# Patient Record
Sex: FEMALE | Race: WHITE | HISPANIC OR LATINO | Employment: FULL TIME | URBAN - METROPOLITAN AREA
[De-identification: names, ages, dates, MRNs, and addresses within clinical notes are randomized per-mention and may not be internally consistent; named-entity substitution may affect disease eponyms.]

---

## 2017-02-22 ENCOUNTER — GENERIC CONVERSION - ENCOUNTER (OUTPATIENT)
Dept: OTHER | Facility: OTHER | Age: 52
End: 2017-02-22

## 2017-04-03 ENCOUNTER — ALLSCRIPTS OFFICE VISIT (OUTPATIENT)
Dept: OTHER | Facility: OTHER | Age: 52
End: 2017-04-03

## 2017-04-03 DIAGNOSIS — E11.9 TYPE 2 DIABETES MELLITUS WITHOUT COMPLICATIONS (HCC): ICD-10-CM

## 2017-04-03 DIAGNOSIS — R14.0 ABDOMINAL DISTENSION (GASEOUS): ICD-10-CM

## 2017-09-12 ENCOUNTER — ALLSCRIPTS OFFICE VISIT (OUTPATIENT)
Dept: OTHER | Facility: OTHER | Age: 52
End: 2017-09-12

## 2017-09-12 DIAGNOSIS — N83.209 CYST OF OVARY: ICD-10-CM

## 2017-09-12 DIAGNOSIS — M25.571 PAIN IN RIGHT ANKLE: ICD-10-CM

## 2017-09-12 DIAGNOSIS — D64.9 ANEMIA: ICD-10-CM

## 2017-09-12 DIAGNOSIS — Z12.31 ENCOUNTER FOR SCREENING MAMMOGRAM FOR MALIGNANT NEOPLASM OF BREAST: ICD-10-CM

## 2017-09-12 DIAGNOSIS — R92.2 INCONCLUSIVE MAMMOGRAM: ICD-10-CM

## 2017-09-12 DIAGNOSIS — E11.9 TYPE 2 DIABETES MELLITUS WITHOUT COMPLICATIONS (HCC): ICD-10-CM

## 2017-09-12 PROCEDURE — G0145 SCR C/V CYTO,THINLAYER,RESCR: HCPCS | Performed by: NURSE PRACTITIONER

## 2017-09-15 ENCOUNTER — LAB REQUISITION (OUTPATIENT)
Dept: LAB | Facility: HOSPITAL | Age: 52
End: 2017-09-15
Payer: COMMERCIAL

## 2017-09-15 DIAGNOSIS — Z01.419 ENCOUNTER FOR GYNECOLOGICAL EXAMINATION WITHOUT ABNORMAL FINDING: ICD-10-CM

## 2017-09-20 ENCOUNTER — ALLSCRIPTS OFFICE VISIT (OUTPATIENT)
Dept: OTHER | Facility: OTHER | Age: 52
End: 2017-09-20

## 2017-09-22 ENCOUNTER — GENERIC CONVERSION - ENCOUNTER (OUTPATIENT)
Dept: OTHER | Facility: OTHER | Age: 52
End: 2017-09-22

## 2017-09-23 ENCOUNTER — HOSPITAL ENCOUNTER (OUTPATIENT)
Dept: ULTRASOUND IMAGING | Facility: HOSPITAL | Age: 52
Discharge: HOME | End: 2017-09-23
Payer: COMMERCIAL

## 2017-09-23 DIAGNOSIS — N83.209 CYST OF OVARY: ICD-10-CM

## 2017-09-23 PROCEDURE — 76830 TRANSVAGINAL US NON-OB: CPT

## 2017-09-23 PROCEDURE — 76856 US EXAM PELVIC COMPLETE: CPT

## 2017-09-24 LAB
LAB AP GYN PRIMARY INTERPRETATION: NORMAL
Lab: NORMAL

## 2017-09-25 ENCOUNTER — GENERIC CONVERSION - ENCOUNTER (OUTPATIENT)
Dept: OTHER | Facility: OTHER | Age: 52
End: 2017-09-25

## 2017-10-02 ENCOUNTER — GENERIC CONVERSION - ENCOUNTER (OUTPATIENT)
Dept: OTHER | Facility: OTHER | Age: 52
End: 2017-10-02

## 2017-10-09 ENCOUNTER — HOSPITAL ENCOUNTER (OUTPATIENT)
Dept: RADIOLOGY | Facility: HOSPITAL | Age: 52
Discharge: HOME | End: 2017-10-09
Payer: COMMERCIAL

## 2017-10-09 ENCOUNTER — TRANSCRIBE ORDERS (OUTPATIENT)
Dept: ADMINISTRATIVE | Facility: HOSPITAL | Age: 52
End: 2017-10-09

## 2017-10-09 DIAGNOSIS — M25.571 PAIN IN RIGHT ANKLE: ICD-10-CM

## 2017-10-09 PROCEDURE — 73610 X-RAY EXAM OF ANKLE: CPT

## 2017-10-11 ENCOUNTER — APPOINTMENT (OUTPATIENT)
Dept: LAB | Facility: CLINIC | Age: 52
End: 2017-10-11
Payer: COMMERCIAL

## 2017-10-11 ENCOUNTER — ALLSCRIPTS OFFICE VISIT (OUTPATIENT)
Dept: OTHER | Facility: OTHER | Age: 52
End: 2017-10-11

## 2017-10-11 DIAGNOSIS — E11.9 TYPE 2 DIABETES MELLITUS WITHOUT COMPLICATIONS (HCC): ICD-10-CM

## 2017-10-11 DIAGNOSIS — D64.9 ANEMIA: ICD-10-CM

## 2017-10-11 LAB
BACTERIA UR QL AUTO: NORMAL /HPF
BASOPHILS # BLD AUTO: 0.05 THOUSANDS/ΜL (ref 0–0.1)
BASOPHILS NFR BLD AUTO: 1 % (ref 0–1)
BILIRUB UR QL STRIP: NEGATIVE
CLARITY UR: CLEAR
COLOR UR: YELLOW
EOSINOPHIL # BLD AUTO: 0.12 THOUSAND/ΜL (ref 0–0.61)
EOSINOPHIL NFR BLD AUTO: 1 % (ref 0–6)
ERYTHROCYTE [DISTWIDTH] IN BLOOD BY AUTOMATED COUNT: 19.1 % (ref 11.6–15.1)
EST. AVERAGE GLUCOSE BLD GHB EST-MCNC: 180 MG/DL
FERRITIN SERPL-MCNC: 3 NG/ML (ref 8–388)
GLUCOSE UR STRIP-MCNC: ABNORMAL MG/DL
HBA1C MFR BLD: 7.9 % (ref 4.2–6.3)
HCT VFR BLD AUTO: 31.2 % (ref 34.8–46.1)
HGB BLD-MCNC: 9 G/DL (ref 11.5–15.4)
HGB UR QL STRIP.AUTO: NEGATIVE
HYALINE CASTS #/AREA URNS LPF: NORMAL /LPF
IRON SATN MFR SERPL: 22 %
IRON SERPL-MCNC: 97 UG/DL (ref 50–170)
KETONES UR STRIP-MCNC: NEGATIVE MG/DL
LEUKOCYTE ESTERASE UR QL STRIP: NEGATIVE
LYMPHOCYTES # BLD AUTO: 1.62 THOUSANDS/ΜL (ref 0.6–4.47)
LYMPHOCYTES NFR BLD AUTO: 19 % (ref 14–44)
MCH RBC QN AUTO: 22 PG (ref 26.8–34.3)
MCHC RBC AUTO-ENTMCNC: 28.8 G/DL (ref 31.4–37.4)
MCV RBC AUTO: 76 FL (ref 82–98)
MONOCYTES # BLD AUTO: 0.62 THOUSAND/ΜL (ref 0.17–1.22)
MONOCYTES NFR BLD AUTO: 7 % (ref 4–12)
NEUTROPHILS # BLD AUTO: 5.95 THOUSANDS/ΜL (ref 1.85–7.62)
NEUTS SEG NFR BLD AUTO: 72 % (ref 43–75)
NITRITE UR QL STRIP: NEGATIVE
NON-SQ EPI CELLS URNS QL MICRO: NORMAL /HPF
NRBC BLD AUTO-RTO: 0 /100 WBCS
PH UR STRIP.AUTO: 6 [PH] (ref 4.5–8)
PLATELET # BLD AUTO: 324 THOUSANDS/UL (ref 149–390)
PMV BLD AUTO: 9.9 FL (ref 8.9–12.7)
PROT UR STRIP-MCNC: NEGATIVE MG/DL
RBC # BLD AUTO: 4.09 MILLION/UL (ref 3.81–5.12)
RBC #/AREA URNS AUTO: NORMAL /HPF
SP GR UR STRIP.AUTO: 1.02 (ref 1–1.03)
TIBC SERPL-MCNC: 451 UG/DL (ref 250–450)
TRANSFERRIN SERPL-MCNC: 354 MG/DL (ref 200–400)
TSH SERPL DL<=0.05 MIU/L-ACNC: 1.7 UIU/ML (ref 0.36–3.74)
UROBILINOGEN UR QL STRIP.AUTO: 0.2 E.U./DL
VIT B12 SERPL-MCNC: 453 PG/ML (ref 100–900)
WBC # BLD AUTO: 8.38 THOUSAND/UL (ref 4.31–10.16)
WBC #/AREA URNS AUTO: NORMAL /HPF

## 2017-10-11 PROCEDURE — 84466 ASSAY OF TRANSFERRIN: CPT

## 2017-10-11 PROCEDURE — 82607 VITAMIN B-12: CPT

## 2017-10-11 PROCEDURE — 83036 HEMOGLOBIN GLYCOSYLATED A1C: CPT

## 2017-10-11 PROCEDURE — 82728 ASSAY OF FERRITIN: CPT

## 2017-10-11 PROCEDURE — 83540 ASSAY OF IRON: CPT

## 2017-10-11 PROCEDURE — 84443 ASSAY THYROID STIM HORMONE: CPT

## 2017-10-11 PROCEDURE — 36415 COLL VENOUS BLD VENIPUNCTURE: CPT

## 2017-10-11 PROCEDURE — 81001 URINALYSIS AUTO W/SCOPE: CPT

## 2017-10-11 PROCEDURE — 85025 COMPLETE CBC W/AUTO DIFF WBC: CPT

## 2017-10-11 PROCEDURE — 83550 IRON BINDING TEST: CPT

## 2017-10-12 ENCOUNTER — GENERIC CONVERSION - ENCOUNTER (OUTPATIENT)
Dept: OTHER | Facility: OTHER | Age: 52
End: 2017-10-12

## 2017-10-12 ENCOUNTER — TRANSCRIBE ORDERS (OUTPATIENT)
Dept: ADMINISTRATIVE | Facility: HOSPITAL | Age: 52
End: 2017-10-12

## 2017-10-13 NOTE — PROGRESS NOTES
Assessment  1  Ankle pain, right (719 47) (M25 571)   2  Anemia, unspecified type (285 9) (D64 9)   3  Controlled diabetes mellitus (250 00) (E11 9)   4  Hyperlipidemia (272 4) (E78 5)   5  Hypertension (401 9) (I10)    Plan  Anemia, unspecified type    · (1) CBC/PLT/DIFF; Status:Active; Requested for:11Oct2017;    · (1) IRON PANEL; Status:Active; Requested for:11Oct2017;    · (1) IRON SATURATION %, TIBC; Status:Active; Requested for:11Oct2017;    · (1) OCCULT BLOOD, FECAL IMMUNOCHEMICAL TEST; Status:Active; Requested for:11Oct2017;    · (1) TRANSFERRIN; Status:Active; Requested for:11Oct2017;    · (1) TSH; Status:Active; Requested for:11Oct2017;    · (1) URINALYSIS w URINE C/S REFLEX (will reflex a microscopy if leukocytes, occult blood, or  nitrites are not within normal limits); Status:Active; Requested for:11Oct2017;    · (1) VITAMIN B12; Status:Active; Requested for:11Oct2017;    · COLONOSCOPY; Status:Active; Requested for:11Oct2017;    · EGD; Status:Hold For - Scheduling; Requested for:11Oct2017;    · 1 - Keli RIVERA, Jimena Garcia  (Gastroenterology) Co-Management  *  Status: Active  Requested for:  18KVH4949  Care Summary provided  : Yes   · Follow-up visit in 1 week Evaluation and Treatment  Follow-up  Status: Hold For - Scheduling   Requested for: 53GTK4779  Anemia, unspecified type, Controlled diabetes mellitus    · (1) HEMOGLOBIN A1C; Status:Active; Requested for:11Oct2017;     Discussion/Summary  Discussion Summary:   Asymptomatic microcytic anemia  We will embark on a workup to be done as soon as possible  She will take an iron supplement and return here or emergency room if she develops bloody stools shortness of breath chest pain palpitations  Medication SE Review and Pt Understands Tx: Possible side effects of new medications were reviewed with the patient/guardian today  The treatment plan was reviewed with the patient/guardian   The patient/guardian understands and agrees with the treatment plan Chief Complaint  Chief Complaint Free Text Note Form: Anemia      History of Present Illness  HPI: She was seen here 3 weeks ago because of pain in her right ankle with weightbearing  Also some swelling of her right ankle at the end of the day  Felt to have tendinitis of her ankle x-rays were negative she had her lab work done yesterday and surprisingly her hemoglobin is 8 5  She has no symptoms of anemia  No shortness of breath chest pain palpitations dizziness abdominal pain  Bowels have been moving normally no vaginal bleeding no bruising or abdominal masses normally active and well working everyday  Her blood sugars have been in the 100s  She has a history of iron deficiency anemia with workup about 2 years ago  No definite bleeding source found  She has not required transfusions   Hyperlipidemia (Follow-Up): Comorbid Illnesses: diabetes mellitus-and-hypertension  She has no significant interval events  Symptoms: The patient is currently asymptomatic  The patient is doing well with her hyperlipidemia goals  Hypertension (Follow-Up): The patient presents for follow-up of essential hypertension  The patient states she has been doing well with her blood pressure control since the last visit  She has no comorbid illnesses  She has no significant interval events  Symptoms: The patient is currently asymptomatic  Disease Management: the patient is doing well with her blood pressure goals  Diabetes Type II (Follow-Up): The patient states she has been doing well with her Type II Diabetes control since the last visit  Comorbid Illnesses: hypertension,-hyperlipidemia-and-obesity  She has no known diabetic complications  She has no significant interval events  Symptoms:   Home monitoring: Glycemic control has been good  The patient is doing well with her diabetes goals        Review of Systems  Complete-Female:   Constitutional: No fever, no chills, feels well, no tiredness, no recent weight gain or weight loss    Eyes: No complaints of eye pain, no red eyes, no eyesight problems, no discharge, no dry eyes, no itching of eyes  ENT: no complaints of earache, no loss of hearing, no nose bleeds, no nasal discharge, no sore throat, no hoarseness  Cardiovascular: No complaints of slow heart rate, no fast heart rate, no chest pain, no palpitations, no leg claudication, no lower extremity edema  Respiratory: No complaints of shortness of breath, no wheezing, no cough, no SOB on exertion, no orthopnea, no PND  Gastrointestinal: No complaints of abdominal pain, no constipation, no nausea or vomiting, no diarrhea, no bloody stools  Genitourinary: No complaints of dysuria, no incontinence, no pelvic pain, no dysmenorrhea, no vaginal discharge or bleeding  Musculoskeletal: as noted in HPI  Integumentary: No complaints of skin rash or lesions, no itching, no skin wounds, no breast pain or lump  Neurological: No complaints of headache, no confusion, no convulsions, no numbness, no dizziness or fainting, no tingling, no limb weakness, no difficulty walking  Psychiatric: Not suicidal, no sleep disturbance, no anxiety or depression, no change in personality, no emotional problems  Endocrine: No complaints of proptosis, no hot flashes, no muscle weakness, no deepening of the voice, no feelings of weakness  Hematologic/Lymphatic: No complaints of swollen glands, no swollen glands in the neck, does not bleed easily, does not bruise easily  ROS Reviewed:   ROS reviewed  Active Problems  1  Abdominal bloating (787 3) (R14 0)   2  Adjustment disorder with anxiety (309 24) (F43 22)   3  Ankle pain, right (719 47) (M25 571)   4  Constipation (564 00) (K59 00)   5  Controlled diabetes mellitus (250 00) (E11 9)   6  Dense breasts (793 82) (R92 2)   7  Encounter for gynecological examination with abnormal finding (V72 31) (Z01 411)   8   Encounter for gynecological examination without abnormal finding (V72 31) (Z01 419)   9  Encounter for screening mammogram for malignant neoplasm of breast (V76 12) (Z12 31)   10  Esophageal reflux (530 81) (K21 9)   11  Heart murmur (785 2) (R01 1)   12  Hyperlipidemia (272 4) (E78 5)   13  Hypertension (401 9) (I10)   14  Iron deficiency anemia (280 9) (D50 9)   15  Ovarian cyst (620 2) (N83 209)   16  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)   17  Restless leg syndrome (333 94) (G25 81)   18  Trigger finger (727 03) (M65 30)    Past Medical History  1  History of cerebral artery occlusion (V12 59) (Z86 79)   2  History of chest pain (V13 89) (Z87 898)   3  History of trigger finger (V13 59) (Z87 39)   4  History of Paroxysmal ventricular tachycardia (427 1) (I47 2)  Active Problems And Past Medical History Reviewed: The active problems and past medical history were reviewed and updated today  Surgical History  1  History of Cholecystectomy  Surgical History Reviewed: The surgical history was reviewed and updated today  Family History  Mother    1  Family history of Tuberculosis  Family History Reviewed: The family history was reviewed and updated today  Social History   · Current Every Day Smoker (305 1)   · Never Used Drugs   · No alcohol use   · Sexually Active  Social History Reviewed: The social history was reviewed and updated today  Current Meds   1  Accu-Chek Isis Device; USE AS DIRECTED; Therapy: 06KTH9110 to (Last Rx:11Nov2014)  Requested for: 99KKJ1176 Ordered   2  Accu-Chek Isis Plus In Vitro Strip; TEST 4 TIMES A DAY; Therapy: 07WUF3714 to (26 100188)  Requested for: 02JEQ5260; Last Rx:09Jan2017   Ordered   3  Accu-Chek Isis STRP; TEST 1 QD; Therapy: 14VSY8240 to (Last Rx:11Nov2014)  Requested for: 76LNK5271 Ordered   4  Accu-Chek FastClix Lancets Miscellaneous; TEST 1 QD; Therapy: 76BIL6860 to (Last Rx:11Nov2014)  Requested for: 42CNG1604 Ordered   5   Accu-Chek Softclix Lancets Miscellaneous; TEST AS DIRECTED; Therapy: 68FOS2858 to (Evaluate:85Oha3825)  Requested for: 68Svk8710; Last Rx:03Apr2017   Ordered   6  Atorvastatin Calcium 20 MG Oral Tablet; take 1 tablet every day; Therapy: 04FWZ0736 to (Evaluate:06Nnb4216)  Requested for: 77PGJ7030; Last Rx:84Flr8323   Ordered   7  Biotin CAPS; Therapy: (Recorded:25Mar2016) to Recorded   8  Calcium TABS; Therapy: (Recorded:25Mar2016) to Recorded   9  Daily Multivitamin TABS; Therapy: (Recorded:25Mar2016) to Recorded   10  Fish Oil 1000 MG Oral Capsule; TAKE 1 CAPSULE DAILY; Therapy: (Recorded:01Apr2014) to Recorded   11  Gabapentin 100 MG Oral Capsule Recorded   12  Glimepiride 2 MG Oral Tablet; take 1 tablet by mouth every day; Therapy: 83Qty5148 to (Evaluate:08Nov2016)  Requested for: 81HUY1512; Last Rx:92Mvx1267    Ordered   13  Invokana 300 MG Oral Tablet Recorded   14  Januvia 50 MG Oral Tablet Recorded   15  Lisinopril 10 MG Oral Tablet; Therapy: 85QSG4203 to Recorded   16  MetFORMIN HCl - 1000 MG Oral Tablet; TAKE 1 TABLET twice a day with meals; Therapy: 65SRB2350 to (Last Munir Bud)  Requested for: 62BPI5177 Ordered   17  Omeprazole 20 MG Oral Capsule Delayed Release; TAKE ONE CAPSULE EVERY MORNING BRFORE    BREAKFAST; Therapy: 34ZDT4630 to (Evaluate:21May2017)  Requested for: 81MNI7208; Last Rx:22Nov2016;    Status: ACTIVE - Renewal Denied Ordered   18  Super B-Complex TABS; Therapy: (Recorded:76Xvm4082) to Recorded  Medication List Reviewed: The medication list was reviewed and updated today  Allergies  1  No Known Drug Allergies    Vitals  Vital Signs    Recorded: 07NMT3275 08:20AM   Heart Rate 74   Respiration 14   Systolic 938   Diastolic 70   Height 5 ft 4 5 in   Weight 178 lb 8 oz   BMI Calculated 30 17   BSA Calculated 1 87     Physical Exam    Constitutional   General appearance: Abnormal   obese  Eyes   Conjunctiva and lids: No swelling, erythema or discharge  Pupils and irises: Equal, round and reactive to light  Ears, Nose, Mouth, and Throat   External inspection of ears and nose: Normal     Otoscopic examination: Tympanic membranes translucent with normal light reflex  Canals patent without erythema  Nasal mucosa, septum, and turbinates: Normal without edema or erythema  Oropharynx: Normal with no erythema, edema, exudate or lesions  Pulmonary   Respiratory effort: No increased work of breathing or signs of respiratory distress  Auscultation of lungs: Clear to auscultation  Cardiovascular   Palpation of heart: Normal PMI, no thrills  Auscultation of heart: Normal rate and rhythm, normal S1 and S2, without murmurs  Examination of extremities for edema and/or varicosities: Normal     Carotid pulses: Normal     Abdomen   Abdomen: Non-tender, no masses  Liver and spleen: No hepatomegaly or splenomegaly  Lymphatic   Palpation of lymph nodes in neck: No lymphadenopathy  Musculoskeletal   Gait and station: Normal     Digits and nails: Normal without clubbing or cyanosis  Inspection/palpation of joints, bones, and muscles: Normal     Skin   Skin and subcutaneous tissue: Normal without rashes or lesions  Neurologic   Cranial nerves: Cranial nerves 2-12 intact  Reflexes: 2+ and symmetric  Sensation: No sensory loss  Psychiatric   Orientation to person, place, and time: Normal     Mood and affect: Normal          Health Management  Health Maintenance   COLONOSCOPY; every 10 years; Last 70ARP8218; Next Due: 92Ktt4561;  Active    Future Appointments    Date/Time Provider Specialty Site   10/12/2017 03:00 PM Emre Barrera, [address]  Gastroenterology Adult [facility] GASTROENTEROLOGY [facility]   10/24/2017 04:00 PM ANN Carey Obstetrics/Gynecology [facility]   10/18/2017 04:00 PM Telma Maravilla [facility] Internal Medicine [facility]     Signatures   Electronically signed by : Dre Coronado [facility]; Oct 11 2017 10:29AM EST                       (Author) Electronically signed by : CHRISTOPHER Zamora ; Oct 11 2017  7:45PM EST

## 2017-10-14 ENCOUNTER — APPOINTMENT (OUTPATIENT)
Dept: LAB | Facility: CLINIC | Age: 52
End: 2017-10-14
Payer: COMMERCIAL

## 2017-10-14 DIAGNOSIS — D64.9 ANEMIA: ICD-10-CM

## 2017-10-14 LAB — HEMOCCULT STL QL IA: NEGATIVE

## 2017-10-14 PROCEDURE — G0328 FECAL BLOOD SCRN IMMUNOASSAY: HCPCS

## 2017-10-16 RX ORDER — GLIMEPIRIDE 2 MG/1
2 TABLET ORAL AS NEEDED
COMMUNITY
End: 2019-02-06

## 2017-10-16 RX ORDER — DIPHENOXYLATE HYDROCHLORIDE AND ATROPINE SULFATE 2.5; .025 MG/1; MG/1
1 TABLET ORAL DAILY
COMMUNITY
End: 2018-06-18 | Stop reason: CLARIF

## 2017-10-16 RX ORDER — ATORVASTATIN CALCIUM 20 MG/1
20 TABLET, FILM COATED ORAL DAILY
COMMUNITY
End: 2018-09-11 | Stop reason: SDUPTHER

## 2017-10-16 RX ORDER — OMEPRAZOLE 20 MG/1
20 CAPSULE, DELAYED RELEASE ORAL DAILY
COMMUNITY
End: 2018-10-22 | Stop reason: SDUPTHER

## 2017-10-16 RX ORDER — GABAPENTIN 100 MG/1
300 CAPSULE ORAL DAILY
COMMUNITY
End: 2019-06-15 | Stop reason: SDUPTHER

## 2017-10-16 RX ORDER — LISINOPRIL 10 MG/1
10 TABLET ORAL DAILY
COMMUNITY
End: 2019-10-07 | Stop reason: SDUPTHER

## 2017-10-16 RX ORDER — CHLORAL HYDRATE 500 MG
1000 CAPSULE ORAL DAILY
COMMUNITY

## 2017-10-18 ENCOUNTER — ANESTHESIA EVENT (OUTPATIENT)
Dept: PERIOP | Facility: HOSPITAL | Age: 52
End: 2017-10-18
Payer: COMMERCIAL

## 2017-10-19 ENCOUNTER — HOSPITAL ENCOUNTER (OUTPATIENT)
Facility: HOSPITAL | Age: 52
Setting detail: OUTPATIENT SURGERY
Discharge: HOME | End: 2017-10-19
Attending: INTERNAL MEDICINE | Admitting: INTERNAL MEDICINE
Payer: COMMERCIAL

## 2017-10-19 ENCOUNTER — ANESTHESIA (OUTPATIENT)
Dept: PERIOP | Facility: HOSPITAL | Age: 52
End: 2017-10-19
Payer: COMMERCIAL

## 2017-10-19 ENCOUNTER — GENERIC CONVERSION - ENCOUNTER (OUTPATIENT)
Dept: OTHER | Facility: OTHER | Age: 52
End: 2017-10-19

## 2017-10-19 VITALS
TEMPERATURE: 97.1 F | HEIGHT: 65 IN | WEIGHT: 173 LBS | DIASTOLIC BLOOD PRESSURE: 65 MMHG | RESPIRATION RATE: 22 BRPM | SYSTOLIC BLOOD PRESSURE: 126 MMHG | OXYGEN SATURATION: 100 % | HEART RATE: 78 BPM | BODY MASS INDEX: 28.82 KG/M2

## 2017-10-19 LAB — GLUCOSE SERPL-MCNC: 152 MG/DL (ref 65–140)

## 2017-10-19 PROCEDURE — 82948 REAGENT STRIP/BLOOD GLUCOSE: CPT

## 2017-10-19 RX ORDER — SODIUM CHLORIDE, SODIUM LACTATE, POTASSIUM CHLORIDE, CALCIUM CHLORIDE 600; 310; 30; 20 MG/100ML; MG/100ML; MG/100ML; MG/100ML
125 INJECTION, SOLUTION INTRAVENOUS CONTINUOUS
Status: DISCONTINUED | OUTPATIENT
Start: 2017-10-19 | End: 2017-10-19 | Stop reason: HOSPADM

## 2017-10-19 RX ORDER — PROPOFOL 10 MG/ML
INJECTION, EMULSION INTRAVENOUS AS NEEDED
Status: DISCONTINUED | OUTPATIENT
Start: 2017-10-19 | End: 2017-10-19 | Stop reason: SURG

## 2017-10-19 RX ORDER — LIDOCAINE HYDROCHLORIDE 10 MG/ML
INJECTION, SOLUTION INFILTRATION; PERINEURAL AS NEEDED
Status: DISCONTINUED | OUTPATIENT
Start: 2017-10-19 | End: 2017-10-19 | Stop reason: SURG

## 2017-10-19 RX ADMIN — PROPOFOL 150 MG: 10 INJECTION, EMULSION INTRAVENOUS at 07:49

## 2017-10-19 RX ADMIN — PROPOFOL 20 MG: 10 INJECTION, EMULSION INTRAVENOUS at 07:50

## 2017-10-19 RX ADMIN — SODIUM CHLORIDE, POTASSIUM CHLORIDE, SODIUM LACTATE AND CALCIUM CHLORIDE 125 ML/HR: 600; 310; 30; 20 INJECTION, SOLUTION INTRAVENOUS at 07:04

## 2017-10-19 RX ADMIN — LIDOCAINE HYDROCHLORIDE 50 MG: 10 INJECTION, SOLUTION INFILTRATION; PERINEURAL at 07:49

## 2017-10-19 RX ADMIN — PROPOFOL 50 MG: 10 INJECTION, EMULSION INTRAVENOUS at 07:52

## 2017-10-19 RX ADMIN — PROPOFOL 50 MG: 10 INJECTION, EMULSION INTRAVENOUS at 07:55

## 2017-10-19 RX ADMIN — SODIUM CHLORIDE, POTASSIUM CHLORIDE, SODIUM LACTATE AND CALCIUM CHLORIDE: 600; 310; 30; 20 INJECTION, SOLUTION INTRAVENOUS at 07:40

## 2017-10-19 RX ADMIN — PROPOFOL 30 MG: 10 INJECTION, EMULSION INTRAVENOUS at 07:57

## 2017-10-19 NOTE — OP NOTE
**** GI/ENDOSCOPY REPORT ****     PATIENT NAME: Theressa Sicard ------ VISIT ID:  Patient ID:   [id number] YOB: 1965     INTRODUCTION: Colonoscopy - A 47 female patient presents for an outpatient   Colonoscopy at [facility]  PREVIOUS COLONOSCOPY: 3 yrs     INDICATIONS: Iron deficiency anemia  CONSENT:  The benefits, risks, and alternatives to the procedure were   discussed and informed consent was obtained from the patient  PREPARATION: EKG, pulse, pulse oximetry and blood pressure were monitored   throughout the procedure  The patient was identified by myself both   verbally and by visual inspection of ID band  Airway Assessment   Classification: Airway class 2 - Visualization of the soft palate, fauces   and uvula  ASA Classification: Class 2 - Patient has mild to moderate   systemic disturbance that may or may not be related to the disorder   requiring surgery  MEDICATIONS: Anesthesia-check records     PROCEDURE:  The endoscope was passed without difficulty through the anus   under direct visualization and advanced to the cecum, confirmed by   appendiceal orifice and ileocecal valve  The scope was withdrawn and the   mucosa was carefully examined  The quality of the preparation was   excellent  Cecal Intubation Time: 3 minutes(s) Scope Withdrawal Time: 3   minutes(s)     RECTAL EXAM: Normal rectal exam      FINDINGS:  The colonoscopy examination was completely normal      COMPLICATIONS: There were no complications  IMPRESSIONS: Normal colonoscopy  RECOMMENDATIONS: Pill cam - esophagus and small bowel recommended  Colonoscopy recommended in 10 years  Yearly FIT test with PCP Every 3 year   Cologuard test with PCP     ESTIMATED BLOOD LOSS: None       PATHOLOGY SPECIMENS: No     PROCEDURE CODES: Colonoscopy     ICD-9 Codes: 280 9 Iron deficiency anemia, unspecified     ICD-10 Codes: D50 9 Iron deficiency anemia, unspecified     PERFORMED BY: Dr Du Lane CHRISTOPHER Tariq  on 10/19/2017  Version 1, electronically signed by CHRISTOPHER Burgos , D O  on   10/19/2017 at 08:04

## 2017-10-19 NOTE — OP NOTE
**** GI/ENDOSCOPY REPORT ****     PATIENT NAME: Pb Cade - VISIT ID:  Patient ID: [id number]   YOB: 1965     INTRODUCTION: Esophagogastroduodenoscopy - A 47 female patient presents   for an outpatient Esophagogastroduodenoscopy at [address] [place] [address]  INDICATIONS: Iron deficiency anemia  CONSENT: The benefits, risks, and alternatives to the procedure were   discussed and informed consent was obtained from the patient  PREPARATION:  EKG, pulse, pulse oximetry and blood pressure were monitored   throughout the procedure  MEDICATIONS:asa 2     PROCEDURE:  The endoscope was passed without difficulty through the mouth   under direct visualization and advanced to the 3rd portion of the   duodenum  The scope was withdrawn and the mucosa was carefully examined  FINDINGS:  The EGD examination was completely normal   Esophagus: The   esophagus appeared to be normal  The Z line was visualized at 37 cm from   the entry site  Stomach: The antrum, body of the stomach, cardia, fundus,   incisura, and pylorus appeared to be normal   Duodenum: The duodenal bulb,   2nd portion of the duodenum, and 3rd portion of the duodenum appeared to   be normal      COMPLICATIONS: There were no complications  IMPRESSIONS: Normal EGD  Normal esophagus  Z line visualized  Normal   antrum, body of the stomach, cardia, fundus, incisura, and pylorus  Normal   duodenal bulb, 2nd portion of the duodenum, and 3rd portion of the   duodenum  RECOMMENDATIONS: Continue current medications  ESTIMATED BLOOD LOSS: None  PATHOLOGY SPECIMENS: No     PROCEDURE CODES: 95650 - EGD flexible; incl brushing or washing     ICD-9 Codes: 280 9 Iron deficiency anemia, unspecified     ICD-10 Codes: D50 9 Iron deficiency anemia, unspecified     PERFORMED BY: CHRISTOPHER Viramontes  on 10/19/2017  Version 1, electronically signed by CHRISTOPHER Bonilla Mt , D O  on   10/19/2017 at 07:54

## 2017-10-19 NOTE — DISCHARGE INSTRUCTIONS

## 2017-10-19 NOTE — ANESTHESIA POSTPROCEDURE EVALUATION
Post-Op Assessment Note      CV Status:  Stable    Mental Status:  Alert and awake    Hydration Status:  Euvolemic    PONV Controlled:  Controlled    Airway Patency:  Patent    Post Op Vitals Reviewed: Yes          Staff: CRNA           /60 (10/19/17 0802)    Temp     Pulse 74 (10/19/17 0802)   Resp 18 (10/19/17 0802)    SpO2 99 % (10/19/17 0802)

## 2017-10-19 NOTE — ANESTHESIA PREPROCEDURE EVALUATION
Review of Systems/Medical History  Patient summary reviewed        Cardiovascular  Hyperlipidemia, Hypertension , Dysrhythmias, ,   Comment: LEFT VENTRICLE:  Ejection fraction was estimated to be 60 %  There were no regional wall motion abnormalities      MITRAL VALVE:v  There was trace regurgitation      PULMONIC VALVE:  There was trace regurgitation  ,  Pulmonary  Smoker cigarette smoker , Tobacco cessation counseling given, ,        GI/Hepatic    GERD ,        Negative  ROS        Endo/Other  Diabetes type 2 ,      GYN  Negative gynecology ROS          Hematology  Anemia iron deficiency anemia,     Musculoskeletal  Negative musculoskeletal ROS        Neurology  Negative neurology ROS      Psychology   Anxiety,            Physical Exam    Airway    Mallampati score: II  TM Distance: >3 FB  Neck ROM: full     Dental       Cardiovascular  Cardiovascular exam normal    Pulmonary  Pulmonary exam normal     Other Findings        Anesthesia Plan  ASA Score- 2       Anesthesia Type- IV sedation with anesthesia with ASA Monitors  Additional Monitors:   Airway Plan:           Induction- intravenous  Informed Consent- Anesthetic plan and risks discussed with patient

## 2017-10-23 ENCOUNTER — HOSPITAL ENCOUNTER (OUTPATIENT)
Dept: MAMMOGRAPHY | Facility: CLINIC | Age: 52
Discharge: HOME | End: 2017-10-23
Payer: COMMERCIAL

## 2017-10-23 DIAGNOSIS — Z12.31 ENCOUNTER FOR SCREENING MAMMOGRAM FOR MALIGNANT NEOPLASM OF BREAST: ICD-10-CM

## 2017-10-23 DIAGNOSIS — R92.2 INCONCLUSIVE MAMMOGRAM: ICD-10-CM

## 2017-10-23 PROCEDURE — G0202 SCR MAMMO BI INCL CAD: HCPCS

## 2017-10-23 PROCEDURE — 77063 BREAST TOMOSYNTHESIS BI: CPT

## 2017-10-24 ENCOUNTER — ALLSCRIPTS OFFICE VISIT (OUTPATIENT)
Dept: OTHER | Facility: OTHER | Age: 52
End: 2017-10-24

## 2017-10-24 PROCEDURE — 88305 TISSUE EXAM BY PATHOLOGIST: CPT | Performed by: NURSE PRACTITIONER

## 2017-10-25 NOTE — PROGRESS NOTES
Assessment  1  Irregular periods/menstrual cycles (626 4) (N92 6)    Plan  Anemia, unspecified type    · Endoscopy Capsule Esophagus Through Ileum; Status:Active; Requested  WGN:03FJL7232;    Perform:[facility]; XSE:05VNR7000; Last Updated By:Regan Blunt; 10/24/2017 11:15:13 AM;Ordered; For:Anemia, unspecified type; Ordered By:Mekhi Dickey;  Irregular periods/menstrual cycles    · Decreasing the stress in your life may help your condition improve ; Status:Complete;    Done: 68QJZ9212   Ordered; For:Irregular periods/menstrual cycles; Ordered By:Lashay Zendejas;   · Eat a diet high in iron ; Status:Complete;   Done: 12EQJ6830   Ordered; For:Irregular periods/menstrual cycles; Ordered By:Lashay Zendejas;   · Call [phone] if: Bleeding between your menstrual cycles is heavier or is lasting  longer than normal ; Status:Complete;   Done: 09VBE5454   Ordered; For:Irregular periods/menstrual cycles; Ordered By:Lashay Zendejas;   · Call [phone] if: The symptoms are not better in 7 days ; Status:Complete;   Done:  31FXG4746   Ordered; For:Irregular periods/menstrual cycles; Ordered By:Lashay Zendejas;   · Call [phone] if: Your menstrual flow is extremely heavy ; Status:Complete;   Done:  26QKG0240   Ordered; For:Irregular periods/menstrual cycles; Ordered By:Lashay Zendejas;   · Call 911 if: You faint or lose consciousness ; Status:Complete;   Done: 18XBB3592   Ordered; For:Irregular periods/menstrual cycles; Ordered By:Brendan Zendejas Drafts;      Patient will obtain ultrasound and return for possible EMB     Discussion/Summary  Goals and Barriers: The patient has the current Goals: To return to normal Pap smears and better control of menses  The patent has the current Barriers: Patient is a smoker and is perimenopausal    Medication SE Review and Pt Understands Tx: Possible side effects of new medications were reviewed with the patient/guardian today   The treatment plan was reviewed with the patient/guardian  The patient/guardian understands and agrees with the treatment plan   Self Referrals:   Self Referrals: Yes    Female, Adult: health maintenance visit Currently, she eats an adequate diet  Pap test with reflex HPV testing was done today Breast cancer screening: monthly self breast exam was advised, mammogram has been ordered and mammogram is needed every year  Colorectal cancer screening: colonoscopy is needed every ten years and the next colonoscopy is due 2024  Osteoporosis screening: bone mineral density testing is not indicated  Advice and education were given regarding calcium supplements, vitamin D supplements and tobacco cessation  Patient Education Record:   PATIENT EDUCATION RECORD   She is ready to learn  She has no barriers to learning  Chief Complaint  Chief Complaint Free Text Note Form: Patient here for an EMB  History of Present Illness  HPI: Patient is a 48-year-old pleasant female here for EMB  She is a previous patient of mine from [facility]  She states she had an abnormal Pap smear and colposcopy by Dr Farrukh Ni late 2016  Pap smear nml 9/2017  She also had a left ovarian cyst which measured 5 6 cm which was resolved on last u/s 9/2017  She has been skipping up to 6 months with her menses and having occasional hot flashes  She has been irregular for many years  She agrees to EMB  Her last menstrual period was in July 2017  GYN HM, Adult Female [facility]: The patient is being seen for a gynecology evaluation  The last health maintenance visit was 1 year(s) ago  General Health: The patient's health since the last visit is described as good  Lifestyle:  She exercises regularly  -- She uses tobacco  The patient is a current cigarette smoker  -- She denies alcohol use  She reports no current alcohol use  Reproductive health: the patient is perimenopausal--   she reports menstrual problems  Menstrual history:  age at menarche was 4   LMP: the last menstrual period was 7/21/17  The cycles are irregular  Menstrual Problems: pt states is skipping months  -- she uses no contraception  -- she is sexually active  -- pregnancy history: G 7P 2,-- 3(miscarriages: 2 )  Screening: Cervical cancer screening includes a pap smear performed 2016 per pt  Breast cancer screening includes a mammogram performed 3/23/16  Colorectal cancer screening includes a colonoscopy performed 12/1/14  Review of Systems  Focused-Female:   Constitutional: No fever, no chills, feels well, no tiredness, no recent weight gain or loss  Gastrointestinal: constipation,-- diarrhea-- and-- Patient seeing GI for anemia, but-- no abdominal pain,-- no nausea,-- no vomiting-- and-- no blood in stools  Genitourinary: unexplained vaginal bleeding, but-- no dysuria,-- no pelvic pain,-- no vaginal discharge,-- no incontinence-- and-- no dysmenorrhea  ROS Reviewed:   ROS reviewed  Active Problems  1  Abdominal bloating (787 3) (R14 0)   2  Adjustment disorder with anxiety (309 24) (F43 22)   3  Anemia, unspecified type (285 9) (D64 9)   4  Ankle pain, right (719 47) (M25 571)   5  Constipation (564 00) (K59 00)   6  Controlled diabetes mellitus (250 00) (E11 9)   7  Dense breasts (793 82) (R92 2)   8  Esophageal reflux (530 81) (K21 9)   9  Heart murmur (785 2) (R01 1)   10  Hyperlipidemia (272 4) (E78 5)   11  Hypertension (401 9) (I10)   12  Iron deficiency anemia (280 9) (D50 9)   13  Ovarian cyst (620 2) (N83 209)   14  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)   15  Restless leg syndrome (333 94) (G25 81)   16  Trigger finger (727 03) (M65 30)    Past Medical History  1  History of cerebral artery occlusion (V12 59) (Z86 79)   2  History of chest pain (V13 89) (Z87 898)   3  History of trigger finger (V13 59) (Z87 39)   4  History of Paroxysmal ventricular tachycardia (427 1) (I47 2)  Active Problems And Past Medical History Reviewed:    The active problems and past medical history were reviewed and updated today  Surgical History  1  History of Cholecystectomy  Surgical History Reviewed: The surgical history was reviewed and updated today  Family History  Mother    1  Family history of Tuberculosis  Family History Reviewed: The family history was reviewed and updated today  Pt has limited fam hx d/t foster care      Social History   · Current Every Day Smoker (305 1)   · Never Used Drugs   · No alcohol use   · Sexually Active  Social History Reviewed: The social history was reviewed and updated today  Current Meds   1  Accu-Chek Isis Device; USE AS DIRECTED; Therapy: 39JUJ5490 to (Last Rx:11Nov2014)  Requested for: 52MFK2511 Ordered   2  Accu-Chek Isis Plus In Vitro Strip; TEST 4 TIMES A DAY; Therapy: 41TCQ3940 to ([facility])  Requested for: 39OCR2226; Last   Rx:09Jan2017 Ordered   3  Accu-Chek Isis STRP; TEST 1 QD; Therapy: 73MWT2235 to (Last Rx:11Nov2014)  Requested for: 00RBK8772 Ordered   4  Accu-Chek FastClix Lancets Miscellaneous; TEST 1 QD; Therapy: 32VGU7679 to (Last Rx:11Nov2014)  Requested for: 39JNQ9498 Ordered   5  Accu-Chek Softclix Lancets Miscellaneous; TEST AS DIRECTED; Therapy: 65LJJ7510 to (Evaluate:42Qho8044)  Requested for: 03Apr2017; Last   Rx:03Apr2017 Ordered   6  Atorvastatin Calcium 20 MG Oral Tablet; take 1 tablet every day; Therapy: 31HNR3397 to (Evaluate:61Ubu2947)  Requested for: 71UUF4862; Last   Rx:05Hfi5603 Ordered   7  Biotin CAPS; Therapy: (Recorded:25Mar2016) to Recorded   8  Calcium TABS; Therapy: (Recorded:25Mar2016) to Recorded   9  Daily Multivitamin TABS; Therapy: (Recorded:25Mar2016) to Recorded   10  Fish Oil 1000 MG Oral Capsule; TAKE 1 CAPSULE DAILY; Therapy: (Recorded:01Apr2014) to Recorded   11  Gabapentin 100 MG Oral Capsule Recorded   12  Glimepiride 2 MG Oral Tablet; take 1 tablet by mouth every day; Therapy: 21Ilf7191 to (Evaluate:08Nov2016)  Requested for: 78JPC4732; Last    Rx:84Zcu0469 Ordered   13  Invokana 300 MG Oral Tablet Recorded   14  Iron TABS; Therapy: (Recorded:24Oct2017) to Recorded   15  Januvia 50 MG Oral Tablet Recorded   16  Lisinopril 10 MG Oral Tablet; Therapy: 63PCZ1872 to Recorded   17  MetFORMIN HCl - 1000 MG Oral Tablet; TAKE 1 TABLET twice a day with meals; Therapy: 27ZZZ7063 to (Last Char Dieter)  Requested for: 49RLW2930 Ordered   18  Omeprazole 20 MG Oral Capsule Delayed Release; TAKE ONE CAPSULE EVERY    MORNING BRFORE BREAKFAST; Therapy: 93OXR2060 to (Evaluate:67Qcw4367)  Requested for: 32FUD9451; Last    Rx:22Nov2016; Status: ACTIVE - Renewal Denied Ordered   19  Super B-Complex TABS; Therapy: (Recorded:44Rnw5134) to Recorded  Medication List Reviewed: The medication list was reviewed and updated today  Allergies  1  No Known Drug Allergies    Vitals  Vital Signs    Recorded: 44XJT1294 04:01PM   Temperature 24 2 F, Oral   Systolic 510, LUE, Sitting   Diastolic 60, LUE, Sitting   Height 5 ft 4 5 in   Weight 176 lb    BMI Calculated 29 74   BSA Calculated 1 86     Physical Exam    Constitutional   General appearance: No acute distress, well appearing and well nourished  Pulmonary   Respiratory effort: No increased work of breathing or signs of respiratory distress  Genitourinary   External genitalia: Normal and no lesions appreciated  Vagina: Normal, no lesions or dryness appreciated  Urethral meatus: Normal     Bladder: Normal, soft, non-tender and no prolapse or masses appreciated  Cervix: Normal, no palpable masses  Psychiatric   Orientation to person, place, and time: Normal     Mood and affect: Normal        Procedure    Procedure: Endometrial biopsy  Indication: abnormal uterine bleeding  Risks, benefits and alternatives were discussed with the patient  We discussed possible complications, including infection,-- bleeding,-- allergic reaction,-- uterine perforation-- and-- pain   written consent was obtained prior to the procedure and

## 2017-10-30 ENCOUNTER — LAB REQUISITION (OUTPATIENT)
Dept: LAB | Facility: HOSPITAL | Age: 52
End: 2017-10-30
Payer: COMMERCIAL

## 2017-10-30 DIAGNOSIS — N92.6 IRREGULAR MENSTRUATION: ICD-10-CM

## 2017-11-03 ENCOUNTER — ALLSCRIPTS OFFICE VISIT (OUTPATIENT)
Dept: OTHER | Facility: OTHER | Age: 52
End: 2017-11-03

## 2017-11-03 ENCOUNTER — GENERIC CONVERSION - ENCOUNTER (OUTPATIENT)
Dept: OTHER | Facility: OTHER | Age: 52
End: 2017-11-03

## 2017-11-06 DIAGNOSIS — R79.89 OTHER SPECIFIED ABNORMAL FINDINGS OF BLOOD CHEMISTRY: ICD-10-CM

## 2017-11-06 DIAGNOSIS — K63.9 DISEASE OF INTESTINE: ICD-10-CM

## 2017-11-06 DIAGNOSIS — M76.71 PERONEAL TENDINITIS OF RIGHT LOWER EXTREMITY: ICD-10-CM

## 2017-11-06 DIAGNOSIS — D64.9 ANEMIA: ICD-10-CM

## 2017-11-06 DIAGNOSIS — D50.9 IRON DEFICIENCY ANEMIA: ICD-10-CM

## 2017-11-07 ENCOUNTER — GENERIC CONVERSION - ENCOUNTER (OUTPATIENT)
Dept: OTHER | Facility: OTHER | Age: 52
End: 2017-11-07

## 2017-11-13 ENCOUNTER — TRANSCRIBE ORDERS (OUTPATIENT)
Dept: LAB | Facility: CLINIC | Age: 52
End: 2017-11-13

## 2017-11-13 ENCOUNTER — GENERIC CONVERSION - ENCOUNTER (OUTPATIENT)
Dept: OTHER | Facility: OTHER | Age: 52
End: 2017-11-13

## 2017-11-13 ENCOUNTER — APPOINTMENT (OUTPATIENT)
Dept: LAB | Facility: CLINIC | Age: 52
End: 2017-11-13
Payer: COMMERCIAL

## 2017-11-13 DIAGNOSIS — D64.9 ANEMIA: ICD-10-CM

## 2017-11-13 LAB
BASOPHILS # BLD AUTO: 0.05 THOUSANDS/ΜL (ref 0–0.1)
BASOPHILS NFR BLD AUTO: 1 % (ref 0–1)
EOSINOPHIL # BLD AUTO: 0.1 THOUSAND/ΜL (ref 0–0.61)
EOSINOPHIL NFR BLD AUTO: 1 % (ref 0–6)
ERYTHROCYTE [DISTWIDTH] IN BLOOD BY AUTOMATED COUNT: 23.3 % (ref 11.6–15.1)
HCT VFR BLD AUTO: 39.2 % (ref 34.8–46.1)
HGB BLD-MCNC: 11.7 G/DL (ref 11.5–15.4)
LYMPHOCYTES # BLD AUTO: 1.85 THOUSANDS/ΜL (ref 0.6–4.47)
LYMPHOCYTES NFR BLD AUTO: 21 % (ref 14–44)
MCH RBC QN AUTO: 24.3 PG (ref 26.8–34.3)
MCHC RBC AUTO-ENTMCNC: 29.8 G/DL (ref 31.4–37.4)
MCV RBC AUTO: 82 FL (ref 82–98)
MONOCYTES # BLD AUTO: 0.54 THOUSAND/ΜL (ref 0.17–1.22)
MONOCYTES NFR BLD AUTO: 6 % (ref 4–12)
NEUTROPHILS # BLD AUTO: 6.14 THOUSANDS/ΜL (ref 1.85–7.62)
NEUTS SEG NFR BLD AUTO: 71 % (ref 43–75)
NRBC BLD AUTO-RTO: 0 /100 WBCS
PLATELET # BLD AUTO: 442 THOUSANDS/UL (ref 149–390)
PMV BLD AUTO: 9.9 FL (ref 8.9–12.7)
RBC # BLD AUTO: 4.81 MILLION/UL (ref 3.81–5.12)
WBC # BLD AUTO: 8.7 THOUSAND/UL (ref 4.31–10.16)

## 2017-11-13 PROCEDURE — 36415 COLL VENOUS BLD VENIPUNCTURE: CPT

## 2017-11-13 PROCEDURE — 85025 COMPLETE CBC W/AUTO DIFF WBC: CPT

## 2017-11-14 ENCOUNTER — HOSPITAL ENCOUNTER (OUTPATIENT)
Dept: CT IMAGING | Facility: HOSPITAL | Age: 52
Discharge: HOME | End: 2017-11-14
Attending: INTERNAL MEDICINE
Payer: COMMERCIAL

## 2017-11-14 DIAGNOSIS — K63.9 DISEASE OF INTESTINE: ICD-10-CM

## 2017-11-14 DIAGNOSIS — D50.9 IRON DEFICIENCY ANEMIA: ICD-10-CM

## 2017-11-14 PROCEDURE — 74160 CT ABDOMEN W/CONTRAST: CPT

## 2017-11-14 RX ADMIN — IOHEXOL 100 ML: 350 INJECTION, SOLUTION INTRAVENOUS at 16:31

## 2017-11-29 ENCOUNTER — ALLSCRIPTS OFFICE VISIT (OUTPATIENT)
Dept: OTHER | Facility: OTHER | Age: 52
End: 2017-11-29

## 2018-01-12 VITALS
WEIGHT: 173.38 LBS | DIASTOLIC BLOOD PRESSURE: 60 MMHG | SYSTOLIC BLOOD PRESSURE: 100 MMHG | HEIGHT: 65 IN | BODY MASS INDEX: 28.89 KG/M2 | TEMPERATURE: 98.3 F | HEART RATE: 72 BPM

## 2018-01-12 VITALS
OXYGEN SATURATION: 97 % | SYSTOLIC BLOOD PRESSURE: 82 MMHG | BODY MASS INDEX: 28.5 KG/M2 | DIASTOLIC BLOOD PRESSURE: 62 MMHG | TEMPERATURE: 98.1 F | WEIGHT: 171.25 LBS | HEART RATE: 87 BPM

## 2018-01-12 VITALS
RESPIRATION RATE: 14 BRPM | BODY MASS INDEX: 29.74 KG/M2 | HEART RATE: 74 BPM | WEIGHT: 178.5 LBS | SYSTOLIC BLOOD PRESSURE: 100 MMHG | HEIGHT: 65 IN | DIASTOLIC BLOOD PRESSURE: 70 MMHG

## 2018-01-12 NOTE — PROGRESS NOTES
History of Present Illness  Care Coordination Encounter Information:   Type of Encounter: Telephonic    Spoke to Patient  Care Coordination SL Nurse [facility]:   The reason for call is to discuss outreach for follow up/needed services and coordination of meeting care plan treatment goals  Patient auto enrolled into care coordination for f/u in regards to health  Multiple calls made  Voicemail left x3 with no return calls  Will close from care coordination at this time  Active Problems    1  Abdominal bloating (787 3) (R14 0)   2  Adjustment disorder with anxiety (309 24) (F43 22)   3  Anemia, unspecified type (285 9) (D64 9)   4  Ankle pain, right (719 47) (M25 571)   5  Constipation (564 00) (K59 00)   6  Controlled diabetes mellitus (250 00) (E11 9)   7  Dense breasts (793 82) (R92 2)   8  Esophageal reflux (530 81) (K21 9)   9  Heart murmur (785 2) (R01 1)   10  Hyperlipidemia (272 4) (E78 5)   11  Hypertension (401 9) (I10)   12  Iron deficiency anemia (280 9) (D50 9)   13  Irregular bleeding (626 4) (N92 6)   14  Irregular periods/menstrual cycles (626 4) (N92 6)   15  Ovarian cyst (620 2) (N83 209)   16  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)   17  Restless leg syndrome (333 94) (G25 81)   18  Trigger finger (727 03) (M65 30)    Past Medical History    1  History of cerebral artery occlusion (V12 59) (Z86 79)   2  History of chest pain (V13 89) (Z87 898)   3  History of trigger finger (V13 59) (Z87 39)   4  History of Paroxysmal ventricular tachycardia (427 1) (I47 2)    Surgical History    1  History of Cholecystectomy    Family History  Mother    1  Family history of Tuberculosis    Social History    · Current Every Day Smoker (305 1)   · Never Used Drugs   · No alcohol use   · Sexually Active    Current Meds    1  Accu-Chek Isis Plus In Vitro Strip; TEST 4 TIMES A DAY; Therapy: 29VZE1364 to (Mohsen Schwartz)  Requested for: 18SID3560; Last   Rx:09Jan2017 Ordered   2   Accu-Chek Softclix Lancets Miscellaneous; TEST AS DIRECTED; Therapy: 63DEA3969 to (Evaluate:87Wmc6257)  Requested for: 85Wlo6417; Last   Rx:03Apr2017 Ordered   3  Glimepiride 2 MG Oral Tablet; take 1 tablet by mouth every day; Therapy: 87Yxu4448 to (Evaluate:08Nov2016)  Requested for: 61EGL2998; Last   Rx:22Lyd8944 Ordered   4  MetFORMIN HCl - 1000 MG Oral Tablet; TAKE 1 TABLET twice a day with meals; Therapy: 17FLH5984 to (Last Rx:36Bel5722)  Requested for: 43MKQ3310 Ordered    5  Accu-Chek Isis Device; USE AS DIRECTED; Therapy: 33ZTC2986 to (Last Rx:11Nov2014)  Requested for: 82FRS8450 Ordered   6  Accu-Chek Isis STRP; TEST 1 QD; Therapy: 91UQO8328 to (Last Rx:11Nov2014)  Requested for: 39DDY9574 Ordered   7  Accu-Chek FastClix Lancets Miscellaneous; TEST 1 QD; Therapy: 63VNE5310 to (Last Rx:11Nov2014)  Requested for: 12NPY7461 Ordered    8  Omeprazole 20 MG Oral Capsule Delayed Release; TAKE ONE CAPSULE EVERY   MORNING BRFORE BREAKFAST; Therapy: 88SMV3376 to (Evaluate:18Umb4745)  Requested for: 62WLN4247; Last   Rx:22Nov2016; Status: ACTIVE - Renewal Denied Ordered    9  Lisinopril 10 MG Oral Tablet; Therapy: 46MIP1556 to Recorded    10  Atorvastatin Calcium 20 MG Oral Tablet (Lipitor); take 1 tablet every day; Therapy: 00UDW4266 to (Evaluate:73Moa5466)  Requested for: 13MRT5071; Last    Rx:49Lwp7749 Ordered    11  Biotin CAPS; Therapy: (Recorded:25Mar2016) to Recorded   12  Calcium TABS; Therapy: (Recorded:25Mar2016) to Recorded   13  Daily Multivitamin TABS; Therapy: (Recorded:25Mar2016) to Recorded   14  Fish Oil 1000 MG Oral Capsule; TAKE 1 CAPSULE DAILY; Therapy: (Recorded:96Fii0037) to Recorded   15  Gabapentin 100 MG Oral Capsule Recorded   16  Invokana 300 MG Oral Tablet Recorded   17  Iron TABS; Therapy: (Recorded:12Uqe2936) to Recorded   18  Januvia 50 MG Oral Tablet Recorded   19  Super B-Complex TABS; Therapy: (Recorded:47Rta1455) to Recorded    Allergies    1   No Known Drug Allergies    Health Management   COLONOSCOPY; every 10 years; Last 80DRZ6771; Next Due: 09NAB5263; Active    End of Encounter Meds    1  Accu-Chek Isis Plus In Vitro Strip; TEST 4 TIMES A DAY; Therapy: 68OKC3677 to (Ernesto Anderson)  Requested for: 87MAE7773; Last   Rx:09Jan2017 Ordered   2  Accu-Chek Softclix Lancets Miscellaneous; TEST AS DIRECTED; Therapy: 49UQP0079 to (Evaluate:99Wwf0374)  Requested for: 83Kgd2569; Last   Rx:57Cgu1731 Ordered   3  Glimepiride 2 MG Oral Tablet; take 1 tablet by mouth every day; Therapy: 13Jxj6139 to (Evaluate:08Nov2016)  Requested for: 18VTV1522; Last   Rx:87Bwa9670 Ordered   4  MetFORMIN HCl - 1000 MG Oral Tablet; TAKE 1 TABLET twice a day with meals; Therapy: 54SAM8375 to (Last Rx:06Bxl6716)  Requested for: 20ZTQ4505 Ordered    5  Accu-Chek Isis Device; USE AS DIRECTED; Therapy: 90FIJ3641 to (Last Rx:11Nov2014)  Requested for: 10KUR9525 Ordered   6  Accu-Chek Isis STRP; TEST 1 QD; Therapy: 67AKI6541 to (Last Rx:11Nov2014)  Requested for: 50QMF2536 Ordered   7  Accu-Chek FastClix Lancets Miscellaneous; TEST 1 QD; Therapy: 51XRE7289 to (Last Rx:11Nov2014)  Requested for: 49DEB0877 Ordered    8  Omeprazole 20 MG Oral Capsule Delayed Release; TAKE ONE CAPSULE EVERY   MORNING BRFORE BREAKFAST; Therapy: 51XSM0178 to (Evaluate:43Jec1558)  Requested for: 39ZSY6446; Last   Rx:22Nov2016; Status: ACTIVE - Renewal Denied Ordered    9  Lisinopril 10 MG Oral Tablet; Therapy: 93HGU4547 to Recorded    10  Atorvastatin Calcium 20 MG Oral Tablet (Lipitor); take 1 tablet every day; Therapy: 05CWG7954 to (Evaluate:55Jjv9492)  Requested for: 44QWM0704; Last    Rx:55Vxj1870 Ordered    11  Biotin CAPS; Therapy: (Recorded:25Mar2016) to Recorded   12  Calcium TABS; Therapy: (Recorded:25Mar2016) to Recorded   13  Daily Multivitamin TABS; Therapy: (Recorded:25Mar2016) to Recorded   14  Fish Oil 1000 MG Oral Capsule; TAKE 1 CAPSULE DAILY;     Therapy: (Recorded:01Apr2014) to Recorded   15  Gabapentin 100 MG Oral Capsule Recorded   16  Invokana 300 MG Oral Tablet Recorded   17  Iron TABS; Therapy: (Recorded:24Oct2017) to Recorded   18  Januvia 50 MG Oral Tablet Recorded   19  Super B-Complex TABS;     Therapy: (Recorded:12Sep2017) to Recorded    Signatures   Electronically signed by : Ana Luisa Vickers RN; Nov  3 2017 12:35PM EST                       (Author)

## 2018-01-13 VITALS
HEIGHT: 65 IN | BODY MASS INDEX: 29.32 KG/M2 | SYSTOLIC BLOOD PRESSURE: 102 MMHG | TEMPERATURE: 99.1 F | DIASTOLIC BLOOD PRESSURE: 60 MMHG | WEIGHT: 176 LBS

## 2018-01-13 NOTE — RESULT NOTES
Verified Results  (1) THIN PREP PAP WITH IMAGING 17RWL2670 08:56AM Neelima Serve     Test Name Result Flag Reference   LAB AP CASE REPORT (Report)     Gynecologic Cytology Report            Case: [id number]                  Authorizing Provider: ANN Faith    Collected:      09/12/2017           First Screen:     ERNST Vences   Received:      09/18/2017 4355        Rescreen:       ERNST Corea                           Specimen:  LIQUID-BASED PAP, SCREENING, Cervix   LAB AP GYN PRIMARY INTERPRETATION      Negative for intraepithelial lesion or malignancy  Electronically signed by ERNST Corea on 9/24/2017 at 8:56 AM   LAB AP GYN SPECIMEN ADEQUACY      Satisfactory for evaluation  Endocervical/transformation zone component present  LAB AP GYN ADDITIONAL INFORMATION (Report)     [employer]'s FDA approved ,  and ThinPrep Imaging System are   utilized with strict adherence to the 's instruction manual to   prepare gynecologic and non-gynecologic cytology specimens for the   production of ThinPrep slides as well as for gynecologic ThinPrep imaging  These processes have been validated by our laboratory and/or by the     The Pap test is not a diagnostic procedure and should not be used as the   sole means to detect cervical cancer  It is only a screening procedure to   aid in the detection of cervical cancer and its precursors  Both   false-negative and false-positive results have been experienced  Your   patient's test result should be interpreted in this context together with   the history and clinical findings

## 2018-01-14 VITALS
DIASTOLIC BLOOD PRESSURE: 60 MMHG | BODY MASS INDEX: 28.82 KG/M2 | WEIGHT: 173 LBS | HEIGHT: 65 IN | SYSTOLIC BLOOD PRESSURE: 118 MMHG

## 2018-01-14 NOTE — MISCELLANEOUS
Message   Recorded as Task   Date: 11/07/2017 01:27 PM, Created By: Andry Morales   Task Name: Follow Up   Assigned To: Kamilah Claudio   Regarding Patient: Kay Harrison, Status: In Progress   Comment:    Lashay Zendejas - 07 Nov 2017 1:27 PM     TASK CREATED  Please notify endometrial biopsy was nml and to just keep an eye on her bleeding patterns with a menses calendar  Thx, advise to call with any problems   Christina Marley - 07 Nov 2017 1:27 PM     TASK IN PROGRESS   Christina Marley 07 Nov 2017 1:51 PM     TASK EDITED  Attempted to leave message with Pt @ [phone]  Spoke with Pt's spouse, Mr Trey Bird, per communication consent form signed on 9/12/17  Mr  Gill you was quite abrupt  Mr  Mekhi Nicholson stated he would give Pt message to call back office  Christina Marley - 07 Nov 2017 4:27 PM     TASK EDITED  Spoke with Pt today via phone call  Pt informed that Edgardo Delong reviewed recent endometrial biopsy result, endometrial biopsy result was normal per ANN Zendejas's review  Pt further informed to keep an eye on her bleeding patterns with a menses calendar per ANN Zendejas's recommendation  Reiterated to Pt that if her symptoms worsen and/or she has any problems, questions or concerns, to contact office  Active Problems    1  Abdominal bloating (787 3) (R14 0)   2  Adjustment disorder with anxiety (309 24) (F43 22)   3  Anemia, unspecified type (285 9) (D64 9)   4  Ankle pain, right (719 47) (M25 571)   5  Constipation (564 00) (K59 00)   6  Controlled diabetes mellitus (250 00) (E11 9)   7  Dense breasts (793 82) (R92 2)   8  Esophageal reflux (530 81) (K21 9)   9  Heart murmur (785 2) (R01 1)   10  Hyperlipidemia (272 4) (E78 5)   11  Hypertension (401 9) (I10)   12  Iron deficiency anemia (280 9) (D50 9)   13  Irregular bleeding (626 4) (N92 6)   14  Irregular periods/menstrual cycles (626 4) (N92 6)   15  Ovarian cyst (620 2) (N83 209)   16   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)   17  Restless leg syndrome (333 94) (G25 81)   18  Small bowel lesion (569 89) (K63 9)   19  Trigger finger (727 03) (M65 30)    Current Meds   1  Accu-Chek Isis Device; USE AS DIRECTED; Therapy: 17ZFU0915 to (Last Rx:11Nov2014)  Requested for: 73DGK4315 Ordered   2  Accu-Chek Isis Plus In Vitro Strip; TEST 4 TIMES A DAY; Therapy: 47DZL5383 to (Oneita Bel)  Requested for: 43SKV0666; Last   Rx:09Jan2017 Ordered   3  Accu-Chek Isis STRP; TEST 1 QD; Therapy: 33QRY6496 to (Last Rx:11Nov2014)  Requested for: 81HFB0884 Ordered   4  Accu-Chek FastClix Lancets Miscellaneous; TEST 1 QD; Therapy: 64KLB7177 to (Last Rx:11Nov2014)  Requested for: 64NIE9747 Ordered   5  Accu-Chek Softclix Lancets Miscellaneous; TEST AS DIRECTED; Therapy: 75QXE6143 to (Evaluate:33Khw1674)  Requested for: 73Ams3439; Last   Rx:03Apr2017 Ordered   6  Atorvastatin Calcium 20 MG Oral Tablet (Lipitor); take 1 tablet every day; Therapy: 59JAV8033 to (Evaluate:16Nns1551)  Requested for: 17LXG4278; Last   Rx:49Jlp4451 Ordered   7  Biotin CAPS; Therapy: (Recorded:25Mar2016) to Recorded   8  Calcium TABS; Therapy: (Recorded:25Mar2016) to Recorded   9  Daily Multivitamin TABS; Therapy: (Recorded:25Mar2016) to Recorded   10  Fish Oil 1000 MG Oral Capsule; TAKE 1 CAPSULE DAILY; Therapy: (Recorded:03Qpx8094) to Recorded   11  Gabapentin 100 MG Oral Capsule Recorded   12  Glimepiride 2 MG Oral Tablet; take 1 tablet by mouth every day; Therapy: 90Vhf1664 to (Evaluate:07Qwx1346)  Requested for: 04YFX3608; Last    Rx:47Mwp8239 Ordered   13  Invokana 300 MG Oral Tablet Recorded   14  Iron TABS; Therapy: (Recorded:69Zyj0223) to Recorded   15  Januvia 50 MG Oral Tablet Recorded   16  Lisinopril 10 MG Oral Tablet; Therapy: 13YVB2357 to Recorded   17  MetFORMIN HCl - 1000 MG Oral Tablet; TAKE 1 TABLET twice a day with meals; Therapy: 09YVA5378 to (Hamilton Balderas)  Requested for: 88OQG9027 Ordered   18  Omeprazole 20 MG Oral Capsule Delayed Release; TAKE ONE CAPSULE EVERY    MORNING BRFORE BREAKFAST; Therapy: 89ROR7405 to (Evaluate:43Sxc2289)  Requested for: 55JHD9110; Last    Rx:22Nov2016; Status: ACTIVE - Renewal Denied Ordered   19  Super B-Complex TABS; Therapy: (Recorded:34Phx6627) to Recorded    Allergies    1   No Known Drug Allergies    Signatures   Electronically signed by : Jessie Tinsley MA; Nov 7 2017  4:27PM EST                       (Author)

## 2018-01-14 NOTE — RESULT NOTES
Message  Records review from [facility]  Patient had colposcopy in October 2016 by Dr Brandon Parnell KAREN 1 at 9:00  Also had colposcopy in 2014 which was benign per Dr Sameer Sharma  November 2016 pelvic ultrasound showed a 5 6 cm left ovarian cyst, possible myomatous changes or adenomyosis  Perimenopause since 2016  Annual by me 2015 and 2016 with normal Paps  Pelvic ultrasound 2014 showed cysts, 2015 showed fibroid  Mammograms normal 2014 through 2016, breast ultrasound in 2016 normal  History of ascus Pap, HPV positive 16 and 18 in the past      Signatures   Electronically signed by : ANN Arenas; Sep 26 2017  9:57AM EST                       (Author)

## 2018-01-16 NOTE — PROGRESS NOTES
Assessment    1  Encounter for preventive health examination (V70 0) (Z00 00)    Plan  Diabetes mellitus out of control    · BD Pen Needle Mini U/F 31G X 5 MM Miscellaneous  Health Maintenance    · Chantix Continuing Month Yo 1 MG Oral Tablet; TAKE 1 TABLET TWICE DAILY   · Chantix Starting Month Yo 0 5 MG X 11 & 1 MG X 42 Oral Tablet; TAKE ONE 0 5MG  TABLET DAILY ON DAYS 1-3, THEN ONE 0 5MG TABLET TWICE DAILY ON DAYS 4-7,  THEN ONE 1MG TABLET TWICE DAILY THEREAFTER   · Tubersol 5 UNIT/0 1ML Intradermal Solution; INJECT 0 1  ML Intradermal; To  Be Done: 65Vor4243  Unlinked    · Calcium 1200 3413-1553 MG-UNIT Oral Tablet Chewable   · Fish Oil CAPS    Discussion/Summary  health maintenance visit Currently, she eats a healthy diet  the risks and benefits of cervical cancer screening were discussed Breast cancer screening: mammogram is current  Colorectal cancer screening: colorectal cancer screening is current  The risks and benefits of immunizations were discussed  Continue to follow with endocrinology  Come back here on Tuesday or Wednesday to have the tuberculin looked at  Use Chantix as directed call me if problems develop  History of Present Illness  HM, Adult Female: The patient is being seen for a health maintenance evaluation  The last health maintenance visit was 1 year(s) ago  Social History: Household members include domestic partner and mother  She is unmarried  Work status: working full time  The patient is a current cigarette smoker  She is ready to quit using tobacco  She reports occasional alcohol use and denies binge drinking  The patient has no concerns about alcohol abuse  She has never used illicit drugs  General Health: The patient's health since the last visit is described as good  She has regular dental visits  She denies vision problems  She denies hearing loss  Immunizations status: up to date  Lifestyle:  She consumes a diverse and healthy diet  She has weight concerns   She does not exercise regularly  She uses tobacco  She consumes alcohol  She denies drug use  Reproductive health: the patient is postmenopausal    Screening: cancer screening reviewed and current  metabolic screening reviewed and current  risk screening reviewed and current  HPI: Examination for employment  The patient has diabetes and is followed by an endocrinologist       Review of Systems    Constitutional: no fever, not feeling poorly, no chills and not feeling tired  Eyes: eyesight problems, but no eye pain  ENT: no earache and no hearing loss  Cardiovascular: no chest pain and no palpitations  Respiratory: no shortness of breath, no cough and no wheezing  Gastrointestinal: no abdominal pain, no nausea and no diarrhea  Genitourinary: no pelvic pain and no dysmenorrhea  Musculoskeletal: no arthralgias and no joint swelling  Integumentary: no rashes and no itching  Neurological: as noted in HPI  Psychiatric: as noted in HPI  Endocrine: no proptosis and no hot flashes  Hematologic/Lymphatic: no swollen glands, no tendency for easy bleeding and no tendency for easy bruising  Over the past 2 weeks, how often have you been bothered by the following problems? 1 ) Little interest or pleasure in doing things? Not at all    2 ) Feeling down, depressed or hopeless? Not at all    3 ) Trouble falling asleep or sleeping too much? Not at all    4 ) Feeling tired or having little energy? Not at all    5 ) Poor appetite or overeating? Not at all    6 ) Feeling bad about yourself, or that you are a failure, or have let yourself or your family down? Not at all    7 ) Trouble concentrating on things, such as reading a newspaper or watching television? Not at all    8 ) Moving or speaking so slowly that other people could have noticed, or the opposite, moving or speaking faster than usual? Not at all    9 ) Thoughts that you would be better off dead or of hurting yourself in some way? Not at all  Score 0      Active Problems    1  Abdominal bloating (787 3) (R14 0)   2  Adjustment disorder with anxiety (309 24) (F43 22)   3  Constipation (564 00) (K59 00)   4  Controlled diabetes mellitus (250 00) (E11 9)   5  Encounter for screening colonoscopy (V76 51) (Z12 11)   6  Esophageal reflux (530 81) (K21 9)   7  Heart murmur (785 2) (R01 1)   8  Hyperlipidemia (272 4) (E78 5)   9  Hypertension (401 9) (I10)   10  Iron deficiency anemia (280 9) (D50 9)   11  Need for influenza vaccination (V04 81) (Z23)   12  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)   13  Restless leg syndrome (333 94) (G25 81)   14  Trigger finger (727 03) (M65 30)    Past Medical History    · History of cerebral artery occlusion (V12 59) (Z86 79)   · History of chest pain (V13 89) (Z21 644)   · History of Need for influenza vaccination (V04 81) (Z23)   · History of Paroxysmal ventricular tachycardia (427 1) (I47 2)    Surgical History    · History of Cholecystectomy    Family History  Mother    · Family history of Tuberculosis    Social History    · Being A Social Drinker   · Current Every Day Smoker (305 1)   · Never Used Drugs   · Sexually Active    Current Meds   1  Accu-Chek Isis Device; USE AS DIRECTED; Therapy: 65SSG9718 to (Last Rx:11Nov2014)  Requested for: 73DGH7137 Ordered   2  Accu-Chek Isis In Vitro Strip; TEST 1 QD; Therapy: 33DGS8428 to (Last Rx:11Nov2014)  Requested for: 65DNQ7656 Ordered   3  Accu-Chek Isis Plus In Vitro Strip; TEST 4 TIMES A DAY; Therapy: 07LPN7174 to (Evaluate:15Jan2017)  Requested for: 54Imw1161; Last   Rx:96Kgd2088 Ordered   4  Accu-Chek FastClix Lancets Miscellaneous; TEST 1 QD; Therapy: 31GTJ6505 to (Last Rx:11Nov2014)  Requested for: 23QGM9868 Ordered   5  Accu-Chek Softclix Lancets Miscellaneous; TEST AS DIRECTED; Therapy: 67BFP1638 to (Marilyn Drake)  Requested for: 86Opp9540; Last   Rx:19Oie9117 Ordered   6   ALPRAZolam 1 MG Oral Tablet; TAKE 1 TABLET BY MOUTH 3 TIMES A DAY AS   NEEDED FOR ANXIETY; Last Rx:01Apr2014 Ordered   7  Atorvastatin Calcium 20 MG Oral Tablet (Lipitor); TAKE 1 TABLET DAILY; Therapy: 37BXK4374 to (Evaluate:72Xxo8576)  Requested for: 26SPL1296; Last   Rx:11Jul2016 Ordered   8  B Complete Oral Tablet; Therapy: (Recorded:25Mar2016) to Recorded   9  BD Pen Needle Mini U/F 31G X 5 MM Miscellaneous; Use once daily for Victoza; Therapy: 34QTK6110 to (Evaluate:10Cni9734)  Requested for: 96SQJ2044; Last   Rx:03Mar2016 Ordered   10  Biotin CAPS; Therapy: (Recorded:25Mar2016) to Recorded   11  BuPROPion HCl ER (SR) 150 MG Oral Tablet Extended Release 12 Hour; TAKE 1    TABLET DAILY ; Therapy: 65YLL1742 to (Evaluate:12Jan2015)  Requested for: 18IFA4753; Last    Rx:14Oct2014 Ordered   12  Calcium 1200 4526-6565 MG-UNIT Oral Tablet Chewable; 1 DAILY; Therapy: (Recorded:01Apr2014) to Recorded   13  Calcium TABS; Therapy: (Recorded:25Mar2016) to Recorded   14  Daily Multivitamin TABS; Therapy: (Recorded:25Mar2016) to Recorded   15  Ferrous Sulfate 325 (65 Fe) MG Oral Tablet; take one tablet by mouth twice daily; Therapy: 20UJF0908 to (Last [facility])  Requested for: 75AAK7585 Ordered   16  Fish Oil 1000 MG Oral Capsule; TAKE 1 CAPSULE DAILY; Therapy: (Recorded:01Apr2014) to Recorded   17  Fish Oil CAPS; Therapy: (Recorded:25Mar2016) to Recorded   18  Gabapentin 100 MG Oral Capsule Recorded   19  Glimepiride 2 MG Oral Tablet; take 1 tablet by mouth every day; Therapy: 21Apr2014 to (Evaluate:08Nov2016)  Requested for: 46RWN5579; Last    Rx:67Bao6750 Ordered   20  Glimepiride 4 MG Oral Tablet; TAKE 1 TABLET DAILY; Therapy: 21Apr2014 to (Evaluate:22Yhm8945)  Requested for: 21Jun2016; Last    Rx:21Jun2016 Ordered   21  Invokana 100 MG Oral Tablet Recorded   22  Invokana 300 MG Oral Tablet Recorded   23  Januvia 50 MG Oral Tablet Recorded   24  Lisinopril 20 MG Oral Tablet; TAKE 1 TABLET DAILY;     Therapy: 31MUS3961 to (Evaluate:37Vve9410) Requested for: 33YSV3566; Last    Rx:88Rlw7689 Ordered   25  MetFORMIN HCl - 1000 MG Oral Tablet; TAKE 1 TABLET TWICE DAILY WITH MEALS; Therapy: 26QBO0963 to (Evaluate:23Jun2016)  Requested for: 71ZTF6842; Last    Rx:29Jun2015 Ordered   26  Multi For Her Oral Tablet; TAKE 1 TABLET DAILY; Therapy: (Recorded:01Apr2014) to Recorded   27  Nicoderm CQ 21 MG/24HR Transdermal Patch 24 Hour (Nicotine); APPLY 1 PATCH    DAILY AS DIRECTED; Therapy: 84CLO9984 to (Evaluate:01Vle1030)  Requested for: 57FED5256; Last    Rx:13Jun2014 Ordered   28  Omeprazole 20 MG Oral Capsule Delayed Release; TAKE 1 CAPSULE EVERY    MORNING BEFORE BREAKFAST; Therapy: 69OSB5197 to (Evaluate:16Nov2016)  Requested for: 85RBO2849; Last    Rx:40Yth6381 Ordered   29  Victoza 18 MG/3ML Subcutaneous Solution Pen-injector; INJECT 0 6 MG    SUBCUTANEOUSLY DAILY FOR 1 WEEK THEN INCREASE TO 1 2 MGDAILY; Therapy: 68SBM0014 to (Evaluate:26Jun2016)  Requested for: 89Hac6879; Last    Rx:35Bcc1259 Ordered    Allergies    1  No Known Drug Allergies    Vitals   Recorded: 92Yer7206 08:54AM Recorded: 44FSA2059 08:56JL   Systolic 327    Diastolic 68    Weight  408 lb 4 00 oz     Physical Exam    Constitutional   General appearance: No acute distress, well appearing and well nourished  Eyes   Conjunctiva and lids: No swelling, erythema or discharge  Pulmonary   Respiratory effort: No increased work of breathing or signs of respiratory distress  Auscultation of lungs: Clear to auscultation  Cardiovascular   Auscultation of heart: Normal rate and rhythm, normal S1 and S2, without murmurs  Musculoskeletal   Gait and station: Normal     Inspection/palpation of joints, bones, and muscles: Normal     Skin   Skin and subcutaneous tissue: Normal without rashes or lesions  Psychiatric   Orientation to person, place, and time: Normal     Mood and affect: Normal        Health Management  Health Maintenance   COLONOSCOPY; every 10 years;  Last 73EEB9838; Next Due: 79Qsq5292;  Active    Signatures   Electronically signed by : CHRISTOPHER Jones ; Aug  6 2016  9:09AM EST                       (Author)

## 2018-01-17 NOTE — PROGRESS NOTES
Assessment   1  Peroneal tendinitis, right (812 22) (A64 86)    Plan   Peroneal tendinitis, right    · Diclofenac Sodium 1 5 % Transdermal Solution; apply to affected area 4 times a    day   · *1 - [facility]  *  Status: Active  Requested for: 83WPI9311  Care Summary provided  : Yes   · Follow-up visit in 6 weeks Evaluation and Treatment  Follow-up  Status: Complete  Done:    51CZV8150    Discussion/Summary      We are going to start patient on physical therapy  She is going to use modalities as ice and heat  We gave her a prescription for Pennsaid to apply to the peroneal tendon area  She will follow up in 6 weeks  Chief Complaint   1  Ankle Pain    History of Present Illness   HPI: Patient is a 71-year-old female who presents with chief complaint of lateral ankle pain and swelling for 2-3 months  Swelling is episodic  She is pain with weight-bearing  She has to take 1 step at a time going up or down stairs  she has been taking some over-the-counter medication for symptoms  patient had an x-ray of the right ankle on 10/09/2017 at [facility]  Those films were reviewed today  They are within normal limits  No fracture, subluxation or degenerative change  Review of Systems        Constitutional: No fever, no chills, feels well, no tiredness, no recent weight gain or loss  Eyes: No complaints of eyesight problems, no red eyes  ENT: no loss of hearing, no nosebleeds, no sore throat  Cardiovascular: No complaints of chest pain, no palpitations, no leg claudication or lower extremity edema  Respiratory: no compliants of shortness of breath, no wheezing, no cough  Gastrointestinal: no complaints of abdominal pain, no constipation, no nausea or diarrhea, no vomiting, no bloody stools  Genitourinary: no complaints of dysuria, no incontinence  Musculoskeletal: as noted in HPI        Integumentary: no complaints of skin rash or lesion, no itching or dry skin, no skin wounds  Neurological: no complaints of headache, no confusion, no numbness or tingling, no dizziness  Endocrine: No complaints of muscle weakness, no feelings of weakness, no frequent urination, no excessive thirst       Psychiatric: No suicidal thoughts, no anxiety, no feelings of depression  ROS reviewed  Active Problems   1  Abdominal bloating (787 3) (R14 0)   2  Adjustment disorder with anxiety (309 24) (F43 22)   3  Anemia, unspecified type (285 9) (D64 9)   4  Ankle pain, right (719 47) (M25 571)   5  Constipation (564 00) (K59 00)   6  Controlled diabetes mellitus (250 00) (E11 9)   7  Dense breasts (793 82) (R92 2)   8  Elevated LFTs (790 6) (R79 89)   9  Esophageal reflux (530 81) (K21 9)   10  Heart murmur (785 2) (R01 1)   11  Hyperlipidemia (272 4) (E78 5)   12  Hypertension (401 9) (I10)   13  Iron deficiency anemia (280 9) (D50 9)   14  Irregular bleeding (626 4) (N92 6)   15  Irregular periods/menstrual cycles (626 4) (N92 6)   16  Need for immunization against influenza (V04 81) (Z23)   17  Ovarian cyst (620 2) (N83 209)   18  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)   19  Restless leg syndrome (333 94) (G25 81)   20  Small bowel lesion (569 89) (K63 9)   21  Trigger finger (727 03) (M65 30)    Past Medical History    · History of cerebral artery occlusion (V12 59) (Z86 79)   · History of chest pain (V13 89) (D72 883)   · History of trigger finger (V13 59) (Z87 39)   · History of Paroxysmal ventricular tachycardia (427 1) (I47 2)     The active problems and past medical history were reviewed and updated today  Surgical History    · History of Cholecystectomy     The surgical history was reviewed and updated today  Family History   Mother    · Family history of Tuberculosis     The family history was reviewed and updated today         Social History    · Current Every Day Smoker (305 1)   · Drinks coffee   · Never Used Drugs   · Sexually Active   · Social alcohol use (Z78 9)  The social history was reviewed and updated today  Current Meds    1  Accu-Chek Isis Device; USE AS DIRECTED; Therapy: 59GMO6319 to (Last Rx:11Nov2014)  Requested for: 07YIL4736 Ordered   2  Accu-Chek Isis Plus In Vitro Strip; TEST 4 TIMES A DAY; Therapy: 66NZM8900 to (Raven Tijerina)  Requested for: 59WHZ2281; Last     Rx:09Jan2017 Ordered   3  Accu-Chek Isis STRP; TEST 1 QD; Therapy: 60DHM4566 to (Last Rx:11Nov2014)  Requested for: 65ZYY2665 Ordered   4  Accu-Chek FastClix Lancets Miscellaneous; TEST 1 QD; Therapy: 04WCF0561 to (Last Rx:11Nov2014)  Requested for: 65AOV3891 Ordered   5  Accu-Chek Softclix Lancets Miscellaneous; TEST AS DIRECTED; Therapy: 04XFB8986 to (Evaluate:15Lbt4179)  Requested for: 03Apr2017; Last     Rx:03Apr2017 Ordered   6  Atorvastatin Calcium 20 MG Oral Tablet; take 1 tablet every day; Therapy: 50KSQ0891 to (Evaluate:58Oxa3097)  Requested for: 23XDV3189; Last     Rx:46Qmt6449 Ordered   7  Biotin CAPS; Therapy: (Recorded:25Mar2016) to Recorded   8  Calcium TABS; Therapy: (Recorded:25Mar2016) to Recorded   9  Daily Multivitamin TABS; Therapy: (Recorded:15Kcr3581) to Recorded   10  Ferrous Sulfate 325 (65 Fe) MG Oral Tablet; take 1 tablet every day; Therapy: 95NFK2585 to (Evaluate:10Aug2018)  Requested for: 57BWR8801; Last      Rx:13Nov2017 Ordered   11  Fish Oil 1000 MG Oral Capsule; TAKE 1 CAPSULE DAILY; Therapy: (Recorded:44Wet5298) to Recorded   12  Gabapentin 100 MG Oral Capsule Recorded   13  Glimepiride 2 MG Oral Tablet; take 1 tablet by mouth every day; Therapy: 15Tfv0857 to (Evaluate:08Nov2016)  Requested for: 93ALH3183; Last      Rx:48Eoq2437 Ordered   14  Invokana 300 MG Oral Tablet Recorded   15  Iron TABS; Therapy: (Recorded:24Oct2017) to Recorded   16  Januvia 50 MG Oral Tablet Recorded   17  Lisinopril 10 MG Oral Tablet; Therapy: 84BGI5119 to Recorded   18   MetFORMIN HCl - 1000 MG Oral Tablet; TAKE 1 TABLET twice a day with meals; Therapy: 84NIY9522 to (Last Ladona Curb)  Requested for: 06PFK3591 Ordered   19  Omeprazole 20 MG Oral Capsule Delayed Release; TAKE ONE CAPSULE EVERY      MORNING BRFORE BREAKFAST; Therapy: 93JUK9973 to (Evaluate:68Jsb9877)  Requested for: 29ABG6371; Last      Rx:22Nov2016; Status: ACTIVE - Renewal Denied Ordered   20  Super B-Complex TABS; Therapy: (Recorded:13Png0954) to Recorded     The medication list was reviewed and updated today  Allergies   1  No Known Drug Allergies    Vitals   Signs   Heart Rate: 75  Systolic: 646  Diastolic: 70  Height: 5 ft 4 5 in  Weight: 178 lb 2 oz  BMI Calculated: 30 1  BSA Calculated: 1 87    Physical Exam        Constitutional - General appearance: Normal       Musculoskeletal - Gait and station: Abnormal  Gait evaluation demonstrated antalgia on the right  -- Digits and nails: Normal -- Muscle strength/tone: Normal       Cardiovascular - Pulses: Normal -- Examination of extremities for edema and/or varicosities: Normal       Skin - Skin and subcutaneous tissue: Normal       Neurologic - Sensation: Normal       Psychiatric - Orientation to person, place, and time: Normal -- Mood and affect: Normal       Eyes      Conjunctiva and lids: Normal        Pupils and irises: Normal        Signatures    Electronically signed by : CHRISTOPHER Cartwright ; Jan 16 2018  7:12AM EST                       (Author)

## 2018-01-22 VITALS
HEIGHT: 65 IN | HEART RATE: 76 BPM | TEMPERATURE: 96.7 F | BODY MASS INDEX: 29.53 KG/M2 | OXYGEN SATURATION: 99 % | DIASTOLIC BLOOD PRESSURE: 60 MMHG | SYSTOLIC BLOOD PRESSURE: 102 MMHG | WEIGHT: 177.25 LBS

## 2018-01-22 VITALS
HEART RATE: 75 BPM | BODY MASS INDEX: 29.68 KG/M2 | WEIGHT: 178.13 LBS | DIASTOLIC BLOOD PRESSURE: 70 MMHG | SYSTOLIC BLOOD PRESSURE: 112 MMHG | HEIGHT: 65 IN

## 2018-01-22 VITALS
WEIGHT: 181.38 LBS | BODY MASS INDEX: 30.22 KG/M2 | HEIGHT: 65 IN | DIASTOLIC BLOOD PRESSURE: 60 MMHG | SYSTOLIC BLOOD PRESSURE: 106 MMHG

## 2018-03-07 NOTE — PROGRESS NOTES
History of Present Illness    Revaccination   Vaccine Information: Vaccine(s) Given (names): [id number]  Spoke with patient regarding vaccine out of temperature range and risks and benefits of revaccination  Action(s): Pt will be revaccinated  Appointment scheduled: [id number]   Pt called (attempt 1): [phone] [id number]   Pt called (attempt 2): [phone] [id number]   Revaccination Completed: [id number]  Active Problems    1  Abdominal bloating (787 3) (R14 0)   2  Adjustment disorder with anxiety (309 24) (F43 22)   3  Constipation (564 00) (K59 00)   4  Controlled diabetes mellitus (250 00) (E11 9)   5  Encounter for screening colonoscopy (V76 51) (Z12 11)   6  Esophageal reflux (530 81) (K21 9)   7  Heart murmur (785 2) (R01 1)   8  Hyperlipidemia (272 4) (E78 5)   9  Hypertension (401 9) (I10)   10  Iron deficiency anemia (280 9) (D50 9)   11  Need for influenza vaccination (V04 81) (Z23)   12  Need for revaccination (V05 9) (Z23)   13  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)   14  Restless leg syndrome (333 94) (G25 81)   15  Trigger finger (727 03) (M65 30)    Immunizations  Influenza --- Zarina Adan: 10-Yxs-9554Zapflwh Gilman: 2015   PPSV --- Zarina Adan: 22-Oct-2014   Tdap --- Zarina Adan: unsure   Tubersol 5 UNIT/0 1ML Intradermal Solution --- Zarina Adan: 2014; Bronwyn Rhodes: Hold For  Documentation, 06-Aug-2016     Current Meds   1  Accu-Chek Isis Device; USE AS DIRECTED   2  Accu-Chek Isis Plus In Vitro Strip; TEST 4 TIMES A DAY   3  Accu-Chek Isis STRP; TEST 1 QD   4  Accu-Chek FastClix Lancets Miscellaneous; TEST 1 QD   5  Accu-Chek Softclix Lancets Miscellaneous; TEST AS DIRECTED   6  ALPRAZolam 1 MG Oral Tablet; TAKE 1 TABLET BY MOUTH 3 TIMES A DAY AS NEEDED   FOR ANXIETY   7  Atorvastatin Calcium 20 MG Oral Tablet; TAKE 1 TABLET DAILY   8  B Complete Oral Tablet   9  Biotin CAPS   10   BuPROPion HCl ER (SR) 150 MG Oral Tablet Extended Release 12 Hour; TAKE 1    TABLET DAILY 11  Calcium TABS   12  Chantix Continuing Month Yo 1 MG Oral Tablet; TAKE 1 TABLET TWICE DAILY   13  Chantix Starting Month Yo 0 5 MG X 11 & 1 MG X 42 Oral Tablet; TAKE ONE 0 5MG    TABLET DAILY ON DAYS 1-3, THEN ONE 0 5MG TABLET TWICE DAILY ON DAYS 4-7,    THEN ONE 1MG TABLET TWICE DAILY THEREAFTER   14  Daily Multivitamin TABS   15  Ferrous Sulfate 325 (65 Fe) MG Oral Tablet; take one tablet by mouth twice daily   16  Fish Oil 1000 MG Oral Capsule; TAKE 1 CAPSULE DAILY   17  Gabapentin 100 MG Oral Capsule   18  Glimepiride 2 MG Oral Tablet; take 1 tablet by mouth every day   19  Glimepiride 4 MG Oral Tablet; TAKE 1 TABLET DAILY   20  Invokana 100 MG Oral Tablet   21  Invokana 300 MG Oral Tablet   22  Januvia 50 MG Oral Tablet   23  Lisinopril 20 MG Oral Tablet; TAKE 1 TABLET DAILY   24  MetFORMIN HCl - 1000 MG Oral Tablet; TAKE 1 TABLET twice a day with meals   25  Multi For Her Oral Tablet; TAKE 1 TABLET DAILY   26  Nicoderm CQ 21 MG/24HR Transdermal Patch 24 Hour; APPLY 1 PATCH DAILY AS    DIRECTED   27  Omeprazole 20 MG Oral Capsule Delayed Release; TAKE ONE CAPSULE EVERY    MORNING BRFORE BREAKFAST   28  Victoza 18 MG/3ML Subcutaneous Solution Pen-injector; INJECT 0 6 MG    SUBCUTANEOUSLY DAILY FOR 1 WEEK THEN INCREASE TO 1 2 MGDAILY    Allergies    1   No Known Drug Allergies    Future Appointments    Date/Time Provider Specialty Site   12/30/2016 03:20 PM ANN Naik Obstetrics/Gynecology [facility]     Signatures   Electronically signed by : CHRISTOPHER Anne ; Dec 28 2016  3:46PM EST

## 2018-05-01 ENCOUNTER — OFFICE VISIT (OUTPATIENT)
Dept: INTERNAL MEDICINE CLINIC | Facility: CLINIC | Age: 53
End: 2018-05-01
Payer: COMMERCIAL

## 2018-05-01 ENCOUNTER — TELEPHONE (OUTPATIENT)
Dept: INTERNAL MEDICINE CLINIC | Facility: CLINIC | Age: 53
End: 2018-05-01

## 2018-05-01 VITALS
DIASTOLIC BLOOD PRESSURE: 66 MMHG | RESPIRATION RATE: 18 BRPM | HEIGHT: 65 IN | HEART RATE: 76 BPM | BODY MASS INDEX: 30.49 KG/M2 | WEIGHT: 183 LBS | SYSTOLIC BLOOD PRESSURE: 110 MMHG | OXYGEN SATURATION: 98 %

## 2018-05-01 DIAGNOSIS — E11.8 CONTROLLED TYPE 2 DIABETES MELLITUS WITH COMPLICATION, WITHOUT LONG-TERM CURRENT USE OF INSULIN (HCC): Primary | ICD-10-CM

## 2018-05-01 DIAGNOSIS — D64.9 ANEMIA, UNSPECIFIED TYPE: ICD-10-CM

## 2018-05-01 DIAGNOSIS — F43.22 ADJUSTMENT DISORDER WITH ANXIETY: ICD-10-CM

## 2018-05-01 DIAGNOSIS — L03.818 CELLULITIS OF OTHER SPECIFIED SITE: Primary | ICD-10-CM

## 2018-05-01 DIAGNOSIS — I10 ESSENTIAL HYPERTENSION: ICD-10-CM

## 2018-05-01 DIAGNOSIS — E78.2 MIXED HYPERLIPIDEMIA: ICD-10-CM

## 2018-05-01 DIAGNOSIS — R79.89 ELEVATED LFTS: ICD-10-CM

## 2018-05-01 DIAGNOSIS — L91.8 INFLAMED SKIN TAG: ICD-10-CM

## 2018-05-01 PROBLEM — N92.6 IRREGULAR BLEEDING: Status: ACTIVE | Noted: 2017-10-24

## 2018-05-01 LAB
LEFT EYE DIABETIC RETINOPATHY: NORMAL
LEFT EYE IMAGE QUALITY: NORMAL
RIGHT EYE DIABETIC RETINOPATHY: NORMAL
RIGHT EYE IMAGE QUALITY: NORMAL
SEVERITY (EYE EXAM): NORMAL

## 2018-05-01 PROCEDURE — 3072F LOW RISK FOR RETINOPATHY: CPT | Performed by: PHYSICIAN ASSISTANT

## 2018-05-01 PROCEDURE — 99214 OFFICE O/P EST MOD 30 MIN: CPT | Performed by: PHYSICIAN ASSISTANT

## 2018-05-01 RX ORDER — CEPHALEXIN 500 MG/1
500 CAPSULE ORAL EVERY 12 HOURS SCHEDULED
Qty: 20 CAPSULE | Refills: 0 | Status: SHIPPED | OUTPATIENT
Start: 2018-05-01 | End: 2018-05-11

## 2018-05-01 RX ORDER — LANCETS
EACH MISCELLANEOUS DAILY
COMMUNITY
Start: 2014-11-11

## 2018-05-01 RX ORDER — FERROUS SULFATE 325(65) MG
TABLET ORAL
COMMUNITY
Start: 2018-04-25 | End: 2018-06-18

## 2018-05-01 RX ORDER — ERGOCALCIFEROL (VITAMIN D2) 10 MCG
TABLET ORAL
COMMUNITY

## 2018-05-01 NOTE — TELEPHONE ENCOUNTER
PT  JASON  MADE  AN  APPT  FOR  PHY  IN June  WANTS  TO KNOW IF  SHE  CAN HAVE AN ORDER  FOR  BLOOD WORK

## 2018-05-01 NOTE — PROGRESS NOTES
Assessment/Plan:     infected skin tag on the mons pubis -patient is advised to  Use warm compresses 2 to 3 times a day for 5 days  She is given  Cephalexin for 10 days  She would like to consider having the skin tag removed  She will notify us when she would like a referral to Dermatology    No problem-specific Assessment & Plan notes found for this encounter  Diagnoses and all orders for this visit:    Cellulitis of other specified site  -     cephalexin (KEFLEX) 500 mg capsule; Take 1 capsule (500 mg total) by mouth every 12 (twelve) hours for 10 days    Inflamed skin tag    Other orders  -     Blood Glucose Monitoring Suppl (ACCU-CHEK JESSEE CONNECT) w/Device KIT; by Does not apply route  -     glucose blood test strip; by In Vitro route 4 (four) times a day  -     ACCU-CHEK FASTCLIX LANCETS MISC; by Does not apply route daily  -     Multiple Vitamin (DAILY VALUE MULTIVITAMIN) TABS; Take by mouth  -     ferrous sulfate 325 (65 Fe) mg tablet;           Subjective:      Patient ID: Christine Philip is a 46 y o  female  Patient comes in with complaints of a sore on the mons pubis area  Patient says that she has a skin tag in that area and sometimes it gets inflamed and beneath it feels with pus  Sometimes it drains little bit  She has not had for a long time and she usually sees a gyn but she could not get an appointment that quickly  She is experiencing some redness beneath the skin tag area on the mons pubis  No fever, chills or sweats  No copious drainage but a slight drainage per the patient  No vaginal drainage, no vaginal itching or discharge  The following portions of the patient's history were reviewed and updated as appropriate: allergies, current medications, past family history, past medical history, past social history, past surgical history and problem list     Review of Systems   Cardiovascular: Negative for chest pain and palpitations     Gastrointestinal: Negative for abdominal pain, diarrhea and nausea  Genitourinary: Positive for genital sores  Skin tag with irritation and erythema beneath it on the mons pubis         Objective:      /66   Pulse 76   Resp 18   Ht 5' 5" (1 651 m)   Wt 83 kg (183 lb)   SpO2 98%   BMI 30 45 kg/m²          Physical Exam   Cardiovascular: Normal rate and regular rhythm  Pulmonary/Chest: Effort normal and breath sounds normal    Abdominal: Soft   Bowel sounds are normal    Genitourinary:         Genitourinary Comments:   Where indicated is a skin tag with redness and induration felt superficially beneath the skin tag there is some warmth associated

## 2018-05-02 ENCOUNTER — TELEPHONE (OUTPATIENT)
Dept: INTERNAL MEDICINE CLINIC | Facility: CLINIC | Age: 53
End: 2018-05-02

## 2018-05-02 NOTE — TELEPHONE ENCOUNTER
----- Message from Elizabeth Beckwith MD sent at 5/2/2018  4:31 PM EDT -----  She has diabetic eye disease and needs to see an ophthalmologist, not in optometrist   My recommendation is Dr Lida Tyler

## 2018-05-02 NOTE — TELEPHONE ENCOUNTER
Informed patient of Dr Gonzalez [place] message  I also provide patient with Terrance Burnett telephone #

## 2018-05-17 PROCEDURE — 3072F LOW RISK FOR RETINOPATHY: CPT | Performed by: PHYSICIAN ASSISTANT

## 2018-05-30 ENCOUNTER — APPOINTMENT (OUTPATIENT)
Dept: LAB | Facility: CLINIC | Age: 53
End: 2018-05-30
Payer: COMMERCIAL

## 2018-05-30 DIAGNOSIS — E78.2 MIXED HYPERLIPIDEMIA: ICD-10-CM

## 2018-05-30 DIAGNOSIS — E11.8 CONTROLLED TYPE 2 DIABETES MELLITUS WITH COMPLICATION, WITHOUT LONG-TERM CURRENT USE OF INSULIN (HCC): ICD-10-CM

## 2018-05-30 DIAGNOSIS — D64.9 ANEMIA, UNSPECIFIED TYPE: ICD-10-CM

## 2018-05-30 LAB
ALBUMIN SERPL BCP-MCNC: 4 G/DL (ref 3.5–5)
ALP SERPL-CCNC: 88 U/L (ref 46–116)
ALT SERPL W P-5'-P-CCNC: 36 U/L (ref 12–78)
ANION GAP SERPL CALCULATED.3IONS-SCNC: 7 MMOL/L (ref 4–13)
AST SERPL W P-5'-P-CCNC: 26 U/L (ref 5–45)
BACTERIA UR QL AUTO: NORMAL /HPF
BASOPHILS # BLD AUTO: 0.05 THOUSANDS/ΜL (ref 0–0.1)
BASOPHILS NFR BLD AUTO: 1 % (ref 0–1)
BILIRUB SERPL-MCNC: 0.46 MG/DL (ref 0.2–1)
BILIRUB UR QL STRIP: NEGATIVE
BUN SERPL-MCNC: 19 MG/DL (ref 5–25)
CALCIUM SERPL-MCNC: 10.1 MG/DL (ref 8.3–10.1)
CHLORIDE SERPL-SCNC: 101 MMOL/L (ref 100–108)
CHOLEST SERPL-MCNC: 106 MG/DL (ref 50–200)
CLARITY UR: CLEAR
CO2 SERPL-SCNC: 30 MMOL/L (ref 21–32)
COLOR UR: YELLOW
CREAT SERPL-MCNC: 1.16 MG/DL (ref 0.6–1.3)
CREAT UR-MCNC: 34.1 MG/DL
EOSINOPHIL # BLD AUTO: 0.12 THOUSAND/ΜL (ref 0–0.61)
EOSINOPHIL NFR BLD AUTO: 2 % (ref 0–6)
ERYTHROCYTE [DISTWIDTH] IN BLOOD BY AUTOMATED COUNT: 12.3 % (ref 11.6–15.1)
EST. AVERAGE GLUCOSE BLD GHB EST-MCNC: 174 MG/DL
FERRITIN SERPL-MCNC: 35 NG/ML (ref 8–388)
GFR SERPL CREATININE-BSD FRML MDRD: 54 ML/MIN/1.73SQ M
GLUCOSE P FAST SERPL-MCNC: 130 MG/DL (ref 65–99)
GLUCOSE UR STRIP-MCNC: ABNORMAL MG/DL
HBA1C MFR BLD: 7.7 % (ref 4.2–6.3)
HCT VFR BLD AUTO: 49.4 % (ref 34.8–46.1)
HDLC SERPL-MCNC: 46 MG/DL (ref 40–60)
HGB BLD-MCNC: 15.6 G/DL (ref 11.5–15.4)
HGB UR QL STRIP.AUTO: NEGATIVE
HYALINE CASTS #/AREA URNS LPF: NORMAL /LPF
IMM GRANULOCYTES # BLD AUTO: 0.01 THOUSAND/UL (ref 0–0.2)
IMM GRANULOCYTES NFR BLD AUTO: 0 % (ref 0–2)
IRON SERPL-MCNC: 93 UG/DL (ref 50–170)
KETONES UR STRIP-MCNC: NEGATIVE MG/DL
LDLC SERPL CALC-MCNC: 31 MG/DL (ref 0–100)
LEUKOCYTE ESTERASE UR QL STRIP: ABNORMAL
LYMPHOCYTES # BLD AUTO: 2.08 THOUSANDS/ΜL (ref 0.6–4.47)
LYMPHOCYTES NFR BLD AUTO: 26 % (ref 14–44)
MCH RBC QN AUTO: 31.3 PG (ref 26.8–34.3)
MCHC RBC AUTO-ENTMCNC: 31.6 G/DL (ref 31.4–37.4)
MCV RBC AUTO: 99 FL (ref 82–98)
MICROALBUMIN UR-MCNC: 17.6 MG/L (ref 0–20)
MICROALBUMIN/CREAT 24H UR: 52 MG/G CREATININE (ref 0–30)
MONOCYTES # BLD AUTO: 0.48 THOUSAND/ΜL (ref 0.17–1.22)
MONOCYTES NFR BLD AUTO: 6 % (ref 4–12)
NEUTROPHILS # BLD AUTO: 5.26 THOUSANDS/ΜL (ref 1.85–7.62)
NEUTS SEG NFR BLD AUTO: 65 % (ref 43–75)
NITRITE UR QL STRIP: NEGATIVE
NON-SQ EPI CELLS URNS QL MICRO: NORMAL /HPF
NRBC BLD AUTO-RTO: 0 /100 WBCS
PH UR STRIP.AUTO: 6 [PH] (ref 4.5–8)
PLATELET # BLD AUTO: 268 THOUSANDS/UL (ref 149–390)
PMV BLD AUTO: 10.7 FL (ref 8.9–12.7)
POTASSIUM SERPL-SCNC: 4.5 MMOL/L (ref 3.5–5.3)
PROT SERPL-MCNC: 9 G/DL (ref 6.4–8.2)
PROT UR STRIP-MCNC: NEGATIVE MG/DL
RBC # BLD AUTO: 4.98 MILLION/UL (ref 3.81–5.12)
RBC #/AREA URNS AUTO: NORMAL /HPF
SODIUM SERPL-SCNC: 138 MMOL/L (ref 136–145)
SP GR UR STRIP.AUTO: 1.02 (ref 1–1.03)
T3FREE SERPL-MCNC: 2.86 PG/ML (ref 2.3–4.2)
TRIGL SERPL-MCNC: 147 MG/DL
TSH SERPL DL<=0.05 MIU/L-ACNC: 1.84 UIU/ML (ref 0.36–3.74)
UROBILINOGEN UR QL STRIP.AUTO: 0.2 E.U./DL
VIT B12 SERPL-MCNC: 598 PG/ML (ref 100–900)
WBC # BLD AUTO: 8 THOUSAND/UL (ref 4.31–10.16)
WBC #/AREA URNS AUTO: NORMAL /HPF

## 2018-05-30 PROCEDURE — 84481 FREE ASSAY (FT-3): CPT

## 2018-05-30 PROCEDURE — 83540 ASSAY OF IRON: CPT

## 2018-05-30 PROCEDURE — 82570 ASSAY OF URINE CREATININE: CPT | Performed by: INTERNAL MEDICINE

## 2018-05-30 PROCEDURE — 85025 COMPLETE CBC W/AUTO DIFF WBC: CPT

## 2018-05-30 PROCEDURE — 3060F POS MICROALBUMINURIA REV: CPT | Performed by: PHYSICIAN ASSISTANT

## 2018-05-30 PROCEDURE — 82043 UR ALBUMIN QUANTITATIVE: CPT | Performed by: INTERNAL MEDICINE

## 2018-05-30 PROCEDURE — 36415 COLL VENOUS BLD VENIPUNCTURE: CPT

## 2018-05-30 PROCEDURE — 81001 URINALYSIS AUTO W/SCOPE: CPT | Performed by: INTERNAL MEDICINE

## 2018-05-30 PROCEDURE — 83036 HEMOGLOBIN GLYCOSYLATED A1C: CPT

## 2018-05-30 PROCEDURE — 82607 VITAMIN B-12: CPT

## 2018-05-30 PROCEDURE — 80061 LIPID PANEL: CPT

## 2018-05-30 PROCEDURE — 80053 COMPREHEN METABOLIC PANEL: CPT

## 2018-05-30 PROCEDURE — 84443 ASSAY THYROID STIM HORMONE: CPT

## 2018-05-30 PROCEDURE — 82728 ASSAY OF FERRITIN: CPT

## 2018-06-13 ENCOUNTER — TELEPHONE (OUTPATIENT)
Dept: INTERNAL MEDICINE CLINIC | Facility: CLINIC | Age: 53
End: 2018-06-13

## 2018-06-13 NOTE — TELEPHONE ENCOUNTER
Pt called asking for lab results from 5/30  [facility] call back with results   ZQ#[phone]

## 2018-06-13 NOTE — TELEPHONE ENCOUNTER
Labs normal except for hemoglobin A1c of 7 7 which, well better than before, is still not great  I need to see her again

## 2018-06-18 ENCOUNTER — OFFICE VISIT (OUTPATIENT)
Dept: INTERNAL MEDICINE CLINIC | Facility: CLINIC | Age: 53
End: 2018-06-18
Payer: COMMERCIAL

## 2018-06-18 VITALS
HEIGHT: 65 IN | DIASTOLIC BLOOD PRESSURE: 74 MMHG | HEART RATE: 73 BPM | SYSTOLIC BLOOD PRESSURE: 122 MMHG | OXYGEN SATURATION: 96 % | WEIGHT: 181.8 LBS | BODY MASS INDEX: 30.29 KG/M2

## 2018-06-18 DIAGNOSIS — G89.29 CHRONIC PAIN OF RIGHT ANKLE: ICD-10-CM

## 2018-06-18 DIAGNOSIS — E78.2 MIXED HYPERLIPIDEMIA: ICD-10-CM

## 2018-06-18 DIAGNOSIS — Z72.0 TOBACCO ABUSE: ICD-10-CM

## 2018-06-18 DIAGNOSIS — I10 ESSENTIAL HYPERTENSION: ICD-10-CM

## 2018-06-18 DIAGNOSIS — M25.571 CHRONIC PAIN OF RIGHT ANKLE: ICD-10-CM

## 2018-06-18 DIAGNOSIS — E11.8 CONTROLLED TYPE 2 DIABETES MELLITUS WITH COMPLICATION, WITHOUT LONG-TERM CURRENT USE OF INSULIN (HCC): Primary | ICD-10-CM

## 2018-06-18 PROBLEM — D64.9 ANEMIA: Status: RESOLVED | Noted: 2017-10-11 | Resolved: 2018-06-18

## 2018-06-18 PROCEDURE — 99214 OFFICE O/P EST MOD 30 MIN: CPT | Performed by: INTERNAL MEDICINE

## 2018-06-18 RX ORDER — VARENICLINE TARTRATE 25 MG
KIT ORAL
Qty: 53 TABLET | Refills: 0 | Status: SHIPPED | OUTPATIENT
Start: 2018-06-18 | End: 2018-10-11 | Stop reason: ALTCHOICE

## 2018-06-18 NOTE — PROGRESS NOTES
Assessment/Plan:       Diagnoses and all orders for this visit:    Controlled type 2 diabetes mellitus with complication, without long-term current use of insulin (HCC)    Essential hypertension    Mixed hyperlipidemia    Tobacco abuse    Chronic pain of right ankle              Subjective:      Patient ID: Cora Hood is a 46 y o  female  A 50-year-old female with poor compliance to follow-up presents  Diabetes, now out of control  Recent hemoglobin A1c 7 7  Hypertension is treated  Dyslipidemia is treated    In 2017 she was found to have a hypochromic microcytic anemia with iron deficiency  Clinically what she had was successive menstrual bleeding but a GI workup was done for completeness and it was entirely normal; upper, lower, and capsule  The clinical picture urge of retro ratio has been stabilized and her hemoglobin has recovered to the point where she actually is polycythemic  Wants to stop smoking and Chantix is recommended; this was discussed at length  Otherwise complaint of a fairly longstanding right ankle arthropathy with intermittent swelling and minimal pain treated with the elevation and periodic application          The following portions of the patient's history were reviewed and updated as appropriate:   She has a past medical history of Adjustment disorder with anxiety; Anemia; Anxiety; Constipation; Dense breasts; GERD (gastroesophageal reflux disease); Heart murmur; History of atrial paroxysmal tachycardia; History of cerebral artery occlusion; History of chest pain; Hyperlipidemia; SHAE (iron deficiency anemia); Ovarian cyst; Polyclonal hypergammaglobulinemia; Restless legs syndrome (RLS); and Trigger finger  ,   does not have any pertinent problems on file  ,   has a past surgical history that includes Cholecystectomy and pr esophagogastroduodenoscopy transoral diagnostic (N/A, 10/19/2017)  ,  family history includes Tuberculosis in her mother  ,   reports that she quit smoking yesterday  Her smoking use included Cigarettes  She smoked 0 50 packs per day  She has never used smokeless tobacco  She reports that she drinks alcohol  She reports that she does not use drugs  ,  has No Known Allergies     Current Outpatient Prescriptions   Medication Sig Dispense Refill    ACCU-CHEK FASTCLIX LANCETS MISC by Does not apply route daily      atorvastatin (LIPITOR) 20 mg tablet Take 20 mg by mouth daily      B Complex-C (SUPER B COMPLEX PO) Take 1 tablet by mouth daily      BIOTIN PO Take 1 capsule by mouth daily      Blood Glucose Monitoring Suppl (ACCU-CHEK JESSEE CONNECT) w/Device KIT by Does not apply route      CALCIUM PO Take by mouth daily      Canagliflozin (INVOKANA) 300 MG TABS Take 300 mg by mouth daily      gabapentin (NEURONTIN) 100 mg capsule Take 100 mg by mouth daily      glimepiride (AMARYL) 2 mg tablet Take 2 mg by mouth as needed        glucose blood test strip by In Vitro route 4 (four) times a day      lisinopril (ZESTRIL) 10 mg tablet Take 10 mg by mouth daily      metFORMIN (GLUCOPHAGE) 1000 MG tablet Take 1,000 mg by mouth 2 (two) times a day with meals      Multiple Vitamin (DAILY VALUE MULTIVITAMIN) TABS Take by mouth      Omega-3 Fatty Acids (FISH OIL) 1,000 mg Take 1,000 mg by mouth daily      omeprazole (PriLOSEC) 20 mg delayed release capsule Take 20 mg by mouth daily      sitaGLIPtin (JANUVIA) 50 mg tablet Take 50 mg by mouth daily       No current facility-administered medications for this visit  Review of Systems   Constitutional: Negative for chills and fever  HENT: Negative for sore throat and trouble swallowing  Eyes: Negative for pain  Respiratory: Negative for cough, shortness of breath and wheezing  Cardiovascular: Negative for chest pain and leg swelling  Gastrointestinal: Negative for abdominal pain, diarrhea, nausea and vomiting  Endocrine: Negative for cold intolerance and heat intolerance     Genitourinary: Negative for dysuria, frequency and pelvic pain  Musculoskeletal: Positive for arthralgias  Negative for joint swelling  Skin: Negative for rash and wound  Allergic/Immunologic: Negative for immunocompromised state  Neurological: Negative for dizziness, seizures, syncope and headaches  Psychiatric/Behavioral: Negative for dysphoric mood  The patient is not nervous/anxious  Objective:  Vitals:    06/18/18 0905   BP: 122/74   Pulse: 73   SpO2: 96%      Physical Exam   Constitutional: She is oriented to person, place, and time  She appears well-developed and well-nourished  HENT:   Head: Normocephalic and atraumatic  Eyes: EOM are normal  Pupils are equal, round, and reactive to light  Neck: Normal range of motion  Neck supple  No tracheal deviation present  No thyromegaly present  Cardiovascular: Normal rate, regular rhythm and normal heart sounds  Exam reveals no gallop  No murmur heard  Pulmonary/Chest: No respiratory distress  She has no wheezes  She has no rales  Abdominal: Soft  Bowel sounds are normal  There is no tenderness  Musculoskeletal: Normal range of motion  She exhibits no tenderness or deformity  Neurological: She is alert and oriented to person, place, and time  Coordination normal    Skin: Skin is warm  Psychiatric: She has a normal mood and affect   Judgment normal

## 2018-06-18 NOTE — TELEPHONE ENCOUNTER
Pt was in this morning and saw Dr Lily Toledo  She was given Chantix but it hasn't been received by the pharmacy ([address])  Can we resend?

## 2018-06-26 PROCEDURE — 3072F LOW RISK FOR RETINOPATHY: CPT | Performed by: PHYSICIAN ASSISTANT

## 2018-06-29 ENCOUNTER — TELEPHONE (OUTPATIENT)
Dept: INTERNAL MEDICINE CLINIC | Facility: CLINIC | Age: 53
End: 2018-06-29

## 2018-06-29 NOTE — TELEPHONE ENCOUNTER
----- Message from Cammy Vega PA-C sent at 6/28/2018  6:36 PM EDT -----  She has retinopathy please ask her to see an eye doctor

## 2018-07-27 ENCOUNTER — TELEPHONE (OUTPATIENT)
Dept: INTERNAL MEDICINE CLINIC | Facility: CLINIC | Age: 53
End: 2018-07-27

## 2018-07-27 DIAGNOSIS — Z72.0 TOBACCO ABUSE: Primary | ICD-10-CM

## 2018-07-27 RX ORDER — VARENICLINE TARTRATE 1 MG/1
1 TABLET, FILM COATED ORAL 2 TIMES DAILY
Qty: 154 TABLET | Refills: 0 | Status: SHIPPED | OUTPATIENT
Start: 2018-07-27 | End: 2018-10-11 | Stop reason: ALTCHOICE

## 2018-07-27 NOTE — TELEPHONE ENCOUNTER
PATIENT WOULD LIKE YOU TO RECOMMEND A DERMATOLOGIST TO GO TO OTHER THAN DR Opal Ho  ALSO PATIENT FINISHED THE STARTER PACK ON THE CHANTIX  IS SHE SUPPOSE TO STARTING THE CONTINUATION PACK    [address]   HER #   [phone]

## 2018-07-27 NOTE — TELEPHONE ENCOUNTER
I do not know any other dermatologist who recommend  I would have to look something up online    I sent in the Chantix

## 2018-08-01 ENCOUNTER — TELEPHONE (OUTPATIENT)
Dept: OBGYN CLINIC | Facility: CLINIC | Age: 53
End: 2018-08-01

## 2018-08-01 ENCOUNTER — TELEPHONE (OUTPATIENT)
Dept: INTERNAL MEDICINE CLINIC | Facility: CLINIC | Age: 53
End: 2018-08-01

## 2018-08-01 NOTE — TELEPHONE ENCOUNTER
I have no specific recommendations because I do not know any other dermatologist   Check on the web site of any of the other local healthcare institutions such as [facility] in the [facility], or [facility], or [place]

## 2018-08-01 NOTE — TELEPHONE ENCOUNTER
Patient is calling back inquiry if Dr Ronak Duarte can recommend a difference dermatology rather than Dr Shanta Dobbs    She is willing to travel or outside of [facility] Also if she should still be taking CHANTIY?

## 2018-08-15 LAB
LEFT EYE DIABETIC RETINOPATHY: NORMAL
RIGHT EYE DIABETIC RETINOPATHY: NORMAL

## 2018-08-15 PROCEDURE — 3072F LOW RISK FOR RETINOPATHY: CPT | Performed by: PHYSICIAN ASSISTANT

## 2018-08-21 ENCOUNTER — OFFICE VISIT (OUTPATIENT)
Dept: OBGYN CLINIC | Age: 53
End: 2018-08-21
Payer: COMMERCIAL

## 2018-08-21 VITALS — DIASTOLIC BLOOD PRESSURE: 60 MMHG | SYSTOLIC BLOOD PRESSURE: 122 MMHG | BODY MASS INDEX: 30.59 KG/M2 | WEIGHT: 181 LBS

## 2018-08-21 DIAGNOSIS — R21 RASH: Primary | ICD-10-CM

## 2018-08-21 PROBLEM — R92.2 DENSE BREASTS: Status: ACTIVE | Noted: 2017-09-12

## 2018-08-21 PROBLEM — E11.9 TYPE 2 DIABETES MELLITUS WITHOUT COMPLICATION, WITHOUT LONG-TERM CURRENT USE OF INSULIN (HCC): Status: ACTIVE | Noted: 2018-07-17

## 2018-08-21 PROBLEM — R92.30 DENSE BREASTS: Status: ACTIVE | Noted: 2017-09-12

## 2018-08-21 PROBLEM — N83.209 OVARIAN CYST: Status: ACTIVE | Noted: 2017-09-12

## 2018-08-21 PROCEDURE — 99213 OFFICE O/P EST LOW 20 MIN: CPT | Performed by: NURSE PRACTITIONER

## 2018-08-21 RX ORDER — LYSINE 500 MG
TABLET ORAL
COMMUNITY
Start: 2018-07-17 | End: 2020-11-09 | Stop reason: ALTCHOICE

## 2018-08-21 NOTE — PROGRESS NOTES
Assessment/Plan:    No problem-specific Assessment & Plan notes found for this encounter  Diagnoses and all orders for this visit:    Rash  -     RPR; Future    Other orders  -     L-Lysine 500 MG TABS; Advised pt to see dermatology and if no conclusive dx to check RPR  Also may be psoriasis so I advised her to get blood drawn to be completely sure if she gets no clear answers  Patient agrees  Subjective:      Patient ID: Radha Drake is a 46 y o  female  Pleasant 46 y o  here for moles and chronic rash complaints  She states she has had annular lesions reoccur over the years in different parts of her body  Denies them being stress related  Denies pruritis  Told in the past they were hormonal  She would also like the "moles" removed from under her breasts  Denies fever or pain  Will be declared postmenopausal come this December  Denies vaginal issues  The following portions of the patient's history were reviewed and updated as appropriate:   She  has a past medical history of Adjustment disorder with anxiety; Anemia; Anxiety; Constipation; Dense breasts; GERD (gastroesophageal reflux disease); Heart murmur; History of atrial paroxysmal tachycardia; History of cerebral artery occlusion; History of chest pain; Hyperlipidemia; SHAE (iron deficiency anemia); Ovarian cyst; Polyclonal hypergammaglobulinemia; Restless legs syndrome (RLS); and Trigger finger    She   Patient Active Problem List    Diagnosis Date Noted    Type 2 diabetes mellitus without complication, without long-term current use of insulin ([facility] ) 07/17/2018    Tobacco abuse 06/18/2018    Elevated LFTs 11/21/2017    Irregular bleeding 10/24/2017    Dense breasts 09/12/2017    Ovarian cyst 09/12/2017    Controlled diabetes mellitus ([facility] ) 03/25/2016    Adjustment disorder with anxiety 04/01/2014    Hyperlipidemia 04/01/2014    Hypertension 04/01/2014     She  has a past surgical history that includes Cholecystectomy and pr esophagogastroduodenoscopy transoral diagnostic (N/A, 10/19/2017)  Her family history includes Tuberculosis in her mother  She was adopted  She  reports that she quit smoking about 2 months ago  Her smoking use included Cigarettes  She smoked 0 50 packs per day  She has never used smokeless tobacco  She reports that she drinks alcohol  She reports that she does not use drugs  Current Outpatient Prescriptions   Medication Sig Dispense Refill    ACCU-CHEK FASTCLIX LANCETS MISC by Does not apply route daily      atorvastatin (LIPITOR) 20 mg tablet Take 20 mg by mouth daily      B Complex-C (SUPER B COMPLEX PO) Take 1 tablet by mouth daily      BIOTIN PO Take 1 capsule by mouth daily      Blood Glucose Monitoring Suppl (ACCU-CHEK JESSEE CONNECT) w/Device KIT by Does not apply route      CALCIUM PO Take by mouth daily      Canagliflozin (INVOKANA) 300 MG TABS Take 300 mg by mouth daily      gabapentin (NEURONTIN) 100 mg capsule Take 100 mg by mouth daily      glimepiride (AMARYL) 2 mg tablet Take 2 mg by mouth as needed        glucose blood test strip by In Vitro route 4 (four) times a day      L-Lysine 500 MG TABS       lisinopril (ZESTRIL) 10 mg tablet Take 10 mg by mouth daily      metFORMIN (GLUCOPHAGE) 1000 MG tablet Take 1,000 mg by mouth 2 (two) times a day with meals      Multiple Vitamin (DAILY VALUE MULTIVITAMIN) TABS Take by mouth      Omega-3 Fatty Acids (FISH OIL) 1,000 mg Take 1,000 mg by mouth daily      omeprazole (PriLOSEC) 20 mg delayed release capsule Take 20 mg by mouth daily      sitaGLIPtin (JANUVIA) 50 mg tablet Take 50 mg by mouth daily      varenicline (CHANTIX DIVINE) 0 5 MG X 11 & 1 MG X 42 tablet Take one 0 5mg tablet by mouth once daily for 3 days, then increase to one 0 5mg tablet twice daily for 3 days, then increase to one 1mg tablet twice daily   (Patient not taking: Reported on 8/21/2018 ) 53 tablet 0    varenicline (CHANTIX) 1 mg tablet Take 1 tablet (1 mg total) by mouth 2 (two) times a day for 77 days (Patient not taking: Reported on 2018 ) 154 tablet 0     No current facility-administered medications for this visit  She has No Known Allergies  OB History    Para Term  AB Living   6 3 3   3     SAB TAB Ectopic Multiple Live Births   2       3      # Outcome Date GA Lbr Edson/2nd Weight Sex Delivery Anes PTL Lv   6 AB            5 SAB            4 SAB            3 Term            2 Term            1 Term                   Review of Systems   Constitutional: Negative for activity change, chills, fatigue, fever and unexpected weight change  HENT: Negative for mouth sores and trouble swallowing  Respiratory: Negative for shortness of breath  Gastrointestinal: Negative for abdominal distention, abdominal pain and anal bleeding  Genitourinary: Negative for difficulty urinating, dysuria, genital sores and hematuria  Neurological: Negative for weakness  Psychiatric/Behavioral: Negative for confusion and self-injury  Objective:      /60 (BP Location: Right arm, Patient Position: Sitting)   Wt 82 1 kg (181 lb)   LMP 2017   Breastfeeding? No   BMI 30 59 kg/m²          Physical Exam   Constitutional: She is oriented to person, place, and time  She appears well-developed and well-nourished  No distress  HENT:   Head: Normocephalic  Eyes: EOM are normal    Neck: Normal range of motion  Pulmonary/Chest: Effort normal  No respiratory distress  Musculoskeletal: Normal range of motion  Neurological: She is alert and oriented to person, place, and time  Skin: Skin is warm and dry  Psychiatric: She has a normal mood and affect      +red blotchy annular lesions x 4 down her left leg, almost linear  Also has large seborrheic keratosis under bilateral breasts

## 2018-08-21 NOTE — PATIENT INSTRUCTIONS
Acute Rash   AMBULATORY CARE:   A rash  is irritation, redness, or itchiness in the skin or mucus membranes  Mucus membranes are areas such as the lining of your nose or throat  Acute means the rash starts suddenly, worsens quickly, and lasts a short time  An acute rash may be caused by a disease, such as hepatitis or vasculitis  The rash may be a reaction to something you are allergic to, such as certain foods, or latex  Certain medicines, including antibiotics, NSAIDs, prescription pain medicines, and aspirin can also cause a rash  Seek care immediately if:   · You have sudden trouble breathing or chest pain  · You are vomiting, have a headache or muscle aches, and your throat hurts  Contact your healthcare provider if:   · You have a fever  · You get open wounds from scratching your skin, or you have a wound that is red, swollen, or painful  · Your rash lasts longer than 3 months  · You have swelling or pain in your joints  · You have questions or concerns about your condition or care  Medicines:  If your rash does not go away on its own, you may need the following:  · Antihistamines  may be given to help decrease itching  · Steroids  may be given to decrease inflammation  · Antibiotics  help fight or prevent a bacterial infection  · Take your medicine as directed  Contact your healthcare provider if you think your medicine is not helping or if you have side effects  Tell him of her if you are allergic to any medicine  Keep a list of the medicines, vitamins, and herbs you take  Include the amounts, and when and why you take them  Bring the list or the pill bottles to follow-up visits  Carry your medicine list with you in case of an emergency  Prevent a rash or care for your skin when you have a rash:  Dry skin can lead to more problems  Do not scratch your skin if it itches  You may cause a skin infection by scratching   The following may prevent dry skin, and help your skin look better:  · Use thick cream lotions or petroleum jelly to help soothe your rash  These products work well on areas with thick skin, such as your feet  Cool compresses may also be used to soothe your skin  Apply a cool compress or a cool, wet towel, and then cover it with a dry towel  · Use lukewarm water when you bathe  Hot water may damage your skin more  Pat your skin dry  Do not rub your skin with a towel  · Use detergents, soaps, shampoos, and bubble baths made for sensitive skin  Wear clothes that are made of cotton instead of nylon or wool  Cotton is softer, so it will not hurt your skin as much  Follow up with your healthcare provider as directed: You may need to see a dermatologist if healthcare providers do not know what is causing your rash  You may also need to see a dermatologist if your rash does not get better even with treatment  You may need to see a dietitian if you have allergies to foods  Write down your questions so you remember to ask them during your visits  © 2017 [facility] Information is for End User's use only and may not be sold, redistributed or otherwise used for commercial purposes  All illustrations and images included in CareNotes® are the copyrighted property of [employer] , [employer]  or [employer]  The above information is an  only  It is not intended as medical advice for individual conditions or treatments  Talk to your doctor, nurse or pharmacist before following any medical regimen to see if it is safe and effective for you

## 2018-09-11 DIAGNOSIS — D64.9 ANEMIA, UNSPECIFIED TYPE: Primary | ICD-10-CM

## 2018-09-11 DIAGNOSIS — E78.2 MIXED HYPERLIPIDEMIA: Primary | ICD-10-CM

## 2018-09-11 RX ORDER — FERROUS SULFATE 325(65) MG
TABLET ORAL
Qty: 90 TABLET | Refills: 2 | Status: SHIPPED | OUTPATIENT
Start: 2018-09-11 | End: 2018-10-11 | Stop reason: ALTCHOICE

## 2018-09-11 RX ORDER — ATORVASTATIN CALCIUM 20 MG/1
TABLET, FILM COATED ORAL
Qty: 90 TABLET | Refills: 3 | Status: SHIPPED | OUTPATIENT
Start: 2018-09-11 | End: 2019-09-08 | Stop reason: SDUPTHER

## 2018-09-15 ENCOUNTER — OFFICE VISIT (OUTPATIENT)
Dept: INTERNAL MEDICINE CLINIC | Facility: CLINIC | Age: 53
End: 2018-09-15
Payer: COMMERCIAL

## 2018-09-15 VITALS
SYSTOLIC BLOOD PRESSURE: 108 MMHG | OXYGEN SATURATION: 94 % | WEIGHT: 186 LBS | RESPIRATION RATE: 14 BRPM | DIASTOLIC BLOOD PRESSURE: 72 MMHG | HEIGHT: 65 IN | BODY MASS INDEX: 30.99 KG/M2 | HEART RATE: 75 BPM

## 2018-09-15 DIAGNOSIS — H10.9 CONJUNCTIVITIS, UNSPECIFIED CONJUNCTIVITIS TYPE, UNSPECIFIED LATERALITY: Primary | ICD-10-CM

## 2018-09-15 DIAGNOSIS — H00.013 HORDEOLUM EXTERNUM OF RIGHT EYE, UNSPECIFIED EYELID: ICD-10-CM

## 2018-09-15 PROCEDURE — 99213 OFFICE O/P EST LOW 20 MIN: CPT | Performed by: NURSE PRACTITIONER

## 2018-09-15 PROCEDURE — 3008F BODY MASS INDEX DOCD: CPT | Performed by: NURSE PRACTITIONER

## 2018-09-15 RX ORDER — MELATONIN
1000 2 TIMES WEEKLY
COMMUNITY

## 2018-09-15 RX ORDER — ERYTHROMYCIN 5 MG/G
0.5 OINTMENT OPHTHALMIC EVERY 8 HOURS SCHEDULED
Qty: 3.5 G | Refills: 0 | Status: SHIPPED | OUTPATIENT
Start: 2018-09-15 | End: 2018-09-20

## 2018-09-15 RX ORDER — CLOBETASOL PROPIONATE 0.5 MG/G
CREAM TOPICAL AS NEEDED
COMMUNITY
Start: 2018-09-12 | End: 2021-08-04 | Stop reason: SDUPTHER

## 2018-09-15 NOTE — PROGRESS NOTES
Assessment/Plan:    Patient Instructions   Stye, recommend gently washing with baby shampoo then warm compress then erythromycin ointment 3 times a day  For the next 5 days throw away any makeup and start using new after antibiotic is completed if symptoms are not improving contact Dr Belkis Davis during the week         Diagnoses and all orders for this visit:    Conjunctivitis, unspecified conjunctivitis type, unspecified laterality    Hordeolum externum of right eye, unspecified eyelid  -     erythromycin (ILOTYCIN) ophthalmic ointment; Administer 0 5 inches to the right eye every 8 (eight) hours for 5 days    Other orders  -     clobetasol (TEMOVATE) 0 05 % cream;   -     cholecalciferol (VITAMIN D3) 1,000 units tablet; Take 1,000 Units by mouth daily         Subjective:      Patient ID: Uri Iniguez is a 46 y o  female    Patient has had a stye to the right eye for about a week now  She states she has had them in the past but they usually go away after 2-3 days  She was trying to gently wash it and you do warm compresses it is not helping  She does not wear contact lenses            Current Outpatient Prescriptions:     ACCU-CHEK FASTCLIX LANCETS MISC, by Does not apply route daily, Disp: , Rfl:     atorvastatin (LIPITOR) 20 mg tablet, TAKE 1 TABLET DAILY, Disp: 90 tablet, Rfl: 3    B Complex-C (SUPER B COMPLEX PO), Take 1 tablet by mouth daily, Disp: , Rfl:     BIOTIN PO, Take 1 capsule by mouth daily, Disp: , Rfl:     Blood Glucose Monitoring Suppl (ACCU-CHEK JESSEE CONNECT) w/Device KIT, by Does not apply route, Disp: , Rfl:     CALCIUM PO, Take by mouth daily, Disp: , Rfl:     Canagliflozin (INVOKANA) 300 MG TABS, Take 300 mg by mouth daily, Disp: , Rfl:     cholecalciferol (VITAMIN D3) 1,000 units tablet, Take 1,000 Units by mouth daily, Disp: , Rfl:     clobetasol (TEMOVATE) 0 05 % cream, , Disp: , Rfl:     gabapentin (NEURONTIN) 100 mg capsule, Take 100 mg by mouth daily, Disp: , Rfl:    glimepiride (AMARYL) 2 mg tablet, Take 2 mg by mouth as needed  , Disp: , Rfl:     glucose blood test strip, by In Vitro route 4 (four) times a day, Disp: , Rfl:     L-Lysine 500 MG TABS, , Disp: , Rfl:     lisinopril (ZESTRIL) 10 mg tablet, Take 10 mg by mouth daily, Disp: , Rfl:     metFORMIN (GLUCOPHAGE) 1000 MG tablet, Take 1,000 mg by mouth 2 (two) times a day with meals, Disp: , Rfl:     Multiple Vitamin (DAILY VALUE MULTIVITAMIN) TABS, Take by mouth, Disp: , Rfl:     Omega-3 Fatty Acids (FISH OIL) 1,000 mg, Take 1,000 mg by mouth daily, Disp: , Rfl:     omeprazole (PriLOSEC) 20 mg delayed release capsule, Take 20 mg by mouth daily, Disp: , Rfl:     sitaGLIPtin (JANUVIA) 50 mg tablet, Take 50 mg by mouth daily, Disp: , Rfl:     erythromycin (ILOTYCIN) ophthalmic ointment, Administer 0 5 inches to the right eye every 8 (eight) hours for 5 days, Disp: 3 5 g, Rfl: 0    ferrous sulfate 325 (65 Fe) mg tablet, TAKE 1 TABLET DAILY (Patient not taking: Reported on 9/15/2018), Disp: 90 tablet, Rfl: 2    varenicline (CHANTIX DIVINE) 0 5 MG X 11 & 1 MG X 42 tablet, Take one 0 5mg tablet by mouth once daily for 3 days, then increase to one 0 5mg tablet twice daily for 3 days, then increase to one 1mg tablet twice daily  (Patient not taking: Reported on 9/15/2018 ), Disp: 53 tablet, Rfl: 0    varenicline (CHANTIX) 1 mg tablet, Take 1 tablet (1 mg total) by mouth 2 (two) times a day for 77 days (Patient not taking: Reported on 9/15/2018 ), Disp: 154 tablet, Rfl: 0    No results found for this or any previous visit (from the past 1008 hour(s))  The following portions of the patient's history were reviewed and updated as appropriate: allergies, current medications, past family history, past medical history, past social history, past surgical history and problem list      Review of Systems   Constitutional: Negative for appetite change, chills, diaphoresis, fatigue, fever and unexpected weight change     HENT: Negative for postnasal drip and sneezing  Swelling to right eyelid   Eyes: Negative for visual disturbance  Respiratory: Negative for chest tightness and shortness of breath  Cardiovascular: Negative for chest pain, palpitations and leg swelling  Gastrointestinal: Negative for abdominal pain and blood in stool  Endocrine: Negative for cold intolerance, heat intolerance, polydipsia, polyphagia and polyuria  Genitourinary: Negative for difficulty urinating, dysuria, frequency and urgency  Musculoskeletal: Negative for arthralgias and myalgias  Skin: Negative for rash and wound  Neurological: Negative for dizziness, weakness, light-headedness and headaches  Hematological: Negative for adenopathy  Psychiatric/Behavioral: Negative for confusion, dysphoric mood and sleep disturbance  The patient is not nervous/anxious  Objective:      /72   Pulse 75   Resp 14   Ht 5' 4 5" (1 638 m)   Wt 84 4 kg (186 lb)   SpO2 94%   BMI 31 43 kg/m²        Physical Exam   Constitutional: She appears well-developed and well-nourished  Eyes: Conjunctivae are normal  Pupils are equal, round, and reactive to light  Swelling to right upper lid with erythema and crusting to upper eyelid   Cardiovascular: Normal rate and regular rhythm      Pulmonary/Chest: Effort normal and breath sounds normal

## 2018-09-15 NOTE — PATIENT INSTRUCTIONS
Lexy, recommend gently washing with baby shampoo then warm compress then erythromycin ointment 3 times a day    For the next 5 days throw away any makeup and start using new after antibiotic is completed if symptoms are not improving contact Dr Belkis Davis during the week

## 2018-10-11 ENCOUNTER — OFFICE VISIT (OUTPATIENT)
Dept: BARIATRICS | Facility: CLINIC | Age: 53
End: 2018-10-11
Payer: COMMERCIAL

## 2018-10-11 VITALS
TEMPERATURE: 97.2 F | BODY MASS INDEX: 30.69 KG/M2 | HEART RATE: 69 BPM | SYSTOLIC BLOOD PRESSURE: 100 MMHG | DIASTOLIC BLOOD PRESSURE: 69 MMHG | HEIGHT: 65 IN | WEIGHT: 184.2 LBS

## 2018-10-11 DIAGNOSIS — E11.8 CONTROLLED TYPE 2 DIABETES MELLITUS WITH COMPLICATION, WITHOUT LONG-TERM CURRENT USE OF INSULIN (HCC): ICD-10-CM

## 2018-10-11 DIAGNOSIS — E11.9 TYPE 2 DIABETES MELLITUS WITHOUT COMPLICATION, WITHOUT LONG-TERM CURRENT USE OF INSULIN (HCC): ICD-10-CM

## 2018-10-11 DIAGNOSIS — I10 ESSENTIAL HYPERTENSION: ICD-10-CM

## 2018-10-11 DIAGNOSIS — R79.89 ELEVATED LFTS: ICD-10-CM

## 2018-10-11 DIAGNOSIS — E66.9 OBESITY, CLASS I, BMI 30-34.9: ICD-10-CM

## 2018-10-11 DIAGNOSIS — R63.5 ABNORMAL WEIGHT GAIN: ICD-10-CM

## 2018-10-11 DIAGNOSIS — E78.2 MIXED HYPERLIPIDEMIA: Primary | ICD-10-CM

## 2018-10-11 PROBLEM — E66.811 OBESITY, CLASS I, BMI 30-34.9: Status: ACTIVE | Noted: 2018-10-11

## 2018-10-11 PROCEDURE — 99203 OFFICE O/P NEW LOW 30 MIN: CPT | Performed by: PHYSICIAN ASSISTANT

## 2018-10-11 RX ORDER — LISINOPRIL 10 MG/1
TABLET ORAL
COMMUNITY
Start: 2018-09-24 | End: 2018-10-11 | Stop reason: SDUPTHER

## 2018-10-11 NOTE — ASSESSMENT & PLAN NOTE
fatty liver noted on CT on 11/6/2017  -should improve with weight loss, dietary, and lifestyle changes

## 2018-10-11 NOTE — PATIENT INSTRUCTIONS
Goals: Food log (ie ) www myfitnesspal com,sparkpeople  com,loseit com,calorieking  com,etc  baritastic  No sugary beverages  At least 64oz of water daily  Increase physical activity by 10 minutes daily   Gradually increase physical activity to a goal of 5 days per week for 30 minutes of MODERATE intensity PLUS 2 days per week of FULL BODY resistance training--10 minute walk per day and increase as tolerated   5-10 servings of fruits and vegetables per day  0828-4982 calories per day

## 2018-10-11 NOTE — ASSESSMENT & PLAN NOTE
Lab Results   Component Value Date    HGBA1C 7 7 (H) 05/30/2018     Taking invokana, metformin, januvia and glimepiride   -should improve with weight loss, dietary, and lifestyle changes

## 2018-10-11 NOTE — ASSESSMENT & PLAN NOTE
-Discussed options of [facility]-Intensive Lifestyle Intervention Program, Very Low Calorie Diet-VLCD and Conservative Program and the role of weight loss medications   -Initial weight loss goal of 5-10% weight loss for improved health  -Screening labs  Recommend checking lab coverage before having labs drawn  -CMP, A1C, lipid and tsh reviewed from 5/30/2018 all within acceptable limits except elevated A1C and decreased eGFR  - STOP BANG-1/8  -Patient is interested in pursuing Conservative Program       Goals:  Food log (ie ) www myfitnesspal com,sparkpeople  com,loseit com,calorieking  com,etc  baritastic  No sugary beverages  At least 64oz of water daily  Increase physical activity by 10 minutes daily   Gradually increase physical activity to a goal of 5 days per week for 30 minutes of MODERATE intensity PLUS 2 days per week of FULL BODY resistance training--10 minute walk per day and increase as tolerated   5-10 servings of fruits and vegetables per day  5189-3470 calories per day

## 2018-10-11 NOTE — PROGRESS NOTES
Assessment/Plan:    Hyperlipidemia  Taking lipitor  -should improve with weight loss, dietary, and lifestyle changes      Hypertension  Taking lisinopril  -should improve with weight loss, dietary, and lifestyle changes      Type 2 diabetes mellitus without complication, without long-term current use of insulin (HCC)  Lab Results   Component Value Date    HGBA1C 7 7 (H) 05/30/2018     Taking invokana, metformin, januvia and glimepiride   -should improve with weight loss, dietary, and lifestyle changes      Obesity, Class I, BMI 30-34 9  -Discussed options of Healthy[facility]-Intensive Lifestyle Intervention Program, Very Low Calorie Diet-VLCD and Conservative Program and the role of weight loss medications   -Initial weight loss goal of 5-10% weight loss for improved health  -Screening labs  Recommend checking lab coverage before having labs drawn  -CMP, A1C, lipid and tsh reviewed from 5/30/2018 all within acceptable limits except elevated A1C and decreased eGFR  - STOP BANG-1/8  -Patient is interested in pursuing Conservative Program       Goals:  Food log (ie ) www myfitnesspal com,sparkpeople  com,loseit com,calorieking  com,etc  baritastic  No sugary beverages  At least 64oz of water daily  Increase physical activity by 10 minutes daily  Gradually increase physical activity to a goal of 5 days per week for 30 minutes of MODERATE intensity PLUS 2 days per week of FULL BODY resistance training--10 minute walk per day and increase as tolerated   5-10 servings of fruits and vegetables per day  9653-1643 calories per day        Elevated LFTs  fatty liver noted on CT on 11/6/2017  -should improve with weight loss, dietary, and lifestyle changes      Abnormal weight gain  See obesity plan     Follow up in approximately 2 months with Non-Surgical Physician/Advanced Practitioner      Diagnoses and all orders for this visit:    Mixed hyperlipidemia    Controlled type 2 diabetes mellitus with complication, without long-term current use of insulin ([facility] [address] )  -     Ambulatory referral to Weight Management    Elevated LFTs    Essential hypertension    Type 2 diabetes mellitus without complication, without long-term current use of insulin ([facility])    Obesity, Class I, BMI 30-34 9    Abnormal weight gain    Other orders  -     Discontinue: lisinopril (ZESTRIL) 10 mg tablet; TAKE 1 TABLET DAILY          Subjective:   Chief Complaint   Patient presents with    Consult     Pt here for initial MWM consult w/PA  BMI 31 1  Negative Stop [place] Middleton  Patient ID: Chetan Wright  is a 46 y o  female with excess weight/obesity here to pursue weight management  Past Medical History:   Diagnosis Date    Adjustment disorder with anxiety     Anemia     Anxiety     Constipation     Dense breasts     GERD (gastroesophageal reflux disease)     Heart murmur     History of atrial paroxysmal tachycardia     History of cerebral artery occlusion     History of chest pain     Hyperlipidemia     SHAE (iron deficiency anemia)     Menopause     Ovarian cyst     Pancreatitis     Polyclonal hypergammaglobulinemia     Restless legs syndrome (RLS)     Trigger finger        HPI:  Obesity/Excess Weight:  Severity: Moderate  Onset: For the last 25-30 years   Modifiers: Diet and Exercise  Contributing factors: Poor Food Choices, Insufficient Physical Activity and night eating, snacking   Associated symptoms: comorbid conditions, fatigue and clothes do not fit    Goals:135-150 lbs  Hydration:1-2 bottles of water, 2-3 cups of coffee  With creamer, 1 hot tea with honey, 1-2 glasses of diet iced tea   Alcohol: none    The following portions of the patient's history were reviewed and updated as appropriate: allergies, current medications, past family history, past medical history, past social history, past surgical history and problem list     Review of Systems   HENT: Negative for sore throat  Respiratory: Negative for cough and shortness of breath  Cardiovascular: Negative for chest pain and palpitations  Gastrointestinal: Negative for abdominal pain, constipation, diarrhea, nausea and vomiting         + GERD= takes omeprazole    Endocrine: Negative for cold intolerance and heat intolerance  Genitourinary: Negative for dysuria  Musculoskeletal: Negative for arthralgias and back pain  Skin: Negative for rash  Neurological: Negative for headaches  Psychiatric/Behavioral: Negative for suicidal ideas (denies HI)  Denies depression and anxiety       Objective:    /69 (BP Location: Right arm, Patient Position: Sitting, Cuff Size: Standard)   Pulse 69   Temp (!) 97 2 °F (36 2 °C) (Tympanic)   Ht 5' 4 5" (1 638 m)   Wt 83 6 kg (184 lb 3 2 oz)   BMI 31 13 kg/m²     Physical Exam   Nursing note and vitals reviewed  Constitutional   General appearance: Abnormal   well developed and obese  Eyes No conjunctival pallor  Ears, Nose, Mouth, and Throat Oral mucosa moist    Pulmonary   Respiratory effort: No increased work of breathing or signs of respiratory distress  Auscultation of lungs: Clear to auscultation, equal breath sounds bilaterally, no wheezes, no rales, no rhonci  Cardiovascular   Auscultation of heart: Normal rate and rhythm, normal S1 and S2, without murmurs  Examination of extremities for edema and/or varicosities: Normal   no edema  Abdomen   Abdomen: Abnormal   The abdomen was obese  Bowel sounds were normal  The abdomen was soft and nontender     Musculoskeletal   Gait and station: Normal     Psychiatric   Orientation to person, place and time: Normal     Affect: appropriate

## 2018-10-22 DIAGNOSIS — R12 HEARTBURN: Primary | ICD-10-CM

## 2018-10-22 RX ORDER — OMEPRAZOLE 20 MG/1
CAPSULE, DELAYED RELEASE ORAL
Qty: 90 CAPSULE | Refills: 4 | Status: SHIPPED | OUTPATIENT
Start: 2018-10-22 | End: 2020-01-17

## 2018-11-23 ENCOUNTER — TELEPHONE (OUTPATIENT)
Dept: INTERNAL MEDICINE CLINIC | Facility: CLINIC | Age: 53
End: 2018-11-23

## 2018-11-24 ENCOUNTER — TELEPHONE (OUTPATIENT)
Dept: INTERNAL MEDICINE CLINIC | Facility: CLINIC | Age: 53
End: 2018-11-24

## 2018-11-24 NOTE — TELEPHONE ENCOUNTER
----- Message from Tai Capone MD sent at 11/24/2018  9:08 AM EST -----  Diabetic eye damage noted on examination  She needs to see an eye doctor if she has not done so already

## 2018-12-02 DIAGNOSIS — E11.9 TYPE 2 DIABETES MELLITUS WITHOUT COMPLICATION, WITHOUT LONG-TERM CURRENT USE OF INSULIN (HCC): Primary | ICD-10-CM

## 2018-12-04 ENCOUNTER — TELEPHONE (OUTPATIENT)
Dept: BARIATRICS | Facility: CLINIC | Age: 53
End: 2018-12-04

## 2018-12-04 NOTE — TELEPHONE ENCOUNTER
Patient left a message with the service this morning to cancel her 3:30 appt today  I called her back to see if she would like to reschedule, but had to leave a voicemail

## 2019-01-28 ENCOUNTER — TELEPHONE (OUTPATIENT)
Dept: INTERNAL MEDICINE CLINIC | Facility: CLINIC | Age: 54
End: 2019-01-28

## 2019-01-28 NOTE — TELEPHONE ENCOUNTER
Patient has on appointment   on Feb 6 she would like to get lab orders   And have it done before she come in

## 2019-01-29 DIAGNOSIS — E78.2 MIXED HYPERLIPIDEMIA: ICD-10-CM

## 2019-01-29 DIAGNOSIS — E11.9 TYPE 2 DIABETES MELLITUS WITHOUT COMPLICATION, WITHOUT LONG-TERM CURRENT USE OF INSULIN (HCC): Primary | ICD-10-CM

## 2019-01-29 DIAGNOSIS — R79.89 ELEVATED LFTS: ICD-10-CM

## 2019-01-29 DIAGNOSIS — I10 ESSENTIAL HYPERTENSION: ICD-10-CM

## 2019-02-04 ENCOUNTER — APPOINTMENT (OUTPATIENT)
Dept: LAB | Facility: CLINIC | Age: 54
End: 2019-02-04
Payer: COMMERCIAL

## 2019-02-04 DIAGNOSIS — R79.89 ELEVATED LFTS: ICD-10-CM

## 2019-02-04 DIAGNOSIS — I10 ESSENTIAL HYPERTENSION: ICD-10-CM

## 2019-02-04 DIAGNOSIS — R21 RASH: ICD-10-CM

## 2019-02-04 DIAGNOSIS — E11.9 TYPE 2 DIABETES MELLITUS WITHOUT COMPLICATION, WITHOUT LONG-TERM CURRENT USE OF INSULIN (HCC): ICD-10-CM

## 2019-02-04 DIAGNOSIS — E78.2 MIXED HYPERLIPIDEMIA: ICD-10-CM

## 2019-02-04 LAB
BASOPHILS # BLD AUTO: 0.04 THOUSANDS/ΜL (ref 0–0.1)
BASOPHILS NFR BLD AUTO: 0 % (ref 0–1)
BILIRUB UR QL STRIP: NEGATIVE
CLARITY UR: CLEAR
COLOR UR: YELLOW
CREAT UR-MCNC: 75.5 MG/DL
EOSINOPHIL # BLD AUTO: 0.11 THOUSAND/ΜL (ref 0–0.61)
EOSINOPHIL NFR BLD AUTO: 1 % (ref 0–6)
ERYTHROCYTE [DISTWIDTH] IN BLOOD BY AUTOMATED COUNT: 13.7 % (ref 11.6–15.1)
EST. AVERAGE GLUCOSE BLD GHB EST-MCNC: 183 MG/DL
GLUCOSE UR STRIP-MCNC: ABNORMAL MG/DL
HBA1C MFR BLD: 8 % (ref 4.2–6.3)
HCT VFR BLD AUTO: 42.6 % (ref 34.8–46.1)
HGB BLD-MCNC: 13.5 G/DL (ref 11.5–15.4)
HGB UR QL STRIP.AUTO: NEGATIVE
IMM GRANULOCYTES # BLD AUTO: 0.03 THOUSAND/UL (ref 0–0.2)
IMM GRANULOCYTES NFR BLD AUTO: 0 % (ref 0–2)
KETONES UR STRIP-MCNC: NEGATIVE MG/DL
LEUKOCYTE ESTERASE UR QL STRIP: NEGATIVE
LYMPHOCYTES # BLD AUTO: 2.44 THOUSANDS/ΜL (ref 0.6–4.47)
LYMPHOCYTES NFR BLD AUTO: 21 % (ref 14–44)
MCH RBC QN AUTO: 31.3 PG (ref 26.8–34.3)
MCHC RBC AUTO-ENTMCNC: 31.7 G/DL (ref 31.4–37.4)
MCV RBC AUTO: 99 FL (ref 82–98)
MICROALBUMIN UR-MCNC: 12.5 MG/L (ref 0–20)
MICROALBUMIN/CREAT 24H UR: 17 MG/G CREATININE (ref 0–30)
MONOCYTES # BLD AUTO: 0.75 THOUSAND/ΜL (ref 0.17–1.22)
MONOCYTES NFR BLD AUTO: 7 % (ref 4–12)
NEUTROPHILS # BLD AUTO: 8.17 THOUSANDS/ΜL (ref 1.85–7.62)
NEUTS SEG NFR BLD AUTO: 71 % (ref 43–75)
NITRITE UR QL STRIP: NEGATIVE
NRBC BLD AUTO-RTO: 0 /100 WBCS
PH UR STRIP.AUTO: 5.5 [PH] (ref 4.5–8)
PLATELET # BLD AUTO: 251 THOUSANDS/UL (ref 149–390)
PMV BLD AUTO: 10.5 FL (ref 8.9–12.7)
PROT UR STRIP-MCNC: NEGATIVE MG/DL
RBC # BLD AUTO: 4.32 MILLION/UL (ref 3.81–5.12)
SP GR UR STRIP.AUTO: 1.04 (ref 1–1.03)
UROBILINOGEN UR QL STRIP.AUTO: 0.2 E.U./DL
WBC # BLD AUTO: 11.54 THOUSAND/UL (ref 4.31–10.16)

## 2019-02-04 PROCEDURE — 81003 URINALYSIS AUTO W/O SCOPE: CPT | Performed by: INTERNAL MEDICINE

## 2019-02-04 PROCEDURE — 84443 ASSAY THYROID STIM HORMONE: CPT

## 2019-02-04 PROCEDURE — 82570 ASSAY OF URINE CREATININE: CPT | Performed by: INTERNAL MEDICINE

## 2019-02-04 PROCEDURE — 80061 LIPID PANEL: CPT

## 2019-02-04 PROCEDURE — 82043 UR ALBUMIN QUANTITATIVE: CPT | Performed by: INTERNAL MEDICINE

## 2019-02-04 PROCEDURE — 83036 HEMOGLOBIN GLYCOSYLATED A1C: CPT

## 2019-02-04 PROCEDURE — 3061F NEG MICROALBUMINURIA REV: CPT | Performed by: INTERNAL MEDICINE

## 2019-02-04 PROCEDURE — 36415 COLL VENOUS BLD VENIPUNCTURE: CPT

## 2019-02-04 PROCEDURE — 80048 BASIC METABOLIC PNL TOTAL CA: CPT

## 2019-02-04 PROCEDURE — 86592 SYPHILIS TEST NON-TREP QUAL: CPT

## 2019-02-04 PROCEDURE — 85025 COMPLETE CBC W/AUTO DIFF WBC: CPT

## 2019-02-05 ENCOUNTER — TELEPHONE (OUTPATIENT)
Dept: OBGYN CLINIC | Facility: CLINIC | Age: 54
End: 2019-02-05

## 2019-02-05 ENCOUNTER — TELEPHONE (OUTPATIENT)
Dept: INTERNAL MEDICINE CLINIC | Facility: CLINIC | Age: 54
End: 2019-02-05

## 2019-02-05 LAB
ANION GAP SERPL CALCULATED.3IONS-SCNC: 7 MMOL/L (ref 4–13)
BUN SERPL-MCNC: 17 MG/DL (ref 5–25)
CALCIUM SERPL-MCNC: 9.4 MG/DL (ref 8.3–10.1)
CHLORIDE SERPL-SCNC: 103 MMOL/L (ref 100–108)
CHOLEST SERPL-MCNC: 106 MG/DL (ref 50–200)
CO2 SERPL-SCNC: 26 MMOL/L (ref 21–32)
CREAT SERPL-MCNC: 1.33 MG/DL (ref 0.6–1.3)
GFR SERPL CREATININE-BSD FRML MDRD: 45 ML/MIN/1.73SQ M
GLUCOSE SERPL-MCNC: 217 MG/DL (ref 65–140)
HDLC SERPL-MCNC: 42 MG/DL (ref 40–60)
LDLC SERPL CALC-MCNC: 24 MG/DL (ref 0–100)
POTASSIUM SERPL-SCNC: 4.5 MMOL/L (ref 3.5–5.3)
RPR SER QL: NORMAL
SODIUM SERPL-SCNC: 136 MMOL/L (ref 136–145)
TRIGL SERPL-MCNC: 198 MG/DL
TSH SERPL DL<=0.05 MIU/L-ACNC: 2.52 UIU/ML (ref 0.36–3.74)

## 2019-02-05 NOTE — TELEPHONE ENCOUNTER
----- Message from Michelle Fink MD sent at 2/5/2019  7:55 AM EST -----  Poor diabetic control needs to come back

## 2019-02-05 NOTE — TELEPHONE ENCOUNTER
Spoke with Pt today via phone call  Pt informed that her recent RPR test result was negative (normal) per ANN Zendejas's review  Reiterated to Pt that if she has any questions/concerns to contact office

## 2019-02-05 NOTE — TELEPHONE ENCOUNTER
----- Message from Miguel Roy, [address] sent at 2/5/2019 11:01 AM EST -----  Please notify patient her RPR was normal  Thanks

## 2019-02-06 ENCOUNTER — TELEPHONE (OUTPATIENT)
Dept: INTERNAL MEDICINE CLINIC | Facility: CLINIC | Age: 54
End: 2019-02-06

## 2019-02-06 ENCOUNTER — OFFICE VISIT (OUTPATIENT)
Dept: INTERNAL MEDICINE CLINIC | Facility: CLINIC | Age: 54
End: 2019-02-06
Payer: COMMERCIAL

## 2019-02-06 VITALS
BODY MASS INDEX: 32.85 KG/M2 | SYSTOLIC BLOOD PRESSURE: 102 MMHG | WEIGHT: 194.4 LBS | HEART RATE: 75 BPM | OXYGEN SATURATION: 98 % | DIASTOLIC BLOOD PRESSURE: 78 MMHG

## 2019-02-06 DIAGNOSIS — R22.0 RIGHT FACIAL SWELLING: ICD-10-CM

## 2019-02-06 DIAGNOSIS — I10 ESSENTIAL HYPERTENSION: ICD-10-CM

## 2019-02-06 DIAGNOSIS — Z72.0 TOBACCO ABUSE: ICD-10-CM

## 2019-02-06 DIAGNOSIS — E11.9 TYPE 2 DIABETES MELLITUS WITHOUT COMPLICATION, WITHOUT LONG-TERM CURRENT USE OF INSULIN (HCC): Primary | ICD-10-CM

## 2019-02-06 DIAGNOSIS — M26.649 TMJ ARTHRITIS: ICD-10-CM

## 2019-02-06 DIAGNOSIS — E66.9 OBESITY, CLASS I, BMI 30-34.9: ICD-10-CM

## 2019-02-06 DIAGNOSIS — N18.2 CHRONIC KIDNEY DISEASE, STAGE 2 (MILD): ICD-10-CM

## 2019-02-06 PROCEDURE — 99214 OFFICE O/P EST MOD 30 MIN: CPT | Performed by: INTERNAL MEDICINE

## 2019-02-06 PROCEDURE — 3074F SYST BP LT 130 MM HG: CPT | Performed by: INTERNAL MEDICINE

## 2019-02-06 PROCEDURE — 3078F DIAST BP <80 MM HG: CPT | Performed by: INTERNAL MEDICINE

## 2019-02-06 PROCEDURE — 3066F NEPHROPATHY DOC TX: CPT | Performed by: INTERNAL MEDICINE

## 2019-02-06 NOTE — TELEPHONE ENCOUNTER
Pt was here for for an appt today, she was prescribed dulaglutide 0 75 injection pens   copay for medication is $820, wants to know if there is an alternative she could try    Send to [facility] in [facility]-[phone]

## 2019-02-06 NOTE — PATIENT INSTRUCTIONS
A patient with type 2 diabetes was hemoglobin A1c is up to 8 in the face of multiple medications  Recommendations:  Back to the bariatric weight program, stop glimepiride which she uses only occasionally anyway, and initiate Trulicity 5 19 mg subcutaneously weekly  Indication for Trulicity is continued obesity and uncontrolled diabetes  I believe both insulin, and glimepiride on a regular basis, should be avoided due to their well-known potential for weight gain

## 2019-02-06 NOTE — TELEPHONE ENCOUNTER
She needs to call her pharmacy to find out of Victoza, or Tanzeum, or any GLP drug  are covered    Failing that we need to do a prior authorization

## 2019-02-06 NOTE — PROGRESS NOTES
Assessment/Plan:       Diagnoses and all orders for this visit:    Type 2 diabetes mellitus without complication, without long-term current use of insulin (HCC)  -     Dulaglutide (TRULICITY) 8 92 OI/8 8MH SOPN; Inject 0 5 mL (0 75 mg total) under the skin once a week  -     Ambulatory referral to Weight Management; Future    Essential hypertension    Tobacco abuse    Obesity, Class I, BMI 30-34 9    Chronic kidney disease, stage 2 (mild)  -     Creatinine Clearance 24 Hr; Future  -     US kidney and bladder; Future    Right facial swelling  -     XR temporomandibular joints bilateral; Future    TMJ arthritis  -     XR temporomandibular joints bilateral; Future          Patient Instructions   A patient with type 2 diabetes was hemoglobin A1c is up to 8 in the face of multiple medications  Recommendations:  Back to the bariatric weight program, stop glimepiride which she uses only occasionally anyway, and initiate Trulicity 2 84 mg subcutaneously weekly  Indication for Trulicity is continued obesity and uncontrolled diabetes  I believe both insulin, and glimepiride on a regular basis, should be avoided due to their well-known potential for weight gain  Subjective:      Patient ID: Lawanda Mansfield is a 46 y o  female  A 70-year-old female with poor compliance to follow-up presents  Once again, she continued this track record  I saw her over half a year ago and wanted her to do hemoglobin A1c  She did 1 a few days ago and it was a 8 0   8 months ago, it was 7 7    Hypertension is treated  Dyslipidemia is treated    In 2017 she was found to have a hypochromic microcytic anemia with iron deficiency  Clinically what she had was successive menstrual bleeding but a GI workup was done for completeness and it was entirely normal; upper, lower, and capsule      Smoking  Right ankle pain        The following portions of the patient's history were reviewed and updated as appropriate:   She has a past medical history of Adjustment disorder with anxiety; Anemia; Anxiety; Constipation; Dense breasts; GERD (gastroesophageal reflux disease); Heart murmur; History of atrial paroxysmal tachycardia; History of cerebral artery occlusion; History of chest pain; Hyperlipidemia; SHAE (iron deficiency anemia); Menopause; Ovarian cyst; Pancreatitis; Polyclonal hypergammaglobulinemia; Restless legs syndrome (RLS); and Trigger finger  ,   does not have any pertinent problems on file  ,   has a past surgical history that includes Cholecystectomy; pr esophagogastroduodenoscopy transoral diagnostic (N/A, 10/19/2017); and Trigger finger release  ,  family history includes Tuberculosis in her mother  She was adopted  ,   reports that she quit smoking about 7 months ago  Her smoking use included Cigarettes  She smoked 0 50 packs per day  She has never used smokeless tobacco  She reports that she drinks alcohol  She reports that she does not use drugs  ,  has No Known Allergies     Current Outpatient Prescriptions   Medication Sig Dispense Refill    ACCU-CHEK FASTCLIX LANCETS MISC by Does not apply route daily      atorvastatin (LIPITOR) 20 mg tablet TAKE 1 TABLET DAILY 90 tablet 3    B Complex-C (SUPER B COMPLEX PO) Take 1 tablet by mouth daily      BIOTIN PO Take 1 capsule by mouth daily      Blood Glucose Monitoring Suppl (ACCU-CHEK JESSEE CONNECT) w/Device KIT by Does not apply route      CALCIUM PO Take by mouth daily      Canagliflozin (INVOKANA) 300 MG TABS Take 300 mg by mouth daily      cholecalciferol (VITAMIN D3) 1,000 units tablet Take 1,000 Units by mouth daily      clobetasol (TEMOVATE) 0 05 % cream       gabapentin (NEURONTIN) 100 mg capsule Take 100 mg by mouth daily      glucose blood test strip by In Vitro route 4 (four) times a day      JANUVIA 100 MG tablet       L-Lysine 500 MG TABS       lisinopril (ZESTRIL) 10 mg tablet Take 10 mg by mouth daily      metFORMIN (GLUCOPHAGE) 1000 MG tablet TAKE 1 TABLET TWICE A DAY 180 tablet 3    Multiple Vitamin (DAILY VALUE MULTIVITAMIN) TABS Take by mouth      Omega-3 Fatty Acids (FISH OIL) 1,000 mg Take 1,000 mg by mouth daily      omeprazole (PriLOSEC) 20 mg delayed release capsule TAKE 1 CAPSULE EVERY MORNING BEFORE BREAKFAST 90 capsule 4    Dulaglutide (TRULICITY) 8 14 GN/6 1MF SOPN Inject 0 5 mL (0 75 mg total) under the skin once a week 4 pen 3     No current facility-administered medications for this visit          Review of Systems      Objective:  Vitals:    02/06/19 1714   BP: 102/78   Pulse: 75   SpO2: 98%      Physical Exam

## 2019-02-11 ENCOUNTER — APPOINTMENT (OUTPATIENT)
Dept: LAB | Facility: MEDICAL CENTER | Age: 54
End: 2019-02-11
Payer: COMMERCIAL

## 2019-02-11 DIAGNOSIS — N18.2 CHRONIC KIDNEY DISEASE, STAGE 2 (MILD): ICD-10-CM

## 2019-02-11 LAB
CREAT 24H UR-MRATE: 1.1 G/24HR (ref 0.6–1.8)
CREAT CL 24H UR+SERPL-VRATE: 49.97 ML/MIN (ref 75–115)
CREAT SERPL-MCNC: 1.36 MG/DL (ref 0.6–1.3)
CREAT UR-MCNC: 56 MG/DL
SPECIMEN VOL UR: 2000 ML

## 2019-02-11 PROCEDURE — 82565 ASSAY OF CREATININE: CPT

## 2019-02-11 PROCEDURE — 36415 COLL VENOUS BLD VENIPUNCTURE: CPT

## 2019-02-11 PROCEDURE — 82575 CREATININE CLEARANCE TEST: CPT

## 2019-02-13 ENCOUNTER — TELEPHONE (OUTPATIENT)
Dept: INTERNAL MEDICINE CLINIC | Facility: CLINIC | Age: 54
End: 2019-02-13

## 2019-02-13 DIAGNOSIS — N18.31 CHRONIC KIDNEY DISEASE (CKD) STAGE G3A/A1, MODERATELY DECREASED GLOMERULAR FILTRATION RATE (GFR) BETWEEN 45-59 ML/MIN/1.73 SQUARE METER AND ALBUMINURIA CREATININE RATIO LESS THAN 30 MG/G (HCC): Primary | ICD-10-CM

## 2019-02-13 NOTE — TELEPHONE ENCOUNTER
----- Message from Umberto Osorio MD sent at 2/13/2019  8:33 AM EST -----  24 hour urine collection does show chronic kidney disease stage 3-moderate    Needs a consultative visit with a kidney specialist which I have put in

## 2019-02-16 ENCOUNTER — HOSPITAL ENCOUNTER (OUTPATIENT)
Dept: ULTRASOUND IMAGING | Facility: HOSPITAL | Age: 54
Discharge: HOME | End: 2019-02-16
Attending: INTERNAL MEDICINE
Payer: COMMERCIAL

## 2019-02-16 DIAGNOSIS — N18.2 CHRONIC KIDNEY DISEASE, STAGE 2 (MILD): ICD-10-CM

## 2019-02-16 PROCEDURE — 76770 US EXAM ABDO BACK WALL COMP: CPT

## 2019-02-18 ENCOUNTER — TELEPHONE (OUTPATIENT)
Dept: INTERNAL MEDICINE CLINIC | Facility: CLINIC | Age: 54
End: 2019-02-18

## 2019-02-18 NOTE — TELEPHONE ENCOUNTER
Patient was giving TRULICITY to take but her insurance doses not cover it and it cost too much    Her insurance did recommend two other [facility] that is covered   Homero Garcia     Would like to know what Dr Jermaine Frankel thinks

## 2019-02-20 ENCOUNTER — TELEPHONE (OUTPATIENT)
Dept: INTERNAL MEDICINE CLINIC | Facility: CLINIC | Age: 54
End: 2019-02-20

## 2019-02-20 NOTE — TELEPHONE ENCOUNTER
FORWARDING MSG  TO DR Artis Cordoba  CALLED PT   AND WAS NOTIFIED AND SHE IS ASKING FOR BYDUREON TO [facility]

## 2019-02-23 DIAGNOSIS — E11.9 TYPE 2 DIABETES MELLITUS WITHOUT COMPLICATION, WITHOUT LONG-TERM CURRENT USE OF INSULIN (HCC): ICD-10-CM

## 2019-02-25 DIAGNOSIS — E11.9 TYPE 2 DIABETES MELLITUS WITHOUT COMPLICATION, WITHOUT LONG-TERM CURRENT USE OF INSULIN (HCC): ICD-10-CM

## 2019-02-27 ENCOUNTER — CONSULT (OUTPATIENT)
Dept: NEPHROLOGY | Facility: CLINIC | Age: 54
End: 2019-02-27
Payer: COMMERCIAL

## 2019-02-27 VITALS
SYSTOLIC BLOOD PRESSURE: 124 MMHG | HEIGHT: 65 IN | HEART RATE: 84 BPM | WEIGHT: 187 LBS | DIASTOLIC BLOOD PRESSURE: 68 MMHG | TEMPERATURE: 97.7 F | BODY MASS INDEX: 31.16 KG/M2

## 2019-02-27 DIAGNOSIS — N18.31 CHRONIC KIDNEY DISEASE (CKD) STAGE G3A/A1, MODERATELY DECREASED GLOMERULAR FILTRATION RATE (GFR) BETWEEN 45-59 ML/MIN/1.73 SQUARE METER AND ALBUMINURIA CREATININE RATIO LESS THAN 30 MG/G (HCC): ICD-10-CM

## 2019-02-27 PROCEDURE — 99244 OFF/OP CNSLTJ NEW/EST MOD 40: CPT | Performed by: INTERNAL MEDICINE

## 2019-02-27 NOTE — LETTER
February 28, 2019     Jossy Powers MD  [address]/[facility] [address]    Patient: Heide Peng   YOB: 1965   Date of Visit: 2/27/2019       Dear Dr Chester Schmidt: Thank you for referring Heide Peng to me for evaluation  Below are my notes for this consultation  If you have questions, please do not hesitate to call me  I look forward to following your patient along with you  Sincerely,        Alayna Jean MD        CC: No Recipients  Alayna Jean MD  2/28/2019 12:09 PM  Incomplete  NEPHROLOGY OFFICE [address] [place] [address] & allyve 46 y o  female MRN: [id number]    Encounter: [id number] DATE: 2/28/2019    REASON FOR VISIT: Heide Peng is a 46 y  o female who was referred by Jossy Powers MD for evaluation  Bobby Manifold HPI:    This is a 59-year-old femal with past medical his of diabetes mellitus for at least 15 years, chronic kidney disease stage 3, GERD, proteinuria who is referred to Renal Clinic by PCP for management of CKD  As per patient, she was told about the elevated renal parameters only 2 weeks ago  However, upon review of labs patient had estimated GFR below 60 since November 2016  Patient admits to having uncontrolled diabetes in her lifetime  States that she was recently placed on Byetta which however has cause GI side effects including excessive burping and bloating  She denies any chest pain, shortness of breath, abdominal pain lower extremity edema  Denies having any foamy urine or blood in the urine either            PAST MEDICAL HISTORY:  Past Medical History:   Diagnosis Date    Adjustment disorder with anxiety     Anemia     Anxiety     Constipation     Dense breasts     GERD (gastroesophageal reflux disease)     Heart murmur     History of atrial paroxysmal tachycardia     History of cerebral artery occlusion     History of chest pain     Hyperlipidemia     SHAE (iron deficiency anemia)     Menopause     Ovarian cyst     Pancreatitis  Polyclonal hypergammaglobulinemia     Restless legs syndrome (RLS)     Trigger finger        PAST SURGICAL HISTORY:  Past Surgical History:   Procedure Laterality Date    CHOLECYSTECTOMY      MO ESOPHAGOGASTRODUODENOSCOPY TRANSORAL DIAGNOSTIC N/A 10/19/2017    Procedure: EGD AND COLONOSCOPY;  Surgeon: Radha Pagan MD;  Location: [facility];   Service: Gastroenterology    TRIGGER FINGER RELEASE         SOCIAL HISTORY:  Social History     Substance and Sexual Activity   Alcohol Use Yes    Comment: rarely     Social History     Substance and Sexual Activity   Drug Use No     Social History     Tobacco Use   Smoking Status Former Smoker    Packs/day: 0 50    Types: Cigarettes    Last attempt to quit: 2018    Years since quittin 7   Smokeless Tobacco Never Used       FAMILY HISTORY:  Family History   Adopted: Yes   Problem Relation Age of Onset    Tuberculosis Mother        ALLERGY:  No Known Allergies    MEDICATIONS:    Current Outpatient Medications:     ACCU-CHEK FASTCLIX LANCETS MISC, by Does not apply route daily, Disp: , Rfl:     atorvastatin (LIPITOR) 20 mg tablet, TAKE 1 TABLET DAILY, Disp: 90 tablet, Rfl: 3    B Complex-C (SUPER B COMPLEX PO), Take 1 tablet by mouth daily, Disp: , Rfl:     BIOTIN PO, Take 1 capsule by mouth daily, Disp: , Rfl:     Blood Glucose Monitoring Suppl (ACCU-CHEK JESSEE CONNECT) w/Device KIT, by Does not apply route, Disp: , Rfl:     CALCIUM PO, Take by mouth daily, Disp: , Rfl:     Canagliflozin (INVOKANA) 300 MG TABS, Take 300 mg by mouth daily, Disp: , Rfl:     cholecalciferol (VITAMIN D3) 1,000 units tablet, Take 1,000 Units by mouth daily, Disp: , Rfl:     clobetasol (TEMOVATE) 0 05 % cream, , Disp: , Rfl:     Exenatide ER 2 MG PEN, Inject 2 mg under the skin once a week, Disp: 12 each, Rfl: 3    Exenatide ER 2 MG PEN, Inject 2 mg under the skin once a week, Disp: 12 each, Rfl: 3    gabapentin (NEURONTIN) 100 mg capsule, Take 300 mg by mouth daily , Disp: , Rfl:     glucose blood test strip, by In Vitro route 4 (four) times a day, Disp: , Rfl:     JANUVIA 100 MG tablet, Take 100 mg by mouth daily , Disp: , Rfl:     L-Lysine 500 MG TABS, , Disp: , Rfl:     lisinopril (ZESTRIL) 10 mg tablet, Take 10 mg by mouth daily, Disp: , Rfl:     metFORMIN (GLUCOPHAGE) 1000 MG tablet, TAKE 1 TABLET TWICE A DAY, Disp: 180 tablet, Rfl: 3    Multiple Vitamin (DAILY VALUE MULTIVITAMIN) TABS, Take by mouth, Disp: , Rfl:     Omega-3 Fatty Acids (FISH OIL) 1,000 mg, Take 1,000 mg by mouth daily, Disp: , Rfl:     omeprazole (PriLOSEC) 20 mg delayed release capsule, TAKE 1 CAPSULE EVERY MORNING BEFORE BREAKFAST, Disp: 90 capsule, Rfl: 4    REVIEW OF SYSTEMS:    Review of Systems   Constitutional: Negative  HENT: Negative  Eyes: Negative  Respiratory: Negative  Cardiovascular: Negative  Gastrointestinal: Negative  Endocrine: Negative  Genitourinary: Negative  Musculoskeletal: Negative  Skin: Negative  Allergic/Immunologic: Negative  Neurological: Negative  Hematological: Negative  All other systems reviewed and are negative  PHYSICAL EXAM:  Vitals:    02/27/19 1535   BP: 124/68   BP Location: Left arm   Patient Position: Sitting   Cuff Size: Adult   Pulse: 84   Temp: 97 7 °F (36 5 °C)   TempSrc: Oral   Weight: 84 8 kg (187 lb)   Height: 5' 4 5" (1 638 m)     Body mass index is 31 6 kg/m²  Physical Exam   Constitutional: She is oriented to person, place, and time  She appears well-developed and well-nourished  HENT:   Head: Normocephalic and atraumatic  Eyes: Pupils are equal, round, and reactive to light  Neck: Neck supple  Cardiovascular: Normal rate, regular rhythm and normal heart sounds  Pulmonary/Chest: Effort normal    Abdominal: Soft  Bowel sounds are normal    Musculoskeletal: Normal range of motion  Neurological: She is alert and oriented to person, place, and time  Skin: Skin is warm     Psychiatric: She has a normal mood and affect  LAB RESULTS:  Results for orders placed or performed in visit on 02/11/19   Creatinine Clearance Serum (Do Not Order)   Result Value Ref Range    Creatinine 1 36 (H) 0 60 - 1 30 mg/dL   Creatinine clearance, urine, 24 hour (Do Not Order)   Result Value Ref Range    Creatinine, Ur 56 0 mg/dL    Creatinine Clearance 49 97 (L) 75 00 - 115 00 mL/min    Creatinine, 24H Ur 1 1 0 6 - 1 8 g/24Hr    TOTAL URINE VOLUME 2,000 ml     Renal ultrasound:  Reviewed    ASSESSMENT and PLAN:  Diagnoses and all orders for this visit:    Chronic kidney disease (CKD) stage G3a/A1, moderately decreased glomerular filtration rate (GFR) between 45-59 mL/min/1 73 square meter and albuminuria creatinine ratio less than 30 mg/g ([facility])  -     Ambulatory referral to Nephrology  -     Albumin; Standing  -     Calcium; Standing  -     CBC and differential; Standing  -     Comprehensive metabolic panel; Standing  -     Phosphorus; Standing  -     Protein / creatinine ratio, urine; Standing  -     PTH, intact; Standing  -     Urinalysis with microscopic; Standing  -     Vitamin D 25 hydroxy; Standing  -     Albumin  -     Calcium  -     CBC and differential  -     Comprehensive metabolic panel  -     Phosphorus  -     Protein / creatinine ratio, urine  -     PTH, intact  -     Urinalysis with microscopic  -     Vitamin D 25 hydroxy     1  Chronic kidney disease stage 3:  Etiology likely appears to be diabetic glomerulosclerosis  Strict glycemic control recommended to ensure stability of current renal function  Avoidance of NSAIDs recommended as well  Renal ultrasound performed revealed small size kidneys suggestive of underlying CKD  Per labs performed recently her serum creatinine is 1 3 with estimated GFR 45     2  Proteinuria:  The most recent albumin to creatinine ratio obtained in the urine had revealed 17 mg proteinuria which is on target  She will be continued on lisinopril at this time      3  Bone mineral disorder: We will evaluate her 25 hydroxy vitamin-D, parathyroid hormone intact, calcium phosphorus levels prior to next visit  4  Anemia of chronic disease:  Recent evaluation hemoglobin revealed it to be on target for her level of CKD    5  Medications: On review of her current list of medication, all of them are in compliance with her current renal function  Recommended patient to decrease Januvia to 50 mg if her estimated GFR drops below 45     6  Nutrition:  Low-salt, low-potassium, low phosphorus low-protein diet recommended  Target protein intake is 60 g per day  Ryan Michael

## 2019-02-28 NOTE — PROGRESS NOTES
[address] 46 y o  female MRN: [id number]    Encounter: [id number] DATE: 2/28/2019    REASON FOR VISIT: Jimena Maddox is a 46 y  o female who was referred by Nury Contreras MD for evaluation  [place] HPI:    This is a 48-year-old femal with past medical his of diabetes mellitus for at least 15 years, chronic kidney disease stage 3, GERD, proteinuria who is referred to Renal Clinic by PCP for management of CKD  As per patient, she was told about the elevated renal parameters only 2 weeks ago  However, upon review of labs patient had estimated GFR below 60 since November 2016  Patient admits to having uncontrolled diabetes in her lifetime  States that she was recently placed on Byetta which however has cause GI side effects including excessive burping and bloating  She denies any chest pain, shortness of breath, abdominal pain lower extremity edema  Denies having any foamy urine or blood in the urine either  PAST MEDICAL HISTORY:  Past Medical History:   Diagnosis Date    Adjustment disorder with anxiety     Anemia     Anxiety     Constipation     Dense breasts     GERD (gastroesophageal reflux disease)     Heart murmur     History of atrial paroxysmal tachycardia     History of cerebral artery occlusion     History of chest pain     Hyperlipidemia     SHAE (iron deficiency anemia)     Menopause     Ovarian cyst     Pancreatitis     Polyclonal hypergammaglobulinemia     Restless legs syndrome (RLS)     Trigger finger        PAST SURGICAL HISTORY:  Past Surgical History:   Procedure Laterality Date    CHOLECYSTECTOMY      TN ESOPHAGOGASTRODUODENOSCOPY TRANSORAL DIAGNOSTIC N/A 10/19/2017    Procedure: EGD AND COLONOSCOPY;  Surgeon: Kraig Juan MD;  Location: [facility];   Service: Gastroenterology    TRIGGER FINGER RELEASE         SOCIAL HISTORY:  Social History     Substance and Sexual Activity   Alcohol Use Yes    Comment: rarely     Social History Substance and Sexual Activity   Drug Use No     Social History     Tobacco Use   Smoking Status Former Smoker    Packs/day: 0 50    Types: Cigarettes    Last attempt to quit: 2018    Years since quittin 7   Smokeless Tobacco Never Used       FAMILY HISTORY:  Family History   Adopted: Yes   Problem Relation Age of Onset    Tuberculosis Mother        ALLERGY:  No Known Allergies    MEDICATIONS:    Current Outpatient Medications:     ACCU-CHEK FASTCLIX LANCETS MISC, by Does not apply route daily, Disp: , Rfl:     atorvastatin (LIPITOR) 20 mg tablet, TAKE 1 TABLET DAILY, Disp: 90 tablet, Rfl: 3    B Complex-C (SUPER B COMPLEX PO), Take 1 tablet by mouth daily, Disp: , Rfl:     BIOTIN PO, Take 1 capsule by mouth daily, Disp: , Rfl:     Blood Glucose Monitoring Suppl (ACCU-CHEK JESSEE CONNECT) w/Device KIT, by Does not apply route, Disp: , Rfl:     CALCIUM PO, Take by mouth daily, Disp: , Rfl:     Canagliflozin (INVOKANA) 300 MG TABS, Take 300 mg by mouth daily, Disp: , Rfl:     cholecalciferol (VITAMIN D3) 1,000 units tablet, Take 1,000 Units by mouth daily, Disp: , Rfl:     clobetasol (TEMOVATE) 0 05 % cream, , Disp: , Rfl:     Exenatide ER 2 MG PEN, Inject 2 mg under the skin once a week, Disp: 12 each, Rfl: 3    Exenatide ER 2 MG PEN, Inject 2 mg under the skin once a week, Disp: 12 each, Rfl: 3    gabapentin (NEURONTIN) 100 mg capsule, Take 300 mg by mouth daily , Disp: , Rfl:     glucose blood test strip, by In Vitro route 4 (four) times a day, Disp: , Rfl:     JANUVIA 100 MG tablet, Take 100 mg by mouth daily , Disp: , Rfl:     L-Lysine 500 MG TABS, , Disp: , Rfl:     lisinopril (ZESTRIL) 10 mg tablet, Take 10 mg by mouth daily, Disp: , Rfl:     metFORMIN (GLUCOPHAGE) 1000 MG tablet, TAKE 1 TABLET TWICE A DAY, Disp: 180 tablet, Rfl: 3    Multiple Vitamin (DAILY VALUE MULTIVITAMIN) TABS, Take by mouth, Disp: , Rfl:     Omega-3 Fatty Acids (FISH OIL) 1,000 mg, Take 1,000 mg by mouth daily, Disp: , Rfl:     omeprazole (PriLOSEC) 20 mg delayed release capsule, TAKE 1 CAPSULE EVERY MORNING BEFORE BREAKFAST, Disp: 90 capsule, Rfl: 4    REVIEW OF SYSTEMS:    Review of Systems   Constitutional: Negative  HENT: Negative  Eyes: Negative  Respiratory: Negative  Cardiovascular: Negative  Gastrointestinal: Negative  Endocrine: Negative  Genitourinary: Negative  Musculoskeletal: Negative  Skin: Negative  Allergic/Immunologic: Negative  Neurological: Negative  Hematological: Negative  All other systems reviewed and are negative  PHYSICAL EXAM:  Vitals:    02/27/19 1535   BP: 124/68   BP Location: Left arm   Patient Position: Sitting   Cuff Size: Adult   Pulse: 84   Temp: 97 7 °F (36 5 °C)   TempSrc: Oral   Weight: 84 8 kg (187 lb)   Height: 5' 4 5" (1 638 m)     Body mass index is 31 6 kg/m²  Physical Exam   Constitutional: She is oriented to person, place, and time  She appears well-developed and well-nourished  HENT:   Head: Normocephalic and atraumatic  Eyes: Pupils are equal, round, and reactive to light  Neck: Neck supple  Cardiovascular: Normal rate, regular rhythm and normal heart sounds  Pulmonary/Chest: Effort normal    Abdominal: Soft  Bowel sounds are normal    Musculoskeletal: Normal range of motion  Neurological: She is alert and oriented to person, place, and time  Skin: Skin is warm  Psychiatric: She has a normal mood and affect         LAB RESULTS:  Results for orders placed or performed in visit on 02/11/19   Creatinine Clearance Serum (Do Not Order)   Result Value Ref Range    Creatinine 1 36 (H) 0 60 - 1 30 mg/dL   Creatinine clearance, urine, 24 hour (Do Not Order)   Result Value Ref Range    Creatinine, Ur 56 0 mg/dL    Creatinine Clearance 49 97 (L) 75 00 - 115 00 mL/min    Creatinine, 24H Ur 1 1 0 6 - 1 8 g/24Hr    TOTAL URINE VOLUME 2,000 ml     Renal ultrasound:  Reviewed    ASSESSMENT and PLAN:  Diagnoses and all orders for this visit:    Chronic kidney disease (CKD) stage G3a/A1, moderately decreased glomerular filtration rate (GFR) between 45-59 mL/min/1 73 square meter and albuminuria creatinine ratio less than 30 mg/g ([facility])  -     Ambulatory referral to Nephrology  -     Albumin; Standing  -     Calcium; Standing  -     CBC and differential; Standing  -     Comprehensive metabolic panel; Standing  -     Phosphorus; Standing  -     Protein / creatinine ratio, urine; Standing  -     PTH, intact; Standing  -     Urinalysis with microscopic; Standing  -     Vitamin D 25 hydroxy; Standing  -     Albumin  -     Calcium  -     CBC and differential  -     Comprehensive metabolic panel  -     Phosphorus  -     Protein / creatinine ratio, urine  -     PTH, intact  -     Urinalysis with microscopic  -     Vitamin D 25 hydroxy     1  Chronic kidney disease stage 3:  Etiology likely appears to be diabetic glomerulosclerosis  Strict glycemic control recommended to ensure stability of current renal function  Avoidance of NSAIDs recommended as well  Renal ultrasound performed revealed small size kidneys suggestive of underlying CKD  Per labs performed recently her serum creatinine is 1 3 with estimated GFR 45     2  Proteinuria:  The most recent albumin to creatinine ratio obtained in the urine had revealed 17 mg proteinuria which is on target  She will be continued on lisinopril at this time  3  Bone mineral disorder: We will evaluate her 25 hydroxy vitamin-D, parathyroid hormone intact, calcium phosphorus levels prior to next visit  4  Anemia of chronic disease:  Recent evaluation hemoglobin revealed it to be on target for her level of CKD    5  Medications: On review of her current list of medication, all of them are in compliance with her current renal function    Recommended patient to decrease Januvia to 50 mg if her estimated GFR drops below 45     6  Nutrition:  Low-salt, low-potassium, low phosphorus low-protein diet recommended  Target protein intake is 60 g per day  Eva Hawthorne

## 2019-03-07 ENCOUNTER — TELEPHONE (OUTPATIENT)
Dept: NEPHROLOGY | Facility: CLINIC | Age: 54
End: 2019-03-07

## 2019-03-07 NOTE — TELEPHONE ENCOUNTER
Called and left a message for patient to return our call about rescheduling her appointment for 6/14/2019 3mo follow up with Dr Sebastian Allen

## 2019-03-14 ENCOUNTER — OFFICE VISIT (OUTPATIENT)
Dept: INTERNAL MEDICINE CLINIC | Facility: CLINIC | Age: 54
End: 2019-03-14
Payer: COMMERCIAL

## 2019-03-14 VITALS
DIASTOLIC BLOOD PRESSURE: 60 MMHG | BODY MASS INDEX: 30.62 KG/M2 | SYSTOLIC BLOOD PRESSURE: 100 MMHG | OXYGEN SATURATION: 97 % | WEIGHT: 181.2 LBS | HEART RATE: 80 BPM | TEMPERATURE: 97.7 F

## 2019-03-14 DIAGNOSIS — L03.115 CELLULITIS OF RIGHT THIGH: Primary | ICD-10-CM

## 2019-03-14 PROCEDURE — 1036F TOBACCO NON-USER: CPT | Performed by: INTERNAL MEDICINE

## 2019-03-14 PROCEDURE — 99213 OFFICE O/P EST LOW 20 MIN: CPT | Performed by: INTERNAL MEDICINE

## 2019-03-14 RX ORDER — SULFAMETHOXAZOLE AND TRIMETHOPRIM 800; 160 MG/1; MG/1
1 TABLET ORAL EVERY 12 HOURS SCHEDULED
Qty: 14 TABLET | Refills: 0 | Status: SHIPPED | OUTPATIENT
Start: 2019-03-14 | End: 2019-03-21

## 2019-03-14 NOTE — PATIENT INSTRUCTIONS
Working diagnosis of bacterial cellulitis although local drug reaction is not out of the question    Treat with Bactrim  Follow-up in a week

## 2019-03-14 NOTE — PROGRESS NOTES
Assessment/Plan:       Diagnoses and all orders for this visit:    Cellulitis of right thigh          Patient Instructions   Working diagnosis of bacterial cellulitis although local drug reaction is not out of the question  Treat with Bactrim  Follow-up in a week        Subjective:      Patient ID: Javan Menchaca is a 53 y o  female  A diabetic patient recently placed on Bydureon has a cellulitis of the injection site in the right thigh  This is her 3rd injection  It was done on Monday; today is Thursday  Now she has developed a circular area around the injection site approximately 6 centimeters across which is itchy and a bit tender  No systemic symptoms    The patient does report symptoms referable to the drug itself but I think this is part of the drugs affect  She has GI complaints involving nausea, some alternating constipation and diarrhea, appetite loss and weight loss  However, she did vomit once  The following portions of the patient's history were reviewed and updated as appropriate:   She has a past medical history of Adjustment disorder with anxiety, Anxiety, Constipation, Dense breasts, GERD (gastroesophageal reflux disease), Heart murmur, History of atrial paroxysmal tachycardia, History of cerebral artery occlusion, History of chest pain, Hyperlipidemia, SHAE (iron deficiency anemia), Menopause, Ovarian cyst, Pancreatitis, Polyclonal hypergammaglobulinemia, Restless legs syndrome (RLS), and Trigger finger  ,  does not have any pertinent problems on file  ,   has a past surgical history that includes Cholecystectomy; pr esophagogastroduodenoscopy transoral diagnostic (N/A, 10/19/2017); and Trigger finger release  ,  family history includes Tuberculosis in her mother  She was adopted  ,   reports that she quit smoking about 8 months ago  Her smoking use included cigarettes  She smoked 0 50 packs per day  She has never used smokeless tobacco  She reports that she drinks alcohol   She reports that she does not use drugs  ,  has No Known Allergies     Current Outpatient Medications   Medication Sig Dispense Refill    ACCU-CHEK FASTCLIX LANCETS MISC by Does not apply route daily      atorvastatin (LIPITOR) 20 mg tablet TAKE 1 TABLET DAILY 90 tablet 3    B Complex-C (SUPER B COMPLEX PO) Take 1 tablet by mouth daily      BIOTIN PO Take 1 capsule by mouth daily      Blood Glucose Monitoring Suppl (ACCU-CHEK JESSEE CONNECT) w/Device KIT by Does not apply route      CALCIUM PO Take by mouth daily      Canagliflozin (INVOKANA) 300 MG TABS Take 300 mg by mouth daily      cholecalciferol (VITAMIN D3) 1,000 units tablet Take 1,000 Units by mouth daily      clobetasol (TEMOVATE) 0 05 % cream as needed       Exenatide ER 2 MG PEN Inject 2 mg under the skin once a week 12 each 3    gabapentin (NEURONTIN) 100 mg capsule Take 300 mg by mouth daily       glucose blood test strip by In Vitro route 4 (four) times a day      JANUVIA 100 MG tablet Take 100 mg by mouth daily       L-Lysine 500 MG TABS       lisinopril (ZESTRIL) 10 mg tablet Take 10 mg by mouth daily      metFORMIN (GLUCOPHAGE) 1000 MG tablet TAKE 1 TABLET TWICE A  tablet 3    Multiple Vitamin (DAILY VALUE MULTIVITAMIN) TABS Take by mouth      Omega-3 Fatty Acids (FISH OIL) 1,000 mg Take 1,000 mg by mouth daily      omeprazole (PriLOSEC) 20 mg delayed release capsule TAKE 1 CAPSULE EVERY MORNING BEFORE BREAKFAST 90 capsule 4     No current facility-administered medications for this visit  Review of Systems   Constitutional: Negative for chills, diaphoresis, fatigue, fever and unexpected weight change  Gastrointestinal: Positive for constipation, diarrhea, nausea and vomiting  Skin: Positive for color change and rash  Objective:  Vitals:    03/14/19 1545   BP: 100/60   Pulse: 80   Temp: 97 7 °F (36 5 °C)   SpO2: 97%      Physical Exam   Constitutional: She appears well-developed and well-nourished     Female patient who appears stated age in no distress verbalizing complaint listed   Cardiovascular: Normal rate  Pulmonary/Chest: Effort normal    Neurological: She is alert  Skin: Skin is warm  Rash noted  Puncture wound with circumscribing erythema as reported  No drainage  The area is not tender

## 2019-03-20 ENCOUNTER — TELEPHONE (OUTPATIENT)
Dept: INTERNAL MEDICINE CLINIC | Facility: CLINIC | Age: 54
End: 2019-03-20

## 2019-03-20 ENCOUNTER — OFFICE VISIT (OUTPATIENT)
Dept: INTERNAL MEDICINE CLINIC | Facility: CLINIC | Age: 54
End: 2019-03-20
Payer: COMMERCIAL

## 2019-03-20 VITALS
OXYGEN SATURATION: 98 % | BODY MASS INDEX: 30.89 KG/M2 | SYSTOLIC BLOOD PRESSURE: 110 MMHG | DIASTOLIC BLOOD PRESSURE: 68 MMHG | WEIGHT: 182.8 LBS | HEART RATE: 75 BPM

## 2019-03-20 DIAGNOSIS — E11.9 TYPE 2 DIABETES MELLITUS WITHOUT COMPLICATION, WITHOUT LONG-TERM CURRENT USE OF INSULIN (HCC): ICD-10-CM

## 2019-03-20 DIAGNOSIS — T78.40XA ALLERGIC REACTION TO DRUG, INITIAL ENCOUNTER: ICD-10-CM

## 2019-03-20 DIAGNOSIS — E11.9 TYPE 2 DIABETES MELLITUS WITHOUT COMPLICATION, WITHOUT LONG-TERM CURRENT USE OF INSULIN (HCC): Primary | ICD-10-CM

## 2019-03-20 DIAGNOSIS — L03.115 CELLULITIS OF RIGHT THIGH: Primary | ICD-10-CM

## 2019-03-20 PROBLEM — R22.0 RIGHT FACIAL SWELLING: Status: RESOLVED | Noted: 2019-02-06 | Resolved: 2019-03-20

## 2019-03-20 PROCEDURE — 1036F TOBACCO NON-USER: CPT | Performed by: INTERNAL MEDICINE

## 2019-03-20 PROCEDURE — 99214 OFFICE O/P EST MOD 30 MIN: CPT | Performed by: INTERNAL MEDICINE

## 2019-03-20 NOTE — TELEPHONE ENCOUNTER
Pt cld stating that the SEMAGLUTIDE 1mg will cost $880  Is there another one that we can prescribe  She would like to have the once a week shot  Please call pt at [phone]

## 2019-03-20 NOTE — TELEPHONE ENCOUNTER
Please ask her to call her pharmacy  to find out which Bydureon like drug is covered given her allergy to [address] [place] [address] - [facility]

## 2019-03-20 NOTE — PROGRESS NOTES
Assessment/Plan:       Diagnoses and all orders for this visit:    Cellulitis of right thigh    Allergic reaction to drug, initial encounter    Type 2 diabetes mellitus without complication, without long-term current use of insulin ([facility] )          Patient Instructions   Impression:  Resolution of cellulitis caused by Bydureon injection  Allergic reaction to Bydureon injection at a different site    Since the molecule appeared to be working, trial of a different GLP drug  Subjective:      Patient ID: Taniya Alva is a 53 y o  female  Seen a week ago with cellulitis of the right upper lateral thigh  Working diagnosis was bacterial infection at the site of a by during an injection  Allergic reaction was not out of the question but I thought the form more lightly  Placed on Bactrim and comes back for follow-up today  The area in the right thigh which was red and swollen has completely resolved with the exception of a 1 mm nodule under the skin  However, she gave herself another injection in the left upper thigh and then area is reddened and itchy  This looks more like an allergic reaction  The following portions of the patient's history were reviewed and updated as appropriate:   She has a past medical history of Adjustment disorder with anxiety, Anxiety, Constipation, Dense breasts, GERD (gastroesophageal reflux disease), Heart murmur, History of atrial paroxysmal tachycardia, History of cerebral artery occlusion, History of chest pain, Hyperlipidemia, SHAE (iron deficiency anemia), Menopause, Ovarian cyst, Pancreatitis, Polyclonal hypergammaglobulinemia, Restless legs syndrome (RLS), and Trigger finger  ,  does not have any pertinent problems on file  ,   has a past surgical history that includes Cholecystectomy; pr esophagogastroduodenoscopy transoral diagnostic (N/A, 10/19/2017); and Trigger finger release  ,  family history includes Tuberculosis in her mother  She was adopted  ,   reports that she quit smoking about 9 months ago  Her smoking use included cigarettes  She smoked 0 50 packs per day  She has never used smokeless tobacco  She reports that she drinks alcohol  She reports that she does not use drugs  ,  has No Known Allergies     Current Outpatient Medications   Medication Sig Dispense Refill    ACCU-CHEK FASTCLIX LANCETS MISC by Does not apply route daily      atorvastatin (LIPITOR) 20 mg tablet TAKE 1 TABLET DAILY 90 tablet 3    B Complex-C (SUPER B COMPLEX PO) Take 1 tablet by mouth daily      BIOTIN PO Take 1 capsule by mouth daily      Blood Glucose Monitoring Suppl (ACCU-CHEK JESSEE CONNECT) w/Device KIT by Does not apply route      CALCIUM PO Take by mouth daily      Canagliflozin (INVOKANA) 300 MG TABS Take 300 mg by mouth daily      cholecalciferol (VITAMIN D3) 1,000 units tablet Take 1,000 Units by mouth daily      clobetasol (TEMOVATE) 0 05 % cream as needed       Exenatide ER 2 MG PEN Inject 2 mg under the skin once a week 12 each 3    gabapentin (NEURONTIN) 100 mg capsule Take 300 mg by mouth daily       glucose blood test strip by In Vitro route 4 (four) times a day      JANUVIA 100 MG tablet Take 100 mg by mouth daily       L-Lysine 500 MG TABS       lisinopril (ZESTRIL) 10 mg tablet Take 10 mg by mouth daily      metFORMIN (GLUCOPHAGE) 1000 MG tablet TAKE 1 TABLET TWICE A  tablet 3    Multiple Vitamin (DAILY VALUE MULTIVITAMIN) TABS Take by mouth      Omega-3 Fatty Acids (FISH OIL) 1,000 mg Take 1,000 mg by mouth daily      omeprazole (PriLOSEC) 20 mg delayed release capsule TAKE 1 CAPSULE EVERY MORNING BEFORE BREAKFAST 90 capsule 4    sulfamethoxazole-trimethoprim (BACTRIM DS) 800-160 mg per tablet Take 1 tablet by mouth every 12 (twelve) hours for 7 days 14 tablet 0     No current facility-administered medications for this visit  Review of Systems   Constitutional: Negative for chills, fatigue and fever  Skin: Positive for color change and rash  Negative for wound  Objective:  Vitals:    03/20/19 1621   BP: 110/68   Pulse: 75   SpO2: 98%      Physical Exam   Constitutional: She is oriented to person, place, and time  She appears well-developed and well-nourished  Cardiovascular: Normal rate  Pulmonary/Chest: Effort normal    Neurological: She is alert and oriented to person, place, and time  Skin:   Resolution of the red hot tender area in the right thigh described in the prior note with a residual 1 mm nontender nodule subcutaneously    A new area in the left upper thigh approximately 5 x 5 cm of redness of the skin with no tenderness corresponding to the latest injection site of the Bydureon

## 2019-03-20 NOTE — PATIENT INSTRUCTIONS
Impression:  Resolution of cellulitis caused by Bydureon injection  Allergic reaction to Bydureon injection at a different site    Since the molecule appeared to be working, trial of a different GLP drug

## 2019-03-26 ENCOUNTER — TELEPHONE (OUTPATIENT)
Dept: INTERNAL MEDICINE CLINIC | Facility: CLINIC | Age: 54
End: 2019-03-26

## 2019-03-26 NOTE — TELEPHONE ENCOUNTER
Pt recently had her medication switched from bydureon to semaglutide because she was allergic  Her insurance will not pay for alternative  Wants to discuss with Dr Yazmin Melissa about possibly taking an oral medication or if she could just diet and exercise instead?  Please advise       [phone]

## 2019-04-18 DIAGNOSIS — E11.9 TYPE 2 DIABETES MELLITUS WITHOUT COMPLICATION, WITHOUT LONG-TERM CURRENT USE OF INSULIN (HCC): Primary | ICD-10-CM

## 2019-04-18 RX ORDER — SITAGLIPTIN 100 MG/1
100 TABLET, FILM COATED ORAL DAILY
Qty: 90 TABLET | Refills: 3 | Status: SHIPPED | OUTPATIENT
Start: 2019-04-18 | End: 2020-04-17 | Stop reason: SDUPTHER

## 2019-06-15 ENCOUNTER — OFFICE VISIT (OUTPATIENT)
Dept: INTERNAL MEDICINE CLINIC | Facility: CLINIC | Age: 54
End: 2019-06-15
Payer: COMMERCIAL

## 2019-06-15 ENCOUNTER — TELEPHONE (OUTPATIENT)
Dept: INTERNAL MEDICINE CLINIC | Facility: CLINIC | Age: 54
End: 2019-06-15

## 2019-06-15 VITALS
WEIGHT: 181 LBS | HEART RATE: 72 BPM | DIASTOLIC BLOOD PRESSURE: 66 MMHG | HEIGHT: 65 IN | OXYGEN SATURATION: 98 % | SYSTOLIC BLOOD PRESSURE: 102 MMHG | BODY MASS INDEX: 30.16 KG/M2

## 2019-06-15 DIAGNOSIS — G62.9 NEUROPATHY: Primary | ICD-10-CM

## 2019-06-15 DIAGNOSIS — E11.9 TYPE 2 DIABETES MELLITUS WITHOUT COMPLICATION, WITHOUT LONG-TERM CURRENT USE OF INSULIN (HCC): ICD-10-CM

## 2019-06-15 DIAGNOSIS — IMO0002: ICD-10-CM

## 2019-06-15 PROBLEM — K85.90 PANCREATITIS: Status: RESOLVED | Noted: 2019-06-15 | Resolved: 2019-06-15

## 2019-06-15 LAB — SL AMB POCT HEMOGLOBIN AIC: 7.8 (ref ?–6.5)

## 2019-06-15 PROCEDURE — 83036 HEMOGLOBIN GLYCOSYLATED A1C: CPT | Performed by: INTERNAL MEDICINE

## 2019-06-15 PROCEDURE — 3008F BODY MASS INDEX DOCD: CPT | Performed by: INTERNAL MEDICINE

## 2019-06-15 PROCEDURE — 1036F TOBACCO NON-USER: CPT | Performed by: INTERNAL MEDICINE

## 2019-06-15 PROCEDURE — 3045F PR MOST RECENT HEMOGLOBIN A1C LEVEL 7.0-9.0%: CPT | Performed by: INTERNAL MEDICINE

## 2019-06-15 PROCEDURE — 99213 OFFICE O/P EST LOW 20 MIN: CPT | Performed by: INTERNAL MEDICINE

## 2019-06-15 RX ORDER — LYSINE HCL 500 MG
TABLET ORAL
COMMUNITY
Start: 2019-03-21 | End: 2019-08-14

## 2019-06-15 RX ORDER — GABAPENTIN 100 MG/1
300 CAPSULE ORAL DAILY
Qty: 270 CAPSULE | Refills: 3 | Status: SHIPPED | OUTPATIENT
Start: 2019-06-15 | End: 2020-04-17 | Stop reason: SDUPTHER

## 2019-07-09 ENCOUNTER — TELEPHONE (OUTPATIENT)
Dept: OBGYN CLINIC | Age: 54
End: 2019-07-09

## 2019-07-12 ENCOUNTER — TELEPHONE (OUTPATIENT)
Dept: NEPHROLOGY | Facility: CLINIC | Age: 54
End: 2019-07-12

## 2019-07-12 NOTE — TELEPHONE ENCOUNTER
Called and left a message on machine for patient confirming her appointment for Tuesday 7/16/2019 3 month follow up with Dr Shaan Sánchez,  
23

## 2019-07-15 ENCOUNTER — APPOINTMENT (OUTPATIENT)
Dept: LAB | Facility: HOSPITAL | Age: 54
End: 2019-07-15
Payer: COMMERCIAL

## 2019-07-15 ENCOUNTER — TELEPHONE (OUTPATIENT)
Dept: NEPHROLOGY | Facility: CLINIC | Age: 54
End: 2019-07-15

## 2019-07-15 LAB
25(OH)D3 SERPL-MCNC: 54.6 NG/ML (ref 30–100)
ALBUMIN SERPL BCP-MCNC: 3.6 G/DL (ref 3.5–5)
ALP SERPL-CCNC: 111 U/L (ref 46–116)
ALT SERPL W P-5'-P-CCNC: 33 U/L (ref 12–78)
ANION GAP SERPL CALCULATED.3IONS-SCNC: 11 MMOL/L (ref 4–13)
AST SERPL W P-5'-P-CCNC: 20 U/L (ref 5–45)
BACTERIA UR QL AUTO: ABNORMAL /HPF
BASOPHILS # BLD AUTO: 0.04 THOUSANDS/ΜL (ref 0–0.1)
BASOPHILS NFR BLD AUTO: 1 % (ref 0–1)
BILIRUB SERPL-MCNC: 0.4 MG/DL (ref 0.2–1)
BILIRUB UR QL STRIP: NEGATIVE
BUN SERPL-MCNC: 19 MG/DL (ref 5–25)
CALCIUM ALBUM COR SERPL-MCNC: 10.5 MG/DL (ref 8.3–10.1)
CALCIUM SERPL-MCNC: 10.2 MG/DL (ref 8.3–10.1)
CHLORIDE SERPL-SCNC: 103 MMOL/L (ref 100–108)
CLARITY UR: CLEAR
CO2 SERPL-SCNC: 28 MMOL/L (ref 21–32)
COLOR UR: YELLOW
CREAT SERPL-MCNC: 1.16 MG/DL (ref 0.6–1.3)
CREAT UR-MCNC: 40.9 MG/DL
EOSINOPHIL # BLD AUTO: 0.13 THOUSAND/ΜL (ref 0–0.61)
EOSINOPHIL NFR BLD AUTO: 2 % (ref 0–6)
ERYTHROCYTE [DISTWIDTH] IN BLOOD BY AUTOMATED COUNT: 12.7 % (ref 11.6–15.1)
GFR SERPL CREATININE-BSD FRML MDRD: 53 ML/MIN/1.73SQ M
GLUCOSE P FAST SERPL-MCNC: 180 MG/DL (ref 65–99)
GLUCOSE UR STRIP-MCNC: ABNORMAL MG/DL
HCT VFR BLD AUTO: 44 % (ref 34.8–46.1)
HGB BLD-MCNC: 14.1 G/DL (ref 11.5–15.4)
HGB UR QL STRIP.AUTO: NEGATIVE
IMM GRANULOCYTES # BLD AUTO: 0.02 THOUSAND/UL (ref 0–0.2)
IMM GRANULOCYTES NFR BLD AUTO: 0 % (ref 0–2)
KETONES UR STRIP-MCNC: NEGATIVE MG/DL
LEUKOCYTE ESTERASE UR QL STRIP: ABNORMAL
LYMPHOCYTES # BLD AUTO: 1.72 THOUSANDS/ΜL (ref 0.6–4.47)
LYMPHOCYTES NFR BLD AUTO: 25 % (ref 14–44)
MCH RBC QN AUTO: 30.8 PG (ref 26.8–34.3)
MCHC RBC AUTO-ENTMCNC: 32 G/DL (ref 31.4–37.4)
MCV RBC AUTO: 96 FL (ref 82–98)
MONOCYTES # BLD AUTO: 0.48 THOUSAND/ΜL (ref 0.17–1.22)
MONOCYTES NFR BLD AUTO: 7 % (ref 4–12)
NEUTROPHILS # BLD AUTO: 4.62 THOUSANDS/ΜL (ref 1.85–7.62)
NEUTS SEG NFR BLD AUTO: 65 % (ref 43–75)
NITRITE UR QL STRIP: NEGATIVE
NON-SQ EPI CELLS URNS QL MICRO: ABNORMAL /HPF
NRBC BLD AUTO-RTO: 0 /100 WBCS
PH UR STRIP.AUTO: 5.5 [PH]
PHOSPHATE SERPL-MCNC: 3.6 MG/DL (ref 2.7–4.5)
PLATELET # BLD AUTO: 253 THOUSANDS/UL (ref 149–390)
PMV BLD AUTO: 10.6 FL (ref 8.9–12.7)
POTASSIUM SERPL-SCNC: 4.8 MMOL/L (ref 3.5–5.3)
PROT SERPL-MCNC: 8.4 G/DL (ref 6.4–8.2)
PROT UR STRIP-MCNC: NEGATIVE MG/DL
PROT UR-MCNC: 7 MG/DL
PROT/CREAT UR: 0.17 MG/G{CREAT} (ref 0–0.1)
PTH-INTACT SERPL-MCNC: 20 PG/ML (ref 18.4–80.1)
RBC # BLD AUTO: 4.58 MILLION/UL (ref 3.81–5.12)
RBC #/AREA URNS AUTO: ABNORMAL /HPF
SODIUM SERPL-SCNC: 142 MMOL/L (ref 136–145)
SP GR UR STRIP.AUTO: <=1.005 (ref 1–1.03)
UROBILINOGEN UR QL STRIP.AUTO: 0.2 E.U./DL
WBC # BLD AUTO: 7.01 THOUSAND/UL (ref 4.31–10.16)
WBC #/AREA URNS AUTO: ABNORMAL /HPF

## 2019-07-15 PROCEDURE — 36415 COLL VENOUS BLD VENIPUNCTURE: CPT | Performed by: INTERNAL MEDICINE

## 2019-07-15 PROCEDURE — 82306 VITAMIN D 25 HYDROXY: CPT | Performed by: INTERNAL MEDICINE

## 2019-07-15 PROCEDURE — 82570 ASSAY OF URINE CREATININE: CPT | Performed by: INTERNAL MEDICINE

## 2019-07-15 PROCEDURE — 83970 ASSAY OF PARATHORMONE: CPT | Performed by: INTERNAL MEDICINE

## 2019-07-15 PROCEDURE — 84156 ASSAY OF PROTEIN URINE: CPT | Performed by: INTERNAL MEDICINE

## 2019-07-15 PROCEDURE — 84100 ASSAY OF PHOSPHORUS: CPT | Performed by: INTERNAL MEDICINE

## 2019-07-15 PROCEDURE — 81001 URINALYSIS AUTO W/SCOPE: CPT | Performed by: INTERNAL MEDICINE

## 2019-07-15 PROCEDURE — 80053 COMPREHEN METABOLIC PANEL: CPT | Performed by: INTERNAL MEDICINE

## 2019-07-15 PROCEDURE — 85025 COMPLETE CBC W/AUTO DIFF WBC: CPT | Performed by: INTERNAL MEDICINE

## 2019-07-15 NOTE — TELEPHONE ENCOUNTER
Called and left a message on home phone and cell phone in reference to appointment time change for Tuesday 7/16/2019 3 month follow up with Dr Leona Nathan from 9/15am to 10:15am  Helen Vargas,

## 2019-07-16 ENCOUNTER — OFFICE VISIT (OUTPATIENT)
Dept: NEPHROLOGY | Facility: CLINIC | Age: 54
End: 2019-07-16
Payer: COMMERCIAL

## 2019-07-16 VITALS
WEIGHT: 181 LBS | RESPIRATION RATE: 18 BRPM | TEMPERATURE: 98.3 F | DIASTOLIC BLOOD PRESSURE: 64 MMHG | BODY MASS INDEX: 30.16 KG/M2 | SYSTOLIC BLOOD PRESSURE: 112 MMHG | HEIGHT: 65 IN | HEART RATE: 74 BPM

## 2019-07-16 DIAGNOSIS — N18.30 STAGE 3 CHRONIC KIDNEY DISEASE (HCC): Primary | ICD-10-CM

## 2019-07-16 PROCEDURE — 99214 OFFICE O/P EST MOD 30 MIN: CPT | Performed by: INTERNAL MEDICINE

## 2019-07-16 PROCEDURE — 3066F NEPHROPATHY DOC TX: CPT | Performed by: INTERNAL MEDICINE

## 2019-07-16 RX ORDER — EXENATIDE 2 MG/.65ML
INJECTION, SUSPENSION, EXTENDED RELEASE SUBCUTANEOUS
COMMUNITY
Start: 2019-07-05 | End: 2019-10-16 | Stop reason: SDUPTHER

## 2019-07-16 NOTE — PROGRESS NOTES
NEPHROLOGY PROGRESS NOTE    Patient: Linda Mendez               Sex: female          DOA: No admission date for patient encounter  YOB: 1965        Age:  53 y o         LOS: @IVY@  7/16/2019        BACKGROUND     40-year-old female with past medical history of diabetes mellitus type 2, proteinuria who is following up in the renal clinic    SUBJECTIVE     Patient presents to renal clinic after 3 months  Offers no complaints    REVIEW OF SYSTEMS     Review of Systems   Constitutional: Negative  HENT: Negative  Eyes: Negative  Respiratory: Negative  Cardiovascular: Negative  Gastrointestinal: Negative  Endocrine: Negative  Genitourinary: Negative  Musculoskeletal: Negative  Skin: Negative  Allergic/Immunologic: Negative  Neurological: Negative  Hematological: Negative  All other systems reviewed and are negative  OBJECTIVE     Current Weight: Weight - Scale: 82 1 kg (181 lb)  Vitals:    07/16/19 1029   BP: 112/64   Pulse: 74   Resp: 18   Temp: 98 3 °F (36 8 °C)     Body mass index is 30 59 kg/m²    [unfilled]    CURRENT MEDICATIONS       Current Outpatient Medications:     ACCU-CHEK FASTCLIX LANCETS MISC, by Does not apply route daily, Disp: , Rfl:     atorvastatin (LIPITOR) 20 mg tablet, TAKE 1 TABLET DAILY, Disp: 90 tablet, Rfl: 3    B Complex-C (SUPER B COMPLEX PO), Take 1 tablet by mouth daily, Disp: , Rfl:     BIOTIN PO, Take 1 capsule by mouth daily, Disp: , Rfl:     Blood Glucose Monitoring Suppl (ACCU-CHEK JESSEE CONNECT) w/Device KIT, by Does not apply route, Disp: , Rfl:     BYDUREON 2 MG PEN, , Disp: , Rfl:     CALCIUM PO, Take by mouth daily, Disp: , Rfl:     Canagliflozin (INVOKANA) 300 MG TABS, Take 1 tablet (300 mg total) by mouth daily, Disp: 90 tablet, Rfl: 3    cholecalciferol (VITAMIN D3) 1,000 units tablet, Take 1,000 Units by mouth daily, Disp: , Rfl:     clobetasol (TEMOVATE) 0 05 % cream, as needed , Disp: , Rfl:    gabapentin (NEURONTIN) 100 mg capsule, Take 3 capsules (300 mg total) by mouth daily, Disp: 270 capsule, Rfl: 3    glucose blood test strip, by In Vitro route 4 (four) times a day, Disp: , Rfl:     JANUVIA 100 MG tablet, Take 1 tablet (100 mg total) by mouth daily, Disp: 90 tablet, Rfl: 3    L-Lysine 500 MG TABS, , Disp: , Rfl:     L-Lysine HCl 500 MG TABS, , Disp: , Rfl:     lisinopril (ZESTRIL) 10 mg tablet, Take 10 mg by mouth daily, Disp: , Rfl:     metFORMIN (GLUCOPHAGE) 1000 MG tablet, TAKE 1 TABLET TWICE A DAY, Disp: 180 tablet, Rfl: 3    Multiple Vitamin (DAILY VALUE MULTIVITAMIN) TABS, Take by mouth, Disp: , Rfl:     Omega-3 Fatty Acids (FISH OIL) 1,000 mg, Take 1,000 mg by mouth daily, Disp: , Rfl:     omeprazole (PriLOSEC) 20 mg delayed release capsule, TAKE 1 CAPSULE EVERY MORNING BEFORE BREAKFAST, Disp: 90 capsule, Rfl: 4      PHYSICAL EXAMINATION     Physical Exam   Constitutional: She is oriented to person, place, and time  She appears well-developed and well-nourished  HENT:   Head: Normocephalic and atraumatic  Eyes: Pupils are equal, round, and reactive to light  Neck: Neck supple  Cardiovascular: Normal rate, regular rhythm and normal heart sounds  Pulmonary/Chest: Effort normal    Abdominal: Soft  Bowel sounds are normal    Musculoskeletal: Normal range of motion  Neurological: She is alert and oriented to person, place, and time  Skin: Skin is warm  Psychiatric: She has a normal mood and affect  LAB RESULTS     Results from last 7 days   Lab Units 07/15/19  1221   WBC Thousand/uL 7 01   HEMOGLOBIN g/dL 14 1   HEMATOCRIT % 44 0   PLATELETS Thousands/uL 253   POTASSIUM mmol/L 4 8   CHLORIDE mmol/L 103   CO2 mmol/L 28   BUN mg/dL 19   CREATININE mg/dL 1 16   EGFR ml/min/1 73sq m 53   CALCIUM mg/dL 10 2*   PHOSPHORUS mg/dL 3 6           RADIOLOGY RESULTS      No results found for this or any previous visit    No results found for this or any previous visit     ASSESSMENT/PLAN     50-year-old female with past medical history of diabetes mellitus type 2, chronic kidney disease stage 3 who is following up in renal Clinic for management of CKD  1  Chronic kidney disease stage 3:  Etiology likely diabetic nephropathy and hypertensive nephrosclerosis  Recent labs revealed serum creatinine 1 1 with estimated GFR 50 which is at baseline  2  Proteinuria:  Patient is on lisinopril 10 mg once daily  She has 170 mg of proteinuria which is at acceptable limits  3  Bone mineral disorder:  Vitamin-D levels are within normal limits  Noted elevated calcium levels and hence will discontinue calcium supplements and have patient take vitamin D2 on every other day basis  4  Anemia chronic disease:  Patient's hemoglobin is on target  5  Hypertension:  Patient admits to not having any diagnosis of hypertension her lifetime  She is maintained on lisinopril 10 mg once daily  I asked patient to check her blood pressure on a daily basis  Target blood pressure for this patient is 130/80              Angie Harmon MD  Nephrology  7/16/2019

## 2019-08-14 ENCOUNTER — ANNUAL EXAM (OUTPATIENT)
Dept: OBGYN CLINIC | Facility: CLINIC | Age: 54
End: 2019-08-14
Payer: COMMERCIAL

## 2019-08-14 VITALS
HEIGHT: 65 IN | BODY MASS INDEX: 29.32 KG/M2 | WEIGHT: 176 LBS | DIASTOLIC BLOOD PRESSURE: 70 MMHG | SYSTOLIC BLOOD PRESSURE: 112 MMHG

## 2019-08-14 DIAGNOSIS — Z01.419 ENCOUNTER FOR GYNECOLOGICAL EXAMINATION WITHOUT ABNORMAL FINDING: Primary | ICD-10-CM

## 2019-08-14 DIAGNOSIS — N95.1 MENOPAUSAL SYMPTOMS: ICD-10-CM

## 2019-08-14 DIAGNOSIS — Z12.31 ENCOUNTER FOR SCREENING MAMMOGRAM FOR MALIGNANT NEOPLASM OF BREAST: ICD-10-CM

## 2019-08-14 PROCEDURE — S0612 ANNUAL GYNECOLOGICAL EXAMINA: HCPCS | Performed by: NURSE PRACTITIONER

## 2019-08-14 PROCEDURE — G0145 SCR C/V CYTO,THINLAYER,RESCR: HCPCS | Performed by: PATHOLOGY

## 2019-08-14 PROCEDURE — 87624 HPV HI-RISK TYP POOLED RSLT: CPT | Performed by: NURSE PRACTITIONER

## 2019-08-14 PROCEDURE — 88141 CYTOPATH C/V INTERPRET: CPT | Performed by: PATHOLOGY

## 2019-08-14 NOTE — PROGRESS NOTES
Diagnoses and all orders for this visit:    Encounter for screening mammogram for malignant neoplasm of breast  -     Mammo screening bilateral w 3d & cad; Future          Calcium/vit d inclusion in the diet discussed, call with any issues, SBE reinforced, all concerns addressed  Pleasant 53 y o  possibly menopausal female here for annual exam  LMP 2018  Previous menses summer 2018  EMB 2017 neg  She denies any issues with irregular bleeding or her menses  +history of abnormal pap smears with colpo  Last Pap , pap today  Stage2-3 Kidney Disease, sees Nephrology q 6 months  Denies vaginal issues  Denies pelvic pain  Sexually active without any concerns  Past Medical History:   Diagnosis Date    Adjustment disorder with anxiety     Anxiety     Constipation     Dense breasts     GERD (gastroesophageal reflux disease)     Heart murmur     History of atrial paroxysmal tachycardia     History of cerebral artery occlusion     History of chest pain     Hyperlipidemia     SHAE (iron deficiency anemia)     Menopause     Ovarian cyst     Pancreatitis     Polyclonal hypergammaglobulinemia     Restless legs syndrome (RLS)     Trigger finger      Past Surgical History:   Procedure Laterality Date    CHOLECYSTECTOMY      OH ESOPHAGOGASTRODUODENOSCOPY TRANSORAL DIAGNOSTIC N/A 10/19/2017    Procedure: EGD AND COLONOSCOPY;  Surgeon: Christelle Perez MD;  Location: [facility];   Service: Gastroenterology    TRIGGER FINGER RELEASE       Family History   Adopted: Yes   Problem Relation Age of Onset    Tuberculosis Mother     Breast cancer Neg Hx     Colon cancer Neg Hx     Ovarian cancer Neg Hx     Uterine cancer Neg Hx     Cervical cancer Neg Hx      Social History     Tobacco Use    Smoking status: Former Smoker     Packs/day: 0 50     Years: 7 00     Pack years: 3 50     Types: Cigarettes     Last attempt to quit: 2018     Years since quittin 1    Smokeless tobacco: Never Used Substance Use Topics    Alcohol use: Yes     Frequency: Monthly or less     Drinks per session: 1 or 2     Binge frequency: Never     Comment: rarely    Drug use: No       Current Outpatient Medications:     atorvastatin (LIPITOR) 20 mg tablet, TAKE 1 TABLET DAILY, Disp: 90 tablet, Rfl: 3    B Complex-C (SUPER B COMPLEX PO), Take 1 tablet by mouth daily, Disp: , Rfl:     BIOTIN PO, Take 1 capsule by mouth daily, Disp: , Rfl:     Blood Glucose Monitoring Suppl (ACCU-CHEK JESSEE CONNECT) w/Device KIT, by Does not apply route, Disp: , Rfl:     BYDUREON 2 MG PEN, , Disp: , Rfl:     Canagliflozin (INVOKANA) 300 MG TABS, Take 1 tablet (300 mg total) by mouth daily, Disp: 90 tablet, Rfl: 3    cholecalciferol (VITAMIN D3) 1,000 units tablet, Take 1,000 Units by mouth daily, Disp: , Rfl:     clobetasol (TEMOVATE) 0 05 % cream, as needed , Disp: , Rfl:     gabapentin (NEURONTIN) 100 mg capsule, Take 3 capsules (300 mg total) by mouth daily, Disp: 270 capsule, Rfl: 3    glucose blood test strip, by In Vitro route 4 (four) times a day, Disp: , Rfl:     JANUVIA 100 MG tablet, Take 1 tablet (100 mg total) by mouth daily, Disp: 90 tablet, Rfl: 3    L-Lysine 500 MG TABS, , Disp: , Rfl:     lisinopril (ZESTRIL) 10 mg tablet, Take 10 mg by mouth daily, Disp: , Rfl:     metFORMIN (GLUCOPHAGE) 1000 MG tablet, TAKE 1 TABLET TWICE A DAY, Disp: 180 tablet, Rfl: 3    Multiple Vitamin (DAILY VALUE MULTIVITAMIN) TABS, Take by mouth, Disp: , Rfl:     Omega-3 Fatty Acids (FISH OIL) 1,000 mg, Take 1,000 mg by mouth daily, Disp: , Rfl:     omeprazole (PriLOSEC) 20 mg delayed release capsule, TAKE 1 CAPSULE EVERY MORNING BEFORE BREAKFAST, Disp: 90 capsule, Rfl: 4    ACCU-CHEK FASTCLIX LANCETS MISC, by Does not apply route daily, Disp: , Rfl:     CALCIUM PO, Take by mouth daily, Disp: , Rfl:   Patient Active Problem List    Diagnosis Date Noted    Allergic reaction caused by a drug 03/20/2019    Chronic kidney disease, stage 2 (mild) 2019    TMJ arthritis 2019    Obesity, Class I, BMI 30-34 9 10/11/2018    Abnormal weight gain 10/11/2018    Tobacco abuse 2018    Elevated LFTs 2017    Irregular bleeding 10/24/2017    Dense breasts 2017    Ovarian cyst 2017    Adjustment disorder with anxiety 2014    Hyperlipidemia 2014    Hypertension 2014       No Known Allergies    OB History    Para Term  AB Living   6 3 3   3     SAB TAB Ectopic Multiple Live Births   2 1     3      # Outcome Date GA Lbr Edson/2nd Weight Sex Delivery Anes PTL Lv   6 TAB            5 SAB            4 SAB            3 Term            2 Term            1 Term               Obstetric Comments   Menarche: 5 y/o      Has 2 grandsons, youngest son is 24  Limited fam hx d/t foster care    Vitals:    19 1144   BP: 112/70   BP Location: Right arm   Patient Position: Sitting   Weight: 79 8 kg (176 lb)   Height: 5' 5" (1 651 m)     Body mass index is 29 29 kg/m²  Review of Systems   Constitutional: Negative for chills, fatigue, fever and unexpected weight change  Respiratory: Negative for shortness of breath  Gastrointestinal: Negative for anal bleeding, blood in stool, constipation and diarrhea  Genitourinary: Negative for difficulty urinating, dysuria and hematuria  Physical Exam   Constitutional: She appears well-developed and well-nourished  No distress  HENT:   Head: Normocephalic  Neck: Normal range of motion  Neck supple  Pulmonary: Effort normal   Breasts: bilateral without masses, skin changes or nipple discharge  Bilaterally soft and warm to touch  No areas of erythema or pain  Abdominal: Soft  Pelvic exam was performed with patient supine  No labial fusion  There is no rash, tenderness, lesion or injury on the right labia  There is no rash, tenderness, lesion or injury on the left labia   Urethral meatus does not show any tenderness, inflammation or discharge  Palpation of midline bladder without pain or discomfort  Uterus is not deviated, not enlarged, not fixed and not tender  Cervix exhibits no motion tenderness, no discharge and no friability  Right adnexum displays no mass, no tenderness and no fullness  Left adnexum displays no mass, no tenderness and no fullness  No erythema or tenderness in the vagina  No foreign body in the vagina  No signs of injury around the vagina  No vaginal discharge found  No signs of injury around the vagina or anus  Perineum without lesions, signs of injury, erythema or swelling  Lymphadenopathy:        Right: No inguinal adenopathy present  Left: No inguinal adenopathy present

## 2019-08-14 NOTE — PATIENT INSTRUCTIONS
Menopause   WHAT YOU NEED TO KNOW:   What is menopause? Menopause is a normal stage in a woman's life when her monthly periods stop  Menopause starts when the ovaries slowly stop making the female hormones estrogen and progesterone  After menopause, a woman is no longer able to become pregnant  A woman who has not had a period for a full year after the age of 39 is considered to be in menopause  Perimenopause is a stage before menopause that may cause signs and symptoms similar to menopause  Perimenopause can last an average of 4 to 5 years  What are the signs and symptoms of menopause? The signs and symptoms of menopause can be different from woman to woman:  · Menstrual period changes such as skipped periods or periods that are closer together, or lighter or heavier than usual    · Hot flashes (feeling warm, flushed, and sweaty)     · Mood changes such as irritability or decreased desire to have sex    · Breast changes such as tenderness or pain    · Hair changes such as thinning hair or increased hair on your face    · Vaginal changes such as increased dryness     · Urinary changes such as increased urinary tract infections (UTIs) or urgency (feeling that you need to urinate right away)    · Other symptoms such as headaches, trouble sleeping, fatigue, or heart palpitations (strong, fast heartbeats)  What do I need to know about menopause? · You can still get pregnant while you have periods  Continue to use birth control if you do not want to get pregnant  You may need to use birth control until it has been 1 year since your periods stopped  Ask your healthcare provider when you can stop using birth control to prevent pregnancy  · Hormone replacement therapy can be used to treat symptoms of menopause  Hormone replacement therapy (HRT) is medicine that replaces your low hormone levels  HRT contains estrogen and sometimes progestin  HRT has benefits and risks   HRT decreases your risk for bone fractures by helping to prevent osteoporosis  HRT also protects you from colon cancer  HRT may increase your risk for breast cancer, blood clots, heart disease, and stroke  Ask your healthcare provider if HRT is right for you  How can I live a healthy lifestyle during and after menopause? After menopause, your risk for heart disease and bone loss increases  Ask about these and other ways to stay healthy:  · Exercise regularly  Exercise helps you maintain a healthy weight  Exercise can also help to control your blood pressure and cholesterol levels  Include weight-bearing exercise for strong bones  Ask your healthcare provider about the best exercise plan for you  · Eat a variety of healthy foods  Include fruits, vegetables, whole grains (whole-wheat bread, pasta, and cereals), low-fat dairy, and lean protein foods (beans, poultry, and fish)  Limit foods high in sodium (salt)  Ask your healthcare provider for more information about a meal plan that is right for you  · Maintain a healthy weight  Check with your healthcare provider before you start any weight loss program      · Take supplements as directed  You may need extra calcium and vitamin D to help prevent osteoporosis  · Limit alcohol and caffeine  Alcohol and caffeine may worsen your symptoms  · Do not smoke  If you smoke, it is never too late to quit  You are more likely to have a heart attack, lung disease, blood clots, and cancer if you smoke  Ask your healthcare provider for information if you need help quitting  When should I contact my healthcare provider? · You have vaginal bleeding after menopause  · You have questions or concerns about your condition or care  CARE AGREEMENT:   You have the right to help plan your care  Learn about your health condition and how it may be treated  Discuss treatment options with your caregivers to decide what care you want to receive  You always have the right to refuse treatment   The above information is an  only  It is not intended as medical advice for individual conditions or treatments  Talk to your doctor, nurse or pharmacist before following any medical regimen to see if it is safe and effective for you  © 2017 [employer] Information is for End User's use only and may not be sold, redistributed or otherwise used for commercial purposes  All illustrations and images included in CareNotes® are the copyrighted property of [employer] M [employer]  or [employer]

## 2019-08-17 LAB
HPV HR 12 DNA CVX QL NAA+PROBE: NEGATIVE
HPV16 DNA CVX QL NAA+PROBE: POSITIVE
HPV18 DNA CVX QL NAA+PROBE: NEGATIVE

## 2019-08-20 ENCOUNTER — TELEPHONE (OUTPATIENT)
Dept: OBGYN CLINIC | Facility: CLINIC | Age: 54
End: 2019-08-20

## 2019-08-20 DIAGNOSIS — R87.619 ATYPICAL GLANDULAR CELLS OF UNDETERMINED SIGNIFICANCE (AGUS) ON CERVICAL PAP SMEAR: Primary | ICD-10-CM

## 2019-08-20 LAB
LAB AP GYN PRIMARY INTERPRETATION: ABNORMAL
Lab: ABNORMAL
PATH INTERP SPEC-IMP: ABNORMAL

## 2019-08-20 NOTE — TELEPHONE ENCOUNTER
----- Message from Jorge Alberto Soler, [address] sent at 8/20/2019  9:58 AM EDT -----  Please arrange for colposcopy and EMB  Her recent pap showed atypical glandular and squamous cells  I'd also like her to get a pelvis u/s  Thanks

## 2019-09-08 DIAGNOSIS — E78.2 MIXED HYPERLIPIDEMIA: ICD-10-CM

## 2019-09-09 RX ORDER — ATORVASTATIN CALCIUM 20 MG/1
TABLET, FILM COATED ORAL
Qty: 90 TABLET | Refills: 4 | Status: SHIPPED | OUTPATIENT
Start: 2019-09-09 | End: 2020-04-17 | Stop reason: SDUPTHER

## 2019-09-18 ENCOUNTER — PROCEDURE VISIT (OUTPATIENT)
Dept: OBGYN CLINIC | Facility: CLINIC | Age: 54
End: 2019-09-18
Payer: COMMERCIAL

## 2019-09-18 VITALS
HEIGHT: 65 IN | BODY MASS INDEX: 29.32 KG/M2 | WEIGHT: 176 LBS | DIASTOLIC BLOOD PRESSURE: 68 MMHG | SYSTOLIC BLOOD PRESSURE: 120 MMHG

## 2019-09-18 DIAGNOSIS — N95.1 MENOPAUSAL SYMPTOMS: ICD-10-CM

## 2019-09-18 DIAGNOSIS — R87.619 ATYPICAL GLANDULAR CELLS OF UNDETERMINED SIGNIFICANCE (AGUS) ON CERVICAL PAP SMEAR: Primary | ICD-10-CM

## 2019-09-18 DIAGNOSIS — R87.810 ASCUS WITH POSITIVE HIGH RISK HPV CERVICAL: ICD-10-CM

## 2019-09-18 DIAGNOSIS — R87.610 ASCUS WITH POSITIVE HIGH RISK HPV CERVICAL: ICD-10-CM

## 2019-09-18 PROCEDURE — 88305 TISSUE EXAM BY PATHOLOGIST: CPT | Performed by: PATHOLOGY

## 2019-09-18 PROCEDURE — 58110 BX DONE W/COLPOSCOPY ADD-ON: CPT | Performed by: NURSE PRACTITIONER

## 2019-09-18 PROCEDURE — 57454 BX/CURETT OF CERVIX W/SCOPE: CPT | Performed by: NURSE PRACTITIONER

## 2019-09-18 NOTE — PROGRESS NOTES
Endometrial biopsy  Date/Time: 9/18/2019 3:14 PM  Performed by: ANN Perez  Authorized by: ANN Perez     Consent:     Consent obtained:  Verbal    Consent given by:  Patient    Procedural risks discussed:  Bleeding, infection, possible continued pain and repeat procedure    Patient questions answered: yes      Patient agrees, verbalizes understanding, and wants to proceed: yes      Educational handouts given: yes      Instructions and paperwork completed: yes    Indication:     Indications: Post-menopausal bleeding      Chronicity of post-menopausal bleeding:  New    Progression of post-menopausal bleeding:  Resolved  Pre-procedure:     Pre-procedure timeout performed: yes    Procedure:     Procedure: endometrial biopsy with Pipelle      A bivalve speculum was placed in the vagina: yes      Cervix cleaned and prepped: yes      A paracervical block was performed: no      An intracervical block was performed: no      The cervix was dilated: no      Uterus sounded: yes      Uterus sound depth (cm):  7    Curettes used:  1    Specimen collected: specimen collected and sent to pathology      Patient tolerated procedure well with no complications: yes    Findings:     Uterus size:  Non-gravid    Cervix: normal    Colposcopy  Date/Time: 9/18/2019 3:15 PM  Performed by: ANN Perez  Authorized by: ANN Perez     Consent:     Consent obtained:  Written (53 y o  possibly menopausal female here for EMB/colpo for ARIA and ASCUS HPV+ pap 2019  LMP 12/2018  Previous menses summer 2018  EMB 2017 neg  She denies any issues with irregular bleeding or her menses   +history of abnormal pap smears with colpo )    Consent given by:  Patient    Procedural risks discussed:  Bleeding, infection, possible continued pain and repeat procedure    Patient questions answered: yes      Patient agrees, verbalizes understanding, and wants to proceed: yes      Educational handouts given: yes      Instructions and paperwork completed: yes    Universal protocol:     Patient states understanding of procedure being performed: yes      Relevant documents present and verified: yes      Site marked: yes    Pre-procedure:     Pre-procedure timeout performed: yes      Prepped with: acetic acid and Lugol    Indication:     Indication:  ASC-US  Procedure:     Procedure: Colposcopy w/ cervical biopsy and ECC      Under satisfactory analgesia the patient was prepped and draped in the dorsal lithotomy position: yes      [place] speculum was placed in the vagina: yes      Under colposcopic examination the transition zone was seen in entirety: yes      Intracervical block was performed: no      Endocervix was curetted using a Kevorkian curette: yes      Cervical biopsy performed with a cervical biopsy punch: yes      Tampon inserted: no      Monsel's solution was applied: yes      Biopsy(s): yes      Location:  12,5,ecc    Specimen to pathology: yes    Post-procedure:     Findings: White epithelium      Impression: Low grade cervical dysplasia      Patient tolerance of procedure: Tolerated well, no immediate complications  Comments:      Repap in 1 yr if biopsies are low level abnormalities  All questions answered  Post procedure handout given  Patient to make appt for pelvic u/s

## 2019-09-18 NOTE — PATIENT INSTRUCTIONS
HPV (Human Papillomavirus)   WHAT YOU NEED TO KNOW:   What is human papillomavirus (HPV)? HPV is the most common infection spread by sexual contact  It can also be spread from a mother to her baby during delivery  HPV may cause oral and genital warts or tumors in your nose, mouth, throat, and lungs  HPV may also cause vaginal, penile, and anal cancers  You may not show symptoms of any of these conditions for several years after being exposed to HPV  What are the symptoms of HPV? · Painless warts    · Genital or anal discharge, bleeding, itching, or pain    · Pain when you urinate  How is HPV diagnosed and treated? Your healthcare provider may use a vinegar liquid to help diagnose HPV genital warts  He or she may take tissue samples to test for HPV infection  There is no cure for HPV  There is treatment for the conditions that are caused by HPV  You will need to be closely monitored for these conditions  Ask your healthcare provider for more information about monitoring, conditions caused by HPV, and treatments  How can HPV infection be prevented? Vaccinations can help stop the spread of HPV  The vaccine is most effective if given before sexual activity begins  This allows your body to build almost complete protection against HPV before having contact with the virus  The HPV vaccine is still effective up to the age of 32 if it is given after sexual activity has already begun  Who should get the HPV vaccine? The first dose of the vaccine may be given as early as 5years of age  The following should also get the vaccine:  · All females and males 6to 15years of age    · Females 15 through 32years of age, and males 15 through 24years of age, who have not been vaccinated     · Men up to 32years of age who have sex with other men, and have not been vaccinated     · Anyone with a weak immune system, including infection with HIV, up to 32years of age  Who should not get the vaccine or should wait to get it? Do not get a second dose if you had a severe allergic reaction to the first dose  Do not get the vaccine if you are pregnant  If you are sick, wait to get the vaccine until symptoms go away  How is the vaccine given? The HPV vaccine can be given with other vaccinations  The vaccine is given in 3 doses to adults:  · The first dose  is given at any time  · The second dose  is given 1 to 2 months after the first dose  · The third dose  is given 6 months after the first dose  What else do I need to know about how the vaccine is given? You may need 1 more dose of the HPV vaccine if any of the following is true:  · You only received 1 dose of the HPV vaccine before 13years of age    · You received 2 doses of the HPV vaccine less than 5 months apart before 13years of age  When should I contact my healthcare provider? · You have warts in your genital or anal area  · You have genital or anal discharge, bleeding, itching, or pain  · You have pain when you urinate  · You have questions or concerns about your condition or care  CARE AGREEMENT:   You have the right to help plan your care  Learn about your health condition and how it may be treated  Discuss treatment options with your caregivers to decide what care you want to receive  You always have the right to refuse treatment  The above information is an  only  It is not intended as medical advice for individual conditions or treatments  Talk to your doctor, nurse or pharmacist before following any medical regimen to see if it is safe and effective for you  © 2017 [employer]  Information is for End User's use only and may not be sold, redistributed or otherwise used for commercial purposes  All illustrations and images included in CareNotes® are the copyrighted property of [employer] , [employer]  or [employer]

## 2019-09-24 ENCOUNTER — TELEPHONE (OUTPATIENT)
Dept: OBGYN CLINIC | Facility: CLINIC | Age: 54
End: 2019-09-24

## 2019-09-24 NOTE — TELEPHONE ENCOUNTER
----- Message from Gerber Crisostomo, [address] sent at 9/23/2019  5:03 PM EDT -----  Please let patient know her colposcopy biopsy results seem benign so we can repeat pap smear in 1 year  Pls find out when her pelvic u/s appt is  Thanks

## 2019-10-07 ENCOUNTER — HOSPITAL ENCOUNTER (OUTPATIENT)
Dept: ULTRASOUND IMAGING | Facility: HOSPITAL | Age: 54
Discharge: HOME | End: 2019-10-07
Payer: COMMERCIAL

## 2019-10-07 DIAGNOSIS — I10 ESSENTIAL HYPERTENSION: Primary | ICD-10-CM

## 2019-10-07 DIAGNOSIS — R87.619 ATYPICAL GLANDULAR CELLS OF UNDETERMINED SIGNIFICANCE (AGUS) ON CERVICAL PAP SMEAR: ICD-10-CM

## 2019-10-07 PROCEDURE — 76856 US EXAM PELVIC COMPLETE: CPT

## 2019-10-07 PROCEDURE — 76830 TRANSVAGINAL US NON-OB: CPT

## 2019-10-07 RX ORDER — LISINOPRIL 10 MG/1
10 TABLET ORAL DAILY
Qty: 90 TABLET | Refills: 3 | Status: SHIPPED | OUTPATIENT
Start: 2019-10-07 | End: 2020-04-17 | Stop reason: SDUPTHER

## 2019-10-14 ENCOUNTER — TELEPHONE (OUTPATIENT)
Dept: OBGYN CLINIC | Facility: CLINIC | Age: 54
End: 2019-10-14

## 2019-10-14 NOTE — TELEPHONE ENCOUNTER
----- Message from Sheldon Webster sent at 10/11/2019  3:14 PM EDT -----  Pls advise patient her pelvic u/s was essentially normal except for possible adenomyosis which could be the cause of her bleeding history  She should continue a menses diary and call if she bleeds again

## 2019-10-14 NOTE — TELEPHONE ENCOUNTER
----- Message from Sheldon Ribeiro sent at 10/11/2019  3:14 PM EDT -----  Pls advise patient her pelvic u/s was essentially normal except for possible adenomyosis which could be the cause of her bleeding history  She should continue a menses diary and call if she bleeds again

## 2019-10-16 ENCOUNTER — OFFICE VISIT (OUTPATIENT)
Dept: INTERNAL MEDICINE CLINIC | Facility: CLINIC | Age: 54
End: 2019-10-16
Payer: COMMERCIAL

## 2019-10-16 ENCOUNTER — APPOINTMENT (OUTPATIENT)
Dept: LAB | Facility: CLINIC | Age: 54
End: 2019-10-16
Payer: COMMERCIAL

## 2019-10-16 VITALS
HEIGHT: 65 IN | BODY MASS INDEX: 30.32 KG/M2 | HEART RATE: 80 BPM | SYSTOLIC BLOOD PRESSURE: 110 MMHG | WEIGHT: 182 LBS | OXYGEN SATURATION: 96 % | DIASTOLIC BLOOD PRESSURE: 70 MMHG

## 2019-10-16 DIAGNOSIS — R30.0 DYSURIA: ICD-10-CM

## 2019-10-16 DIAGNOSIS — L03.115 CELLULITIS OF RIGHT THIGH: ICD-10-CM

## 2019-10-16 DIAGNOSIS — E11.9 TYPE 2 DIABETES MELLITUS WITHOUT COMPLICATION, WITHOUT LONG-TERM CURRENT USE OF INSULIN (HCC): Primary | ICD-10-CM

## 2019-10-16 DIAGNOSIS — E11.9 TYPE 2 DIABETES MELLITUS WITHOUT COMPLICATION, WITHOUT LONG-TERM CURRENT USE OF INSULIN (HCC): ICD-10-CM

## 2019-10-16 DIAGNOSIS — Z23 NEED FOR INFLUENZA VACCINATION: ICD-10-CM

## 2019-10-16 DIAGNOSIS — N18.2 CHRONIC KIDNEY DISEASE, STAGE 2 (MILD): ICD-10-CM

## 2019-10-16 LAB
ALBUMIN SERPL BCP-MCNC: 4.3 G/DL (ref 3.5–5)
ANION GAP SERPL CALCULATED.3IONS-SCNC: 5 MMOL/L (ref 4–13)
BACTERIA UR QL AUTO: ABNORMAL /HPF
BILIRUB UR QL STRIP: NEGATIVE
BUN SERPL-MCNC: 18 MG/DL (ref 5–25)
CALCIUM ALBUM COR SERPL-MCNC: 10.1 MG/DL (ref 8.3–10.1)
CALCIUM SERPL-MCNC: 10.3 MG/DL (ref 8.3–10.1)
CALCIUM SERPL-MCNC: 10.3 MG/DL (ref 8.3–10.1)
CHLORIDE SERPL-SCNC: 105 MMOL/L (ref 100–108)
CLARITY UR: CLEAR
CO2 SERPL-SCNC: 27 MMOL/L (ref 21–32)
COLOR UR: YELLOW
CREAT SERPL-MCNC: 1.23 MG/DL (ref 0.6–1.3)
CREAT UR-MCNC: 66 MG/DL
EST. AVERAGE GLUCOSE BLD GHB EST-MCNC: 166 MG/DL
GFR SERPL CREATININE-BSD FRML MDRD: 49 ML/MIN/1.73SQ M
GLUCOSE SERPL-MCNC: 135 MG/DL (ref 65–140)
GLUCOSE UR STRIP-MCNC: ABNORMAL MG/DL
HBA1C MFR BLD: 7.4 % (ref 4.2–6.3)
HGB UR QL STRIP.AUTO: NEGATIVE
HYALINE CASTS #/AREA URNS LPF: ABNORMAL /LPF
KETONES UR STRIP-MCNC: NEGATIVE MG/DL
LEUKOCYTE ESTERASE UR QL STRIP: ABNORMAL
MICROALBUMIN UR-MCNC: 26.1 MG/L (ref 0–20)
MICROALBUMIN/CREAT 24H UR: 40 MG/G CREATININE (ref 0–30)
NITRITE UR QL STRIP: POSITIVE
NON-SQ EPI CELLS URNS QL MICRO: ABNORMAL /HPF
PH UR STRIP.AUTO: 6 [PH]
POTASSIUM SERPL-SCNC: 4.8 MMOL/L (ref 3.5–5.3)
PROT UR STRIP-MCNC: NEGATIVE MG/DL
RBC #/AREA URNS AUTO: ABNORMAL /HPF
SODIUM SERPL-SCNC: 137 MMOL/L (ref 136–145)
SP GR UR STRIP.AUTO: 1.03 (ref 1–1.03)
UROBILINOGEN UR QL STRIP.AUTO: 0.2 E.U./DL
WBC #/AREA URNS AUTO: ABNORMAL /HPF

## 2019-10-16 PROCEDURE — 82040 ASSAY OF SERUM ALBUMIN: CPT

## 2019-10-16 PROCEDURE — 99214 OFFICE O/P EST MOD 30 MIN: CPT | Performed by: INTERNAL MEDICINE

## 2019-10-16 PROCEDURE — 83036 HEMOGLOBIN GLYCOSYLATED A1C: CPT

## 2019-10-16 PROCEDURE — 90682 RIV4 VACC RECOMBINANT DNA IM: CPT | Performed by: INTERNAL MEDICINE

## 2019-10-16 PROCEDURE — 3008F BODY MASS INDEX DOCD: CPT | Performed by: INTERNAL MEDICINE

## 2019-10-16 PROCEDURE — 87086 URINE CULTURE/COLONY COUNT: CPT

## 2019-10-16 PROCEDURE — 87077 CULTURE AEROBIC IDENTIFY: CPT

## 2019-10-16 PROCEDURE — 81001 URINALYSIS AUTO W/SCOPE: CPT | Performed by: INTERNAL MEDICINE

## 2019-10-16 PROCEDURE — 82570 ASSAY OF URINE CREATININE: CPT | Performed by: INTERNAL MEDICINE

## 2019-10-16 PROCEDURE — 90471 IMMUNIZATION ADMIN: CPT | Performed by: INTERNAL MEDICINE

## 2019-10-16 PROCEDURE — 82043 UR ALBUMIN QUANTITATIVE: CPT | Performed by: INTERNAL MEDICINE

## 2019-10-16 PROCEDURE — 36415 COLL VENOUS BLD VENIPUNCTURE: CPT

## 2019-10-16 PROCEDURE — 80048 BASIC METABOLIC PNL TOTAL CA: CPT

## 2019-10-16 PROCEDURE — 87186 SC STD MICRODIL/AGAR DIL: CPT

## 2019-10-16 RX ORDER — SULFAMETHOXAZOLE AND TRIMETHOPRIM 800; 160 MG/1; MG/1
1 TABLET ORAL EVERY 12 HOURS SCHEDULED
Qty: 14 TABLET | Refills: 0 | Status: SHIPPED | OUTPATIENT
Start: 2019-10-16 | End: 2019-10-16 | Stop reason: SDUPTHER

## 2019-10-16 RX ORDER — EXENATIDE 2 MG/.65ML
2 INJECTION, SUSPENSION, EXTENDED RELEASE SUBCUTANEOUS WEEKLY
Qty: 12 EACH | Refills: 3 | Status: SHIPPED | OUTPATIENT
Start: 2019-10-16 | End: 2021-08-04 | Stop reason: SDUPTHER

## 2019-10-16 NOTE — TELEPHONE ENCOUNTER
Patient was seen today, she needs her SULFAMETHOXAZOLE-TRIMETHOPRIM (BACTRIM DS) 800-160MG PER TABLET    Sent to [facility] # [phone]    It was sent to [facility]     Call back # [phone]

## 2019-10-16 NOTE — PATIENT INSTRUCTIONS
Patient on multiple diabetic drugs  We can recheck the A1c today  CKD stage 2 with estimated GFR of 53; in all probability heard in measured clearance would be higher  Will continue Invokana 300  Hypercalcemia noted lawn last BMP; it was pretty minimal   Recheck this today and if it persists we can take some actions  symptom of dysuria: Initiate Bactrim  Do a culture  Plan to see me again in 6 months but any issues may require a quicker visit

## 2019-10-16 NOTE — PROGRESS NOTES
BMI Counseling: Body mass index is 30 29 kg/m²  The BMI is above normal  Nutrition recommendations include moderation in carbohydrate intake

## 2019-10-16 NOTE — PROGRESS NOTES
Assessment/Plan:       Diagnoses and all orders for this visit:    Type 2 diabetes mellitus without complication, without long-term current use of insulin (HCC)  -     BYDUREON 2 MG PEN; Inject 2 mg under the skin once a week  -     Basic metabolic panel; Future  -     Microalbumin / creatinine urine ratio  -     Urinalysis with reflex to microscopic  -     Hemoglobin A1C; Future    Need for influenza vaccination  -     influenza vaccine, [id number], quadrivalent, recombinant, PF, 0 5 mL, for patients 18 yr+ (FLU[facility])    Dysuria    Chronic kidney disease, stage 2 (mild)                Subjective:      Patient ID: Alok Cates is a 53 y o  female  a patient with diabetes hypertension and dyslipidemia with chronic kidney disease stage 2  Taking a cocktail of drugs including metformin, Januvia, Bydureon, and Invokana  The Bydureon is a bit of a surprise because she thought she had an allergic reaction to it when she 1st tried it but what she probably had was a bacterial cellulitis  An 18 rate she is on it with no adverse side effect   Home sugars being checked at [facility] [place] generally under 150     the patient reports that she thinks she is getting a bladder infection  She has some suprapubic pressure  We will assess and treat  The following portions of the patient's history were reviewed and updated as appropriate:   She has a past medical history of Adjustment disorder with anxiety, Anxiety, Constipation, Dense breasts, GERD (gastroesophageal reflux disease), Heart murmur, History of atrial paroxysmal tachycardia, History of cerebral artery occlusion, History of chest pain, Hyperlipidemia, SHAE (iron deficiency anemia), Menopause, Ovarian cyst, Pancreatitis, Plantar fasciitis, Polyclonal hypergammaglobulinemia, Restless legs syndrome (RLS), and Trigger finger  ,  does not have any pertinent problems on file  ,   has a past surgical history that includes Cholecystectomy; pr esophagogastroduodenoscopy transoral diagnostic (N/A, 10/19/2017); and Trigger finger release  ,  family history includes Tuberculosis in her mother  She was adopted  ,   reports that she quit smoking about 15 months ago  Her smoking use included cigarettes  She has a 3 50 pack-year smoking history  She has never used smokeless tobacco  She reports that she drinks alcohol  She reports that she does not use drugs  ,  has No Known Allergies     Current Outpatient Medications   Medication Sig Dispense Refill    ACCU-CHEK FASTCLIX LANCETS MISC by Does not apply route daily      atorvastatin (LIPITOR) 20 mg tablet TAKE 1 TABLET DAILY 90 tablet 4    B Complex-C (SUPER B COMPLEX PO) Take 1 tablet by mouth daily      BIOTIN PO Take 1 capsule by mouth daily      Blood Glucose Monitoring Suppl (ACCU-CHEK JESSEE CONNECT) w/Device KIT by Does not apply route      BYDUREON 2 MG PEN Inject 2 mg under the skin once a week 12 each 3    Canagliflozin (INVOKANA) 300 MG TABS Take 1 tablet (300 mg total) by mouth daily 90 tablet 3    cholecalciferol (VITAMIN D3) 1,000 units tablet Take 1,000 Units by mouth daily      clobetasol (TEMOVATE) 0 05 % cream as needed       gabapentin (NEURONTIN) 100 mg capsule Take 3 capsules (300 mg total) by mouth daily 270 capsule 3    glucose blood test strip by In Vitro route 4 (four) times a day      JANUVIA 100 MG tablet Take 1 tablet (100 mg total) by mouth daily 90 tablet 3    L-Lysine 500 MG TABS       lisinopril (ZESTRIL) 10 mg tablet Take 1 tablet (10 mg total) by mouth daily 90 tablet 3    metFORMIN (GLUCOPHAGE) 1000 MG tablet TAKE 1 TABLET TWICE A  tablet 3    Multiple Vitamin (DAILY VALUE MULTIVITAMIN) TABS Take by mouth      Omega-3 Fatty Acids (FISH OIL) 1,000 mg Take 1,000 mg by mouth daily      omeprazole (PriLOSEC) 20 mg delayed release capsule TAKE 1 CAPSULE EVERY MORNING BEFORE BREAKFAST 90 capsule 4    CALCIUM PO Take by mouth daily       No current facility-administered medications for this visit  Review of Systems   Constitutional: Negative for fatigue and unexpected weight change  Respiratory: Negative for cough and shortness of breath  Cardiovascular: Negative for chest pain  Gastrointestinal: Negative for abdominal pain  Genitourinary: Positive for dysuria and urgency  Negative for vaginal discharge  Neurological: Negative for headaches  Objective:  Vitals:    10/16/19 1605   BP: 110/70   Pulse: 80   SpO2: 96%      Physical Exam   Constitutional: She is oriented to person, place, and time  She appears well-developed and well-nourished  No distress  Cardiovascular: Normal rate  Pulmonary/Chest: Effort normal    Neurological: She is alert and oriented to person, place, and time  Patient Instructions   Patient on multiple diabetic drugs  We can recheck the A1c today  CKD stage 2 with estimated GFR of 53; in all probability heard in measured clearance would be higher  Will continue Invokana 300  Hypercalcemia noted lawn last BMP; it was pretty minimal   Recheck this today and if it persists we can take some actions  Plan to see me again in 6 months but any issues may require a quicker visit

## 2019-10-17 RX ORDER — SULFAMETHOXAZOLE AND TRIMETHOPRIM 800; 160 MG/1; MG/1
1 TABLET ORAL EVERY 12 HOURS SCHEDULED
Qty: 14 TABLET | Refills: 0 | Status: SHIPPED | OUTPATIENT
Start: 2019-10-17 | End: 2019-10-24

## 2019-10-17 RX ORDER — SULFAMETHOXAZOLE AND TRIMETHOPRIM 800; 160 MG/1; MG/1
TABLET ORAL
Qty: 14 TABLET | Refills: 0 | OUTPATIENT
Start: 2019-10-17

## 2019-10-18 LAB — BACTERIA UR CULT: ABNORMAL

## 2019-11-30 DIAGNOSIS — E11.9 TYPE 2 DIABETES MELLITUS WITHOUT COMPLICATION, WITHOUT LONG-TERM CURRENT USE OF INSULIN (HCC): ICD-10-CM

## 2020-01-17 DIAGNOSIS — R12 HEARTBURN: ICD-10-CM

## 2020-01-17 RX ORDER — OMEPRAZOLE 20 MG/1
CAPSULE, DELAYED RELEASE ORAL
Qty: 90 CAPSULE | Refills: 3 | Status: SHIPPED | OUTPATIENT
Start: 2020-01-17 | End: 2021-01-11

## 2020-01-28 ENCOUNTER — TELEPHONE (OUTPATIENT)
Dept: NEPHROLOGY | Facility: CLINIC | Age: 55
End: 2020-01-28

## 2020-01-28 ENCOUNTER — APPOINTMENT (OUTPATIENT)
Dept: LAB | Facility: HOSPITAL | Age: 55
End: 2020-01-28
Payer: COMMERCIAL

## 2020-01-28 NOTE — TELEPHONE ENCOUNTER
I called and left a message on machine for patient to return our call about her appointment for Thursday 1/30/2020 6 month follow up with Dr Ghazala Decker to possible reschedule her for Wednesday 1/29/2020  The patient did return my phone call and stated that she wanted to keep her appointment for Thursday 1/30/2020 because she still needed to have her blood work done  The patient understood and was okay with keeping her appointment for Thursday 1/30/2020   Seun Reeder,

## 2020-01-29 PROCEDURE — 3061F NEG MICROALBUMINURIA REV: CPT | Performed by: INTERNAL MEDICINE

## 2020-01-30 ENCOUNTER — OFFICE VISIT (OUTPATIENT)
Dept: NEPHROLOGY | Facility: CLINIC | Age: 55
End: 2020-01-30
Payer: COMMERCIAL

## 2020-01-30 VITALS
HEART RATE: 72 BPM | WEIGHT: 192 LBS | BODY MASS INDEX: 30.86 KG/M2 | SYSTOLIC BLOOD PRESSURE: 124 MMHG | DIASTOLIC BLOOD PRESSURE: 68 MMHG | TEMPERATURE: 96.7 F | HEIGHT: 66 IN | RESPIRATION RATE: 16 BRPM

## 2020-01-30 DIAGNOSIS — N18.30 STAGE 3 CHRONIC KIDNEY DISEASE (HCC): Primary | ICD-10-CM

## 2020-01-30 PROCEDURE — 99214 OFFICE O/P EST MOD 30 MIN: CPT | Performed by: INTERNAL MEDICINE

## 2020-01-30 PROCEDURE — 3078F DIAST BP <80 MM HG: CPT | Performed by: INTERNAL MEDICINE

## 2020-01-30 PROCEDURE — 3066F NEPHROPATHY DOC TX: CPT | Performed by: INTERNAL MEDICINE

## 2020-01-30 PROCEDURE — 3074F SYST BP LT 130 MM HG: CPT | Performed by: INTERNAL MEDICINE

## 2020-01-30 PROCEDURE — 3008F BODY MASS INDEX DOCD: CPT | Performed by: INTERNAL MEDICINE

## 2020-01-30 NOTE — PROGRESS NOTES
NEPHROLOGY PROGRESS NOTE    Patient: Jeanie Loomis               Sex: female          DOA: No admission date for patient encounter  YOB: 1965        Age:  53 y o         LOS: [unfilled]  1/30/2020        BACKGROUND     43-year-old female with past medical history of diabetes mellitus type 2, proteinuria who is following up in the renal clinic    SUBJECTIVE     Patient presents to renal clinic after 6 months  States that she may have been gaining few lbs  Denies introduction of any new medication  States that she does not check her blood sugar on daily basis    REVIEW OF SYSTEMS     Review of Systems   Constitutional: Negative  HENT: Negative  Eyes: Negative  Respiratory: Negative  Cardiovascular: Negative  Gastrointestinal: Negative  Endocrine: Negative  Genitourinary: Negative  Musculoskeletal: Negative  Skin: Negative  Allergic/Immunologic: Negative  Neurological: Negative  Hematological: Negative  All other systems reviewed and are negative  OBJECTIVE     Current Weight: Weight - Scale: 87 1 kg (192 lb)  Vitals:    01/30/20 1612   BP: 124/68   Pulse: 72   Resp: 16   Temp: (!) 96 7 °F (35 9 °C)     Body mass index is 31 46 kg/m²    [unfilled]    CURRENT MEDICATIONS       Current Outpatient Medications:     ACCU-CHEK FASTCLIX LANCETS MISC, by Does not apply route daily, Disp: , Rfl:     atorvastatin (LIPITOR) 20 mg tablet, TAKE 1 TABLET DAILY, Disp: 90 tablet, Rfl: 4    B Complex-C (SUPER B COMPLEX PO), Take 1 tablet by mouth daily, Disp: , Rfl:     BIOTIN PO, Take 1 capsule by mouth daily, Disp: , Rfl:     Blood Glucose Monitoring Suppl (ACCU-CHEK JESSEE CONNECT) w/Device KIT, by Does not apply route, Disp: , Rfl:     BYDUREON 2 MG PEN, Inject 2 mg under the skin once a week, Disp: 12 each, Rfl: 3    Canagliflozin (INVOKANA) 300 MG TABS, Take 1 tablet (300 mg total) by mouth daily, Disp: 90 tablet, Rfl: 3    cholecalciferol (VITAMIN D3) 1,000 units tablet, Take 1,000 Units by mouth daily, Disp: , Rfl:     clobetasol (TEMOVATE) 0 05 % cream, as needed , Disp: , Rfl:     gabapentin (NEURONTIN) 100 mg capsule, Take 3 capsules (300 mg total) by mouth daily, Disp: 270 capsule, Rfl: 3    glucose blood test strip, by In Vitro route 4 (four) times a day, Disp: , Rfl:     JANUVIA 100 MG tablet, Take 1 tablet (100 mg total) by mouth daily, Disp: 90 tablet, Rfl: 3    lisinopril (ZESTRIL) 10 mg tablet, Take 1 tablet (10 mg total) by mouth daily, Disp: 90 tablet, Rfl: 3    metFORMIN (GLUCOPHAGE) 1000 MG tablet, TAKE 1 TABLET TWICE A DAY, Disp: 180 tablet, Rfl: 4    Multiple Vitamin (DAILY VALUE MULTIVITAMIN) TABS, Take by mouth, Disp: , Rfl:     Omega-3 Fatty Acids (FISH OIL) 1,000 mg, Take 1,000 mg by mouth daily, Disp: , Rfl:     omeprazole (PriLOSEC) 20 mg delayed release capsule, TAKE 1 CAPSULE EVERY MORNING BEFORE BREAKFAST, Disp: 90 capsule, Rfl: 3    CALCIUM PO, Take by mouth daily, Disp: , Rfl:     L-Lysine 500 MG TABS, , Disp: , Rfl:       PHYSICAL EXAMINATION     Physical Exam   Constitutional: She is oriented to person, place, and time  She appears well-developed and well-nourished  HENT:   Head: Normocephalic and atraumatic  Eyes: Pupils are equal, round, and reactive to light  Neck: Neck supple  Cardiovascular: Normal rate, regular rhythm and normal heart sounds  Pulmonary/Chest: Effort normal    Abdominal: Soft  Bowel sounds are normal    Musculoskeletal: Normal range of motion  Neurological: She is alert and oriented to person, place, and time  Skin: Skin is warm  Psychiatric: She has a normal mood and affect           LAB RESULTS     Results from last 7 days   Lab Units 01/28/20  1632   WBC Thousand/uL 9 05   HEMOGLOBIN g/dL 14 6   HEMATOCRIT % 46 0   PLATELETS Thousands/uL 275   POTASSIUM mmol/L 4 6   CHLORIDE mmol/L 99*   CO2 mmol/L 29   BUN mg/dL 19   CREATININE mg/dL 1 30   EGFR ml/min/1 73sq m 46   CALCIUM mg/dL 10 1 PHOSPHORUS mg/dL 4 2           RADIOLOGY RESULTS      Reviewed    ASSESSMENT/PLAN     69-year-old female with past medical history of diabetes mellitus type 2, chronic kidney disease stage 3 who is following up in renal Clinic for management of CKD  1  Chronic kidney disease stage 3:  Etiology likely diabetic nephropathy and hypertensive nephrosclerosis  Recent labs revealed serum creatinine 1 3 with estimated GFR of 46 which is at baseline  Previous creatinine was 1 1-1 2  Slight rise which is within baseline however may be caused by elevated blood sugar noted in that blood work  2  Proteinuria:  Patient is on lisinopril 10 mg once daily  She has 150 mg of proteinuria which is an improvement from prior  3  Secondary hyperparathyroidism of renal origin:  Vitamin-D levels are within normal limits  Calcium levels are at the upper low normal though improved from prior  Patient is taking vitamin-D tablet 1 tablet every other day  4  Anemia due to chronic kidney disease:  Patient's hemoglobin is on target  Target hemoglobin for patient with CKD is 9 5-11 grams/deciliter  5  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:  Patient blood pressure is on target in the office today  She is maintained on lisinopril 10 mg once daily  Target blood pressure for this patient is 130/80  6  Diabetes mellitus in CKD:  Patient noted to be on Januvia, Invokana and metformin  Instructed patient to check her blood sugar daily  7  Nutrition:  Low-potassium, moderate protein and low-salt diet recommended  8  Hypertriglyceridemia:  Patient noted to be on Omega 3 fatty acids  Return in 6 months with CBC, BMP, phos, calcium, vitamin-D, urinalysis, urine protein creatinine ratio        Yessica Johnson MD  Nephrology  1/30/2020

## 2020-04-17 ENCOUNTER — TELEMEDICINE (OUTPATIENT)
Dept: INTERNAL MEDICINE CLINIC | Facility: CLINIC | Age: 55
End: 2020-04-17
Payer: COMMERCIAL

## 2020-04-17 VITALS
HEART RATE: 72 BPM | DIASTOLIC BLOOD PRESSURE: 82 MMHG | WEIGHT: 185 LBS | SYSTOLIC BLOOD PRESSURE: 112 MMHG | BODY MASS INDEX: 30.32 KG/M2

## 2020-04-17 DIAGNOSIS — E11.22 TYPE 2 DIABETES MELLITUS WITH STAGE 2 CHRONIC KIDNEY DISEASE, WITHOUT LONG-TERM CURRENT USE OF INSULIN (HCC): ICD-10-CM

## 2020-04-17 DIAGNOSIS — I10 ESSENTIAL HYPERTENSION: ICD-10-CM

## 2020-04-17 DIAGNOSIS — G62.9 NEUROPATHY: ICD-10-CM

## 2020-04-17 DIAGNOSIS — E11.9 TYPE 2 DIABETES MELLITUS WITHOUT COMPLICATION, WITHOUT LONG-TERM CURRENT USE OF INSULIN (HCC): ICD-10-CM

## 2020-04-17 DIAGNOSIS — E78.2 MIXED HYPERLIPIDEMIA: ICD-10-CM

## 2020-04-17 DIAGNOSIS — N18.2 CHRONIC KIDNEY DISEASE, STAGE 2 (MILD): Primary | ICD-10-CM

## 2020-04-17 DIAGNOSIS — N18.2 TYPE 2 DIABETES MELLITUS WITH STAGE 2 CHRONIC KIDNEY DISEASE, WITHOUT LONG-TERM CURRENT USE OF INSULIN (HCC): ICD-10-CM

## 2020-04-17 PROCEDURE — 99213 OFFICE O/P EST LOW 20 MIN: CPT | Performed by: INTERNAL MEDICINE

## 2020-04-17 PROCEDURE — 4010F ACE/ARB THERAPY RXD/TAKEN: CPT | Performed by: INTERNAL MEDICINE

## 2020-04-17 RX ORDER — SITAGLIPTIN 100 MG/1
100 TABLET, FILM COATED ORAL DAILY
Qty: 90 TABLET | Refills: 3 | Status: SHIPPED | OUTPATIENT
Start: 2020-04-17 | End: 2021-04-19

## 2020-04-17 RX ORDER — GABAPENTIN 300 MG/1
300 CAPSULE ORAL 3 TIMES DAILY
Qty: 270 CAPSULE | Refills: 3 | Status: SHIPPED | OUTPATIENT
Start: 2020-04-17 | End: 2021-05-25

## 2020-04-17 RX ORDER — LISINOPRIL 10 MG/1
10 TABLET ORAL DAILY
Qty: 90 TABLET | Refills: 3 | Status: SHIPPED | OUTPATIENT
Start: 2020-04-17 | End: 2020-11-09 | Stop reason: ALTCHOICE

## 2020-04-17 RX ORDER — ATORVASTATIN CALCIUM 20 MG/1
20 TABLET, FILM COATED ORAL DAILY
Qty: 90 TABLET | Refills: 4 | Status: SHIPPED | OUTPATIENT
Start: 2020-04-17 | End: 2021-05-13

## 2020-04-29 DIAGNOSIS — E11.9 TYPE 2 DIABETES MELLITUS WITHOUT COMPLICATION, WITHOUT LONG-TERM CURRENT USE OF INSULIN (HCC): ICD-10-CM

## 2020-04-29 RX ORDER — CANAGLIFLOZIN 300 MG/1
TABLET, FILM COATED ORAL
Qty: 90 TABLET | Refills: 3 | Status: SHIPPED | OUTPATIENT
Start: 2020-04-29 | End: 2021-04-27

## 2020-05-08 ENCOUNTER — TELEPHONE (OUTPATIENT)
Dept: NEPHROLOGY | Facility: CLINIC | Age: 55
End: 2020-05-08

## 2020-05-08 DIAGNOSIS — N18.30 STAGE 3 CHRONIC KIDNEY DISEASE (HCC): Primary | ICD-10-CM

## 2020-05-13 ENCOUNTER — TELEPHONE (OUTPATIENT)
Dept: NEPHROLOGY | Facility: CLINIC | Age: 55
End: 2020-05-13

## 2020-08-07 ENCOUNTER — TELEPHONE (OUTPATIENT)
Dept: INTERNAL MEDICINE CLINIC | Facility: CLINIC | Age: 55
End: 2020-08-07

## 2020-08-07 NOTE — TELEPHONE ENCOUNTER
Forms in [employer] bin to be filled out needs for school asap    Please call when ready # [phone]

## 2020-08-11 NOTE — TELEPHONE ENCOUNTER
Informed patient, put forms up front No changes/discrepenciesin medications requested.  Pharmacy has been set up and verified.

## 2020-08-11 NOTE — TELEPHONE ENCOUNTER
Informed patient that a letter was written for the patient  She still needs the form filled out, please advise  Is that done as well?

## 2020-08-11 NOTE — TELEPHONE ENCOUNTER
Patient states that she wants to work only from home, she states that she is "high risk according to CDC"

## 2020-08-12 ENCOUNTER — TELEPHONE (OUTPATIENT)
Dept: INTERNAL MEDICINE CLINIC | Facility: CLINIC | Age: 55
End: 2020-08-12

## 2020-09-08 ENCOUNTER — APPOINTMENT (OUTPATIENT)
Dept: LAB | Facility: HOSPITAL | Age: 55
End: 2020-09-08
Attending: INTERNAL MEDICINE
Payer: COMMERCIAL

## 2020-09-08 DIAGNOSIS — N18.30 STAGE 3 CHRONIC KIDNEY DISEASE (HCC): ICD-10-CM

## 2020-09-08 DIAGNOSIS — E11.9 TYPE 2 DIABETES MELLITUS WITHOUT COMPLICATION, WITHOUT LONG-TERM CURRENT USE OF INSULIN (HCC): ICD-10-CM

## 2020-09-08 LAB
ANION GAP SERPL CALCULATED.3IONS-SCNC: 10 MMOL/L (ref 4–13)
BACTERIA UR QL AUTO: ABNORMAL /HPF
BILIRUB UR QL STRIP: NEGATIVE
BUN SERPL-MCNC: 13 MG/DL (ref 5–25)
CALCIUM SERPL-MCNC: 10.1 MG/DL (ref 8.3–10.1)
CHLORIDE SERPL-SCNC: 99 MMOL/L (ref 100–108)
CLARITY UR: CLEAR
CO2 SERPL-SCNC: 29 MMOL/L (ref 21–32)
COLOR UR: YELLOW
CREAT SERPL-MCNC: 1.24 MG/DL (ref 0.6–1.3)
EST. AVERAGE GLUCOSE BLD GHB EST-MCNC: 166 MG/DL
GFR SERPL CREATININE-BSD FRML MDRD: 49 ML/MIN/1.73SQ M
GLUCOSE P FAST SERPL-MCNC: 170 MG/DL (ref 65–99)
GLUCOSE UR STRIP-MCNC: ABNORMAL MG/DL
HBA1C MFR BLD: 7.4 %
HGB UR QL STRIP.AUTO: NEGATIVE
KETONES UR STRIP-MCNC: NEGATIVE MG/DL
LEUKOCYTE ESTERASE UR QL STRIP: ABNORMAL
NITRITE UR QL STRIP: NEGATIVE
NON-SQ EPI CELLS URNS QL MICRO: ABNORMAL /HPF
PH UR STRIP.AUTO: 6 [PH]
PHOSPHATE SERPL-MCNC: 3.9 MG/DL (ref 2.7–4.5)
POTASSIUM SERPL-SCNC: 4.3 MMOL/L (ref 3.5–5.3)
PROT UR STRIP-MCNC: NEGATIVE MG/DL
RBC #/AREA URNS AUTO: ABNORMAL /HPF
SODIUM SERPL-SCNC: 138 MMOL/L (ref 136–145)
SP GR UR STRIP.AUTO: <=1.005 (ref 1–1.03)
UROBILINOGEN UR QL STRIP.AUTO: 0.2 E.U./DL
WBC #/AREA URNS AUTO: ABNORMAL /HPF

## 2020-09-08 PROCEDURE — 84156 ASSAY OF PROTEIN URINE: CPT

## 2020-09-08 PROCEDURE — 36415 COLL VENOUS BLD VENIPUNCTURE: CPT

## 2020-09-08 PROCEDURE — 82570 ASSAY OF URINE CREATININE: CPT

## 2020-09-08 PROCEDURE — 3051F HG A1C>EQUAL 7.0%<8.0%: CPT | Performed by: INTERNAL MEDICINE

## 2020-09-08 PROCEDURE — 81001 URINALYSIS AUTO W/SCOPE: CPT

## 2020-09-08 PROCEDURE — 84100 ASSAY OF PHOSPHORUS: CPT

## 2020-09-08 PROCEDURE — 83036 HEMOGLOBIN GLYCOSYLATED A1C: CPT

## 2020-09-08 PROCEDURE — 80048 BASIC METABOLIC PNL TOTAL CA: CPT

## 2020-09-09 LAB
CREAT UR-MCNC: 18 MG/DL
PROT UR-MCNC: <6 MG/DL
PROT/CREAT UR: <0.33 MG/G{CREAT} (ref 0–0.1)

## 2020-09-15 ENCOUNTER — TELEPHONE (OUTPATIENT)
Dept: NEPHROLOGY | Facility: CLINIC | Age: 55
End: 2020-09-15

## 2020-09-15 NOTE — TELEPHONE ENCOUNTER
Called and left message on machine for patient to return our call about confirming appointment   Kaitlyn Orellana

## 2020-09-16 ENCOUNTER — TELEPHONE (OUTPATIENT)
Dept: NEPHROLOGY | Facility: CLINIC | Age: 55
End: 2020-09-16

## 2020-09-16 ENCOUNTER — TELEMEDICINE (OUTPATIENT)
Dept: NEPHROLOGY | Facility: CLINIC | Age: 55
End: 2020-09-16
Payer: COMMERCIAL

## 2020-09-16 VITALS — RESPIRATION RATE: 16 BRPM | BODY MASS INDEX: 30.16 KG/M2 | HEIGHT: 65 IN | WEIGHT: 181 LBS

## 2020-09-16 DIAGNOSIS — N18.30 CKD (CHRONIC KIDNEY DISEASE) STAGE 3, GFR 30-59 ML/MIN (HCC): Primary | ICD-10-CM

## 2020-09-16 PROCEDURE — 99215 OFFICE O/P EST HI 40 MIN: CPT | Performed by: INTERNAL MEDICINE

## 2020-09-16 PROCEDURE — 1036F TOBACCO NON-USER: CPT | Performed by: INTERNAL MEDICINE

## 2020-09-16 PROCEDURE — 3066F NEPHROPATHY DOC TX: CPT | Performed by: INTERNAL MEDICINE

## 2020-09-16 RX ORDER — BACITRACIN 500 UNIT/G
250 OINTMENT (GRAM) TOPICAL
COMMUNITY

## 2020-09-16 RX ORDER — ZINC GLUCONATE 50 MG
50 TABLET ORAL DAILY
COMMUNITY

## 2020-09-16 NOTE — TELEPHONE ENCOUNTER
I called and spoke to the patient and schedule her follow up appointment  I also explained that I was mailing out her lab orders, summary report and appointment card  The patient understood and was okay with it   Georgina Courtney,

## 2020-09-16 NOTE — PROGRESS NOTES
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 + hypertensive kidney disease  Baseline creatinine is 1 1-1 3  Current creatinine is 1 2 and at baseline  Renal US was unremarkable    2) HTN due to CKD stage III: BP is often running below 312 mm Hg systolic  Instructed to check BP prior to taking BP med which is lisinopril 10 mg once daily  Asked to hold BP med if SBP < 105    3) DM-2: insulin dependent  On metformin, Invokana, Januvia, Bydureon  Blood sugars are acceptable  4) Secondary hyperparathyroidism of renal origin: vitamin D in jan 2020 was acceptable  Calcium is at the upper limit of normal      5) Anemia due to CKD: Hemoglobin is on target  6) Dyslipidemia: on lipitor 20 mg at bedtime  F/u in 6 months  Problem List Items Addressed This Visit     None               Reason for visit is   Chief Complaint   Patient presents with    Chronic Kidney Disease    Follow-up        Encounter provider Deshawn Olsen MD    Provider located at [phone] [address]  [phone] [address]  [phone]      Recent Visits  Date Type Provider Dept   09/15/20 Telephone Deshawn Olsen MD [facility] [address] recent visits within past 7 days and meeting all other requirements     Today's Visits  Date Type Provider Dept   09/16/20 Telemedicine Deshawn Olsen MD [facility] [place]   Showing today's visits and meeting all other requirements     Future Appointments  No visits were found meeting these conditions  Showing future appointments within next 150 days and meeting all other requirements        The patient was identified by name and date of birth   Monty Kirkpatrick was informed that this is a telemedicine visit and that the visit is being conducted through [facility] and patient was informed that this is a secure, HIPAA-compliant platform  She agrees to proceed     My office door was closed  No one else was in the room  She acknowledged consent and understanding of privacy and security of the video platform  The patient has agreed to participate and understands they can discontinue the visit at any time  Patient is aware this is a billable service  Subjective  Eliot Aguilar is a 63 y o  female who underwent tele video visit  Feels well  States that BP has been low at times going down to 95 mm Hg systolic  Denies any chest pain, shortness of breath, painful urination, cloudy urine  C/o dizziness when standing up from seated position  Shanell Mont[place] HPI     Past Medical History:   Diagnosis Date    Adjustment disorder with anxiety     Anxiety     Chronic kidney disease     Constipation     Dense breasts     GERD (gastroesophageal reflux disease)     Heart murmur     History of atrial paroxysmal tachycardia     History of cerebral artery occlusion     History of chest pain     Hyperlipidemia     SHAE (iron deficiency anemia)     Menopause     Ovarian cyst     Pancreatitis     Plantar fasciitis     Polyclonal hypergammaglobulinemia     Restless legs syndrome (RLS)     Trigger finger        Past Surgical History:   Procedure Laterality Date    CHOLECYSTECTOMY      MA ESOPHAGOGASTRODUODENOSCOPY TRANSORAL DIAGNOSTIC N/A 10/19/2017    Procedure: EGD AND COLONOSCOPY;  Surgeon: Christopher Salcedo MD;  Location: [facility];   Service: Gastroenterology    TRIGGER FINGER RELEASE         Current Outpatient Medications   Medication Sig Dispense Refill    ACCU-CHEK FASTCLIX LANCETS [address] [facility] by Does not apply route daily      atorvastatin (LIPITOR) 20 mg tablet Take 1 tablet (20 mg total) by mouth daily 90 tablet 4    B Complex-C (SUPER B COMPLEX PO) Take 1 tablet by mouth daily      BIOTIN PO Take 1 capsule by mouth daily      Blood Glucose Monitoring Suppl (ACCU-CHEK JESSEE CONNECT) w/Device KIT by Does not apply route      BYDUREON 2 MG PEN Inject 2 mg under the skin once a week 12 each 3    cholecalciferol (VITAMIN D3) 1,000 units tablet Take 1,000 Units by mouth 3 (three) times a week       Chromium Picolinate 200 MCG CAPS Take by mouth daily      clobetasol (TEMOVATE) 0 05 % cream as needed       gabapentin (NEURONTIN) 300 mg capsule Take 1 capsule (300 mg total) by mouth 3 (three) times a day 270 capsule 3    glucose blood test strip by In Vitro route 4 (four) times a day      Hyaluronic Acid-Vitamin C (HYALURONIC ACID PO) Take 100 mg by mouth daily      INVOKANA 300 MG TABS TAKE 1 TABLET DAILY 90 tablet 3    JANUVIA 100 MG tablet Take 1 tablet (100 mg total) by mouth daily 90 tablet 3    lisinopril (ZESTRIL) 10 mg tablet Take 1 tablet (10 mg total) by mouth daily 90 tablet 3    metFORMIN (GLUCOPHAGE) 1000 MG tablet TAKE 1 TABLET TWICE A  tablet 4    Multiple Vitamin (DAILY VALUE MULTIVITAMIN) TABS Take by mouth      Omega-3 Fatty Acids (FISH OIL) 1,000 mg Take 1,000 mg by mouth daily      omeprazole (PriLOSEC) 20 mg delayed release capsule TAKE 1 CAPSULE EVERY MORNING BEFORE BREAKFAST 90 capsule 3    Saw Palmetto 160 MG CAPS Take 250 mg by mouth      zinc gluconate 50 mg tablet Take 50 mg by mouth daily      CALCIUM PO Take by mouth daily      L-Lysine 500 MG TABS        No current facility-administered medications for this visit  No Known Allergies    Review of Systems   Constitutional: Negative  HENT: Negative  Eyes: Negative  Respiratory: Negative  Cardiovascular: Negative  Gastrointestinal: Negative  Endocrine: Negative  Genitourinary: Negative  Musculoskeletal: Negative  Skin: Negative  Allergic/Immunologic: Negative  Neurological: Negative  Hematological: Negative  All other systems reviewed and are negative        Video Exam    Vitals:    09/16/20 1442   Resp: 16   Weight: 82 1 kg (181 lb)   Height: 5' 5" (1 651 m)       Physical Exam     In no acute distress  Neck is supple  Symmetric lung expansion  No abdominal distention  No edema  Pulses intact    I spent 63 minutes with patient today in which greater than 50% of the time was spent in counseling/coordination of care regarding CkD care      VIRTUAL VISIT 58 Izabelhisilverio Felipe acknowledges that she has consented to an online visit or consultation  She understands that the online visit is based solely on information provided by her, and that, in the absence of a face-to-face physical evaluation by the physician, the diagnosis she receives is both limited and provisional in terms of accuracy and completeness  This is not intended to replace a full medical face-to-face evaluation by the physician  Lawanda Mansfield understands and accepts these terms

## 2020-09-16 NOTE — LETTER
September 16, 2020     Jhon Cooney MD  [address] [facility] [address]    Patient: Monty Kirkpatrick   YOB: 1965   Date of Visit: 9/16/2020       Dear Dr Madi Thomas: Thank you for referring Monty Kirkpatrick to me for evaluation  Below are my notes for this consultation  If you have questions, please do not hesitate to call me  I look forward to following your patient along with you  Sincerely,        Deshawn Olsen MD        CC: No Recipients  Deshawn Olsen MD  9/16/2020  3:31 PM  Sign when Signing Visit  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 + hypertensive kidney disease  Baseline creatinine is 1 1-1 3  Current creatinine is 1 2 and at baseline  Renal US was unremarkable    2) HTN due to CKD stage III: BP is often running below 275 mm Hg systolic  Instructed to check BP prior to taking BP med which is lisinopril 10 mg once daily  Asked to hold BP med if SBP < 105    3) DM-2: insulin dependent  On metformin, Invokana, Januvia, Bydureon  Blood sugars are acceptable  4) Secondary hyperparathyroidism of renal origin: vitamin D in jan 2020 was acceptable  Calcium is at the upper limit of normal      5) Anemia due to CKD: Hemoglobin is on target  6) Dyslipidemia: on lipitor 20 mg at bedtime  F/u in 6 months       Problem List Items Addressed This Visit     None               Reason for visit is   Chief Complaint   Patient presents with    Chronic Kidney Disease    Follow-up        Encounter provider Deshawn Olsen MD    Provider located at [phone] [id number] [address]  [phone]      Recent Visits  Date Type Provider Dept   09/15/20 Telephone Deshawn Olsen MD [facility] recent visits within past 7 days and meeting all other requirements     Today's Visits  Date Type Provider Dept   09/16/20 Telemedicine Deshawn Olsen MD [facility] [address] today's visits and meeting all other requirements     Future Appointments  No visits were found meeting these conditions  Showing future appointments within next 150 days and meeting all other requirements        The patient was identified by name and date of birth  Monty Kirkpatrick was informed that this is a telemedicine visit and that the visit is being conducted through [facility] and patient was informed that this is a secure, HIPAA-compliant platform  She agrees to proceed     My office door was closed  No one else was in the room  She acknowledged consent and understanding of privacy and security of the video platform  The patient has agreed to participate and understands they can discontinue the visit at any time  Patient is aware this is a billable service  Subjective  Monty Kirkpatrick is a 63 y o  female who underwent tele video visit  Feels well  States that BP has been low at times going down to 95 mm Hg systolic  Denies any chest pain, shortness of breath, painful urination, cloudy urine  C/o dizziness when standing up from seated position  Can Trotter       HPI     Past Medical History:   Diagnosis Date    Adjustment disorder with anxiety     Anxiety     Chronic kidney disease     Constipation     Dense breasts     GERD (gastroesophageal reflux disease)     Heart murmur     History of atrial paroxysmal tachycardia     History of cerebral artery occlusion     History of chest pain     Hyperlipidemia     SHAE (iron deficiency anemia)     Menopause     Ovarian cyst     Pancreatitis     Plantar fasciitis     Polyclonal hypergammaglobulinemia     Restless legs syndrome (RLS)     Trigger finger        Past Surgical History:   Procedure Laterality Date    CHOLECYSTECTOMY      NV ESOPHAGOGASTRODUODENOSCOPY TRANSORAL DIAGNOSTIC N/A 10/19/2017    Procedure: EGD AND COLONOSCOPY;  Surgeon: Cale Diaz MD;  Location: [facility];   Service: Gastroenterology    TRIGGER FINGER RELEASE         Current Outpatient Medications   Medication Sig Dispense Refill    ACCU-CHEK FASTCLIX LANCETS [address] [facility] by Does not apply route daily      atorvastatin (LIPITOR) 20 mg tablet Take 1 tablet (20 mg total) by mouth daily 90 tablet 4    B Complex-C (SUPER B COMPLEX PO) Take 1 tablet by mouth daily      BIOTIN PO Take 1 capsule by mouth daily      Blood Glucose Monitoring Suppl (ACCU-CHEK JESSEE CONNECT) w/Device KIT by Does not apply route      BYDUREON 2 MG PEN Inject 2 mg under the skin once a week 12 each 3    cholecalciferol (VITAMIN D3) 1,000 units tablet Take 1,000 Units by mouth 3 (three) times a week       Chromium Picolinate 200 MCG CAPS Take by mouth daily      clobetasol (TEMOVATE) 0 05 % cream as needed       gabapentin (NEURONTIN) 300 mg capsule Take 1 capsule (300 mg total) by mouth 3 (three) times a day 270 capsule 3    glucose blood test strip by In Vitro route 4 (four) times a day      Hyaluronic Acid-Vitamin C (HYALURONIC ACID PO) Take 100 mg by mouth daily      INVOKANA 300 MG TABS TAKE 1 TABLET DAILY 90 tablet 3    JANUVIA 100 MG tablet Take 1 tablet (100 mg total) by mouth daily 90 tablet 3    lisinopril (ZESTRIL) 10 mg tablet Take 1 tablet (10 mg total) by mouth daily 90 tablet 3    metFORMIN (GLUCOPHAGE) 1000 MG tablet TAKE 1 TABLET TWICE A  tablet 4    Multiple Vitamin (DAILY VALUE MULTIVITAMIN) TABS Take by mouth      Omega-3 Fatty Acids (FISH OIL) 1,000 mg Take 1,000 mg by mouth daily      omeprazole (PriLOSEC) 20 mg delayed release capsule TAKE 1 CAPSULE EVERY MORNING BEFORE BREAKFAST 90 capsule 3    Saw Palmetto 160 MG CAPS Take 250 mg by mouth      zinc gluconate 50 mg tablet Take 50 mg by mouth daily      CALCIUM PO Take by mouth daily      L-Lysine 500 MG TABS        No current facility-administered medications for this visit  No Known Allergies    Review of Systems   Constitutional: Negative  HENT: Negative  Eyes: Negative  Respiratory: Negative  Cardiovascular: Negative  Gastrointestinal: Negative  Endocrine: Negative  Genitourinary: Negative  Musculoskeletal: Negative  Skin: Negative  Allergic/Immunologic: Negative  Neurological: Negative  Hematological: Negative  All other systems reviewed and are negative  Video Exam    Vitals:    09/16/20 1442   Resp: 16   Weight: 82 1 kg (181 lb)   Height: 5' 5" (1 651 m)       Physical Exam     In no acute distress  Neck is supple  Symmetric lung expansion  No abdominal distention  No edema  Pulses intact    I spent 63 minutes with patient today in which greater than 50% of the time was spent in counseling/coordination of care regarding CkD care      VIRTUAL VISIT 58 Levi Wang acknowledges that she has consented to an online visit or consultation  She understands that the online visit is based solely on information provided by her, and that, in the absence of a face-to-face physical evaluation by the physician, the diagnosis she receives is both limited and provisional in terms of accuracy and completeness  This is not intended to replace a full medical face-to-face evaluation by the physician  Baldo Min understands and accepts these terms

## 2020-09-21 ENCOUNTER — ANNUAL EXAM (OUTPATIENT)
Dept: OBGYN CLINIC | Facility: CLINIC | Age: 55
End: 2020-09-21
Payer: COMMERCIAL

## 2020-09-21 VITALS
WEIGHT: 184 LBS | SYSTOLIC BLOOD PRESSURE: 106 MMHG | BODY MASS INDEX: 30.66 KG/M2 | DIASTOLIC BLOOD PRESSURE: 70 MMHG | HEIGHT: 65 IN

## 2020-09-21 DIAGNOSIS — Z01.419 ENCOUNTER FOR GYNECOLOGICAL EXAMINATION WITHOUT ABNORMAL FINDING: ICD-10-CM

## 2020-09-21 DIAGNOSIS — Z12.31 ENCOUNTER FOR SCREENING MAMMOGRAM FOR MALIGNANT NEOPLASM OF BREAST: Primary | ICD-10-CM

## 2020-09-21 PROCEDURE — G0145 SCR C/V CYTO,THINLAYER,RESCR: HCPCS | Performed by: NURSE PRACTITIONER

## 2020-09-21 PROCEDURE — S0612 ANNUAL GYNECOLOGICAL EXAMINA: HCPCS | Performed by: NURSE PRACTITIONER

## 2020-09-21 PROCEDURE — 87624 HPV HI-RISK TYP POOLED RSLT: CPT | Performed by: NURSE PRACTITIONER

## 2020-09-21 NOTE — PROGRESS NOTES
Diagnoses and all orders for this visit:    Encounter for screening mammogram for malignant neoplasm of breast  -     Mammo screening bilateral w 3d & cad; Future    Encounter for gynecological examination without abnormal finding  -     Liquid-based pap, screening    Other orders  -     Cancel: Mammo screening bilateral w cad; Future        Call as needed, encouraged calcium/vit D in her diet, call with any PMB, all questions answered      Pleasant 63 y o  postmenopausal female here for annual exam  She denies postmenopausal bleeding  +history of abnormal pap smears, last Pap NIL 2019 ARIA/ASCUS HPV +, repap/hpv today  Denies vaginal issues  Denies pelvic pain  Denies postmenopausal issues  Not sexually active  Colonoscopy due 2022  End stage kidney disease-sees Nephrology  Past Medical History:   Diagnosis Date    Adjustment disorder with anxiety     Anxiety     Chronic kidney disease     Constipation     Dense breasts     GERD (gastroesophageal reflux disease)     Heart murmur     History of atrial paroxysmal tachycardia     History of cerebral artery occlusion     History of chest pain     Hyperlipidemia     SHAE (iron deficiency anemia)     Menopause     Ovarian cyst     Pancreatitis     Plantar fasciitis     Polyclonal hypergammaglobulinemia     Restless legs syndrome (RLS)     Trigger finger      Past Surgical History:   Procedure Laterality Date    CHOLECYSTECTOMY      MD ESOPHAGOGASTRODUODENOSCOPY TRANSORAL DIAGNOSTIC N/A 10/19/2017    Procedure: EGD AND COLONOSCOPY;  Surgeon: Nunu Winters MD;  Location: [facility];   Service: Gastroenterology    TRIGGER FINGER RELEASE       Family History   Adopted: Yes   Problem Relation Age of Onset    Tuberculosis Mother     Breast cancer Neg Hx     Colon cancer Neg Hx     Ovarian cancer Neg Hx     Uterine cancer Neg Hx     Cervical cancer Neg Hx      Social History     Tobacco Use    Smoking status: Former Smoker     Packs/day: 0 50 Years:  7 00     Pack years: 3 50     Types: Cigarettes     Last attempt to quit: 2018     Years since quittin 2    Smokeless tobacco: Never Used   Substance Use Topics    Alcohol use: Yes     Frequency: Monthly or less     Drinks per session: 1 or 2     Binge frequency: Never     Comment: rarely    Drug use: No       Current Outpatient Medications:     ACCU-CHEK FASTCLIX LANCETS MISC, by Does not apply route daily, Disp: , Rfl:     atorvastatin (LIPITOR) 20 mg tablet, Take 1 tablet (20 mg total) by mouth daily, Disp: 90 tablet, Rfl: 4    B Complex-C (SUPER B COMPLEX PO), Take 1 tablet by mouth daily, Disp: , Rfl:     BIOTIN PO, Take 1 capsule by mouth daily, Disp: , Rfl:     Blood Glucose Monitoring Suppl (ACCU-CHEK JESSEE CONNECT) w/Device KIT, by Does not apply route, Disp: , Rfl:     BYDUREON 2 MG PEN, Inject 2 mg under the skin once a week, Disp: 12 each, Rfl: 3    cholecalciferol (VITAMIN D3) 1,000 units tablet, Take 1,000 Units by mouth 3 (three) times a week , Disp: , Rfl:     Chromium Picolinate 200 MCG CAPS, Take by mouth daily, Disp: , Rfl:     clobetasol (TEMOVATE) 0 05 % cream, as needed , Disp: , Rfl:     gabapentin (NEURONTIN) 300 mg capsule, Take 1 capsule (300 mg total) by mouth 3 (three) times a day, Disp: 270 capsule, Rfl: 3    glucose blood test strip, by In Vitro route 4 (four) times a day, Disp: , Rfl:     Hyaluronic Acid-Vitamin C (HYALURONIC ACID PO), Take 100 mg by mouth daily, Disp: , Rfl:     INVOKANA 300 MG TABS, TAKE 1 TABLET DAILY, Disp: 90 tablet, Rfl: 3    JANUVIA 100 MG tablet, Take 1 tablet (100 mg total) by mouth daily, Disp: 90 tablet, Rfl: 3    metFORMIN (GLUCOPHAGE) 1000 MG tablet, TAKE 1 TABLET TWICE A DAY, Disp: 180 tablet, Rfl: 4    Multiple Vitamin (DAILY VALUE MULTIVITAMIN) TABS, Take by mouth, Disp: , Rfl:     Omega-3 Fatty Acids (FISH OIL) 1,000 mg, Take 1,000 mg by mouth daily, Disp: , Rfl:     omeprazole (PriLOSEC) 20 mg delayed release capsule, TAKE 1 CAPSULE EVERY MORNING BEFORE BREAKFAST, Disp: 90 capsule, Rfl: 3    Saw Palmetto 160 MG CAPS, Take 250 mg by mouth, Disp: , Rfl:     zinc gluconate 50 mg tablet, Take 50 mg by mouth daily, Disp: , Rfl:     CALCIUM PO, Take by mouth daily, Disp: , Rfl:     L-Lysine 500 MG TABS, , Disp: , Rfl:     lisinopril (ZESTRIL) 10 mg tablet, Take 1 tablet (10 mg total) by mouth daily (Patient not taking: Reported on 2020), Disp: 90 tablet, Rfl: 3  Patient Active Problem List    Diagnosis Date Noted    Type 2 diabetes mellitus with stage 2 chronic kidney disease, without long-term current use of insulin [facility] 2020    Dysuria 10/16/2019    Atypical glandular cells of undetermined significance (ARIA) on cervical Pap smear 2019    Encounter for gynecological examination without abnormal finding 2019    Menopausal symptoms 2019    Allergic reaction caused by a drug 2019    Chronic kidney disease, stage 2 (mild) 2019    TMJ arthritis 2019    Obesity, Class I, BMI 30-34 9 10/11/2018    Abnormal weight gain 10/11/2018    Tobacco abuse 2018    Elevated LFTs 2017    Irregular bleeding 10/24/2017    Dense breasts 2017    Ovarian cyst 2017    Adjustment disorder with anxiety 2014    Hyperlipidemia 2014    Hypertension 2014       No Known Allergies    OB History    Para Term  AB Living   6 3 3   3     SAB TAB Ectopic Multiple Live Births   2 1     3      # Outcome Date GA Lbr Edson/2nd Weight Sex Delivery Anes PTL Lv   6 TAB            5 SAB            4 SAB            3 Term            2 Term            1 Term               Obstetric Comments   Menarche: 5 y/o      Has 2 grandsons, youngest son is 18yo  Limited fam hx d/t foster care    Vitals:    20 1546   BP: 106/70   Weight: 83 5 kg (184 lb)   Height: 5' 5" (1 651 m)     Body mass index is 30 62 kg/m²      Review of Systems   Constitutional: Negative for chills, fatigue, fever and unexpected weight change  Respiratory: Negative for shortness of breath  Gastrointestinal: Negative for anal bleeding, blood in stool, constipation and diarrhea  Genitourinary: Negative for difficulty urinating, dysuria and hematuria  Physical Exam   Constitutional: She appears well-developed and well-nourished  No distress  HENT: atraumatic, EOMI  Head: Normocephalic  Neck: Normal range of motion  Neck supple  Pulmonary: Effort normal   Breasts: bilateral without masses, skin changes or nipple discharge  Bilaterally soft and warm to touch  No areas of erythema or pain  Abdominal: Soft  Pelvic exam was performed with patient supine  No labial fusion  There is no rash, tenderness, lesion or injury on the right labia  There is no rash, tenderness, lesion or injury on the left labia  Urethral meatus does not show any tenderness, inflammation or discharge  Palpation of midline bladder without pain or discomfort  Uterus is not deviated, not enlarged, not fixed and not tender  Cervix exhibits no motion tenderness, no discharge and no friability  Right adnexum displays no mass, no tenderness and no fullness  Left adnexum displays no mass, no tenderness and no fullness  No erythema or tenderness in the vagina  No foreign body in the vagina  No signs of injury around the vagina or anus  Perineum without lesions, signs of injury, erythema or swelling  No vaginal discharge found  Lymphadenopathy:        Right: No inguinal adenopathy present  Left: No inguinal adenopathy present

## 2020-09-21 NOTE — PATIENT INSTRUCTIONS
Please review the instructions provided. Drink extra water. Take the full course of antibiotic. You will be notified of urine culture results within 2-3 days and would like know if any treatment changes are needed based on this. Eating yogurt while on antibiotics can help prevent side effects. If you develop symptoms of yeast infection, take the 1 time dose of Diflucan. Patient Education   Urinary Tract Infections in Women    Urinary tract infections (UTIs) are most often caused byÂ bacteria. These bacteria enter the urinary tract. The bacteria may come from outside the body. Or they may travel from the skin outside theÂ rectum or vagina into the urethra. Female anatomy makes it easy for bacteria from the bowelÂ to enter a womanâs urinary tract, which is the most common source of UTI. This means women develop UTIs more often than men. Pain in or around the urinary tract is a common UTI symptom. But the only way to know for sure if you have a UTI for the healthcare provider to test your urine. The two tests that may be done are the urinalysis and urine culture. Types of UTIs  Â· Cystitis. A bladder infection (cystitis) is the most common UTI in women. You may have urgent or frequent urination. You may also haveÂ pain, burning when you urinate, and bloody urine. Â· Urethritis. This is an inflamed urethra, which is the tube that carries urine from the bladder to outside the body. You may have lower stomach or back pain. You may also have urgent or frequent urination. Â· Pyelonephritis. This is a kidney infection. If not treated, it can be serious and damage your kidneys. In severe cases, you may need to stay in the hospital. You may have a fever and lower back pain. Medicines to treat a UTI  Most UTIs are treated with antibiotics. These kill the bacteria. The length of time you need to take them depends on the type of infection. It may be as short as 3 days.  If you have repeated UTIs, you may need a low-dose Weight Management   AMBULATORY CARE:   Why it is important to manage your weight:  Being overweight increases your risk of health conditions such as heart disease, high blood pressure, type 2 diabetes, and certain types of cancer  It can also increase your risk for osteoarthritis, sleep apnea, and other respiratory problems  Aim for a slow, steady weight loss  Even a small amount of weight loss can lower your risk of health problems  How to lose weight safely:  A safe and healthy way to lose weight is to eat fewer calories and get regular exercise  You can lose up about 1 pound a week by decreasing the number of calories you eat by 500 calories each day  You can decrease calories by eating smaller portion sizes or by cutting out high-calorie foods  Read labels to find out how many calories are in the foods you eat  You can also burn calories with exercise such as walking, swimming, or biking  You will be more likely to keep weight off if you make these changes part of your lifestyle  Healthy meal plan for weight management:  A healthy meal plan includes a variety of foods, contains fewer calories, and helps you stay healthy  A healthy meal plan includes the following:  · Eat whole-grain foods more often  A healthy meal plan should contain fiber  Fiber is the part of grains, fruits, and vegetables that is not broken down by your body  Whole-grain foods are healthy and provide extra fiber in your diet  Some examples of whole-grain foods are whole-wheat breads and pastas, oatmeal, brown rice, and bulgur  · Eat a variety of vegetables every day  Include dark, leafy greens such as spinach, kale, mesfin greens, and mustard greens  Eat yellow and orange vegetables such as carrots, sweet potatoes, and winter squash  · Eat a variety of fruits every day  Choose fresh or canned fruit (canned in its own juice or light syrup) instead of juice  Fruit juice has very little or no fiber  · Eat low-fat dairy foods  Drink fat-free (skim) milk or 1% milk  Eat fat-free yogurt and low-fat cottage cheese  Try low-fat cheeses such as mozzarella and other reduced-fat cheeses  · Choose meat and other protein foods that are low in fat  Choose beans or other legumes such as split peas or lentils  Choose fish, skinless poultry (chicken or turkey), or lean cuts of red meat (beef or pork)  Before you cook meat or poultry, cut off any visible fat  · Use less fat and oil  Try baking foods instead of frying them  Add less fat, such as margarine, sour cream, regular salad dressing and mayonnaise to foods  Eat fewer high-fat foods  Some examples of high-fat foods include french fries, doughnuts, ice cream, and cakes  · Eat fewer sweets  Limit foods and drinks that are high in sugar  This includes candy, cookies, regular soda, and sweetened drinks  Ways to decrease calories:   · Eat smaller portions  ¨ Use a small plate with smaller servings  ¨ Do not eat second helpings  ¨ When you eat at a restaurant, ask for a box and place half of your meal in the box before you eat  ¨ Share an entrée with someone else  · Replace high-calorie snacks with healthy, low-calorie snacks  ¨ Choose fresh fruit, vegetables, fat-free rice cakes, or air-popped popcorn instead of potato chips, nuts, or chocolate  ¨ Choose water or calorie-free drinks instead of soda or sweetened drinks  · Eat regular meals  Skipping meals can lead to overeating later in the day  Eat a healthy snack in place of a meal if you do not have time to eat a regular meal      · Do not shop for groceries when you are hungry  You may be more likely to make unhealthy food choices  Take a grocery list of healthy foods and shop after you have eaten  Exercise:  Exercise at least 30 minutes per day on most days of the week  Some examples of exercise include walking, biking, dancing, and swimming   You can also fit in more physical activity by taking the stairs antibioticÂ for several months. Take antibiotics exactly as directed. Donât stop taking them until all of the medicine is gone. If you stop taking the antibiotic too soon, the infection may not go away. You may also develop a resistance to the antibiotic. This can make it much harder to treat. Lifestyle changes to treat and prevent UTIs  The lifestyle changes below will help get rid of your UTI. They may also help prevent future UTIs. Â· Drink plenty of fluids. This includes water, juice, or other caffeine-free drinks. Fluids help flush bacteria out of your body. Â· Empty your bladder. Always empty your bladder when you feel the urge to urinate. And always urinate before going to sleep. Urine that stays in your bladder can lead to infection. Try to urinate before and after sex as well. Â· Practice good personal hygiene. Wipe yourself from front to back after using the toilet. This helps keep bacteria from getting into the urethra. Â· Use condoms during sex. These help prevent UTIs caused by sexually transmitted bacteria. Also don't use spermicides during sex. These can increase the risk for UTIs. Choose other forms of birth control instead. For women who tend to get UTIs after sex, a low-dose of a preventive antibiotic may be used. Be sure to discuss this option with your healthcare provider. Â· Follow up with your healthcare provider as directed. He or she may test to make sure the infection has cleared. If needed, more treatment may be started. Date Last Reviewed: 1/1/2017  Â© 8754-7082 [employer]. [address] [place] [address], [place], [facility] [address] All rights reserved. This information is not intended as a substitute for professional medical care. Always follow your healthcare professional's instructions. instead of the elevator or parking farther away from stores  Ask your healthcare provider about the best exercise plan for you  Other things to consider as you try to lose weight:   · Be aware of situations that may give you the urge to overeat, such as eating while watching television  Find ways to avoid these situations  For example, read a book, go for a walk, or do crafts  · Meet with a weight loss support group or friends who are also trying to lose weight  This may help you stay motivated to continue working on your weight loss goals  © 2017 [facility] Information is for End User's use only and may not be sold, redistributed or otherwise used for commercial purposes  All illustrations and images included in CareNotes® are the copyrighted property of [employer]  or [employer]  The above information is an  only  It is not intended as medical advice for individual conditions or treatments  Talk to your doctor, nurse or pharmacist before following any medical regimen to see if it is safe and effective for you

## 2020-10-02 LAB
LAB AP GYN PRIMARY INTERPRETATION: NORMAL
Lab: NORMAL

## 2020-10-22 ENCOUNTER — HOSPITAL ENCOUNTER (OUTPATIENT)
Dept: MAMMOGRAPHY | Facility: CLINIC | Age: 55
Discharge: HOME | End: 2020-10-22
Payer: COMMERCIAL

## 2020-10-22 ENCOUNTER — CLINICAL SUPPORT (OUTPATIENT)
Dept: INTERNAL MEDICINE CLINIC | Facility: CLINIC | Age: 55
End: 2020-10-22
Payer: COMMERCIAL

## 2020-10-22 VITALS — WEIGHT: 184 LBS | BODY MASS INDEX: 30.66 KG/M2 | HEIGHT: 65 IN

## 2020-10-22 DIAGNOSIS — Z12.31 ENCOUNTER FOR SCREENING MAMMOGRAM FOR MALIGNANT NEOPLASM OF BREAST: ICD-10-CM

## 2020-10-22 DIAGNOSIS — Z23 NEED FOR INFLUENZA VACCINATION: Primary | ICD-10-CM

## 2020-10-22 PROCEDURE — 90471 IMMUNIZATION ADMIN: CPT

## 2020-10-22 PROCEDURE — 90682 RIV4 VACC RECOMBINANT DNA IM: CPT

## 2020-10-22 PROCEDURE — 77063 BREAST TOMOSYNTHESIS BI: CPT

## 2020-10-22 PROCEDURE — 77067 SCR MAMMO BI INCL CAD: CPT

## 2020-11-09 ENCOUNTER — PROCEDURE VISIT (OUTPATIENT)
Dept: OBGYN CLINIC | Facility: CLINIC | Age: 55
End: 2020-11-09
Payer: COMMERCIAL

## 2020-11-09 VITALS
DIASTOLIC BLOOD PRESSURE: 82 MMHG | TEMPERATURE: 97.3 F | SYSTOLIC BLOOD PRESSURE: 126 MMHG | WEIGHT: 185.8 LBS | BODY MASS INDEX: 30.92 KG/M2

## 2020-11-09 DIAGNOSIS — R87.810 CERVICAL HIGH RISK HPV (HUMAN PAPILLOMAVIRUS) TEST POSITIVE: Primary | ICD-10-CM

## 2020-11-09 PROCEDURE — 88305 TISSUE EXAM BY PATHOLOGIST: CPT | Performed by: PATHOLOGY

## 2020-11-09 PROCEDURE — 57454 BX/CURETT OF CERVIX W/SCOPE: CPT | Performed by: NURSE PRACTITIONER

## 2020-11-09 RX ORDER — CLOTRIMAZOLE AND BETAMETHASONE DIPROPIONATE 10; .64 MG/G; MG/G
CREAM TOPICAL
COMMUNITY
Start: 2020-09-22 | End: 2021-08-04 | Stop reason: SDUPTHER

## 2020-11-11 LAB
LEFT EYE DIABETIC RETINOPATHY: NORMAL
RIGHT EYE DIABETIC RETINOPATHY: NORMAL

## 2020-11-13 ENCOUNTER — TELEPHONE (OUTPATIENT)
Dept: INTERNAL MEDICINE CLINIC | Facility: CLINIC | Age: 55
End: 2020-11-13

## 2020-11-13 ENCOUNTER — TELEPHONE (OUTPATIENT)
Dept: OBGYN CLINIC | Facility: CLINIC | Age: 55
End: 2020-11-13

## 2021-01-11 ENCOUNTER — TELEPHONE (OUTPATIENT)
Dept: INTERNAL MEDICINE CLINIC | Facility: CLINIC | Age: 56
End: 2021-01-11

## 2021-01-11 DIAGNOSIS — R12 HEARTBURN: ICD-10-CM

## 2021-01-11 RX ORDER — OMEPRAZOLE 20 MG/1
CAPSULE, DELAYED RELEASE ORAL
Qty: 90 CAPSULE | Refills: 3 | Status: SHIPPED | OUTPATIENT
Start: 2021-01-11 | End: 2022-01-06

## 2021-01-11 NOTE — TELEPHONE ENCOUNTER
Pt dropped off a medical questionnaire for Dr Liz Hodge to fill out  Says this is filled out yearly by him  Needs form to be filled out by Thursday 1/14/21 if possible  Informed him Dr Liz Hodge out of the office  Pt states only he can fill out the forms  Scanned blank form into this encounter if Dr Liz Hodge ok with printing it and filling it out       Call when ready, she will   [phone]

## 2021-01-13 NOTE — TELEPHONE ENCOUNTER
Pt called and wants to know if Dr Li Drake is working on the form she dropped off on 01/11  It was scanned in the patient chart under media called [facility]  She stated this needs to be completed by Friday the latest she stated this is very important      Please advice and call patient back at [phone]

## 2021-01-15 ENCOUNTER — TELEMEDICINE (OUTPATIENT)
Dept: INTERNAL MEDICINE CLINIC | Facility: CLINIC | Age: 56
End: 2021-01-15
Payer: COMMERCIAL

## 2021-01-15 DIAGNOSIS — Z91.89 AT INCREASED RISK OF EXPOSURE TO COVID-19 VIRUS: Primary | ICD-10-CM

## 2021-01-15 PROCEDURE — 99213 OFFICE O/P EST LOW 20 MIN: CPT | Performed by: INTERNAL MEDICINE

## 2021-01-15 NOTE — PATIENT INSTRUCTIONS
A patient at increased risk for adverse outcome from coronavirus infection   Accommodation for work to alleviate this situation is working from home via tele commuting

## 2021-01-15 NOTE — TELEPHONE ENCOUNTER
The form is completed    The patient would like it scanned into her chart so she can uploaded at home and printed

## 2021-01-15 NOTE — PROGRESS NOTES
Virtual Regular Visit      Assessment/Plan:    Problem List Items Addressed This Visit        Other    At increased risk of exposure to COVID-19 virus - Primary               Reason for visit is   Chief Complaint   Patient presents with    Virtual Regular Visit        Encounter provider Donna Shelley MD    Provider located at [address] [place] [address]      Recent Visits  Date Type Provider Dept   01/11/21 Telephone Rajni Scott 7 recent visits within past 7 days and meeting all other requirements     Today's Visits  Date Type Provider Dept   01/15/21 Telemedicine MD Rajni Skinner 7 today's visits and meeting all other requirements     Future Appointments  No visits were found meeting these conditions  Showing future appointments within next 150 days and meeting all other requirements        The patient was identified by name and date of birth  Wild Garcia was informed that this is a telemedicine visit and that the visit is being conducted through [employer] and patient was informed that this is not a secure, HIPAA-compliant platform  She agrees to proceed     My office door was closed  No one else was in the room  She acknowledged consent and understanding of privacy and security of the video platform  The patient has agreed to participate and understands they can discontinue the visit at any time  Patient is aware this is a billable service  Subjective  Wild Garcia is a 63 y o  female    Virtual visit to review the patient's a combination for working at home  A 58-year-old female  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  She has been working from home since the onset of the coronavirus pandemic    Indication for this is the fact that she has comorbidities which placed her at higher risk for adverse outcome from coronavirus infection  These include: Age greater than 54, diabetes, obesity  This situation has persisted  The patient is a combination is that she should continue to work from home until such time as the pandemic dissipates or until she receives both doses of coronavirus vaccine  She feels well       Past Medical History:   Diagnosis Date    Adjustment disorder with anxiety     Anxiety     Chronic kidney disease     Constipation     Dense breasts     GERD (gastroesophageal reflux disease)     Heart murmur     History of atrial paroxysmal tachycardia     History of cerebral artery occlusion     History of chest pain     Hyperlipidemia     SHAE (iron deficiency anemia)     Menopause     Ovarian cyst     Pancreatitis     Plantar fasciitis     Polyclonal hypergammaglobulinemia     Restless legs syndrome (RLS)     Trigger finger        Past Surgical History:   Procedure Laterality Date    CHOLECYSTECTOMY      [place] ESOPHAGOGASTRODUODENOSCOPY TRANSORAL DIAGNOSTIC N/A 10/19/2017    Procedure: EGD AND COLONOSCOPY;  Surgeon: Beba Ray MD;  Location: [facility];   Service: Gastroenterology    TRIGGER FINGER RELEASE         Current Outpatient Medications   Medication Sig Dispense Refill    ACCU-CHEK FASTCLIX LANCETS MISC by Does not apply route daily      atorvastatin (LIPITOR) 20 mg tablet Take 1 tablet (20 mg total) by mouth daily 90 tablet 4    B Complex-C (SUPER B COMPLEX PO) Take 1 tablet by mouth daily      BIOTIN PO Take 1 capsule by mouth daily      Blood Glucose Monitoring Suppl (ACCU-CHEK JESSEE CONNECT) w/Device KIT by Does not apply route      BYDUREON 2 MG PEN Inject 2 mg under the skin once a week 12 each 3    cholecalciferol (VITAMIN D3) 1,000 units tablet Take 1,000 Units by mouth 3 (three) times a week       Chromium Picolinate 200 MCG CAPS Take by mouth daily      clobetasol (TEMOVATE) 0 05 % cream as needed       clotrimazole-betamethasone (LOTRISONE) 1-0 05 % cream       gabapentin (NEURONTIN) 300 mg capsule Take 1 capsule (300 mg total) by mouth 3 (three) times a day 270 capsule 3    glucose blood test strip by In Vitro route 4 (four) times a day      Hyaluronic Acid-Vitamin C (HYALURONIC ACID PO) Take 100 mg by mouth daily      INVOKANA 300 MG TABS TAKE 1 TABLET DAILY 90 tablet 3    JANUVIA 100 MG tablet Take 1 tablet (100 mg total) by mouth daily 90 tablet 3    metFORMIN (GLUCOPHAGE) 1000 MG tablet TAKE 1 TABLET TWICE A  tablet 4    Multiple Vitamin (DAILY VALUE MULTIVITAMIN) TABS Take by mouth      Omega-3 Fatty Acids (FISH OIL) 1,000 mg Take 1,000 mg by mouth daily      omeprazole (PriLOSEC) 20 mg delayed release capsule TAKE 1 CAPSULE EVERY MORNING BEFORE BREAKFAST 90 capsule 3    Saw Palmetto 160 MG CAPS Take 250 mg by mouth      zinc gluconate 50 mg tablet Take 50 mg by mouth daily       No current facility-administered medications for this visit  No Known Allergies    Review of Systems   Psychiatric/Behavioral: The patient is nervous/anxious  All other systems reviewed and are negative  Video Exam    There were no vitals filed for this visit  Physical Exam  Constitutional:       Appearance: Normal appearance  Pulmonary:      Effort: Pulmonary effort is normal    Neurological:      General: No focal deficit present  Mental Status: She is alert and oriented to person, place, and time  Psychiatric:         Mood and Affect: Mood normal          Behavior: Behavior normal          Judgment: Judgment normal           I spent 15 minutes directly with the patient during this visit      Vitor Fuller acknowledges that she has consented to an online visit or consultation   She understands that the online visit is based solely on information provided by her, and that, in the absence of a face-to-face physical evaluation by the physician, the diagnosis she receives is both limited and provisional in terms of accuracy and completeness  This is not intended to replace a full medical face-to-face evaluation by the physician  Alok Cates understands and accepts these terms

## 2021-02-22 DIAGNOSIS — E11.9 TYPE 2 DIABETES MELLITUS WITHOUT COMPLICATION, WITHOUT LONG-TERM CURRENT USE OF INSULIN (HCC): ICD-10-CM

## 2021-02-26 PROCEDURE — 3066F NEPHROPATHY DOC TX: CPT | Performed by: INTERNAL MEDICINE

## 2021-03-03 LAB
LEFT EYE DIABETIC RETINOPATHY: NORMAL
RIGHT EYE DIABETIC RETINOPATHY: NORMAL

## 2021-03-03 PROCEDURE — 2022F DILAT RTA XM EVC RTNOPTHY: CPT | Performed by: INTERNAL MEDICINE

## 2021-03-09 ENCOUNTER — TELEPHONE (OUTPATIENT)
Dept: NEPHROLOGY | Facility: CLINIC | Age: 56
End: 2021-03-09

## 2021-03-09 NOTE — TELEPHONE ENCOUNTER
LM to do labs prior to 03/17/21 appt  Pt called back and changed her appt to virtual visit via [employer] and will have labs done

## 2021-03-11 ENCOUNTER — TELEPHONE (OUTPATIENT)
Dept: INTERNAL MEDICINE CLINIC | Facility: CLINIC | Age: 56
End: 2021-03-11

## 2021-03-11 NOTE — TELEPHONE ENCOUNTER
FORWARDING MSG  TO DR Kamlesh Guzman
cb # [phone]    Wants to discuss her work return date w/ you--would like you to call her    Did not want an appt
Angina pectoris  Anginal pain  Atherosclerosis of coronary artery  CAD (coronary artery disease)  Essential hypertension  HTN (hypertension)  Nondependent alcohol abuse  Alcohol abuse  Pancreatitis    Polysubstance abuse

## 2021-03-15 ENCOUNTER — APPOINTMENT (OUTPATIENT)
Dept: LAB | Facility: HOSPITAL | Age: 56
End: 2021-03-15
Attending: INTERNAL MEDICINE
Payer: COMMERCIAL

## 2021-03-15 LAB
25(OH)D3 SERPL-MCNC: 55.6 NG/ML (ref 30–100)
ALBUMIN SERPL BCP-MCNC: 3.7 G/DL (ref 3.5–5)
ALP SERPL-CCNC: 95 U/L (ref 46–116)
ALT SERPL W P-5'-P-CCNC: 63 U/L (ref 12–78)
ANION GAP SERPL CALCULATED.3IONS-SCNC: 9 MMOL/L (ref 4–13)
AST SERPL W P-5'-P-CCNC: 35 U/L (ref 5–45)
BACTERIA UR QL AUTO: ABNORMAL /HPF
BASOPHILS # BLD AUTO: 0.05 THOUSANDS/ΜL (ref 0–0.1)
BASOPHILS NFR BLD AUTO: 1 % (ref 0–1)
BILIRUB SERPL-MCNC: 0.38 MG/DL (ref 0.2–1)
BILIRUB UR QL STRIP: NEGATIVE
BUN SERPL-MCNC: 18 MG/DL (ref 5–25)
CALCIUM SERPL-MCNC: 10.3 MG/DL (ref 8.3–10.1)
CHLORIDE SERPL-SCNC: 100 MMOL/L (ref 100–108)
CLARITY UR: CLEAR
CO2 SERPL-SCNC: 30 MMOL/L (ref 21–32)
COLOR UR: YELLOW
CREAT SERPL-MCNC: 1.22 MG/DL (ref 0.6–1.3)
CREAT UR-MCNC: 60.6 MG/DL
EOSINOPHIL # BLD AUTO: 0.21 THOUSAND/ΜL (ref 0–0.61)
EOSINOPHIL NFR BLD AUTO: 2 % (ref 0–6)
ERYTHROCYTE [DISTWIDTH] IN BLOOD BY AUTOMATED COUNT: 13.1 % (ref 11.6–15.1)
GFR SERPL CREATININE-BSD FRML MDRD: 49 ML/MIN/1.73SQ M
GLUCOSE P FAST SERPL-MCNC: 202 MG/DL (ref 65–99)
GLUCOSE UR STRIP-MCNC: ABNORMAL MG/DL
HCT VFR BLD AUTO: 47.3 % (ref 34.8–46.1)
HGB BLD-MCNC: 14.7 G/DL (ref 11.5–15.4)
HGB UR QL STRIP.AUTO: NEGATIVE
IMM GRANULOCYTES # BLD AUTO: 0.02 THOUSAND/UL (ref 0–0.2)
IMM GRANULOCYTES NFR BLD AUTO: 0 % (ref 0–2)
KETONES UR STRIP-MCNC: NEGATIVE MG/DL
LEUKOCYTE ESTERASE UR QL STRIP: ABNORMAL
LYMPHOCYTES # BLD AUTO: 2.3 THOUSANDS/ΜL (ref 0.6–4.47)
LYMPHOCYTES NFR BLD AUTO: 26 % (ref 14–44)
MCH RBC QN AUTO: 29.3 PG (ref 26.8–34.3)
MCHC RBC AUTO-ENTMCNC: 31.1 G/DL (ref 31.4–37.4)
MCV RBC AUTO: 94 FL (ref 82–98)
MONOCYTES # BLD AUTO: 0.71 THOUSAND/ΜL (ref 0.17–1.22)
MONOCYTES NFR BLD AUTO: 8 % (ref 4–12)
MUCOUS THREADS UR QL AUTO: ABNORMAL
NEUTROPHILS # BLD AUTO: 5.69 THOUSANDS/ΜL (ref 1.85–7.62)
NEUTS SEG NFR BLD AUTO: 63 % (ref 43–75)
NITRITE UR QL STRIP: NEGATIVE
NON-SQ EPI CELLS URNS QL MICRO: ABNORMAL /HPF
NRBC BLD AUTO-RTO: 0 /100 WBCS
PH UR STRIP.AUTO: 5 [PH]
PHOSPHATE SERPL-MCNC: 4.1 MG/DL (ref 2.7–4.5)
PLATELET # BLD AUTO: 257 THOUSANDS/UL (ref 149–390)
PMV BLD AUTO: 9.8 FL (ref 8.9–12.7)
POTASSIUM SERPL-SCNC: 5 MMOL/L (ref 3.5–5.3)
PROT SERPL-MCNC: 8.6 G/DL (ref 6.4–8.2)
PROT UR STRIP-MCNC: NEGATIVE MG/DL
PROT UR-MCNC: 14 MG/DL
PROT/CREAT UR: 0.23 MG/G{CREAT} (ref 0–0.1)
PTH-INTACT SERPL-MCNC: 36.9 PG/ML (ref 18.4–80.1)
RBC # BLD AUTO: 5.01 MILLION/UL (ref 3.81–5.12)
RBC #/AREA URNS AUTO: ABNORMAL /HPF
SODIUM SERPL-SCNC: 139 MMOL/L (ref 136–145)
SP GR UR STRIP.AUTO: 1.02 (ref 1–1.03)
UROBILINOGEN UR QL STRIP.AUTO: 0.2 E.U./DL
WBC # BLD AUTO: 8.98 THOUSAND/UL (ref 4.31–10.16)
WBC #/AREA URNS AUTO: ABNORMAL /HPF

## 2021-03-15 PROCEDURE — 84100 ASSAY OF PHOSPHORUS: CPT | Performed by: INTERNAL MEDICINE

## 2021-03-15 PROCEDURE — 83970 ASSAY OF PARATHORMONE: CPT | Performed by: INTERNAL MEDICINE

## 2021-03-15 PROCEDURE — 36415 COLL VENOUS BLD VENIPUNCTURE: CPT | Performed by: INTERNAL MEDICINE

## 2021-03-15 PROCEDURE — 3061F NEG MICROALBUMINURIA REV: CPT | Performed by: INTERNAL MEDICINE

## 2021-03-15 PROCEDURE — 82306 VITAMIN D 25 HYDROXY: CPT | Performed by: INTERNAL MEDICINE

## 2021-03-15 PROCEDURE — 85025 COMPLETE CBC W/AUTO DIFF WBC: CPT | Performed by: INTERNAL MEDICINE

## 2021-03-15 PROCEDURE — 84156 ASSAY OF PROTEIN URINE: CPT | Performed by: INTERNAL MEDICINE

## 2021-03-15 PROCEDURE — 80053 COMPREHEN METABOLIC PANEL: CPT | Performed by: INTERNAL MEDICINE

## 2021-03-15 PROCEDURE — 82570 ASSAY OF URINE CREATININE: CPT | Performed by: INTERNAL MEDICINE

## 2021-03-15 PROCEDURE — 81001 URINALYSIS AUTO W/SCOPE: CPT | Performed by: INTERNAL MEDICINE

## 2021-03-16 ENCOUNTER — TELEPHONE (OUTPATIENT)
Dept: NEPHROLOGY | Facility: CLINIC | Age: 56
End: 2021-03-16

## 2021-03-16 NOTE — TELEPHONE ENCOUNTER
Appointment was confirmed and went over registration details with patient for her virtual appointment   Val Mensah,

## 2021-03-17 ENCOUNTER — TELEPHONE (OUTPATIENT)
Dept: NEPHROLOGY | Facility: CLINIC | Age: 56
End: 2021-03-17

## 2021-03-17 ENCOUNTER — TELEMEDICINE (OUTPATIENT)
Dept: NEPHROLOGY | Facility: CLINIC | Age: 56
End: 2021-03-17
Payer: COMMERCIAL

## 2021-03-17 VITALS
HEIGHT: 65 IN | RESPIRATION RATE: 16 BRPM | BODY MASS INDEX: 29.49 KG/M2 | WEIGHT: 177 LBS | SYSTOLIC BLOOD PRESSURE: 140 MMHG | DIASTOLIC BLOOD PRESSURE: 80 MMHG

## 2021-03-17 DIAGNOSIS — N18.31 STAGE 3A CHRONIC KIDNEY DISEASE (HCC): ICD-10-CM

## 2021-03-17 DIAGNOSIS — I10 ESSENTIAL HYPERTENSION: Primary | ICD-10-CM

## 2021-03-17 PROCEDURE — 4010F ACE/ARB THERAPY RXD/TAKEN: CPT | Performed by: INTERNAL MEDICINE

## 2021-03-17 PROCEDURE — 1036F TOBACCO NON-USER: CPT | Performed by: INTERNAL MEDICINE

## 2021-03-17 PROCEDURE — 99215 OFFICE O/P EST HI 40 MIN: CPT | Performed by: INTERNAL MEDICINE

## 2021-03-17 PROCEDURE — 3008F BODY MASS INDEX DOCD: CPT | Performed by: INTERNAL MEDICINE

## 2021-03-17 RX ORDER — LISINOPRIL 5 MG/1
5 TABLET ORAL DAILY
Qty: 90 TABLET | Refills: 5 | Status: SHIPPED | OUTPATIENT
Start: 2021-03-17 | End: 2021-11-10 | Stop reason: SDUPTHER

## 2021-03-17 NOTE — PROGRESS NOTES
Virtual Regular Visit      Assessment/Plan:    1) CKD stage IIIa: etiology of CKD likely hypertensive nephrosclerosis, diabetic nephropathy  Per labs performed serum creatinine is 1 22 with eGFR of 49 which is within acceptable range  2) 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: BP within acceptable range  On Lisinopril and recommended patient to take 5 mg daily  3) Insulin dependent diabetes mellitus: Blood sugars not optimal noted on labwork  Strict glycemic control  Necessary to prevent progression of CKD  4) Secondary hyperparathyroidism of renal origin: calcium is mildly elevated at 10 3 and elevated  Recommended taking vitamin D tablet twice weekly instead  5) Anemia due to CkD: Hemoglobin is within acceptable range  6) Proteinuria: noted to have subnephrotic range proteinuria  Continue with lisinopril on a daily basis  Problem List Items Addressed This Visit        Cardiovascular and Mediastinum    Hypertension - Primary    Relevant Medications    lisinopril (ZESTRIL) 5 mg tablet               Reason for visit is   Chief Complaint   Patient presents with    Follow-up    Chronic Kidney Disease    Virtual Regular Visit        Encounter provider Laly Hernández MD    Provider located at [phone] [address]  [phone] [facility] [address] [phone]      Recent Visits  Date Type Provider Dept   03/16/21 Telephone Laly Hernández MD [place] [address] recent visits within past 7 days and meeting all other requirements     Today's Visits  Date Type Provider Dept   03/17/21 Telephone Prosper Garcia [facility] [place] [facility]   03/17/21 Telephone Fariba stark [facility] [address] [place] [address]   03/17/21 Telemedicine Laly Hernández MD [facility] [address] today's visits and meeting all other requirements     Future Appointments  No visits were found meeting these conditions     Showing future appointments within next 150 days and meeting all other requirements        The patient was identified by name and date of birth  Shawn Crane was informed that this is a telemedicine visit and that the visit is being conducted through [facility] and patient was informed that this is a secure, HIPAA-compliant platform  She agrees to proceed     My office door was closed  No one else was in the room  She acknowledged consent and understanding of privacy and security of the video platform  The patient has agreed to participate and understands they can discontinue the visit at any time  Patient is aware this is a billable service  Subjective  Shawn Crane is a 61 y o  female with PMH of CKD IIIb, HTN, insulin dependent diabetes mellitus who underwent telemedicine visit  Offers no complaints  Rod WYATT     Past Medical History:   Diagnosis Date    Adjustment disorder with anxiety     Anxiety     Chronic kidney disease     Constipation     Dense breasts     GERD (gastroesophageal reflux disease)     Heart murmur     History of atrial paroxysmal tachycardia     History of cerebral artery occlusion     History of chest pain     Hyperlipidemia     SHAE (iron deficiency anemia)     Menopause     Ovarian cyst     Pancreatitis     Plantar fasciitis     Polyclonal hypergammaglobulinemia     Restless legs syndrome (RLS)     Trigger finger        Past Surgical History:   Procedure Laterality Date    CHOLECYSTECTOMY      [place] ESOPHAGOGASTRODUODENOSCOPY TRANSORAL DIAGNOSTIC N/A 10/19/2017    Procedure: EGD AND COLONOSCOPY;  Surgeon: Анан Chavis MD;  Location: [facility];   Service: Gastroenterology    TRIGGER FINGER RELEASE         Current Outpatient Medications   Medication Sig Dispense Refill    ACCU-CHEK FASTCLIX LANCETS MISC by Does not apply route daily      atorvastatin (LIPITOR) 20 mg tablet Take 1 tablet (20 mg total) by mouth daily 90 tablet 4    B Complex-C (SUPER B COMPLEX PO) Take 1 tablet by mouth daily      BIOTIN PO Take 1 capsule by mouth daily      Blood Glucose Monitoring Suppl (ACCU-CHEK JESSEE CONNECT) w/Device KIT by Does not apply route      BYDUREON 2 MG PEN Inject 2 mg under the skin once a week 12 each 3    cholecalciferol (VITAMIN D3) 1,000 units tablet Take 1,000 Units by mouth 3 (three) times a week       Chromium Picolinate 200 MCG CAPS Take by mouth daily      clobetasol (TEMOVATE) 0 05 % cream as needed       clotrimazole-betamethasone (LOTRISONE) 1-0 05 % cream       gabapentin (NEURONTIN) 300 mg capsule Take 1 capsule (300 mg total) by mouth 3 (three) times a day (Patient taking differently: Take 300 mg by mouth 3 (three) times a day Once daily) 270 capsule 3    glucose blood test strip by In Vitro route 4 (four) times a day      Hyaluronic Acid-Vitamin C (HYALURONIC ACID PO) Take 100 mg by mouth daily      INVOKANA 300 MG TABS TAKE 1 TABLET DAILY 90 tablet 3    JANUVIA 100 MG tablet Take 1 tablet (100 mg total) by mouth daily 90 tablet 3    metFORMIN (GLUCOPHAGE) 1000 MG tablet TAKE 1 TABLET TWICE A  tablet 3    Multiple Vitamin (DAILY VALUE MULTIVITAMIN) TABS Take by mouth      Omega-3 Fatty Acids (FISH OIL) 1,000 mg Take 1,000 mg by mouth daily      omeprazole (PriLOSEC) 20 mg delayed release capsule TAKE 1 CAPSULE EVERY MORNING BEFORE BREAKFAST 90 capsule 3    Saw Palmetto 160 MG CAPS Take 250 mg by mouth      zinc gluconate 50 mg tablet Take 50 mg by mouth daily      lisinopril (ZESTRIL) 5 mg tablet Take 1 tablet (5 mg total) by mouth daily 90 tablet 5     No current facility-administered medications for this visit  No Known Allergies    Review of Systems   Constitutional: Negative  HENT: Negative  Eyes: Negative  Respiratory: Negative  Cardiovascular: Negative  Gastrointestinal: Negative  Endocrine: Negative  Genitourinary: Negative  Musculoskeletal: Negative  Skin: Negative  Allergic/Immunologic: Negative  Neurological: Negative  Hematological: Negative  All other systems reviewed and are negative  Video Exam    Vitals:    03/17/21 1458   BP: 140/80   BP Location: Left arm   Patient Position: Sitting   Cuff Size: Standard   Resp: 16   Weight: 80 3 kg (177 lb)   Height: 5' 5" (1 651 m)       Physical Exam     In no acute distress  Symmetric lung expansion  Pulses palpable  No abdominal distention  No pedal edema    I spent 32 minutes with patient today in which greater than 50% of the time was spent in counseling/coordination of care regarding CKD care      VIRTUAL VISIT 58 Levi Wang acknowledges that she has consented to an online visit or consultation  She understands that the online visit is based solely on information provided by her, and that, in the absence of a face-to-face physical evaluation by the physician, the diagnosis she receives is both limited and provisional in terms of accuracy and completeness  This is not intended to replace a full medical face-to-face evaluation by the physician  Pearl Garcia understands and accepts these terms

## 2021-03-17 NOTE — LETTER
March 17, 2021     Berta Mercado MD  [address] [place] [id number]    Patient: Ewelina Barekr   YOB: 1965   Date of Visit: 3/17/2021       Dear Dr Zulay Kaufman: Thank you for referring Ewelina Barker to me for evaluation  Below are my notes for this consultation  If you have questions, please do not hesitate to call me  I look forward to following your patient along with you  Sincerely,        Kourtney Pritchett MD        CC: No Recipients  Kourtney Pritchett MD  3/17/2021  3:41 PM  Sign when Signing Visit    Virtual Regular Visit      Assessment/Plan:    1) CKD stage IIIa: etiology of CKD likely hypertensive nephrosclerosis, diabetic nephropathy  Per labs performed serum creatinine is 1 22 with eGFR of 49 which is within acceptable range  2) 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: BP within acceptable range  On Lisinopril and recommended patient to take 5 mg daily  3) Insulin dependent diabetes mellitus: Blood sugars not optimal noted on labwork  Strict glycemic control  Necessary to prevent progression of CKD  4) Secondary hyperparathyroidism of renal origin: calcium is mildly elevated at 10 3 and elevated  Recommended taking vitamin D tablet twice weekly instead  5) Anemia due to CkD: Hemoglobin is within acceptable range  6) Proteinuria: noted to have subnephrotic range proteinuria  Continue with lisinopril on a daily basis       Problem List Items Addressed This Visit        Cardiovascular and Mediastinum    Hypertension - Primary    Relevant Medications    lisinopril (ZESTRIL) 5 mg tablet               Reason for visit is   Chief Complaint   Patient presents with    Follow-up    Chronic Kidney Disease    Virtual Regular Visit        Encounter provider Kourtney Pritchett MD    Provider located at [address] [address]  [address] [address] [phone]      Recent Visits  Date Type Provider Dept   03/16/21 Telephone Sandi Paredes MD [facility] [address] recent visits within past 7 days and meeting all other requirements     Today's Visits  Date Type Provider Dept   03/17/21 Telephone Geremias Garcia [facility] [place]   03/17/21 Telephone Fariba stark [facility] [address] [facility]   03/17/21 Telemedicine Sandi Paredes MD [facility] [address] today's visits and meeting all other requirements     Future Appointments  No visits were found meeting these conditions  Showing future appointments within next 150 days and meeting all other requirements        The patient was identified by name and date of birth  Ryan Mora was informed that this is a telemedicine visit and that the visit is being conducted through [facility] and patient was informed that this is a secure, HIPAA-compliant platform  She agrees to proceed     My office door was closed  No one else was in the room  She acknowledged consent and understanding of privacy and security of the video platform  The patient has agreed to participate and understands they can discontinue the visit at any time  Patient is aware this is a billable service  Subjective  Ryan Mora is a 61 y o  female with PMH of CKD IIIb, HTN, insulin dependent diabetes mellitus who underwent telemedicine visit  Offers no complaints  Dalton WYATT     Past Medical History:   Diagnosis Date    Adjustment disorder with anxiety     Anxiety     Chronic kidney disease     Constipation     Dense breasts     GERD (gastroesophageal reflux disease)     Heart murmur     History of atrial paroxysmal tachycardia     History of cerebral artery occlusion     History of chest pain     Hyperlipidemia     SHAE (iron deficiency anemia)     Menopause     Ovarian cyst     Pancreatitis     Plantar fasciitis     Polyclonal hypergammaglobulinemia     Restless legs syndrome (RLS)     Trigger finger        Past Surgical History:   Procedure Laterality Date    CHOLECYSTECTOMY      [place] ESOPHAGOGASTRODUODENOSCOPY TRANSORAL DIAGNOSTIC N/A 10/19/2017    Procedure: EGD AND COLONOSCOPY;  Surgeon: Анна Chavis MD;  Location: [facility];   Service: Gastroenterology    TRIGGER FINGER RELEASE         Current Outpatient Medications   Medication Sig Dispense Refill    ACCU-CHEK FASTCLIX LANCETS [address] [facility] by Does not apply route daily      atorvastatin (LIPITOR) 20 mg tablet Take 1 tablet (20 mg total) by mouth daily 90 tablet 4    B Complex-C (SUPER B COMPLEX PO) Take 1 tablet by mouth daily      BIOTIN PO Take 1 capsule by mouth daily      Blood Glucose Monitoring Suppl (ACCU-CHEK JESSEE CONNECT) w/Device KIT by Does not apply route      BYDUREON 2 MG PEN Inject 2 mg under the skin once a week 12 each 3    cholecalciferol (VITAMIN D3) 1,000 units tablet Take 1,000 Units by mouth 3 (three) times a week       Chromium Picolinate 200 MCG CAPS Take by mouth daily      clobetasol (TEMOVATE) 0 05 % cream as needed       clotrimazole-betamethasone (LOTRISONE) 1-0 05 % cream       gabapentin (NEURONTIN) 300 mg capsule Take 1 capsule (300 mg total) by mouth 3 (three) times a day (Patient taking differently: Take 300 mg by mouth 3 (three) times a day Once daily) 270 capsule 3    glucose blood test strip by In Vitro route 4 (four) times a day      Hyaluronic Acid-Vitamin C (HYALURONIC ACID PO) Take 100 mg by mouth daily      INVOKANA 300 MG TABS TAKE 1 TABLET DAILY 90 tablet 3    JANUVIA 100 MG tablet Take 1 tablet (100 mg total) by mouth daily 90 tablet 3    metFORMIN (GLUCOPHAGE) 1000 MG tablet TAKE 1 TABLET TWICE A  tablet 3    Multiple Vitamin (DAILY VALUE MULTIVITAMIN) TABS Take by mouth      Omega-3 Fatty Acids (FISH OIL) 1,000 mg Take 1,000 mg by mouth daily      omeprazole (PriLOSEC) 20 mg delayed release capsule TAKE 1 CAPSULE EVERY MORNING BEFORE BREAKFAST 90 capsule 3    Saw Palmetto 160 MG CAPS Take 250 mg by mouth      zinc gluconate 50 mg tablet Take 50 mg by mouth daily      lisinopril (ZESTRIL) 5 mg tablet Take 1 tablet (5 mg total) by mouth daily 90 tablet 5     No current facility-administered medications for this visit  No Known Allergies    Review of Systems   Constitutional: Negative  HENT: Negative  Eyes: Negative  Respiratory: Negative  Cardiovascular: Negative  Gastrointestinal: Negative  Endocrine: Negative  Genitourinary: Negative  Musculoskeletal: Negative  Skin: Negative  Allergic/Immunologic: Negative  Neurological: Negative  Hematological: Negative  All other systems reviewed and are negative  Video Exam    Vitals:    03/17/21 1458   BP: 140/80   BP Location: Left arm   Patient Position: Sitting   Cuff Size: Standard   Resp: 16   Weight: 80 3 kg (177 lb)   Height: 5' 5" (1 651 m)       Physical Exam     In no acute distress  Symmetric lung expansion  Pulses palpable  No abdominal distention  No pedal edema    I spent 32 minutes with patient today in which greater than 50% of the time was spent in counseling/coordination of care regarding CKD care      VIRTUAL VISIT 58 Levi Wang acknowledges that she has consented to an online visit or consultation  She understands that the online visit is based solely on information provided by her, and that, in the absence of a face-to-face physical evaluation by the physician, the diagnosis she receives is both limited and provisional in terms of accuracy and completeness  This is not intended to replace a full medical face-to-face evaluation by the physician  Shen Roth understands and accepts these terms

## 2021-03-17 NOTE — LETTER
March 17, 2021     Martha Hooper MD  [address] [facility] [address]    Patient: Ade Menon   YOB: 1965   Date of Visit: 3/17/2021       Dear Dr Carolee Abernathy: Thank you for referring Ade Menon to me for evaluation  Below are my notes for this consultation  If you have questions, please do not hesitate to call me  I look forward to following your patient along with you  Sincerely,        Sol Herman MD        CC: No Recipients  Sol Herman MD  3/17/2021  3:39 PM  Incomplete    Virtual Regular Visit      Assessment/Plan:    1) CKD stage IIIa: etiology of CKD likely hypertensive nephrosclerosis, diabetic nephropathy  Per labs performed serum creatinine is 1 22 with eGFR of 49 which is within acceptable range  2) 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: BP within acceptable range  On Lisinopril and recommended patient to take 5 mg daily  3) Insulin dependent diabetes mellitus: Blood sugars not optimal noted on labwork  Strict glycemic control  Necessary to prevent progression of CKD  4) Secondary hyperparathyroidism of renal origin: calcium is mildly elevated at 10 3 and elevated  Recommended taking vitamin D tablet twice weekly instead  5) Anemia due to CkD: Hemoglobin is within acceptable range  6) Proteinuria: noted to have subnephrotic range proteinuria  Continue with lisinopril on a daily basis       Problem List Items Addressed This Visit        Cardiovascular and Mediastinum    Hypertension - Primary    Relevant Medications    lisinopril (ZESTRIL) 5 mg tablet               Reason for visit is   Chief Complaint   Patient presents with    Follow-up    Chronic Kidney Disease    Virtual Regular Visit        Encounter provider Sol Herman MD    Provider located at [address] [facility] [address] [place] [address]  [address] [id number] [phone]      Recent Visits  Date Type Provider Dept   03/16/21 Telephone Tessa Khalida Diallo, [address] [facility] recent visits within past 7 days and meeting all other requirements     Today's Visits  Date Type Provider Dept   03/17/21 Telephone Tera Garcia [facility]   03/17/21 Telephone Fariba stark [facility]   03/17/21 Telemedicine Kaye Hairston MD [facility] [address] today's visits and meeting all other requirements     Future Appointments  No visits were found meeting these conditions  Showing future appointments within next 150 days and meeting all other requirements        The patient was identified by name and date of birth  Shen Roth was informed that this is a telemedicine visit and that the visit is being conducted through [facility] and patient was informed that this is a secure, HIPAA-compliant platform  She agrees to proceed     My office door was closed  No one else was in the room  She acknowledged consent and understanding of privacy and security of the video platform  The patient has agreed to participate and understands they can discontinue the visit at any time  Patient is aware this is a billable service  Subjective  Shen Roth is a 61 y o  female with PMH of CKD IIIb, HTN, insulin dependent diabetes mellitus who underwent telemedicine visit  Offers no complaints  Marleta Press       HPI     Past Medical History:   Diagnosis Date    Adjustment disorder with anxiety     Anxiety     Chronic kidney disease     Constipation     Dense breasts     GERD (gastroesophageal reflux disease)     Heart murmur     History of atrial paroxysmal tachycardia     History of cerebral artery occlusion     History of chest pain     Hyperlipidemia     SHAE (iron deficiency anemia)     Menopause     Ovarian cyst     Pancreatitis     Plantar fasciitis     Polyclonal hypergammaglobulinemia     Restless legs syndrome (RLS)     Trigger finger        Past Surgical History:   Procedure Laterality Date    CHOLECYSTECTOMY      [place] ESOPHAGOGASTRODUODENOSCOPY TRANSORAL DIAGNOSTIC N/A 10/19/2017    Procedure: EGD AND COLONOSCOPY;  Surgeon: Jazzmine Monroy MD;  Location[facility];   Service: Gastroenterology    TRIGGER FINGER RELEASE         Current Outpatient Medications   Medication Sig Dispense Refill    ACCU-CHEK FASTCLIX LANCETS [address] [facility] by Does not apply route daily      atorvastatin (LIPITOR) 20 mg tablet Take 1 tablet (20 mg total) by mouth daily 90 tablet 4    B Complex-C (SUPER B COMPLEX PO) Take 1 tablet by mouth daily      BIOTIN PO Take 1 capsule by mouth daily      Blood Glucose Monitoring Suppl (ACCU-CHEK JESSEE CONNECT) w/Device KIT by Does not apply route      BYDUREON 2 MG PEN Inject 2 mg under the skin once a week 12 each 3    cholecalciferol (VITAMIN D3) 1,000 units tablet Take 1,000 Units by mouth 3 (three) times a week       Chromium Picolinate 200 MCG CAPS Take by mouth daily      clobetasol (TEMOVATE) 0 05 % cream as needed       clotrimazole-betamethasone (LOTRISONE) 1-0 05 % cream       gabapentin (NEURONTIN) 300 mg capsule Take 1 capsule (300 mg total) by mouth 3 (three) times a day (Patient taking differently: Take 300 mg by mouth 3 (three) times a day Once daily) 270 capsule 3    glucose blood test strip by In Vitro route 4 (four) times a day      Hyaluronic Acid-Vitamin C (HYALURONIC ACID PO) Take 100 mg by mouth daily      INVOKANA 300 MG TABS TAKE 1 TABLET DAILY 90 tablet 3    JANUVIA 100 MG tablet Take 1 tablet (100 mg total) by mouth daily 90 tablet 3    metFORMIN (GLUCOPHAGE) 1000 MG tablet TAKE 1 TABLET TWICE A  tablet 3    Multiple Vitamin (DAILY VALUE MULTIVITAMIN) TABS Take by mouth      Omega-3 Fatty Acids (FISH OIL) 1,000 mg Take 1,000 mg by mouth daily      omeprazole (PriLOSEC) 20 mg delayed release capsule TAKE 1 CAPSULE EVERY MORNING BEFORE BREAKFAST 90 capsule 3    Saw Palmetto 160 MG CAPS Take 250 mg by mouth      zinc gluconate 50 mg tablet Take 50 mg by mouth daily      lisinopril (ZESTRIL) 5 mg tablet Take 1 tablet (5 mg total) by mouth daily 90 tablet 5     No current facility-administered medications for this visit  No Known Allergies    Review of Systems   Constitutional: Negative  HENT: Negative  Eyes: Negative  Respiratory: Negative  Cardiovascular: Negative  Gastrointestinal: Negative  Endocrine: Negative  Genitourinary: Negative  Musculoskeletal: Negative  Skin: Negative  Allergic/Immunologic: Negative  Neurological: Negative  Hematological: Negative  All other systems reviewed and are negative  Video Exam    Vitals:    03/17/21 1458   BP: 140/80   BP Location: Left arm   Patient Position: Sitting   Cuff Size: Standard   Resp: 16   Weight: 80 3 kg (177 lb)   Height: 5' 5" (1 651 m)       Physical Exam     In no acute distress  Symmetric lung expansion  Pulses palpable  No abdominal distention  No pedal edema    I spent 32 minutes with patient today in which greater than 50% of the time was spent in counseling/coordination of care regarding CKD care      VIRTUAL VISIT [address] acknowledges that she has consented to an online visit or consultation  She understands that the online visit is based solely on information provided by her, and that, in the absence of a face-to-face physical evaluation by the physician, the diagnosis she receives is both limited and provisional in terms of accuracy and completeness  This is not intended to replace a full medical face-to-face evaluation by the physician  Wild Garcia understands and accepts these terms      Ephraim Cortes MD  3/17/2021  3:31 PM  Sign when Signing Visit    Virtual Regular Visit      Assessment/Plan:    Problem List Items Addressed This Visit        Cardiovascular and Mediastinum    Hypertension - Primary    Relevant Medications    lisinopril (ZESTRIL) 5 mg tablet               Reason for visit is   Chief Complaint Patient presents with    Follow-up    Chronic Kidney Disease    Virtual Regular Visit        Encounter provider Xiang Gutiérrez MD    Provider located at [phone] [place] [address]  [address] [place] [id number] [phone]      Recent Visits  Date Type Provider Dept   03/16/21 Telephone Xiang Gutiérrez MD [facility] [address] recent visits within past 7 days and meeting all other requirements     Today's Visits  Date Type Provider Dept   03/17/21 Telephone Jo Garcia [facility] [address] [facility] [place]   03/17/21 Telephone Fariba lorenzos [facility]   03/17/21 Telemedicine Xiang Gutiérrez MD [facility] [address] today's visits and meeting all other requirements     Future Appointments  No visits were found meeting these conditions  Showing future appointments within next 150 days and meeting all other requirements        The patient was identified by name and date of birth  Reinier Anderson was informed that this is a telemedicine visit and that the visit is being conducted through {AMB CORONAVIRUS VISIT TAFZVA:66029}  {Telemedicine confidentiality :35322} {Telemedicine participants:10643}  She acknowledged consent and understanding of privacy and security of the video platform  The patient has agreed to participate and understands they can discontinue the visit at any time  Patient is aware this is a billable service  Subjective  Reinier nAderson is a 61 y o  female ***         HPI     Past Medical History:   Diagnosis Date    Adjustment disorder with anxiety     Anxiety     Chronic kidney disease     Constipation     Dense breasts     GERD (gastroesophageal reflux disease)     Heart murmur     History of atrial paroxysmal tachycardia     History of cerebral artery occlusion     History of chest pain     Hyperlipidemia     SHAE (iron deficiency anemia)     Menopause     Ovarian cyst     Pancreatitis     Plantar fasciitis     Polyclonal hypergammaglobulinemia     Restless legs syndrome (RLS)     Trigger finger        Past Surgical History:   Procedure Laterality Date    CHOLECYSTECTOMY      [place] ESOPHAGOGASTRODUODENOSCOPY TRANSORAL DIAGNOSTIC N/A 10/19/2017    Procedure: EGD AND COLONOSCOPY;  Surgeon: Анна Chavis MD;  Location: [facility];   Service: Gastroenterology    TRIGGER FINGER RELEASE         Current Outpatient Medications   Medication Sig Dispense Refill    ACCU-CHEK FASTCLIX LANCETS [address] [facility] by Does not apply route daily      atorvastatin (LIPITOR) 20 mg tablet Take 1 tablet (20 mg total) by mouth daily 90 tablet 4    B Complex-C (SUPER B COMPLEX PO) Take 1 tablet by mouth daily      BIOTIN PO Take 1 capsule by mouth daily      Blood Glucose Monitoring Suppl (ACCU-CHEK JESSEE CONNECT) w/Device KIT by Does not apply route      BYDUREON 2 MG PEN Inject 2 mg under the skin once a week 12 each 3    cholecalciferol (VITAMIN D3) 1,000 units tablet Take 1,000 Units by mouth 3 (three) times a week       Chromium Picolinate 200 MCG CAPS Take by mouth daily      clobetasol (TEMOVATE) 0 05 % cream as needed       clotrimazole-betamethasone (LOTRISONE) 1-0 05 % cream       gabapentin (NEURONTIN) 300 mg capsule Take 1 capsule (300 mg total) by mouth 3 (three) times a day (Patient taking differently: Take 300 mg by mouth 3 (three) times a day Once daily) 270 capsule 3    glucose blood test strip by In Vitro route 4 (four) times a day      Hyaluronic Acid-Vitamin C (HYALURONIC ACID PO) Take 100 mg by mouth daily      INVOKANA 300 MG TABS TAKE 1 TABLET DAILY 90 tablet 3    JANUVIA 100 MG tablet Take 1 tablet (100 mg total) by mouth daily 90 tablet 3    metFORMIN (GLUCOPHAGE) 1000 MG tablet TAKE 1 TABLET TWICE A  tablet 3    Multiple Vitamin (DAILY VALUE MULTIVITAMIN) TABS Take by mouth      Omega-3 Fatty Acids (FISH OIL) 1,000 mg Take 1,000 mg by mouth daily      omeprazole (PriLOSEC) 20 mg delayed release capsule TAKE 1 CAPSULE EVERY MORNING BEFORE BREAKFAST 90 capsule 3    Saw Palmetto 160 MG CAPS Take 250 mg by mouth      zinc gluconate 50 mg tablet Take 50 mg by mouth daily      lisinopril (ZESTRIL) 5 mg tablet Take 1 tablet (5 mg total) by mouth daily 90 tablet 5     No current facility-administered medications for this visit  No Known Allergies    Review of Systems    Video Exam    Vitals:    03/17/21 1458   BP: 140/80   BP Location: Left arm   Patient Position: Sitting   Cuff Size: Standard   Resp: 16   Weight: 80 3 kg (177 lb)   Height: 5' 5" (1 651 m)       Physical Exam     {covid time spent:10612}      VIRTUAL VISIT [address] acknowledges that she has consented to an online visit or consultation  She understands that the online visit is based solely on information provided by her, and that, in the absence of a face-to-face physical evaluation by the physician, the diagnosis she receives is both limited and provisional in terms of accuracy and completeness  This is not intended to replace a full medical face-to-face evaluation by the physician  Urmila Huitron understands and accepts these terms

## 2021-03-17 NOTE — LETTER
March 17, 2021     Jill Brannon MD  [address] [address]    Patient: Ester Morris   YOB: 1965   Date of Visit: 3/17/2021       Dear Dr Clay Groves: Thank you for referring Ester Morris to me for evaluation  Below are my notes for this consultation  If you have questions, please do not hesitate to call me  I look forward to following your patient along with you  Sincerely,        Negar Mann MD        CC: No Recipients  Negar Mann MD  3/17/2021  3:41 PM  Sign when Signing Visit    Virtual Regular Visit      Assessment/Plan:    1) CKD stage IIIa: etiology of CKD likely hypertensive nephrosclerosis, diabetic nephropathy  Per labs performed serum creatinine is 1 22 with eGFR of 49 which is within acceptable range  2) 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: BP within acceptable range  On Lisinopril and recommended patient to take 5 mg daily  3) Insulin dependent diabetes mellitus: Blood sugars not optimal noted on labwork  Strict glycemic control  Necessary to prevent progression of CKD  4) Secondary hyperparathyroidism of renal origin: calcium is mildly elevated at 10 3 and elevated  Recommended taking vitamin D tablet twice weekly instead  5) Anemia due to CkD: Hemoglobin is within acceptable range  6) Proteinuria: noted to have subnephrotic range proteinuria  Continue with lisinopril on a daily basis       Problem List Items Addressed This Visit        Cardiovascular and Mediastinum    Hypertension - Primary    Relevant Medications    lisinopril (ZESTRIL) 5 mg tablet               Reason for visit is   Chief Complaint   Patient presents with    Follow-up    Chronic Kidney Disease    Virtual Regular Visit        Encounter provider Negar Mann MD    Provider located at [address] [facility] [address] [place] [address]  [address] [facility] [address] [id number] [phone]      Recent Visits  Date Type Provider Dept   03/16/21 Telephone Cristian Resendiz MD [facility] [address] recent visits within past 7 days and meeting all other requirements     Today's Visits  Date Type Provider Dept   03/17/21 Telephone Malemitul Garcia [facility] [address] [place]   03/17/21 Telephone Fariba stark [facility] [address] [facility]   03/17/21 Telemedicine Cristian Resendiz MD [facility] [address] today's visits and meeting all other requirements     Future Appointments  No visits were found meeting these conditions  Showing future appointments within next 150 days and meeting all other requirements        The patient was identified by name and date of birth  Shawn Crane was informed that this is a telemedicine visit and that the visit is being conducted through [facility] and patient was informed that this is a secure, HIPAA-compliant platform  She agrees to proceed     My office door was closed  No one else was in the room  She acknowledged consent and understanding of privacy and security of the video platform  The patient has agreed to participate and understands they can discontinue the visit at any time  Patient is aware this is a billable service  Subjective  Shawn Crane is a 61 y o  female with PMH of CKD IIIb, HTN, insulin dependent diabetes mellitus who underwent telemedicine visit  Offers no complaints  Rod WYATT     Past Medical History:   Diagnosis Date    Adjustment disorder with anxiety     Anxiety     Chronic kidney disease     Constipation     Dense breasts     GERD (gastroesophageal reflux disease)     Heart murmur     History of atrial paroxysmal tachycardia     History of cerebral artery occlusion     History of chest pain     Hyperlipidemia     SHAE (iron deficiency anemia)     Menopause     Ovarian cyst     Pancreatitis     Plantar fasciitis     Polyclonal hypergammaglobulinemia     Restless legs syndrome (RLS)     Trigger finger        Past Surgical History:   Procedure Laterality Date    CHOLECYSTECTOMY      [place] ESOPHAGOGASTRODUODENOSCOPY TRANSORAL DIAGNOSTIC N/A 10/19/2017    Procedure: EGD AND COLONOSCOPY;  Surgeon: Vladislav Flores MD;  [facility];   Service: Gastroenterology    TRIGGER FINGER RELEASE         Current Outpatient Medications   Medication Sig Dispense Refill    ACCU-CHEK FASTCLIX LANCETS [address] [facility] by Does not apply route daily      atorvastatin (LIPITOR) 20 mg tablet Take 1 tablet (20 mg total) by mouth daily 90 tablet 4    B Complex-C (SUPER B COMPLEX PO) Take 1 tablet by mouth daily      BIOTIN PO Take 1 capsule by mouth daily      Blood Glucose Monitoring Suppl (ACCU-CHEK JESSEE CONNECT) w/Device KIT by Does not apply route      BYDUREON 2 MG PEN Inject 2 mg under the skin once a week 12 each 3    cholecalciferol (VITAMIN D3) 1,000 units tablet Take 1,000 Units by mouth 3 (three) times a week       Chromium Picolinate 200 MCG CAPS Take by mouth daily      clobetasol (TEMOVATE) 0 05 % cream as needed       clotrimazole-betamethasone (LOTRISONE) 1-0 05 % cream       gabapentin (NEURONTIN) 300 mg capsule Take 1 capsule (300 mg total) by mouth 3 (three) times a day (Patient taking differently: Take 300 mg by mouth 3 (three) times a day Once daily) 270 capsule 3    glucose blood test strip by In Vitro route 4 (four) times a day      Hyaluronic Acid-Vitamin C (HYALURONIC ACID PO) Take 100 mg by mouth daily      INVOKANA 300 MG TABS TAKE 1 TABLET DAILY 90 tablet 3    JANUVIA 100 MG tablet Take 1 tablet (100 mg total) by mouth daily 90 tablet 3    metFORMIN (GLUCOPHAGE) 1000 MG tablet TAKE 1 TABLET TWICE A  tablet 3    Multiple Vitamin (DAILY VALUE MULTIVITAMIN) TABS Take by mouth      Omega-3 Fatty Acids (FISH OIL) 1,000 mg Take 1,000 mg by mouth daily      omeprazole (PriLOSEC) 20 mg delayed release capsule TAKE 1 CAPSULE EVERY MORNING BEFORE BREAKFAST 90 capsule 3    Saw Palmetto 160 MG CAPS Take 250 mg by mouth      zinc gluconate 50 mg tablet Take 50 mg by mouth daily      lisinopril (ZESTRIL) 5 mg tablet Take 1 tablet (5 mg total) by mouth daily 90 tablet 5     No current facility-administered medications for this visit  No Known Allergies    Review of Systems   Constitutional: Negative  HENT: Negative  Eyes: Negative  Respiratory: Negative  Cardiovascular: Negative  Gastrointestinal: Negative  Endocrine: Negative  Genitourinary: Negative  Musculoskeletal: Negative  Skin: Negative  Allergic/Immunologic: Negative  Neurological: Negative  Hematological: Negative  All other systems reviewed and are negative  Video Exam    Vitals:    03/17/21 1458   BP: 140/80   BP Location: Left arm   Patient Position: Sitting   Cuff Size: Standard   Resp: 16   Weight: 80 3 kg (177 lb)   Height: 5' 5" (1 651 m)       Physical Exam     In no acute distress  Symmetric lung expansion  Pulses palpable  No abdominal distention  No pedal edema    I spent 32 minutes with patient today in which greater than 50% of the time was spent in counseling/coordination of care regarding CKD care      VIRTUAL VISIT [address] acknowledges that she has consented to an online visit or consultation  She understands that the online visit is based solely on information provided by her, and that, in the absence of a face-to-face physical evaluation by the physician, the diagnosis she receives is both limited and provisional in terms of accuracy and completeness  This is not intended to replace a full medical face-to-face evaluation by the physician  Piyush Burnett understands and accepts these terms

## 2021-03-17 NOTE — TELEPHONE ENCOUNTER
I called and spoke to the patient and schedule her follow up appointment  I also explained that I was mailing out her summary report and appointment card and the patient is aware  that her lab orders are in the [facility] system and to have her blood work done before her next appointment  The patient understood and was okay with it   Rita Maldonado,

## 2021-03-25 ENCOUNTER — TELEPHONE (OUTPATIENT)
Dept: INTERNAL MEDICINE CLINIC | Facility: CLINIC | Age: 56
End: 2021-03-25

## 2021-03-25 DIAGNOSIS — Z23 ENCOUNTER FOR IMMUNIZATION: ICD-10-CM

## 2021-03-25 NOTE — TELEPHONE ENCOUNTER
Pt has a return to work note in her chart saying she can return to work on June 1st  She needs the date changed to April 6th as she received the [place] and dmitry vaccine on March 19th  Call when letter ready, she will     [phone]

## 2021-04-19 DIAGNOSIS — E11.9 TYPE 2 DIABETES MELLITUS WITHOUT COMPLICATION, WITHOUT LONG-TERM CURRENT USE OF INSULIN (HCC): ICD-10-CM

## 2021-04-19 RX ORDER — SITAGLIPTIN 100 MG/1
TABLET, FILM COATED ORAL
Qty: 90 TABLET | Refills: 3 | Status: SHIPPED | OUTPATIENT
Start: 2021-04-19 | End: 2021-12-16

## 2021-04-24 DIAGNOSIS — E11.9 TYPE 2 DIABETES MELLITUS WITHOUT COMPLICATION, WITHOUT LONG-TERM CURRENT USE OF INSULIN (HCC): ICD-10-CM

## 2021-04-27 RX ORDER — CANAGLIFLOZIN 300 MG/1
TABLET, FILM COATED ORAL
Qty: 90 TABLET | Refills: 3 | Status: SHIPPED | OUTPATIENT
Start: 2021-04-27 | End: 2021-12-16

## 2021-05-13 DIAGNOSIS — E78.2 MIXED HYPERLIPIDEMIA: ICD-10-CM

## 2021-05-13 RX ORDER — ATORVASTATIN CALCIUM 20 MG/1
TABLET, FILM COATED ORAL
Qty: 90 TABLET | Refills: 3 | Status: SHIPPED | OUTPATIENT
Start: 2021-05-13 | End: 2022-05-09

## 2021-05-24 DIAGNOSIS — G62.9 NEUROPATHY: ICD-10-CM

## 2021-05-25 RX ORDER — GABAPENTIN 300 MG/1
CAPSULE ORAL
Qty: 270 CAPSULE | Refills: 3 | Status: SHIPPED | OUTPATIENT
Start: 2021-05-25

## 2021-07-23 ENCOUNTER — TELEPHONE (OUTPATIENT)
Dept: INTERNAL MEDICINE CLINIC | Facility: CLINIC | Age: 56
End: 2021-07-23

## 2021-07-23 NOTE — TELEPHONE ENCOUNTER
There already multiple laboratory request in her chart dated from March and May  She should do those    She does not need new requisitions

## 2021-07-23 NOTE — TELEPHONE ENCOUNTER
PT has appt w/ Dr Lily Toledo on 8/4 and would like him to order labs that he feels she should have before her appt  Please place orders or call if none are required      PT # [phone]

## 2021-07-24 NOTE — TELEPHONE ENCOUNTER
PT  NOTIFIED Patient was instructed to start oral contraception medication 12/08/2017 by Ob/Gyn Dr Farnsworth but patient had declined at that time. Was also instructed to follow up for another pelvic and US when she was ready. Given to Dr. Ni for review.

## 2021-07-29 ENCOUNTER — RA CDI HCC (OUTPATIENT)
Dept: OTHER | Facility: HOSPITAL | Age: 56
End: 2021-07-29

## 2021-07-29 NOTE — PROGRESS NOTES
[place] [address]  coding opportunities             Chart reviewed, (number of) suggestions sent to provider: 4                  Patients insurance company: "[employer], [place]" ([employer])   nothing is in visit and still open? Looks like a no show?    Visit status: Patient ""no showed"" to the scheduled appointment        [place] [address]  coding opportunities             Chart reviewed, (number of) suggestions sent to provider: 4   DX:  E11 65-Type 2 diabetes mellitus with hyperglycemia-Sept 2020-A1C-7 4, fasting glucose-170  E11 22-Type 2 diabetes mellitus with diabetic chronic kidney disease  N18 30-Chronic kidney disease, stage 3 unspecified  E11 3393-Type 2 diabetes mellitus with moderate nonproliferative diabetic retinopathy without macular edema, bilateral-recent opth note                 Patients insurance company: [employer] (Medicare Advantage and Commercial)

## 2021-07-31 ENCOUNTER — APPOINTMENT (OUTPATIENT)
Dept: LAB | Facility: HOSPITAL | Age: 56
End: 2021-07-31
Attending: INTERNAL MEDICINE
Payer: COMMERCIAL

## 2021-07-31 DIAGNOSIS — E11.9 TYPE 2 DIABETES MELLITUS WITHOUT COMPLICATION, WITHOUT LONG-TERM CURRENT USE OF INSULIN (HCC): ICD-10-CM

## 2021-07-31 DIAGNOSIS — Z11.4 ENCOUNTER FOR SCREENING FOR HIV: ICD-10-CM

## 2021-07-31 DIAGNOSIS — Z11.59 ENCOUNTER FOR HEPATITIS C SCREENING TEST FOR LOW RISK PATIENT: ICD-10-CM

## 2021-07-31 LAB
ALBUMIN SERPL BCP-MCNC: 3.8 G/DL (ref 3.5–5)
ALP SERPL-CCNC: 83 U/L (ref 46–116)
ALT SERPL W P-5'-P-CCNC: 53 U/L (ref 12–78)
ANION GAP SERPL CALCULATED.3IONS-SCNC: 7 MMOL/L (ref 4–13)
AST SERPL W P-5'-P-CCNC: 24 U/L (ref 5–45)
BASOPHILS # BLD AUTO: 0.05 THOUSANDS/ΜL (ref 0–0.1)
BASOPHILS NFR BLD AUTO: 1 % (ref 0–1)
BILIRUB SERPL-MCNC: 0.36 MG/DL (ref 0.2–1)
BUN SERPL-MCNC: 21 MG/DL (ref 5–25)
CALCIUM SERPL-MCNC: 9.8 MG/DL (ref 8.3–10.1)
CHLORIDE SERPL-SCNC: 102 MMOL/L (ref 100–108)
CHOLEST SERPL-MCNC: 149 MG/DL (ref 50–200)
CO2 SERPL-SCNC: 31 MMOL/L (ref 21–32)
CREAT SERPL-MCNC: 1.24 MG/DL (ref 0.6–1.3)
EOSINOPHIL # BLD AUTO: 0.17 THOUSAND/ΜL (ref 0–0.61)
EOSINOPHIL NFR BLD AUTO: 3 % (ref 0–6)
ERYTHROCYTE [DISTWIDTH] IN BLOOD BY AUTOMATED COUNT: 12.7 % (ref 11.6–15.1)
GFR SERPL CREATININE-BSD FRML MDRD: 48 ML/MIN/1.73SQ M
GLUCOSE P FAST SERPL-MCNC: 185 MG/DL (ref 65–99)
HCT VFR BLD AUTO: 48.2 % (ref 34.8–46.1)
HDLC SERPL-MCNC: 44 MG/DL
HGB BLD-MCNC: 14.8 G/DL (ref 11.5–15.4)
IMM GRANULOCYTES # BLD AUTO: 0.03 THOUSAND/UL (ref 0–0.2)
IMM GRANULOCYTES NFR BLD AUTO: 1 % (ref 0–2)
LDLC SERPL CALC-MCNC: 71 MG/DL (ref 0–100)
LYMPHOCYTES # BLD AUTO: 1.74 THOUSANDS/ΜL (ref 0.6–4.47)
LYMPHOCYTES NFR BLD AUTO: 27 % (ref 14–44)
MCH RBC QN AUTO: 29.1 PG (ref 26.8–34.3)
MCHC RBC AUTO-ENTMCNC: 30.7 G/DL (ref 31.4–37.4)
MCV RBC AUTO: 95 FL (ref 82–98)
MONOCYTES # BLD AUTO: 0.56 THOUSAND/ΜL (ref 0.17–1.22)
MONOCYTES NFR BLD AUTO: 9 % (ref 4–12)
NEUTROPHILS # BLD AUTO: 3.81 THOUSANDS/ΜL (ref 1.85–7.62)
NEUTS SEG NFR BLD AUTO: 59 % (ref 43–75)
NRBC BLD AUTO-RTO: 0 /100 WBCS
PLATELET # BLD AUTO: 245 THOUSANDS/UL (ref 149–390)
PMV BLD AUTO: 10.3 FL (ref 8.9–12.7)
POTASSIUM SERPL-SCNC: 5.1 MMOL/L (ref 3.5–5.3)
PROT SERPL-MCNC: 8.8 G/DL (ref 6.4–8.2)
RBC # BLD AUTO: 5.09 MILLION/UL (ref 3.81–5.12)
SODIUM SERPL-SCNC: 140 MMOL/L (ref 136–145)
TRIGL SERPL-MCNC: 170 MG/DL
TSH SERPL DL<=0.05 MIU/L-ACNC: 2.88 UIU/ML (ref 0.36–3.74)
WBC # BLD AUTO: 6.36 THOUSAND/UL (ref 4.31–10.16)

## 2021-07-31 PROCEDURE — 85025 COMPLETE CBC W/AUTO DIFF WBC: CPT

## 2021-07-31 PROCEDURE — 80061 LIPID PANEL: CPT

## 2021-07-31 PROCEDURE — 87389 HIV-1 AG W/HIV-1&-2 AB AG IA: CPT

## 2021-07-31 PROCEDURE — 36415 COLL VENOUS BLD VENIPUNCTURE: CPT

## 2021-07-31 PROCEDURE — 84443 ASSAY THYROID STIM HORMONE: CPT

## 2021-07-31 PROCEDURE — 83036 HEMOGLOBIN GLYCOSYLATED A1C: CPT

## 2021-07-31 PROCEDURE — 86803 HEPATITIS C AB TEST: CPT

## 2021-07-31 PROCEDURE — 80053 COMPREHEN METABOLIC PANEL: CPT

## 2021-08-01 LAB
EST. AVERAGE GLUCOSE BLD GHB EST-MCNC: 192 MG/DL
HBA1C MFR BLD: 8.3 %
HCV AB SER QL: NORMAL

## 2021-08-04 ENCOUNTER — OFFICE VISIT (OUTPATIENT)
Dept: INTERNAL MEDICINE CLINIC | Facility: CLINIC | Age: 56
End: 2021-08-04
Payer: COMMERCIAL

## 2021-08-04 VITALS
OXYGEN SATURATION: 99 % | HEART RATE: 72 BPM | HEIGHT: 65 IN | WEIGHT: 176.4 LBS | BODY MASS INDEX: 29.39 KG/M2 | DIASTOLIC BLOOD PRESSURE: 70 MMHG | TEMPERATURE: 98.3 F | SYSTOLIC BLOOD PRESSURE: 106 MMHG

## 2021-08-04 DIAGNOSIS — E11.22 TYPE 2 DIABETES MELLITUS WITH STAGE 2 CHRONIC KIDNEY DISEASE, WITHOUT LONG-TERM CURRENT USE OF INSULIN (HCC): ICD-10-CM

## 2021-08-04 DIAGNOSIS — E11.9 TYPE 2 DIABETES MELLITUS WITHOUT COMPLICATION, WITHOUT LONG-TERM CURRENT USE OF INSULIN (HCC): ICD-10-CM

## 2021-08-04 DIAGNOSIS — B35.9 TINEA: Primary | ICD-10-CM

## 2021-08-04 DIAGNOSIS — N18.2 TYPE 2 DIABETES MELLITUS WITH STAGE 2 CHRONIC KIDNEY DISEASE, WITHOUT LONG-TERM CURRENT USE OF INSULIN (HCC): ICD-10-CM

## 2021-08-04 DIAGNOSIS — IMO0002: ICD-10-CM

## 2021-08-04 LAB — HIV 1+2 AB+HIV1 P24 AG SERPL QL IA: NORMAL

## 2021-08-04 PROCEDURE — 99213 OFFICE O/P EST LOW 20 MIN: CPT | Performed by: INTERNAL MEDICINE

## 2021-08-04 RX ORDER — CLOTRIMAZOLE AND BETAMETHASONE DIPROPIONATE 10; .64 MG/G; MG/G
CREAM TOPICAL 2 TIMES DAILY
Qty: 30 G | Refills: 11 | Status: SHIPPED | OUTPATIENT
Start: 2021-08-04

## 2021-08-04 RX ORDER — EXENATIDE 2 MG/.65ML
2 INJECTION, SUSPENSION, EXTENDED RELEASE SUBCUTANEOUS WEEKLY
Qty: 12 EACH | Refills: 3 | Status: SHIPPED | OUTPATIENT
Start: 2021-08-04 | End: 2021-08-04

## 2021-08-04 RX ORDER — CLOBETASOL PROPIONATE 0.5 MG/G
CREAM TOPICAL 2 TIMES DAILY
Qty: 30 G | Refills: 11 | Status: SHIPPED | OUTPATIENT
Start: 2021-08-04

## 2021-08-05 ENCOUNTER — TELEPHONE (OUTPATIENT)
Dept: INTERNAL MEDICINE CLINIC | Facility: CLINIC | Age: 56
End: 2021-08-05

## 2021-08-05 NOTE — TELEPHONE ENCOUNTER
SAW  JASON   YESTERDAY  GOT  REFERRAL   CAN'T SEE   NUTRITIONIST  TILL   October 5  WANTS  TO  KNOW  IF  JASON  WOULD   GIVE   HER  A  REFERRAL  FOR  WEIGHT   MANAGEMENT    PROGRAM  CALL  PT  [phone]

## 2021-08-13 NOTE — PROGRESS NOTES
Assessment/Plan:       Diagnoses and all orders for this visit:    Tinea  -     clobetasol (TEMOVATE) 0 05 % cream; Apply topically 2 (two) times a day  -     clotrimazole-betamethasone (LOTRISONE) 1-0 05 % cream; Apply topically 2 (two) times a day    Type 2 diabetes mellitus without complication, without long-term current use of insulin ([facility])  -     Discontinue: Bydureon 2 MG PEN; Inject 2 mg under the skin once a week    Uncontrolled diabetes mellitus with complication, without long-term current use of insulin ([facility])    Type 2 diabetes mellitus with stage 2 chronic kidney disease, without long-term current use of insulin [facility]  -     Ambulatory referral to Nutrition Services; Future                Subjective:      Patient ID: Mp Bergeron is a 61 y o  female  50-year-old female on multiple diabetic medications as hemoglobin A1c of 8 4%  I think she needs to see an endocrinologist but she would prefer to see a dietitian so we will arrange that  Really no symptoms  The following portions of the patient's history were reviewed and updated as appropriate:   She has a past medical history of Adjustment disorder with anxiety, Anxiety, Chronic kidney disease, Constipation, Dense breasts, GERD (gastroesophageal reflux disease), Heart murmur, History of atrial paroxysmal tachycardia, History of cerebral artery occlusion, History of chest pain, Hyperlipidemia, SHAE (iron deficiency anemia), Menopause, Ovarian cyst, Pancreatitis, Plantar fasciitis, Polyclonal hypergammaglobulinemia, Restless legs syndrome (RLS), and Trigger finger  ,  does not have any pertinent problems on file  ,   has a past surgical history that includes Cholecystectomy; pr esophagogastroduodenoscopy transoral diagnostic (N/A, 10/19/2017); and Trigger finger release  ,  family history includes Tuberculosis in her mother  She was adopted  ,   reports that she quit smoking about 3 years ago  Her smoking use included cigarettes   She has a 3 50 pack-year smoking history  She has never used smokeless tobacco  She reports current alcohol use  She reports that she does not use drugs  ,  has No Known Allergies     Current Outpatient Medications   Medication Sig Dispense Refill    ACCU-CHEK FASTCLIX LANCETS MISC by Does not apply route daily      atorvastatin (LIPITOR) 20 mg tablet TAKE 1 TABLET DAILY 90 tablet 3    B Complex-C (SUPER B COMPLEX PO) Take 1 tablet by mouth daily      BIOTIN PO Take 1 capsule by mouth daily      Blood Glucose Monitoring Suppl (ACCU-CHEK JESSEE CONNECT) w/Device KIT by Does not apply route      cholecalciferol (VITAMIN D3) 1,000 units tablet Take 1,000 Units by mouth 3 (three) times a week       Chromium Picolinate 200 MCG CAPS Take by mouth daily      clobetasol (TEMOVATE) 0 05 % cream Apply topically 2 (two) times a day 30 g 11    clotrimazole-betamethasone (LOTRISONE) 1-0 05 % cream Apply topically 2 (two) times a day 30 g 11    gabapentin (NEURONTIN) 300 mg capsule TAKE 1 CAPSULE THREE TIMES A  capsule 3    glucose blood test strip by In Vitro route 4 (four) times a day      Hyaluronic Acid-Vitamin C (HYALURONIC ACID PO) Take 100 mg by mouth daily      Invokana 300 MG TABS TAKE 1 TABLET DAILY 90 tablet 3    Januvia 100 MG tablet TAKE 1 TABLET DAILY 90 tablet 3    lisinopril (ZESTRIL) 5 mg tablet Take 1 tablet (5 mg total) by mouth daily 90 tablet 5    metFORMIN (GLUCOPHAGE) 1000 MG tablet TAKE 1 TABLET TWICE A  tablet 3    Multiple Vitamin (DAILY VALUE MULTIVITAMIN) TABS Take by mouth      Omega-3 Fatty Acids (FISH OIL) 1,000 mg Take 1,000 mg by mouth daily      Saw Farnam 160 MG CAPS Take 250 mg by mouth      zinc gluconate 50 mg tablet Take 50 mg by mouth daily      omeprazole (PriLOSEC) 20 mg delayed release capsule TAKE 1 CAPSULE EVERY MORNING BEFORE BREAKFAST 90 capsule 3     No current facility-administered medications for this visit         Review of Systems   Respiratory: Negative for cough and shortness of breath  Cardiovascular: Negative for chest pain and leg swelling  Objective:  Vitals:    08/04/21 1716   BP: 106/70   Pulse: 72   Temp: 98 3 °F (36 8 °C)   SpO2: 99%      Physical Exam  Cardiovascular:      Rate and Rhythm: Normal rate  Pulmonary:      Effort: Pulmonary effort is normal    Skin:     General: Skin is warm and dry  Neurological:      General: No focal deficit present  Mental Status: She is alert  There are no Patient Instructions on file for this visit

## 2021-09-10 ENCOUNTER — TELEPHONE (OUTPATIENT)
Dept: NEPHROLOGY | Facility: CLINIC | Age: 56
End: 2021-09-10

## 2021-09-10 PROCEDURE — 3066F NEPHROPATHY DOC TX: CPT | Performed by: INTERNAL MEDICINE

## 2021-09-10 NOTE — TELEPHONE ENCOUNTER
I called [employer] @ [phone] (Automated System) to check eligible for the patient and as of 9/10/2021 the patient has current active coverage as of 1/1/2019   [facility],

## 2021-09-13 ENCOUNTER — TELEPHONE (OUTPATIENT)
Dept: NEPHROLOGY | Facility: CLINIC | Age: 56
End: 2021-09-13

## 2021-09-15 ENCOUNTER — OFFICE VISIT (OUTPATIENT)
Dept: BARIATRICS | Facility: CLINIC | Age: 56
End: 2021-09-15
Payer: COMMERCIAL

## 2021-09-15 VITALS
HEART RATE: 76 BPM | RESPIRATION RATE: 12 BRPM | SYSTOLIC BLOOD PRESSURE: 110 MMHG | DIASTOLIC BLOOD PRESSURE: 50 MMHG | HEIGHT: 64 IN | WEIGHT: 178.8 LBS | TEMPERATURE: 97.8 F | BODY MASS INDEX: 30.52 KG/M2

## 2021-09-15 DIAGNOSIS — E11.22 TYPE 2 DIABETES MELLITUS WITH STAGE 2 CHRONIC KIDNEY DISEASE, WITHOUT LONG-TERM CURRENT USE OF INSULIN (HCC): ICD-10-CM

## 2021-09-15 DIAGNOSIS — R79.89 ELEVATED LFTS: ICD-10-CM

## 2021-09-15 DIAGNOSIS — E78.2 MIXED HYPERLIPIDEMIA: ICD-10-CM

## 2021-09-15 DIAGNOSIS — N18.2 TYPE 2 DIABETES MELLITUS WITH STAGE 2 CHRONIC KIDNEY DISEASE, WITHOUT LONG-TERM CURRENT USE OF INSULIN (HCC): ICD-10-CM

## 2021-09-15 DIAGNOSIS — R63.5 ABNORMAL WEIGHT GAIN: ICD-10-CM

## 2021-09-15 DIAGNOSIS — K21.9 GASTROESOPHAGEAL REFLUX DISEASE, UNSPECIFIED WHETHER ESOPHAGITIS PRESENT: ICD-10-CM

## 2021-09-15 DIAGNOSIS — E66.9 CLASS 1 OBESITY: Primary | ICD-10-CM

## 2021-09-15 PROCEDURE — 3078F DIAST BP <80 MM HG: CPT | Performed by: INTERNAL MEDICINE

## 2021-09-15 PROCEDURE — 99245 OFF/OP CONSLTJ NEW/EST HI 55: CPT | Performed by: INTERNAL MEDICINE

## 2021-09-15 PROCEDURE — 3074F SYST BP LT 130 MM HG: CPT | Performed by: INTERNAL MEDICINE

## 2021-09-15 RX ORDER — TETRACYCLINE HCL 500 MG
CAPSULE ORAL
COMMUNITY

## 2021-09-15 RX ORDER — PROPRANOLOL/HYDROCHLOROTHIAZID 40 MG-25MG
TABLET ORAL
COMMUNITY

## 2021-09-15 NOTE — PROGRESS NOTES
Assessment/Plan:  King Mcdaniel was seen today for consult  Diagnoses and all orders for this visit:    Class 1 obesity    Type 2 diabetes mellitus with stage 2 chronic kidney disease, without long-term current use of insulin (HCC)    Elevated LFTs    Mixed hyperlipidemia    Abnormal weight gain    Gastroesophageal reflux disease, unspecified whether esophagitis present         - Discussed options of HealthyCORE-Intensive Lifestyle Intervention Program, Very Low Calorie Diet-VLCD and Conservative Program and the role of weight loss medications  - Explained the importance of making lifestyle changes first before starting any anti-obesity medications  Patient should demonstrate lifestyle changes first before anti-obesity medication can be initiated  - Patient is interested in pursuing HealthyCORE-Intensive Lifestyle Intervention Program  - Initial weight loss goal of 5-10% weight loss for improved health  - Weight loss can improve patient's co-morbid conditions and/or prevent weight-related complications  Goals:  Do not skip any meals! Food log (ie ) www myfitnesspal com,sparkpeople  com,loseit com,calorieking  com,etc  baritastic (use skinnytaste  com or smartphone deborah [employer] for recipes)  No sugary beverages  At least 64oz of water daily  Increase physical activity by 10 minutes daily  Gradually increase physical activity to a goal of 5 days per week for 30 minutes of MODERATE intensity PLUS 2 days per week of FULL BODY resistance training (use smartphone apps [employer], Home Workout, etc )  Goal protein 80g per day  Goal carbohydrates 80-90g per day   3650-2531 calories    Consider adding Ozempic at next visit with me    30 minute visit, >50% face-to-face time spent counseling patient on nonsurgical interventions for the treatment of excess weight   Discussed in detail nonsurgical options including intensive lifestyle intervention program, very low-calorie diet program and conservative program   Discussed the role of weight loss medications  Counseled patient on diet behavior and exercise modification for weight loss  Follow up in approximately 2 weeks with Non-Surgical Dietician  Follow up with me in 2 months     Subjective:   Chief Complaint   Patient presents with    Consult     initial visit with bariatrician        Patient ID: Cassy Napoles  is a 61 y o  female with excess weight/obesity here to pursue weight management  Past Medical History:   Diagnosis Date    Adjustment disorder with anxiety     Anxiety     Chronic kidney disease     Constipation     Dense breasts     GERD (gastroesophageal reflux disease)     Heart murmur     History of atrial paroxysmal tachycardia     History of cerebral artery occlusion     History of chest pain     Hyperlipidemia     SHAE (iron deficiency anemia)     Menopause     Ovarian cyst     Pancreatitis     Plantar fasciitis     Polyclonal hypergammaglobulinemia     Restless legs syndrome (RLS)     Trigger finger      Past Surgical History:   Procedure Laterality Date    CHOLECYSTECTOMY      HI ESOPHAGOGASTRODUODENOSCOPY TRANSORAL DIAGNOSTIC N/A 10/19/2017    Procedure: EGD AND COLONOSCOPY;  Surgeon: Viola Hicks MD;  Location: [facility];   Service: Gastroenterology    TRIGGER FINGER RELEASE         HPI:  Wt Readings from Last 20 Encounters:   09/15/21 81 1 kg (178 lb 12 8 oz)   08/04/21 80 kg (176 lb 6 4 oz)   03/17/21 80 3 kg (177 lb)   11/09/20 84 3 kg (185 lb 12 8 oz)   10/22/20 83 5 kg (184 lb)   09/21/20 83 5 kg (184 lb)   09/16/20 82 1 kg (181 lb)   04/17/20 83 9 kg (185 lb)   01/30/20 87 1 kg (192 lb)   10/16/19 82 6 kg (182 lb)   09/18/19 79 8 kg (176 lb)   08/14/19 79 8 kg (176 lb)   07/16/19 82 1 kg (181 lb)   06/15/19 82 1 kg (181 lb)   03/20/19 82 9 kg (182 lb 12 8 oz)   03/14/19 82 2 kg (181 lb 3 2 oz)   02/27/19 84 8 kg (187 lb)   02/06/19 88 2 kg (194 lb 6 4 oz)   10/11/18 83 6 kg (184 lb 3 2 oz)   09/15/18 84 4 kg (186 lb) Severity: Mild  Onset:  Over the years  DM2 has been getting worse  Was on Bydureon on and off 1-1 5 years but had some GI side effects with more bloating   At first had cellulitis so had to stop for awhile  Pancreatitis hx in the remote past  Doesn't think she lost weight  Modifiers: Diet and Exercise  Contributing factors: Poor Food Choices and Insufficient Physical Activity  Associated symptoms: comorbid conditions  Goal weight loss: 5-10% STG    B- skipped, coffee    S-coffee   L- [employer]    S-  D- fish vegetables   Staying away from carbs in general   S- snacks on random things    Hydration: 48 oz water or more  Alcohol: rare   Smoking: no  Exercise: no  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   Sleep: 5-6 hours   STOP ban/8    The following portions of the patient's history were reviewed and updated as appropriate: allergies, current medications, past family history, past medical history, past social history, past surgical history, and problem list     Review of Systems   Constitutional: Negative for appetite change, chills and fever  HENT: Negative for rhinorrhea and sore throat  Respiratory: Negative for cough, chest tightness and shortness of breath  Cardiovascular: Negative for chest pain and leg swelling  Gastrointestinal: Positive for constipation and diarrhea  Negative for abdominal pain, nausea and vomiting  Endocrine: Negative for cold intolerance and heat intolerance  Genitourinary: Negative for difficulty urinating  Musculoskeletal: Positive for arthralgias (right knee)  Skin: Negative for color change  Neurological: Negative for dizziness, numbness and headaches  Psychiatric/Behavioral: Negative for sleep disturbance  The patient is not nervous/anxious  All other systems reviewed and are negative        Objective:  /50   Pulse 76   Temp 97 8 °F (36 6 °C)   Resp 12   Ht 5' 4" (1 626 m)   Wt 81 1 kg (178 lb 12 8 oz)   LMP  (LMP Unknown)   BMI 30 69 kg/m²   Constitutional: Well-developed, well-nourished and obese Body mass index is 30 69 kg/m²  Gloria Watt HEENT: No conjunctival pallor or jaundice  Pulmonary: No increased work of breathing or signs of respiratory distress  CV: Normal rate, well-perfused  GI: Obese  Non-distended  MSK: No edema   Neuro: Oriented to person, place and time  Normal Speech  Normal gait  Psych: Normal affect and mood       Labs and Imaging  Lab Results   Component Value Date    WBC 6 36 07/31/2021    HGB 14 8 07/31/2021    HCT 48 2 (H) 07/31/2021    MCV 95 07/31/2021     07/31/2021     Lab Results   Component Value Date     11/07/2015    SODIUM 140 07/31/2021    K 5 1 07/31/2021     07/31/2021    CO2 31 07/31/2021    ANIONGAP 5 11/07/2015    AGAP 7 07/31/2021    BUN 21 07/31/2021    CREATININE 1 24 07/31/2021    GLUC 198 (H) 01/28/2020    GLUF 185 (H) 07/31/2021    CALCIUM 9 8 07/31/2021    AST 24 07/31/2021    ALT 53 07/31/2021    ALKPHOS 83 07/31/2021    PROT 8 6 (H) 06/29/2015    TP 8 8 (H) 07/31/2021    BILITOT 0 35 06/27/2015    TBILI 0 36 07/31/2021    EGFR 48 07/31/2021     Lab Results   Component Value Date    HGBA1C 8 3 (H) 07/31/2021     Lab Results   Component Value Date    ZBJ4RLRGEUXA 2 884 07/31/2021     Lab Results   Component Value Date    CHOLESTEROL 149 07/31/2021     Lab Results   Component Value Date    HDL 44 07/31/2021     Lab Results   Component Value Date    TRIG 170 (H) 07/31/2021     No results found for: LDLDIRECT

## 2021-09-15 NOTE — PATIENT INSTRUCTIONS
Goals:  Do not skip any meals! Food log (ie ) www myfitnesspal com,sparkpeople  com,loseit com,calorieking  com,etc  baritastic (use skinnytaste  com or smartphone deborah [employer] for recipes)  No sugary beverages  At least 64oz of water daily  Increase physical activity by 10 minutes daily   Gradually increase physical activity to a goal of 5 days per week for 30 minutes of MODERATE intensity PLUS 2 days per week of FULL BODY resistance training (use smartphone apps [employer], Home Workout, etc )  Goal protein 80g per day  Goal carbohydrates 80-90g per day   4965-9695 calories

## 2021-09-17 ENCOUNTER — TELEPHONE (OUTPATIENT)
Dept: NEPHROLOGY | Facility: CLINIC | Age: 56
End: 2021-09-17

## 2021-09-18 ENCOUNTER — APPOINTMENT (OUTPATIENT)
Dept: LAB | Facility: CLINIC | Age: 56
End: 2021-09-18
Payer: COMMERCIAL

## 2021-09-18 LAB
25(OH)D3 SERPL-MCNC: 51 NG/ML (ref 30–100)
ALBUMIN SERPL BCP-MCNC: 3.7 G/DL (ref 3.5–5)
ALP SERPL-CCNC: 82 U/L (ref 46–116)
ALT SERPL W P-5'-P-CCNC: 39 U/L (ref 12–78)
ANION GAP SERPL CALCULATED.3IONS-SCNC: 6 MMOL/L (ref 4–13)
AST SERPL W P-5'-P-CCNC: 29 U/L (ref 5–45)
BACTERIA UR QL AUTO: ABNORMAL /HPF
BASOPHILS # BLD AUTO: 0.05 THOUSANDS/ΜL (ref 0–0.1)
BASOPHILS NFR BLD AUTO: 1 % (ref 0–1)
BILIRUB SERPL-MCNC: 0.31 MG/DL (ref 0.2–1)
BILIRUB UR QL STRIP: NEGATIVE
BUN SERPL-MCNC: 18 MG/DL (ref 5–25)
CALCIUM SERPL-MCNC: 9.6 MG/DL (ref 8.3–10.1)
CHLORIDE SERPL-SCNC: 104 MMOL/L (ref 100–108)
CLARITY UR: CLEAR
CO2 SERPL-SCNC: 26 MMOL/L (ref 21–32)
COLOR UR: YELLOW
CREAT SERPL-MCNC: 1.23 MG/DL (ref 0.6–1.3)
CREAT UR-MCNC: 27.7 MG/DL
EOSINOPHIL # BLD AUTO: 0.15 THOUSAND/ΜL (ref 0–0.61)
EOSINOPHIL NFR BLD AUTO: 2 % (ref 0–6)
ERYTHROCYTE [DISTWIDTH] IN BLOOD BY AUTOMATED COUNT: 13.2 % (ref 11.6–15.1)
GFR SERPL CREATININE-BSD FRML MDRD: 49 ML/MIN/1.73SQ M
GLUCOSE SERPL-MCNC: 195 MG/DL (ref 65–140)
GLUCOSE UR STRIP-MCNC: ABNORMAL MG/DL
HCT VFR BLD AUTO: 45.6 % (ref 34.8–46.1)
HGB BLD-MCNC: 14.3 G/DL (ref 11.5–15.4)
HGB UR QL STRIP.AUTO: NEGATIVE
HYALINE CASTS #/AREA URNS LPF: ABNORMAL /LPF
IMM GRANULOCYTES # BLD AUTO: 0.02 THOUSAND/UL (ref 0–0.2)
IMM GRANULOCYTES NFR BLD AUTO: 0 % (ref 0–2)
KETONES UR STRIP-MCNC: NEGATIVE MG/DL
LEUKOCYTE ESTERASE UR QL STRIP: ABNORMAL
LYMPHOCYTES # BLD AUTO: 1.79 THOUSANDS/ΜL (ref 0.6–4.47)
LYMPHOCYTES NFR BLD AUTO: 26 % (ref 14–44)
MCH RBC QN AUTO: 29.9 PG (ref 26.8–34.3)
MCHC RBC AUTO-ENTMCNC: 31.4 G/DL (ref 31.4–37.4)
MCV RBC AUTO: 95 FL (ref 82–98)
MONOCYTES # BLD AUTO: 0.59 THOUSAND/ΜL (ref 0.17–1.22)
MONOCYTES NFR BLD AUTO: 9 % (ref 4–12)
NEUTROPHILS # BLD AUTO: 4.28 THOUSANDS/ΜL (ref 1.85–7.62)
NEUTS SEG NFR BLD AUTO: 62 % (ref 43–75)
NITRITE UR QL STRIP: NEGATIVE
NON-SQ EPI CELLS URNS QL MICRO: ABNORMAL /HPF
NRBC BLD AUTO-RTO: 0 /100 WBCS
PH UR STRIP.AUTO: 6 [PH]
PHOSPHATE SERPL-MCNC: 3.3 MG/DL (ref 2.7–4.5)
PLATELET # BLD AUTO: 271 THOUSANDS/UL (ref 149–390)
PMV BLD AUTO: 10.7 FL (ref 8.9–12.7)
POTASSIUM SERPL-SCNC: 4.5 MMOL/L (ref 3.5–5.3)
PROT SERPL-MCNC: 8.4 G/DL (ref 6.4–8.2)
PROT UR STRIP-MCNC: NEGATIVE MG/DL
PROT UR-MCNC: <6 MG/DL
PROT/CREAT UR: <0.22 MG/G{CREAT} (ref 0–0.1)
PTH-INTACT SERPL-MCNC: 38.9 PG/ML (ref 18.4–80.1)
RBC # BLD AUTO: 4.78 MILLION/UL (ref 3.81–5.12)
RBC #/AREA URNS AUTO: ABNORMAL /HPF
SODIUM SERPL-SCNC: 136 MMOL/L (ref 136–145)
SP GR UR STRIP.AUTO: 1.02 (ref 1–1.03)
UROBILINOGEN UR QL STRIP.AUTO: 0.2 E.U./DL
WBC # BLD AUTO: 6.88 THOUSAND/UL (ref 4.31–10.16)
WBC #/AREA URNS AUTO: ABNORMAL /HPF

## 2021-09-18 PROCEDURE — 84100 ASSAY OF PHOSPHORUS: CPT | Performed by: INTERNAL MEDICINE

## 2021-09-18 PROCEDURE — 82570 ASSAY OF URINE CREATININE: CPT | Performed by: INTERNAL MEDICINE

## 2021-09-18 PROCEDURE — 80053 COMPREHEN METABOLIC PANEL: CPT | Performed by: INTERNAL MEDICINE

## 2021-09-18 PROCEDURE — 84156 ASSAY OF PROTEIN URINE: CPT | Performed by: INTERNAL MEDICINE

## 2021-09-18 PROCEDURE — 85025 COMPLETE CBC W/AUTO DIFF WBC: CPT | Performed by: INTERNAL MEDICINE

## 2021-09-18 PROCEDURE — 82306 VITAMIN D 25 HYDROXY: CPT | Performed by: INTERNAL MEDICINE

## 2021-09-18 PROCEDURE — 36415 COLL VENOUS BLD VENIPUNCTURE: CPT | Performed by: INTERNAL MEDICINE

## 2021-09-18 PROCEDURE — 83970 ASSAY OF PARATHORMONE: CPT | Performed by: INTERNAL MEDICINE

## 2021-09-18 PROCEDURE — 81001 URINALYSIS AUTO W/SCOPE: CPT | Performed by: INTERNAL MEDICINE

## 2021-09-20 ENCOUNTER — OFFICE VISIT (OUTPATIENT)
Dept: NEPHROLOGY | Facility: CLINIC | Age: 56
End: 2021-09-20
Payer: COMMERCIAL

## 2021-09-20 VITALS
TEMPERATURE: 97.3 F | SYSTOLIC BLOOD PRESSURE: 110 MMHG | RESPIRATION RATE: 16 BRPM | HEIGHT: 65 IN | WEIGHT: 177 LBS | DIASTOLIC BLOOD PRESSURE: 64 MMHG | HEART RATE: 70 BPM | BODY MASS INDEX: 29.49 KG/M2

## 2021-09-20 DIAGNOSIS — N18.31 STAGE 3A CHRONIC KIDNEY DISEASE (HCC): Primary | ICD-10-CM

## 2021-09-20 PROCEDURE — 3008F BODY MASS INDEX DOCD: CPT | Performed by: INTERNAL MEDICINE

## 2021-09-20 PROCEDURE — 99215 OFFICE O/P EST HI 40 MIN: CPT | Performed by: INTERNAL MEDICINE

## 2021-09-20 PROCEDURE — 1036F TOBACCO NON-USER: CPT | Performed by: INTERNAL MEDICINE

## 2021-09-20 NOTE — PROGRESS NOTES
NEPHROLOGY PROGRESS NOTE    Patient: Peter Garcia               Sex: female          DOA: No admission date for patient encounter  YOB: 1965        Age:  61 y o         LOS: @IVY@  9/20/2021        BACKGROUND     23-year-old female with past medical history of diabetes mellitus type 2, proteinuria who is following up in the renal clinic    SUBJECTIVE     Patient presents to renal clinic after 6 months  Last visit was via telemedicine  Feels well  Blood sugars remain elevated and above target  REVIEW OF SYSTEMS     Review of Systems   Constitutional: Negative  HENT: Negative  Eyes: Negative  Respiratory: Negative  Cardiovascular: Negative  Gastrointestinal: Negative  Endocrine: Negative  Genitourinary: Negative  Musculoskeletal: Negative  Skin: Negative  Allergic/Immunologic: Negative  Neurological: Negative  Hematological: Negative  All other systems reviewed and are negative  OBJECTIVE     Current Weight: Weight - Scale: 80 3 kg (177 lb)  Vitals:    09/20/21 1514   BP: 110/64   Pulse: 70   Resp: 16   Temp: (!) 97 3 °F (36 3 °C)     Body mass index is 29 45 kg/m²    [unfilled]    CURRENT MEDICATIONS       Current Outpatient Medications:     ACCU-CHEK FASTCLIX LANCETS MISC, by Does not apply route daily, Disp: , Rfl:     Apple Cider Vinegar 500 MG TABS, Take by mouth, Disp: , Rfl:     atorvastatin (LIPITOR) 20 mg tablet, TAKE 1 TABLET DAILY, Disp: 90 tablet, Rfl: 3    B Complex-C (SUPER B COMPLEX PO), Take 1 tablet by mouth daily, Disp: , Rfl:     BIOTIN PO, Take 1 capsule by mouth daily, Disp: , Rfl:     Blood Glucose Monitoring Suppl (ACCU-CHEK JESSEE CONNECT) w/Device KIT, by Does not apply route, Disp: , Rfl:     cholecalciferol (VITAMIN D3) 1,000 units tablet, Take 1,000 Units by mouth 2 (two) times a week , Disp: , Rfl:     Chromium Picolinate 200 MCG CAPS, Take by mouth daily, Disp: , Rfl:     clobetasol (TEMOVATE) 0 05 % cream, Apply topically 2 (two) times a day (Patient taking differently: Apply topically as needed ), Disp: 30 g, Rfl: 11    clotrimazole-betamethasone (LOTRISONE) 1-0 05 % cream, Apply topically 2 (two) times a day (Patient taking differently: Apply topically as needed ), Disp: 30 g, Rfl: 11    gabapentin (NEURONTIN) 300 mg capsule, TAKE 1 CAPSULE THREE TIMES A DAY (Patient taking differently: daily ), Disp: 270 capsule, Rfl: 3    glucose blood test strip, by In Vitro route 4 (four) times a day, Disp: , Rfl:     Hyaluronic Acid-Vitamin C (HYALURONIC ACID PO), Take 100 mg by mouth daily, Disp: , Rfl:     Invokana 300 MG TABS, TAKE 1 TABLET DAILY, Disp: 90 tablet, Rfl: 3    Januvia 100 MG tablet, TAKE 1 TABLET DAILY, Disp: 90 tablet, Rfl: 3    lisinopril (ZESTRIL) 5 mg tablet, Take 1 tablet (5 mg total) by mouth daily, Disp: 90 tablet, Rfl: 5    metFORMIN (GLUCOPHAGE) 1000 MG tablet, TAKE 1 TABLET TWICE A DAY, Disp: 180 tablet, Rfl: 3    Multiple Vitamin (DAILY VALUE MULTIVITAMIN) TABS, Take by mouth, Disp: , Rfl:     Omega-3 Fatty Acids (FISH OIL) 1,000 mg, Take 1,000 mg by mouth daily, Disp: , Rfl:     omeprazole (PriLOSEC) 20 mg delayed release capsule, TAKE 1 CAPSULE EVERY MORNING BEFORE BREAKFAST, Disp: 90 capsule, Rfl: 3    Saw Palmetto 160 MG CAPS, Take 250 mg by mouth, Disp: , Rfl:     Turmeric 500 MG CAPS, Take by mouth, Disp: , Rfl:     zinc gluconate 50 mg tablet, Take 50 mg by mouth daily, Disp: , Rfl:       PHYSICAL EXAMINATION     Physical Exam  Constitutional:       Appearance: She is well-developed  HENT:      Head: Normocephalic and atraumatic  Eyes:      Pupils: Pupils are equal, round, and reactive to light  Cardiovascular:      Rate and Rhythm: Normal rate and regular rhythm  Heart sounds: Normal heart sounds  Pulmonary:      Effort: Pulmonary effort is normal    Abdominal:      General: Bowel sounds are normal       Palpations: Abdomen is soft     Musculoskeletal: General: Normal range of motion  Cervical back: Neck supple  Skin:     General: Skin is warm  Neurological:      Mental Status: She is alert and oriented to person, place, and time  LAB RESULTS     Results from last 7 days   Lab Units 09/18/21  0950   WBC Thousand/uL 6 88   HEMOGLOBIN g/dL 14 3   HEMATOCRIT % 45 6   PLATELETS Thousands/uL 271   POTASSIUM mmol/L 4 5   CHLORIDE mmol/L 104   CO2 mmol/L 26   BUN mg/dL 18   CREATININE mg/dL 1 23   EGFR ml/min/1 73sq m 49   CALCIUM mg/dL 9 6   PHOSPHORUS mg/dL 3 3           RADIOLOGY RESULTS      Reviewed    ASSESSMENT/PLAN     58-year-old female with past medical history of diabetes mellitus type 2, chronic kidney disease stage 3 who is following up in renal Clinic for management of CKD  1  Chronic kidney disease stage 3a:  Etiology likely diabetic nephropathy and hypertensive nephrosclerosis  Recent labs revealed serum creatinine 1 2 with estimated GFR of 49 which is at baseline  2  Proteinuria:  Patient is on lisinopril 5 mg once daily  She has 220 mg of proteinuria  On Invokana as well known to lower proteinuria  3  Secondary hyperparathyroidism of renal origin:  Vitamin-D levels are within normal limits  Calcium levels are 9 6 and normal as well  25 OH vit D is within acceptable range  4  Anemia due to chronic kidney disease:  Patient's hemoglobin is on target  Target hemoglobin for patient with CKD is 9 5-11 grams/deciliter  5  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:  Patient blood pressure is on target in the office today at 502 mm Hg systolic  She is maintained on lisinopril 5 mg once daily  Target blood pressure for this patient is 130/80  6  Diabetes mellitus in CKD:  Patient noted to be on Januvia, Invokana and metformin  Instructed patient to check her blood sugar daily  7  Nutrition:  Low-potassium, moderate protein and low-salt diet recommended  8  Hypertriglyceridemia:  Patient noted to be on Omega 3 fatty acids  Return in 6 months with CBC, BMP, phos, calcium, vitamin-D, urinalysis, urine protein creatinine ratio        Luis Henderson MD  Nephrology  9/20/2021

## 2021-10-11 ENCOUNTER — ANNUAL EXAM (OUTPATIENT)
Dept: OBGYN CLINIC | Facility: CLINIC | Age: 56
End: 2021-10-11
Payer: COMMERCIAL

## 2021-10-11 ENCOUNTER — OFFICE VISIT (OUTPATIENT)
Dept: BARIATRICS | Facility: CLINIC | Age: 56
End: 2021-10-11

## 2021-10-11 VITALS — BODY MASS INDEX: 29.99 KG/M2 | WEIGHT: 180 LBS | HEIGHT: 65 IN

## 2021-10-11 DIAGNOSIS — Z12.31 SCREENING MAMMOGRAM FOR BREAST CANCER: ICD-10-CM

## 2021-10-11 DIAGNOSIS — R39.9 URINARY SYMPTOM OR SIGN: ICD-10-CM

## 2021-10-11 DIAGNOSIS — Z78.0 ASYMPTOMATIC POSTMENOPAUSAL STATUS: ICD-10-CM

## 2021-10-11 DIAGNOSIS — R63.5 ABNORMAL WEIGHT GAIN: ICD-10-CM

## 2021-10-11 DIAGNOSIS — Z01.411 ENCOUNTER FOR GYNECOLOGICAL EXAMINATION WITH ABNORMAL FINDING: Primary | ICD-10-CM

## 2021-10-11 PROBLEM — R87.619 ATYPICAL GLANDULAR CELLS OF UNDETERMINED SIGNIFICANCE (AGUS) ON CERVICAL PAP SMEAR: Status: RESOLVED | Noted: 2019-09-18 | Resolved: 2021-10-11

## 2021-10-11 PROBLEM — Z01.419 ENCOUNTER FOR GYNECOLOGICAL EXAMINATION WITHOUT ABNORMAL FINDING: Status: RESOLVED | Noted: 2019-08-14 | Resolved: 2021-10-11

## 2021-10-11 PROBLEM — N95.1 MENOPAUSAL SYMPTOMS: Status: RESOLVED | Noted: 2019-08-14 | Resolved: 2021-10-11

## 2021-10-11 PROBLEM — N92.6 IRREGULAR BLEEDING: Status: RESOLVED | Noted: 2017-10-24 | Resolved: 2021-10-11

## 2021-10-11 PROCEDURE — G0476 HPV COMBO ASSAY CA SCREEN: HCPCS | Performed by: NURSE PRACTITIONER

## 2021-10-11 PROCEDURE — WMPRO12

## 2021-10-11 PROCEDURE — G0145 SCR C/V CYTO,THINLAYER,RESCR: HCPCS | Performed by: NURSE PRACTITIONER

## 2021-10-11 PROCEDURE — S0612 ANNUAL GYNECOLOGICAL EXAMINA: HCPCS | Performed by: NURSE PRACTITIONER

## 2021-10-11 PROCEDURE — RECHECK

## 2021-10-11 PROCEDURE — 87086 URINE CULTURE/COLONY COUNT: CPT | Performed by: NURSE PRACTITIONER

## 2021-10-11 RX ORDER — IBUPROFEN 800 MG/1
800 TABLET ORAL AS NEEDED
COMMUNITY
Start: 2021-09-26

## 2021-10-13 LAB — BACTERIA UR CULT: NORMAL

## 2021-10-14 VITALS — BODY MASS INDEX: 29.89 KG/M2 | HEIGHT: 65 IN | WEIGHT: 179.4 LBS

## 2021-10-15 LAB
LAB AP GYN PRIMARY INTERPRETATION: NORMAL
Lab: NORMAL

## 2021-10-20 ENCOUNTER — CLINICAL SUPPORT (OUTPATIENT)
Dept: BARIATRICS | Facility: CLINIC | Age: 56
End: 2021-10-20

## 2021-10-20 VITALS — WEIGHT: 177.8 LBS | HEIGHT: 65 IN | BODY MASS INDEX: 29.62 KG/M2

## 2021-10-20 DIAGNOSIS — R63.5 ABNORMAL WEIGHT GAIN: Primary | ICD-10-CM

## 2021-10-20 PROCEDURE — RECHECK

## 2021-10-26 ENCOUNTER — OFFICE VISIT (OUTPATIENT)
Dept: BARIATRICS | Facility: CLINIC | Age: 56
End: 2021-10-26

## 2021-10-26 DIAGNOSIS — R63.5 ABNORMAL WEIGHT GAIN: Primary | ICD-10-CM

## 2021-10-26 PROCEDURE — RECHECK

## 2021-10-27 ENCOUNTER — CLINICAL SUPPORT (OUTPATIENT)
Dept: BARIATRICS | Facility: CLINIC | Age: 56
End: 2021-10-27

## 2021-10-27 VITALS — WEIGHT: 177.4 LBS | HEIGHT: 65 IN | BODY MASS INDEX: 29.56 KG/M2

## 2021-10-27 DIAGNOSIS — R63.5 ABNORMAL WEIGHT GAIN: Primary | ICD-10-CM

## 2021-10-27 PROCEDURE — 3008F BODY MASS INDEX DOCD: CPT | Performed by: INTERNAL MEDICINE

## 2021-10-27 PROCEDURE — RECHECK

## 2021-11-03 ENCOUNTER — CLINICAL SUPPORT (OUTPATIENT)
Dept: BARIATRICS | Facility: CLINIC | Age: 56
End: 2021-11-03

## 2021-11-03 VITALS — WEIGHT: 174.4 LBS | HEIGHT: 65 IN | BODY MASS INDEX: 29.06 KG/M2

## 2021-11-03 DIAGNOSIS — R63.5 ABNORMAL WEIGHT GAIN: Primary | ICD-10-CM

## 2021-11-03 PROCEDURE — RECHECK

## 2021-11-10 ENCOUNTER — OFFICE VISIT (OUTPATIENT)
Dept: INTERNAL MEDICINE CLINIC | Facility: CLINIC | Age: 56
End: 2021-11-10
Payer: COMMERCIAL

## 2021-11-10 VITALS
HEIGHT: 65 IN | BODY MASS INDEX: 29.22 KG/M2 | WEIGHT: 175.4 LBS | DIASTOLIC BLOOD PRESSURE: 62 MMHG | HEART RATE: 82 BPM | RESPIRATION RATE: 16 BRPM | SYSTOLIC BLOOD PRESSURE: 102 MMHG | TEMPERATURE: 97 F | OXYGEN SATURATION: 97 %

## 2021-11-10 DIAGNOSIS — Z23 ENCOUNTER FOR IMMUNIZATION: Primary | ICD-10-CM

## 2021-11-10 DIAGNOSIS — I10 ESSENTIAL HYPERTENSION: ICD-10-CM

## 2021-11-10 DIAGNOSIS — N18.2 TYPE 2 DIABETES MELLITUS WITH STAGE 2 CHRONIC KIDNEY DISEASE, WITHOUT LONG-TERM CURRENT USE OF INSULIN (HCC): ICD-10-CM

## 2021-11-10 DIAGNOSIS — E11.22 TYPE 2 DIABETES MELLITUS WITH STAGE 2 CHRONIC KIDNEY DISEASE, WITHOUT LONG-TERM CURRENT USE OF INSULIN (HCC): ICD-10-CM

## 2021-11-10 PROCEDURE — 3066F NEPHROPATHY DOC TX: CPT | Performed by: INTERNAL MEDICINE

## 2021-11-10 PROCEDURE — 99213 OFFICE O/P EST LOW 20 MIN: CPT | Performed by: INTERNAL MEDICINE

## 2021-11-10 PROCEDURE — 90682 RIV4 VACC RECOMBINANT DNA IM: CPT | Performed by: INTERNAL MEDICINE

## 2021-11-10 PROCEDURE — 1036F TOBACCO NON-USER: CPT | Performed by: INTERNAL MEDICINE

## 2021-11-10 PROCEDURE — 90471 IMMUNIZATION ADMIN: CPT | Performed by: INTERNAL MEDICINE

## 2021-11-10 RX ORDER — CYCLOBENZAPRINE HCL 10 MG
TABLET ORAL
COMMUNITY
Start: 2021-09-26

## 2021-11-10 RX ORDER — LISINOPRIL 5 MG/1
2.5 TABLET ORAL DAILY
Qty: 90 TABLET | Refills: 5 | Status: SHIPPED | OUTPATIENT
Start: 2021-11-10 | End: 2021-11-17 | Stop reason: SDUPTHER

## 2021-11-16 ENCOUNTER — OFFICE VISIT (OUTPATIENT)
Dept: BARIATRICS | Facility: CLINIC | Age: 56
End: 2021-11-16

## 2021-11-16 VITALS
BODY MASS INDEX: 29.67 KG/M2 | DIASTOLIC BLOOD PRESSURE: 60 MMHG | HEART RATE: 77 BPM | TEMPERATURE: 97.1 F | HEIGHT: 65 IN | WEIGHT: 178.1 LBS | SYSTOLIC BLOOD PRESSURE: 108 MMHG | RESPIRATION RATE: 12 BRPM

## 2021-11-16 DIAGNOSIS — E66.3 OVERWEIGHT: ICD-10-CM

## 2021-11-16 DIAGNOSIS — E78.1 HYPERTRIGLYCERIDEMIA: ICD-10-CM

## 2021-11-16 DIAGNOSIS — E11.22 TYPE 2 DIABETES MELLITUS WITH STAGE 2 CHRONIC KIDNEY DISEASE, WITHOUT LONG-TERM CURRENT USE OF INSULIN (HCC): ICD-10-CM

## 2021-11-16 DIAGNOSIS — N18.2 TYPE 2 DIABETES MELLITUS WITH STAGE 2 CHRONIC KIDNEY DISEASE, WITHOUT LONG-TERM CURRENT USE OF INSULIN (HCC): ICD-10-CM

## 2021-11-16 DIAGNOSIS — R63.5 ABNORMAL WEIGHT GAIN: ICD-10-CM

## 2021-11-16 DIAGNOSIS — IMO0002 UNCONTROLLED DIABETES MELLITUS: Primary | ICD-10-CM

## 2021-11-16 PROCEDURE — 99214 OFFICE O/P EST MOD 30 MIN: CPT | Performed by: INTERNAL MEDICINE

## 2021-11-17 ENCOUNTER — CLINICAL SUPPORT (OUTPATIENT)
Dept: BARIATRICS | Facility: CLINIC | Age: 56
End: 2021-11-17

## 2021-11-17 VITALS — WEIGHT: 177.6 LBS | BODY MASS INDEX: 29.59 KG/M2 | HEIGHT: 65 IN

## 2021-11-17 DIAGNOSIS — I10 ESSENTIAL HYPERTENSION: ICD-10-CM

## 2021-11-17 DIAGNOSIS — R63.5 ABNORMAL WEIGHT GAIN: Primary | ICD-10-CM

## 2021-11-17 PROCEDURE — 3008F BODY MASS INDEX DOCD: CPT | Performed by: INTERNAL MEDICINE

## 2021-11-17 PROCEDURE — RECHECK

## 2021-11-17 PROCEDURE — 4010F ACE/ARB THERAPY RXD/TAKEN: CPT | Performed by: INTERNAL MEDICINE

## 2021-11-17 RX ORDER — LISINOPRIL 5 MG/1
2.5 TABLET ORAL DAILY
Qty: 90 TABLET | Refills: 5 | Status: SHIPPED | OUTPATIENT
Start: 2021-11-17

## 2021-12-08 ENCOUNTER — CLINICAL SUPPORT (OUTPATIENT)
Dept: BARIATRICS | Facility: CLINIC | Age: 56
End: 2021-12-08

## 2021-12-08 VITALS — WEIGHT: 177.4 LBS | BODY MASS INDEX: 29.56 KG/M2 | HEIGHT: 65 IN

## 2021-12-08 DIAGNOSIS — R63.5 ABNORMAL WEIGHT GAIN: Primary | ICD-10-CM

## 2021-12-08 PROCEDURE — RECHECK

## 2021-12-15 ENCOUNTER — CLINICAL SUPPORT (OUTPATIENT)
Dept: BARIATRICS | Facility: CLINIC | Age: 56
End: 2021-12-15

## 2021-12-15 ENCOUNTER — TELEPHONE (OUTPATIENT)
Dept: BARIATRICS | Facility: CLINIC | Age: 56
End: 2021-12-15

## 2021-12-15 VITALS — BODY MASS INDEX: 29.66 KG/M2 | HEIGHT: 65 IN | WEIGHT: 178 LBS

## 2021-12-15 DIAGNOSIS — R63.5 ABNORMAL WEIGHT GAIN: Primary | ICD-10-CM

## 2021-12-15 DIAGNOSIS — E11.65 UNCONTROLLED TYPE 2 DIABETES MELLITUS WITH HYPERGLYCEMIA (HCC): Primary | ICD-10-CM

## 2021-12-15 PROCEDURE — 3008F BODY MASS INDEX DOCD: CPT | Performed by: INTERNAL MEDICINE

## 2021-12-15 PROCEDURE — RECHECK

## 2021-12-16 DIAGNOSIS — E11.65 UNCONTROLLED TYPE 2 DIABETES MELLITUS WITH HYPERGLYCEMIA (HCC): Primary | ICD-10-CM

## 2021-12-16 DIAGNOSIS — R63.5 ABNORMAL WEIGHT GAIN: ICD-10-CM

## 2021-12-16 RX ORDER — EMPAGLIFLOZIN 10 MG/1
10 TABLET, FILM COATED ORAL EVERY MORNING
Qty: 90 TABLET | Refills: 1 | Status: SHIPPED | OUTPATIENT
Start: 2021-12-16 | End: 2022-05-27

## 2021-12-16 RX ORDER — ORAL SEMAGLUTIDE 7 MG/1
TABLET ORAL
Qty: 90 TABLET | Refills: 1 | Status: SHIPPED | OUTPATIENT
Start: 2021-12-16 | End: 2022-02-23

## 2022-01-05 ENCOUNTER — CLINICAL SUPPORT (OUTPATIENT)
Dept: BARIATRICS | Facility: CLINIC | Age: 57
End: 2022-01-05

## 2022-01-05 VITALS — HEIGHT: 65 IN | WEIGHT: 175.6 LBS | BODY MASS INDEX: 29.26 KG/M2

## 2022-01-05 DIAGNOSIS — R63.5 ABNORMAL WEIGHT GAIN: Primary | ICD-10-CM

## 2022-01-05 PROCEDURE — RECHECK

## 2022-01-06 DIAGNOSIS — R12 HEARTBURN: ICD-10-CM

## 2022-01-06 RX ORDER — OMEPRAZOLE 20 MG/1
CAPSULE, DELAYED RELEASE ORAL
Qty: 90 CAPSULE | Refills: 3 | Status: SHIPPED | OUTPATIENT
Start: 2022-01-06

## 2022-01-12 ENCOUNTER — CLINICAL SUPPORT (OUTPATIENT)
Dept: BARIATRICS | Facility: CLINIC | Age: 57
End: 2022-01-12

## 2022-01-12 VITALS — WEIGHT: 175.6 LBS | HEIGHT: 65 IN | BODY MASS INDEX: 29.26 KG/M2

## 2022-01-12 DIAGNOSIS — R63.5 ABNORMAL WEIGHT GAIN: Primary | ICD-10-CM

## 2022-01-12 PROCEDURE — RECHECK

## 2022-01-24 ENCOUNTER — APPOINTMENT (OUTPATIENT)
Dept: LAB | Facility: MEDICAL CENTER | Age: 57
End: 2022-01-24
Payer: COMMERCIAL

## 2022-01-24 DIAGNOSIS — E11.65 UNCONTROLLED TYPE 2 DIABETES MELLITUS WITH HYPERGLYCEMIA (HCC): ICD-10-CM

## 2022-01-24 LAB
EST. AVERAGE GLUCOSE BLD GHB EST-MCNC: 192 MG/DL
HBA1C MFR BLD: 8.3 %

## 2022-01-24 PROCEDURE — 36415 COLL VENOUS BLD VENIPUNCTURE: CPT

## 2022-01-24 PROCEDURE — 3052F HG A1C>EQUAL 8.0%<EQUAL 9.0%: CPT | Performed by: INTERNAL MEDICINE

## 2022-01-24 PROCEDURE — 83036 HEMOGLOBIN GLYCOSYLATED A1C: CPT

## 2022-01-25 ENCOUNTER — OFFICE VISIT (OUTPATIENT)
Dept: BARIATRICS | Facility: CLINIC | Age: 57
End: 2022-01-25
Payer: COMMERCIAL

## 2022-01-25 VITALS
HEIGHT: 65 IN | DIASTOLIC BLOOD PRESSURE: 68 MMHG | WEIGHT: 172.2 LBS | RESPIRATION RATE: 13 BRPM | HEART RATE: 83 BPM | BODY MASS INDEX: 28.69 KG/M2 | SYSTOLIC BLOOD PRESSURE: 138 MMHG | TEMPERATURE: 98.3 F

## 2022-01-25 DIAGNOSIS — E11.22 TYPE 2 DIABETES MELLITUS WITH STAGE 2 CHRONIC KIDNEY DISEASE, WITHOUT LONG-TERM CURRENT USE OF INSULIN (HCC): ICD-10-CM

## 2022-01-25 DIAGNOSIS — E66.3 OVERWEIGHT: Primary | ICD-10-CM

## 2022-01-25 DIAGNOSIS — N18.2 TYPE 2 DIABETES MELLITUS WITH STAGE 2 CHRONIC KIDNEY DISEASE, WITHOUT LONG-TERM CURRENT USE OF INSULIN (HCC): ICD-10-CM

## 2022-01-25 DIAGNOSIS — IMO0002 UNCONTROLLED DIABETES MELLITUS: ICD-10-CM

## 2022-01-25 DIAGNOSIS — E78.2 ELEVATED TRIGLYCERIDES WITH HIGH CHOLESTEROL: ICD-10-CM

## 2022-01-25 DIAGNOSIS — R63.5 ABNORMAL WEIGHT GAIN: ICD-10-CM

## 2022-01-25 PROCEDURE — 3075F SYST BP GE 130 - 139MM HG: CPT | Performed by: INTERNAL MEDICINE

## 2022-01-25 PROCEDURE — 3078F DIAST BP <80 MM HG: CPT | Performed by: INTERNAL MEDICINE

## 2022-01-25 PROCEDURE — 99214 OFFICE O/P EST MOD 30 MIN: CPT | Performed by: INTERNAL MEDICINE

## 2022-01-25 PROCEDURE — 1036F TOBACCO NON-USER: CPT | Performed by: INTERNAL MEDICINE

## 2022-01-25 PROCEDURE — 3066F NEPHROPATHY DOC TX: CPT | Performed by: INTERNAL MEDICINE

## 2022-01-25 NOTE — PROGRESS NOTES
Assessment/Plan:  Wyatt Chou was seen today for follow-up  Diagnoses and all orders for this visit:    Uncontrolled diabetes mellitus ([address] )  Type 2 diabetes with CKD  A1C:   Lab Results   Component Value Date    HGBA1C 8 3 (H) 01/24/2022    HGBA1C 5 7 (H) 11/07/2015   Currently on: metformin, Jardiance, Invokana  Rybelsus-- will increase to 14mg  Weight loss/low carbohydrate diet should help improve blood glucose levels    Hypertriglyceridemia  Increase activity level to 150 minutes of moderate to intense exercise per week  Weight loss should help improve the lipid levels  Avoid foods with trans fat, limit saturated fats and refined carbohydrates  Increase fish/omega 3 consumption    Overweight  Abnormal weight gain  Was on Bydureon before- had cellulitis on her leg, bloating/nausea  Felt uncomfortable while on it   Previous hx of pancreatitis due to gallstones s/p cholecystectomy in 1998  Will continue Rybelsus- increase to 14 mg    Goals:  Finish healthy core program  Exercise 3x wk at least   Pt signed up for [employer] with [employer]       Total time spent: 30 minutes with >50% face-to-face time with the patient  Follow up in approximately 2 months with Non-Surgical Physician/Advanced Practitioner  Subjective:   Chief Complaint   Patient presents with    Follow-up       Patient ID: Heide Peng  is a 61 y o  female with excess weight/obesity here to pursue weight management  Patient is pursuing HealthyCORE-Intensive Lifestyle Intervention Program    Most recent notes and records were reviewed      HPI  -Initial weight loss goal of 5-10% weight loss for improved health  Wt Readings from Last 10 Encounters:   01/25/22 78 1 kg (172 lb 3 2 oz)   01/12/22 79 7 kg (175 lb 9 6 oz)   01/05/22 79 7 kg (175 lb 9 6 oz)   12/15/21 80 7 kg (178 lb)   12/08/21 80 5 kg (177 lb 6 4 oz)   11/17/21 80 6 kg (177 lb 9 6 oz)   11/16/21 80 8 kg (178 lb 1 6 oz)   11/10/21 79 6 kg (175 lb 6 4 oz)   11/03/21 79 1 kg (174 lb 6 4 oz)   10/27/21 80 5 kg (177 lb 6 4 oz)     Initial weight 9/2021: 179 lbs  Current weight: 172 lbs  Change in weight: -7 lbs  Goal:  Started on Rybelsus in 11/2021- now at [address]  No side effects other than fullness-- less portions  Also switched Invokana to Jardiance in 12/2021 as her insurance coverage changed  Discontinued Januvia  A1c was rechecked recently, which was 8 3 and the same as 7/2021  Glucose averaging 130s now in AM; which has improved since starting Rybelsus   Eats around 7275-4882 calories   B- chobani yogurt  L- salad  D- same   Tries to eat light  Snacking less at night - 1/2 a cookie  Being more conscientious  Drinks- 64 oz  Alcohol- no  Exercise- 2x wk   Tries to walk more at work and play pickle ball       The following portions of the patient's history were reviewed and updated as appropriate: allergies, current medications, past family history, past medical history, past social history, past surgical history, and problem list     Review of Systems   Cardiovascular: Negative  Gastrointestinal: Negative  Objective:  /68 (BP Location: Left arm, Patient Position: Sitting, Cuff Size: Standard)   Pulse 83   Temp 98 3 °F (36 8 °C) (Tympanic)   Resp 13   Ht 5' 4 5" (1 638 m)   Wt 78 1 kg (172 lb 3 2 oz)   LMP  (LMP Unknown)   BMI 29 10 kg/m²   Constitutional: Well-developed, well-nourished and obese Body mass index is 29 1 kg/m²  Dara Soulier HEENT: No conjunctival pallor or jaundice  Pulmonary: No increased work of breathing or signs of respiratory distress  CV: Normal rate, well-perfused  GI: Obese  Non-distended  MSK: No edema   Neuro: Oriented to person, place and time  Normal Speech  Normal gait  Psych: Normal affect and mood       Labs and Imaging  Most recent labs and imaging reviewed

## 2022-01-26 ENCOUNTER — CLINICAL SUPPORT (OUTPATIENT)
Dept: BARIATRICS | Facility: CLINIC | Age: 57
End: 2022-01-26

## 2022-01-26 VITALS — WEIGHT: 172 LBS | BODY MASS INDEX: 28.66 KG/M2 | HEIGHT: 65 IN

## 2022-01-26 DIAGNOSIS — R63.5 ABNORMAL WEIGHT GAIN: Primary | ICD-10-CM

## 2022-01-26 PROCEDURE — 3008F BODY MASS INDEX DOCD: CPT | Performed by: INTERNAL MEDICINE

## 2022-01-26 PROCEDURE — RECHECK

## 2022-02-02 ENCOUNTER — CLINICAL SUPPORT (OUTPATIENT)
Dept: BARIATRICS | Facility: CLINIC | Age: 57
End: 2022-02-02

## 2022-02-02 VITALS — HEIGHT: 65 IN | BODY MASS INDEX: 28.66 KG/M2 | WEIGHT: 172 LBS

## 2022-02-02 DIAGNOSIS — R63.5 ABNORMAL WEIGHT GAIN: Primary | ICD-10-CM

## 2022-02-02 PROCEDURE — RECHECK

## 2022-02-09 ENCOUNTER — CLINICAL SUPPORT (OUTPATIENT)
Dept: BARIATRICS | Facility: CLINIC | Age: 57
End: 2022-02-09

## 2022-02-09 ENCOUNTER — OFFICE VISIT (OUTPATIENT)
Dept: BARIATRICS | Facility: CLINIC | Age: 57
End: 2022-02-09

## 2022-02-09 VITALS — BODY MASS INDEX: 28.22 KG/M2 | WEIGHT: 169.4 LBS | HEIGHT: 65 IN

## 2022-02-09 DIAGNOSIS — R63.5 ABNORMAL WEIGHT GAIN: Primary | ICD-10-CM

## 2022-02-09 PROCEDURE — RECHECK

## 2022-02-09 NOTE — PROGRESS NOTES
Weight Management Medical Nutrition Assessment  Gemini Delong was here today for her month 3 follow-up and REE in the [facility] Program  Pepper Valle she weighs 169 6 lbs giving her a loss of 2 lbs since class last week  Reevue indirect calorimter revealed REE is  (+31 fast) compared to the predictive normal for someone her same age, height, and gender  She is no longer skipping meals, and is limiting her carb intake  She has been food logging and increasing her water intake  She has not been exercising, but knows that would be beneficial not just for her weight loss, but for her overall health as well       Patient seen by Medical Provider in past 6 months:  yes  Requested to schedule appointment with Medical Provider: No        Anthropometric Measurements  Start Weight (#): 179 4  Current Weight (#): 169 6  TBW % Change from start weight:4%  Ideal Body Weight (#):125  Goal Weight (#): 150     Weight Loss History  Previous weight loss attempts: Commercial Programs (Weight Watchers, Tapan Hammer, etc )     Food and Nutrition Related History  Wake up: 4:30 - 5 am  (work week)  Bed Time:  10:30 - 11 pm     Food Recall  Breakfast:coffee, banana      Snack: fruit if needed  Lunch:   Snack: not usually  Dinner: smart one with salad or fish and vegetables   Snack: ice cream, cookies        Beverages: water and coffee/tea  Volume of beverage intake: 48 - 68 oz     Weekends: Same  Cravings: sweets/salty  Trouble area of day: after dinner     Frequency of Eating out: 1 - 2 times per week, chinese,   Food restrictions:none  Cooking: self   Food Shopping: self     Physical Activity Intake  Activity:none currently  Frequency:infrequently  Physical limitations/barriers to exercise: none     Estimated Needs  Energy     Bear Left Hand Energy Needs:  BMR : 1380  0# loss weekly sedentary:  1180          1-2# loss weekly lightly active:924 - 1424  Maintenance calories for sedentary activity level: 1680  Protein:68 - 85      (1 2-1 5g/kg IBW)  Fluid: 66    (35mL/kg IBW)     Nutrition Diagnosis  Yes;    Overweight/obesity  related to Excess energy intake as evidenced by  BMI more than normative standard for age and sex (overweight 25-29  9)     Nutrition Intervention     Nutrition Prescription  Calories:1000  Protein:68 - 80  Fluid:66        Nutrition Education:    Healthy Core Manual  Calorie controlled menu  Lean protein food choices  Healthy snack options  Food journaling tips        Nutrition Counseling:  Strategies: meal planning, portion sizes, healthy snack choices, hydration, fiber intake, protein intake, exercise, food journal        Monitoring and Evaluation:  Evaluation criteria:  Energy Intake  Meet protein needs  Maintain adequate hydration  Monitor weekly weight  Meal planning/preparation  Food journal   Decreased portions at mealtimes and snacks  Physical activity      Barriers to learning:none  Readiness to change: Action:  (Changing behavior)  Comprehension: good  Expected Compliance: good

## 2022-02-10 ENCOUNTER — APPOINTMENT (OUTPATIENT)
Dept: LAB | Facility: CLINIC | Age: 57
End: 2022-02-10
Payer: COMMERCIAL

## 2022-02-10 ENCOUNTER — TELEPHONE (OUTPATIENT)
Dept: NEPHROLOGY | Facility: CLINIC | Age: 57
End: 2022-02-10

## 2022-02-10 ENCOUNTER — OFFICE VISIT (OUTPATIENT)
Dept: INTERNAL MEDICINE CLINIC | Facility: CLINIC | Age: 57
End: 2022-02-10
Payer: COMMERCIAL

## 2022-02-10 VITALS
OXYGEN SATURATION: 97 % | RESPIRATION RATE: 18 BRPM | WEIGHT: 169.8 LBS | DIASTOLIC BLOOD PRESSURE: 74 MMHG | HEIGHT: 64 IN | TEMPERATURE: 97.6 F | HEART RATE: 81 BPM | SYSTOLIC BLOOD PRESSURE: 118 MMHG | BODY MASS INDEX: 28.99 KG/M2

## 2022-02-10 DIAGNOSIS — N30.90 CYSTITIS: ICD-10-CM

## 2022-02-10 DIAGNOSIS — N30.90 CYSTITIS: Primary | ICD-10-CM

## 2022-02-10 LAB
BACTERIA UR QL AUTO: ABNORMAL /HPF
BILIRUB UR QL STRIP: NEGATIVE
CLARITY UR: CLEAR
COLOR UR: YELLOW
GLUCOSE UR STRIP-MCNC: ABNORMAL MG/DL
HGB UR QL STRIP.AUTO: ABNORMAL
KETONES UR STRIP-MCNC: NEGATIVE MG/DL
LEUKOCYTE ESTERASE UR QL STRIP: ABNORMAL
NITRITE UR QL STRIP: NEGATIVE
NON-SQ EPI CELLS URNS QL MICRO: ABNORMAL /HPF
PH UR STRIP.AUTO: 6 [PH]
PROT UR STRIP-MCNC: NEGATIVE MG/DL
RBC #/AREA URNS AUTO: ABNORMAL /HPF
SP GR UR STRIP.AUTO: 1.01 (ref 1–1.03)
UROBILINOGEN UR QL STRIP.AUTO: 0.2 E.U./DL
WBC #/AREA URNS AUTO: ABNORMAL /HPF

## 2022-02-10 PROCEDURE — 99214 OFFICE O/P EST MOD 30 MIN: CPT | Performed by: NURSE PRACTITIONER

## 2022-02-10 PROCEDURE — 3008F BODY MASS INDEX DOCD: CPT | Performed by: NURSE PRACTITIONER

## 2022-02-10 PROCEDURE — 3074F SYST BP LT 130 MM HG: CPT | Performed by: NURSE PRACTITIONER

## 2022-02-10 PROCEDURE — 1036F TOBACCO NON-USER: CPT | Performed by: NURSE PRACTITIONER

## 2022-02-10 PROCEDURE — 87086 URINE CULTURE/COLONY COUNT: CPT

## 2022-02-10 PROCEDURE — 3725F SCREEN DEPRESSION PERFORMED: CPT | Performed by: NURSE PRACTITIONER

## 2022-02-10 PROCEDURE — 3078F DIAST BP <80 MM HG: CPT | Performed by: NURSE PRACTITIONER

## 2022-02-10 PROCEDURE — 81001 URINALYSIS AUTO W/SCOPE: CPT

## 2022-02-10 RX ORDER — SULFAMETHOXAZOLE AND TRIMETHOPRIM 800; 160 MG/1; MG/1
1 TABLET ORAL 2 TIMES DAILY
Qty: 6 TABLET | Refills: 0 | Status: SHIPPED | OUTPATIENT
Start: 2022-02-10 | End: 2022-02-13

## 2022-02-10 NOTE — ASSESSMENT & PLAN NOTE
Patient started yesterday with symptoms of pelvic pressure, burning with urination, frequency  She had amoxicillin at home and took 1 dose  Urinalysis and culture have been ordered for the patient  Bactrim was sent to her pharmacy  Information was provided to the patient regarding urinary tract infections  Side effects of the Bactrim were discussed    I will contact her with the final culture results

## 2022-02-10 NOTE — PROGRESS NOTES
[facility]    NAME: Erica Fox  AGE: 61 y o  SEX: female  : 1965     DATE: 2/10/2022     Assessment and Plan:     Problem List Items Addressed This Visit        Genitourinary    Cystitis - Primary      Patient started yesterday with symptoms of pelvic pressure, burning with urination, frequency  She had amoxicillin at home and took 1 dose  Urinalysis and culture have been ordered for the patient  Bactrim was sent to her pharmacy  Information was provided to the patient regarding urinary tract infections  Side effects of the Bactrim were discussed  I will contact her with the final culture results         Relevant Medications    sulfamethoxazole-trimethoprim (BACTRIM DS) 800-160 mg per tablet    Other Relevant Orders    UA w Reflex to Microscopic w Reflex to Culture -Lab Collect    Urine culture              No follow-ups on file  Chief Complaint:     Chief Complaint   Patient presents with    Follow-up     UTI, frequent urination, burning sensation, chills        History of Present Illness:      the patient presents to the office today with symptoms of urinary tract infection  This is discussed in the above assessment plan  Review of Systems:     Review of Systems   Constitutional: Negative for activity change, fatigue and fever  HENT: Negative for congestion, hearing loss, rhinorrhea, trouble swallowing and voice change  Eyes: Negative for photophobia, pain, discharge and visual disturbance  Respiratory: Negative for cough, chest tightness and shortness of breath  Cardiovascular: Negative for chest pain, palpitations and leg swelling  Gastrointestinal: Negative for abdominal pain, blood in stool, constipation, nausea and vomiting  Endocrine: Negative for cold intolerance and heat intolerance  Genitourinary: Positive for dysuria, frequency and pelvic pain   Negative for difficulty urinating, hematuria, urgency, vaginal bleeding and vaginal discharge  Musculoskeletal: Negative for arthralgias and myalgias  Skin: Negative  Neurological: Negative for dizziness, weakness, numbness and headaches  Psychiatric/Behavioral: Negative for decreased concentration  The patient is not nervous/anxious  Problem List:     Patient Active Problem List   Diagnosis    Adjustment disorder with anxiety    Elevated LFTs    Hyperlipidemia    Hypertension    Tobacco abuse    Dense breasts    Ovarian cyst    Obesity, Class I, BMI 30-34 9    Abnormal weight gain    Chronic kidney disease, stage 2 (mild)    TMJ arthritis    Allergic reaction caused by a drug    Dysuria    Type 2 diabetes mellitus with stage 2 chronic kidney disease, without long-term current use of insulin (HCC)    At increased risk of exposure to COVID-19 virus        Objective:     LMP  (LMP Unknown)     Physical Exam  Vitals reviewed  Constitutional:       Appearance: Normal appearance  She is obese  HENT:      Head: Normocephalic  Nose: Nose normal       Mouth/Throat:      Mouth: Mucous membranes are moist       Pharynx: Oropharynx is clear  Eyes:      Extraocular Movements: Extraocular movements intact  Pupils: Pupils are equal, round, and reactive to light  Cardiovascular:      Rate and Rhythm: Normal rate and regular rhythm  Pulses: Normal pulses  Heart sounds: Normal heart sounds  Pulmonary:      Effort: Pulmonary effort is normal       Breath sounds: Normal breath sounds  Abdominal:      Tenderness: There is abdominal tenderness  Musculoskeletal:         General: Normal range of motion  Skin:     General: Skin is warm and dry  Neurological:      General: No focal deficit present  Mental Status: She is alert and oriented to person, place, and time  Psychiatric:         Mood and Affect: Mood normal          Behavior: Behavior normal          Thought Content:  Thought content normal          Judgment: Judgment normal          [place], [address] [facility]

## 2022-02-11 LAB
BACTERIA UR CULT: NORMAL
BACTERIA UR CULT: NORMAL

## 2022-02-17 DIAGNOSIS — E11.9 TYPE 2 DIABETES MELLITUS WITHOUT COMPLICATION, WITHOUT LONG-TERM CURRENT USE OF INSULIN (HCC): ICD-10-CM

## 2022-02-23 DIAGNOSIS — E11.65 UNCONTROLLED TYPE 2 DIABETES MELLITUS WITH HYPERGLYCEMIA (HCC): ICD-10-CM

## 2022-02-23 RX ORDER — ORAL SEMAGLUTIDE 14 MG/1
TABLET ORAL
Qty: 30 TABLET | Refills: 0 | Status: SHIPPED | OUTPATIENT
Start: 2022-02-23 | End: 2022-03-18 | Stop reason: SDUPTHER

## 2022-02-23 RX ORDER — ORAL SEMAGLUTIDE 14 MG/1
TABLET ORAL
Qty: 90 TABLET | Refills: 0 | Status: SHIPPED | OUTPATIENT
Start: 2022-02-23 | End: 2022-05-10 | Stop reason: SDUPTHER

## 2022-03-02 ENCOUNTER — OFFICE VISIT (OUTPATIENT)
Dept: BARIATRICS | Facility: CLINIC | Age: 57
End: 2022-03-02
Payer: COMMERCIAL

## 2022-03-02 VITALS
HEART RATE: 79 BPM | RESPIRATION RATE: 16 BRPM | DIASTOLIC BLOOD PRESSURE: 70 MMHG | SYSTOLIC BLOOD PRESSURE: 110 MMHG | TEMPERATURE: 97.6 F | BODY MASS INDEX: 29.02 KG/M2 | HEIGHT: 64 IN | WEIGHT: 170 LBS

## 2022-03-02 DIAGNOSIS — E11.22 CKD STAGE 3 DUE TO TYPE 2 DIABETES MELLITUS (HCC): ICD-10-CM

## 2022-03-02 DIAGNOSIS — E66.3 OVERWEIGHT: Primary | ICD-10-CM

## 2022-03-02 DIAGNOSIS — N18.30 CKD STAGE 3 DUE TO TYPE 2 DIABETES MELLITUS (HCC): ICD-10-CM

## 2022-03-02 DIAGNOSIS — E66.9 DIABETES MELLITUS TYPE 2 IN OBESE (HCC): ICD-10-CM

## 2022-03-02 DIAGNOSIS — E11.69 DIABETES MELLITUS TYPE 2 IN OBESE (HCC): ICD-10-CM

## 2022-03-02 DIAGNOSIS — Z78.9 ADVISED ABOUT MANAGEMENT OF WEIGHT: ICD-10-CM

## 2022-03-02 DIAGNOSIS — R63.5 ABNORMAL WEIGHT GAIN: ICD-10-CM

## 2022-03-02 PROCEDURE — 99214 OFFICE O/P EST MOD 30 MIN: CPT | Performed by: INTERNAL MEDICINE

## 2022-03-02 PROCEDURE — 3066F NEPHROPATHY DOC TX: CPT | Performed by: STUDENT IN AN ORGANIZED HEALTH CARE EDUCATION/TRAINING PROGRAM

## 2022-03-02 NOTE — PROGRESS NOTES
Assessment/Plan:  Baljit Johnson was seen today for follow-up  Diagnoses and all orders for this visit:    DM2 in obese  CKD stage 3 dt type 2 diabetes   Renal function stable  A1C:   Lab Results   Component Value Date    HGBA1C 8 3 (H) 01/24/2022    HGBA1C 5 7 (H) 11/07/2015   Currently on: metformin 1000mg, Jardiance 10mg, Rybelsus 14mg  Invokana was discontinued dt cost/insurance coverage   Recheck a1c in April, if A1c still over 8 will consider increasing Jardiance/metformin  Was on Bydureon before- had cellulitis on her leg, bloating/nausea  Previous hx of pancreatitis due to gallstones s/p cholecystectomy in 1998  Weight loss/low carbohydrate diet should help improve blood glucose levels    Hypertriglyceridemia  Increase activity level to 150 minutes of moderate to intense exercise per week  Weight loss should help improve the lipid levels  Avoid foods with trans fat, limit saturated fats and refined carbohydrates  Increase fish/omega 3 consumption    Overweight  Abnormal weight gain  Losing weight slowly but still on the right track  Will continue Rybelsus 14 mg  Can consider switching to [facility] in the future if pt willing to try; she had previously developed cellulitis a few times from using Bydureon  Continue lifestyle changes  Add strengthening exercise 2x week  Pt signed up for [facility] program with [employer] but hasn't started yet       Total time spent: 30 minutes with >50% face-to-face time with the patient  Follow up in approximately 2 months with Non-Surgical Physician/Advanced Practitioner  Subjective:   Chief Complaint   Patient presents with    Follow-up       Patient ID: Bernadette Meyers  is a 61 y o  female with excess weight/obesity here to pursue weight management  Patient has finished [place]    Most recent notes and records were reviewed      HPI  -Initial weight loss goal of 5-10% weight loss for improved health  Wt Readings from Last 10 Encounters: 03/02/22 77 1 kg (170 lb)   02/10/22 77 kg (169 lb 12 8 oz)   02/09/22 76 8 kg (169 lb 6 4 oz)   02/02/22 78 kg (172 lb)   01/26/22 78 kg (172 lb)   01/25/22 78 1 kg (172 lb 3 2 oz)   01/12/22 79 7 kg (175 lb 9 6 oz)   01/05/22 79 7 kg (175 lb 9 6 oz)   12/15/21 80 7 kg (178 lb)   12/08/21 80 5 kg (177 lb 6 4 oz)     Initial weight 9/2021: 179 lbs  Current weight: 170 lbs  Change in weight: -9 lbs (-2 lbs since LOV)  Goal:  Finished HC, helped her a lot with learning healthy eating habits  Not counting calories but less portions, watching what she eats  Started on Rybelsus in 11/2021- now at 14mg  No side effects other than fullness-- less portions  Also switched Invokana to Jardiance in 12/2021 as her insurance coverage changed  Discontinued Januvia  A1c was rechecked recently, which was 8 3 and the same as 7/2021  Glucose averaging 130s in AM, 110s to 120s in PM  Thinks it has improved since starting Rybelsus - less spikes of hyperglycemia 180s  Felt she had cystitis sx's so was treated with bactrim but urine culture was negative  Drinks- 64 oz  Alcohol- no  Exercise- moving around more at work; averaging 9000 steps    Tries to walk more at work and play pickle ball       The following portions of the patient's history were reviewed and updated as appropriate: allergies, current medications, past family history, past medical history, past social history, past surgical history, and problem list     Review of Systems   Cardiovascular: Negative  Gastrointestinal: Negative  Objective:  /70 (BP Location: Right arm, Patient Position: Sitting, Cuff Size: Standard)   Pulse 79   Temp 97 6 °F (36 4 °C)   Resp 16   Ht 5' 4" (1 626 m)   Wt 77 1 kg (170 lb)   LMP  (LMP Unknown)   BMI 29 18 kg/m²   Constitutional: Well-developed, well-nourished and obese Body mass index is 29 18 kg/m²  [place] HEENT: No conjunctival pallor or jaundice    Pulmonary: No increased work of breathing or signs of respiratory distress  CV: Normal rate, well-perfused  GI: Obese  Non-distended  MSK: No edema   Neuro: Oriented to person, place and time  Normal Speech  Normal gait  Psych: Normal affect and mood       Labs and Imaging  Most recent labs and imaging reviewed

## 2022-03-11 ENCOUNTER — RA CDI HCC (OUTPATIENT)
Dept: OTHER | Facility: HOSPITAL | Age: 57
End: 2022-03-11

## 2022-03-11 NOTE — PROGRESS NOTES
[facility]  coding opportunities       Chart reviewed, no opportunity found: CHART REVIEWED, NO OPPORTUNITY FOUND                        Patients insurance company: [employer] (Medicare Advantage and Commercial)

## 2022-03-18 ENCOUNTER — OFFICE VISIT (OUTPATIENT)
Dept: INTERNAL MEDICINE CLINIC | Facility: CLINIC | Age: 57
End: 2022-03-18
Payer: COMMERCIAL

## 2022-03-18 VITALS
HEIGHT: 64 IN | HEART RATE: 77 BPM | TEMPERATURE: 98.8 F | DIASTOLIC BLOOD PRESSURE: 60 MMHG | SYSTOLIC BLOOD PRESSURE: 120 MMHG | BODY MASS INDEX: 28.68 KG/M2 | WEIGHT: 168 LBS | OXYGEN SATURATION: 97 %

## 2022-03-18 DIAGNOSIS — E11.9 TYPE 2 DIABETES MELLITUS WITHOUT COMPLICATION, WITHOUT LONG-TERM CURRENT USE OF INSULIN (HCC): Primary | ICD-10-CM

## 2022-03-18 LAB
LEFT EYE DIABETIC RETINOPATHY: ABNORMAL
LEFT EYE IMAGE QUALITY: ABNORMAL
LEFT EYE MACULAR EDEMA: ABNORMAL
LEFT EYE OTHER RETINOPATHY: ABNORMAL
RIGHT EYE DIABETIC RETINOPATHY: ABNORMAL
RIGHT EYE IMAGE QUALITY: ABNORMAL
RIGHT EYE MACULAR EDEMA: ABNORMAL
RIGHT EYE OTHER RETINOPATHY: ABNORMAL
SEVERITY (EYE EXAM): ABNORMAL

## 2022-03-18 PROCEDURE — 92250 FUNDUS PHOTOGRAPHY W/I&R: CPT | Performed by: INTERNAL MEDICINE

## 2022-03-18 PROCEDURE — 99213 OFFICE O/P EST LOW 20 MIN: CPT | Performed by: INTERNAL MEDICINE

## 2022-03-18 PROCEDURE — 2024F 7 FLD RTA PHOTO EVC RTNOPTHY: CPT | Performed by: STUDENT IN AN ORGANIZED HEALTH CARE EDUCATION/TRAINING PROGRAM

## 2022-03-18 NOTE — PROGRESS NOTES
Assessment/Plan:       Diagnoses and all orders for this visit:    Type 2 diabetes mellitus without complication, without long-term current use of insulin ([facility] )  -     IRIS Diabetic eye exam                Subjective:      Patient ID: Chetan Wright is a 61 y o  female  Diabetic:Prior hemoglobin A1c at least the last was 8 3  Now follows with the bariatric physician and has been started on Rybulsis within a couple of weeks so she is optimistic that the next check will be better as she has been losing weight       Hypertensive: Good blood pressure control on a reduced dose of lisinopril  No complaints , other than urinary incontinence issues and is followed by Urogynecology for this      The following portions of the patient's history were reviewed and updated as appropriate:   She has a past medical history of Adjustment disorder with anxiety, Anxiety, Atypical glandular cells of undetermined significance (ARIA) on cervical Pap smear (9/18/2019), Chronic kidney disease, Constipation, Dense breasts, GERD (gastroesophageal reflux disease), Heart murmur, History of atrial paroxysmal tachycardia, History of cerebral artery occlusion, History of chest pain, Hyperlipidemia, SHAE (iron deficiency anemia), Menopause, Ovarian cyst, Pancreatitis, Plantar fasciitis, Polyclonal hypergammaglobulinemia, Restless legs syndrome (RLS), and Trigger finger  ,  does not have any pertinent problems on file  ,   has a past surgical history that includes Cholecystectomy; pr esophagogastroduodenoscopy transoral diagnostic (N/A, 10/19/2017); and Trigger finger release  ,  family history includes Tuberculosis in her mother  She was adopted  ,   reports that she quit smoking about 3 years ago  Her smoking use included cigarettes  She has a 3 50 pack-year smoking history  She has never used smokeless tobacco  She reports current alcohol use  She reports that she does not use drugs  ,  has No Known Allergies     Current Outpatient Medications Medication Sig Dispense Refill    ACCU-CHEK FASTCLIX LANCETS MISC by Does not apply route daily      Apple Cider Vinegar 500 MG TABS Take by mouth      atorvastatin (LIPITOR) 20 mg tablet TAKE 1 TABLET DAILY 90 tablet 3    B Complex-C (SUPER B COMPLEX PO) Take 1 tablet by mouth daily      BIOTIN PO Take 1 capsule by mouth daily      Blood Glucose Monitoring Suppl (ACCU-CHEK JESSEE CONNECT) w/Device KIT by Does not apply route      cholecalciferol (VITAMIN D3) 1,000 units tablet Take 1,000 Units by mouth 2 (two) times a week       Chromium Picolinate 200 MCG CAPS Take by mouth daily      clobetasol (TEMOVATE) 0 05 % cream Apply topically 2 (two) times a day 30 g 11    clotrimazole-betamethasone (LOTRISONE) 1-0 05 % cream Apply topically 2 (two) times a day 30 g 11    cyclobenzaprine (FLEXERIL) 10 mg tablet TAKE 1 TABLET BY MOUTH EVERY DAY AT NIGHT      Empagliflozin (Jardiance) 10 MG TABS Take 1 tablet (10 mg total) by mouth every morning 90 tablet 1    gabapentin (NEURONTIN) 300 mg capsule TAKE 1 CAPSULE THREE TIMES A DAY (Patient taking differently: daily ) 270 capsule 3    glucose blood test strip by In Vitro route 4 (four) times a day      Hyaluronic Acid-Vitamin C (HYALURONIC ACID PO) Take 100 mg by mouth daily      ibuprofen (MOTRIN) 800 mg tablet Take 800 mg by mouth as needed        lisinopril (ZESTRIL) 5 mg tablet Take 0 5 tablets (2 5 mg total) by mouth daily 90 tablet 5    metFORMIN (GLUCOPHAGE) 1000 MG tablet TAKE 1 TABLET TWICE A  tablet 3    Multiple Vitamin (DAILY VALUE MULTIVITAMIN) TABS Take by mouth      Omega-3 Fatty Acids (FISH OIL) 1,000 mg Take 1,000 mg by mouth daily      omeprazole (PriLOSEC) 20 mg delayed release capsule TAKE 1 CAPSULE EVERY MORNING BEFORE BREAKFAST 90 capsule 3    Saw Palmetto 160 MG CAPS Take 250 mg by mouth        Semaglutide (Rybelsus) 14 MG TABS Take 1 tablet po daily 30 minutes prior to food/drinks 90 tablet 0    Turmeric 500 MG CAPS Take by mouth      zinc gluconate 50 mg tablet Take 50 mg by mouth daily       No current facility-administered medications for this visit  Review of Systems   Genitourinary: Positive for frequency and urgency  All other systems reviewed and are negative  Objective:  Vitals:    03/18/22 1538   BP: 120/60   Pulse: 77   Temp: 98 8 °F (37 1 °C)   SpO2: 97%      Physical Exam  Cardiovascular:      Rate and Rhythm: Normal rate  Pulmonary:      Effort: Pulmonary effort is normal    Neurological:      General: No focal deficit present  Mental Status: She is alert     Psychiatric:         Mood and Affect: Mood normal            Patient Instructions   She will continue to follow with bariatric son I would like to see her again in September

## 2022-03-18 NOTE — PROGRESS NOTES
Diabetic Foot Exam    Patient's shoes and socks removed  Right Foot/Ankle   Right Foot Inspection  Skin Exam: skin intact and dry skin  Sensory   Monofilament testing: intact    Vascular  The right DP pulse is 2+  The right PT pulse is 1+  Left Foot/Ankle  Left Foot Inspection  Skin Exam: skin intact and dry skin  Sensory   Monofilament testing: intact    Vascular  The left DP pulse is 2+  The left PT pulse is 1+       Assign Risk Category  No deformity present  No loss of protective sensation  No weak pulses  Risk: 0

## 2022-03-21 ENCOUNTER — PROCEDURE VISIT (OUTPATIENT)
Dept: OBGYN CLINIC | Facility: CLINIC | Age: 57
End: 2022-03-21
Payer: COMMERCIAL

## 2022-03-21 ENCOUNTER — TELEPHONE (OUTPATIENT)
Dept: INTERNAL MEDICINE CLINIC | Facility: CLINIC | Age: 57
End: 2022-03-21

## 2022-03-21 VITALS
SYSTOLIC BLOOD PRESSURE: 122 MMHG | HEIGHT: 64 IN | DIASTOLIC BLOOD PRESSURE: 70 MMHG | WEIGHT: 170 LBS | BODY MASS INDEX: 29.02 KG/M2

## 2022-03-21 DIAGNOSIS — R87.810 CERVICAL HIGH RISK HPV (HUMAN PAPILLOMAVIRUS) TEST POSITIVE: Primary | ICD-10-CM

## 2022-03-21 PROCEDURE — 88307 TISSUE EXAM BY PATHOLOGIST: CPT | Performed by: PATHOLOGY

## 2022-03-21 PROCEDURE — 88344 IMHCHEM/IMCYTCHM EA MLT ANTB: CPT | Performed by: PATHOLOGY

## 2022-03-21 PROCEDURE — 57522 CONIZATION OF CERVIX: CPT | Performed by: OBSTETRICS & GYNECOLOGY

## 2022-03-21 NOTE — ASSESSMENT & PLAN NOTE
Persistent HPV +   Options discussed colposcopy vs LEEP  Patient would like to proceed with LEEP at this time  Specimen collected, sent to pathology  Will call with results

## 2022-03-21 NOTE — TELEPHONE ENCOUNTER
----- Message from Colletta Lolling, MD sent at 3/21/2022  1:29 PM EDT -----  Diabetic retinopathy noted on our screen and she needs to see an ophthalmologist

## 2022-03-21 NOTE — PATIENT INSTRUCTIONS
Loop Electrosurgical Excision Procedure   WHAT YOU NEED TO KNOW:   What do I need to know about a loop electrosurgical excision procedure (LEEP)? A LEEP is a procedure to remove abnormal tissue from your cervix or vagina  The tissue can be tested for cancer or infection  You may need a LEEP if other tests have found abnormal cells on your cervix or in your vagina  How do I prepare for a LEEP? Your healthcare provider will talk to you about how to prepare for your procedure  Do not douche, use tampons, or have sex for 24 hours before the procedure  Do not put medicines in your vagina for 24 hours before the procedure  Call your healthcare provider if you have your menstrual period on the day of the procedure  You may need to wait until your period ends to have the procedure  Arrange for someone to drive you home and stay with you after your procedure  What will happen during a LEEP? · Your healthcare provider will insert a speculum in your vagina  A speculum is an instrument that holds the vagina open so your provider can see your cervix  You will be given local anesthesia to numb your cervix  With local anesthesia, you may feel pressure or pushing during your procedure, but you should not feel pain  · Your healthcare provider will insert a tube with a looped wire at the end into your vagina  He or she will use this instrument to remove a sample of tissue from your cervix  You may feel pain or cramping when the sample is taken  You may also feel dizzy  Tell your healthcare provider if the dizziness gets worse  Your healthcare provider may use a paste or tools to control bleeding  What will happen after a LEEP? Your healthcare provider will monitor you for heavy bleeding  You may have cramping, bleeding, or dark brown discharge after your procedure  These symptoms may last up to 4 weeks  What are the risks of a LEEP? You may bleed more than expected or get an infection   Your menstrual periods may feel more painful after the procedure  You may have problems getting pregnant or be at risk for a miscarriage or  birth  If you do get pregnant, your baby may be underweight  CARE AGREEMENT:   You have the right to help plan your care  Learn about your health condition and how it may be treated  Discuss treatment options with your healthcare providers to decide what care you want to receive  You always have the right to refuse treatment  The above information is an  only  It is not intended as medical advice for individual conditions or treatments  Talk to your doctor, nurse or pharmacist before following any medical regimen to see if it is safe and effective for you  © Copyright [employer]  Information is for End User's use only and may not be sold, redistributed or otherwise used for commercial purposes   All illustrations and images included in CareNotes® are the copyrighted property of A D A M , Inc  or [address] [place] [address]

## 2022-03-21 NOTE — PROGRESS NOTES
Assessment/Plan:    Cervical high risk HPV (human papillomavirus) test positive  Persistent HPV +   Options discussed colposcopy vs LEEP  Patient would like to proceed with LEEP at this time  Specimen collected, sent to pathology  Will call with results  Diagnoses and all orders for this visit:    Cervical high risk HPV (human papillomavirus) test positive  -     Tissue Exam          Subjective:      Patient ID: Violet Orozco is a 61 y o  female  Pt presents for a LEEP procedure  Currently sexually active  Pap-Neg, HPV 16-Pos      Excisional Biopsy Procedure Note -LEEP    Pre-operative Diagnosis:HPV 16 persistent    Post-operative Diagnosis: same    Locations: Cervix    Anesthesia: Lidocaine 1% with epinephrine      Procedure Details   The risks, benefits, indications, potential complications, and alternatives were explained to the patient and informed consent obtained  A coated Graves speculum was placed into the vagina with good visualization of the cervix  1% Lidocaine with epinephrine was injected circumferentially into the cervix with good blanching of the tissue  The endocervical specimen was obtained using a wire loop under cautery  Roller ball coagulation was then used on the cervical bed for hemostasis  Monsel's was applied as well  All instruments were removed from the vagina  All sponge, lap and needle counts were correct  The specimen was sent for pathologic examination  The patient tolerated the procedure well          The following portions of the patient's history were reviewed and updated as appropriate:   She  has a past medical history of Adjustment disorder with anxiety, Anxiety, Atypical glandular cells of undetermined significance (ARIA) on cervical Pap smear (9/18/2019), Chronic kidney disease, Constipation, Dense breasts, GERD (gastroesophageal reflux disease), Heart murmur, History of atrial paroxysmal tachycardia, History of cerebral artery occlusion, History of chest pain, Hyperlipidemia, SHAE (iron deficiency anemia), Menopause, Ovarian cyst, Pancreatitis, Plantar fasciitis, Polyclonal hypergammaglobulinemia, Restless legs syndrome (RLS), and Trigger finger  She   Patient Active Problem List    Diagnosis Date Noted    Cervical high risk HPV (human papillomavirus) test positive 03/21/2022    Cystitis 02/10/2022    At increased risk of exposure to COVID-19 virus 01/15/2021    Type 2 diabetes mellitus with stage 2 chronic kidney disease, without long-term current use of insulin ([facility] ) 04/17/2020    Dysuria 10/16/2019    Allergic reaction caused by a drug 03/20/2019    Chronic kidney disease, stage 2 (mild) 02/06/2019    TMJ arthritis 02/06/2019    Obesity, Class I, BMI 30-34 9 10/11/2018    Abnormal weight gain 10/11/2018    Tobacco abuse 06/18/2018    Elevated LFTs 11/21/2017    Dense breasts 09/12/2017    Ovarian cyst 09/12/2017    Adjustment disorder with anxiety 04/01/2014    Hyperlipidemia 04/01/2014    Hypertension 04/01/2014     She  has a past surgical history that includes Cholecystectomy; pr esophagogastroduodenoscopy transoral diagnostic (N/A, 10/19/2017); and Trigger finger release  Her family history includes Tuberculosis in her mother  She was adopted  She  reports that she quit smoking about 3 years ago  Her smoking use included cigarettes  She has a 3 50 pack-year smoking history  She has never used smokeless tobacco  She reports current alcohol use  She reports that she does not use drugs    Current Outpatient Medications   Medication Sig Dispense Refill    ACCU-CHEK FASTCLIX LANCETS MISC by Does not apply route daily      Apple Cider Vinegar 500 MG TABS Take by mouth      atorvastatin (LIPITOR) 20 mg tablet TAKE 1 TABLET DAILY 90 tablet 3    B Complex-C (SUPER B COMPLEX PO) Take 1 tablet by mouth daily      BIOTIN PO Take 1 capsule by mouth daily      Blood Glucose Monitoring Suppl (ACCU-CHEK JESSEE CONNECT) w/Device KIT by Does not apply route      cholecalciferol (VITAMIN D3) 1,000 units tablet Take 1,000 Units by mouth 2 (two) times a week       Chromium Picolinate 200 MCG CAPS Take by mouth daily      clobetasol (TEMOVATE) 0 05 % cream Apply topically 2 (two) times a day 30 g 11    clotrimazole-betamethasone (LOTRISONE) 1-0 05 % cream Apply topically 2 (two) times a day 30 g 11    Empagliflozin (Jardiance) 10 MG TABS Take 1 tablet (10 mg total) by mouth every morning 90 tablet 1    gabapentin (NEURONTIN) 300 mg capsule TAKE 1 CAPSULE THREE TIMES A DAY (Patient taking differently: daily ) 270 capsule 3    glucose blood test strip by In Vitro route 4 (four) times a day      Hyaluronic Acid-Vitamin C (HYALURONIC ACID PO) Take 100 mg by mouth daily      ibuprofen (MOTRIN) 800 mg tablet Take 800 mg by mouth as needed        lisinopril (ZESTRIL) 5 mg tablet Take 0 5 tablets (2 5 mg total) by mouth daily 90 tablet 5    metFORMIN (GLUCOPHAGE) 1000 MG tablet TAKE 1 TABLET TWICE A  tablet 3    Multiple Vitamin (DAILY VALUE MULTIVITAMIN) TABS Take by mouth      Omega-3 Fatty Acids (FISH OIL) 1,000 mg Take 1,000 mg by mouth daily      omeprazole (PriLOSEC) 20 mg delayed release capsule TAKE 1 CAPSULE EVERY MORNING BEFORE BREAKFAST 90 capsule 3    Saw Palmetto 160 MG CAPS Take 250 mg by mouth        Semaglutide (Rybelsus) 14 MG TABS Take 1 tablet po daily 30 minutes prior to food/drinks 90 tablet 0    Turmeric 500 MG CAPS Take by mouth      zinc gluconate 50 mg tablet Take 50 mg by mouth daily      cyclobenzaprine (FLEXERIL) 10 mg tablet TAKE 1 TABLET BY MOUTH EVERY DAY AT NIGHT (Patient not taking: Reported on 3/21/2022)       No current facility-administered medications for this visit       Current Outpatient Medications on File Prior to Visit   Medication Sig    ACCU-CHEK FASTCLIX LANCETS MISC by Does not apply route daily    Apple Cider Vinegar 500 MG TABS Take by mouth    atorvastatin (LIPITOR) 20 mg tablet TAKE 1 TABLET DAILY    B Complex-C (SUPER B COMPLEX PO) Take 1 tablet by mouth daily    BIOTIN PO Take 1 capsule by mouth daily    Blood Glucose Monitoring Suppl (ACCU-CHEK JESSEE CONNECT) w/Device KIT by Does not apply route    cholecalciferol (VITAMIN D3) 1,000 units tablet Take 1,000 Units by mouth 2 (two) times a week     Chromium Picolinate 200 MCG CAPS Take by mouth daily    clobetasol (TEMOVATE) 0 05 % cream Apply topically 2 (two) times a day    clotrimazole-betamethasone (LOTRISONE) 1-0 05 % cream Apply topically 2 (two) times a day    Empagliflozin (Jardiance) 10 MG TABS Take 1 tablet (10 mg total) by mouth every morning    gabapentin (NEURONTIN) 300 mg capsule TAKE 1 CAPSULE THREE TIMES A DAY (Patient taking differently: daily )    glucose blood test strip by In Vitro route 4 (four) times a day    Hyaluronic Acid-Vitamin C (HYALURONIC ACID PO) Take 100 mg by mouth daily    ibuprofen (MOTRIN) 800 mg tablet Take 800 mg by mouth as needed      lisinopril (ZESTRIL) 5 mg tablet Take 0 5 tablets (2 5 mg total) by mouth daily    metFORMIN (GLUCOPHAGE) 1000 MG tablet TAKE 1 TABLET TWICE A DAY    Multiple Vitamin (DAILY VALUE MULTIVITAMIN) TABS Take by mouth    Omega-3 Fatty Acids (FISH OIL) 1,000 mg Take 1,000 mg by mouth daily    omeprazole (PriLOSEC) 20 mg delayed release capsule TAKE 1 CAPSULE EVERY MORNING BEFORE BREAKFAST    Saw [place] 160 MG CAPS Take 250 mg by mouth      Semaglutide (Rybelsus) 14 MG TABS Take 1 tablet po daily 30 minutes prior to food/drinks    Turmeric 500 MG CAPS Take by mouth    zinc gluconate 50 mg tablet Take 50 mg by mouth daily    cyclobenzaprine (FLEXERIL) 10 mg tablet TAKE 1 TABLET BY MOUTH EVERY DAY AT NIGHT (Patient not taking: Reported on 3/21/2022)     No current facility-administered medications on file prior to visit  She has No Known Allergies       Review of Systems      Objective:      /70 (BP Location: Left arm, Patient Position: Sitting, Cuff Size: Adult)   Ht 5' 4" (1 626 m)   Wt 77 1 kg (170 lb)   LMP  (LMP Unknown)   BMI 29 18 kg/m²          Physical Exam

## 2022-03-29 ENCOUNTER — OFFICE VISIT (OUTPATIENT)
Dept: OBGYN CLINIC | Age: 57
End: 2022-03-29
Payer: COMMERCIAL

## 2022-03-29 VITALS
DIASTOLIC BLOOD PRESSURE: 70 MMHG | HEIGHT: 64 IN | WEIGHT: 167.4 LBS | SYSTOLIC BLOOD PRESSURE: 122 MMHG | BODY MASS INDEX: 28.58 KG/M2

## 2022-03-29 DIAGNOSIS — R31.9 HEMATURIA, UNSPECIFIED TYPE: Primary | ICD-10-CM

## 2022-03-29 DIAGNOSIS — Z98.890 S/P LEEP: ICD-10-CM

## 2022-03-29 DIAGNOSIS — R30.0 DYSURIA: ICD-10-CM

## 2022-03-29 LAB
BACTERIA UR QL AUTO: ABNORMAL /HPF
BILIRUB UR QL STRIP: NEGATIVE
CLARITY UR: ABNORMAL
COLOR UR: YELLOW
GLUCOSE UR STRIP-MCNC: ABNORMAL MG/DL
HGB UR QL STRIP.AUTO: ABNORMAL
KETONES UR STRIP-MCNC: NEGATIVE MG/DL
LEUKOCYTE ESTERASE UR QL STRIP: ABNORMAL
NITRITE UR QL STRIP: NEGATIVE
NON-SQ EPI CELLS URNS QL MICRO: ABNORMAL /HPF
PH UR STRIP.AUTO: 6 [PH]
PROT UR STRIP-MCNC: ABNORMAL MG/DL
RBC #/AREA URNS AUTO: ABNORMAL /HPF
RENAL EPI CELLS #/AREA URNS HPF: PRESENT /[HPF]
SP GR UR STRIP.AUTO: 1.01 (ref 1–1.03)
UROBILINOGEN UR STRIP-ACNC: <2 MG/DL
WBC #/AREA URNS AUTO: ABNORMAL /HPF

## 2022-03-29 PROCEDURE — 3074F SYST BP LT 130 MM HG: CPT | Performed by: STUDENT IN AN ORGANIZED HEALTH CARE EDUCATION/TRAINING PROGRAM

## 2022-03-29 PROCEDURE — 87077 CULTURE AEROBIC IDENTIFY: CPT | Performed by: STUDENT IN AN ORGANIZED HEALTH CARE EDUCATION/TRAINING PROGRAM

## 2022-03-29 PROCEDURE — 87086 URINE CULTURE/COLONY COUNT: CPT | Performed by: STUDENT IN AN ORGANIZED HEALTH CARE EDUCATION/TRAINING PROGRAM

## 2022-03-29 PROCEDURE — 1036F TOBACCO NON-USER: CPT | Performed by: STUDENT IN AN ORGANIZED HEALTH CARE EDUCATION/TRAINING PROGRAM

## 2022-03-29 PROCEDURE — 99214 OFFICE O/P EST MOD 30 MIN: CPT | Performed by: STUDENT IN AN ORGANIZED HEALTH CARE EDUCATION/TRAINING PROGRAM

## 2022-03-29 PROCEDURE — 87186 SC STD MICRODIL/AGAR DIL: CPT | Performed by: STUDENT IN AN ORGANIZED HEALTH CARE EDUCATION/TRAINING PROGRAM

## 2022-03-29 PROCEDURE — 81001 URINALYSIS AUTO W/SCOPE: CPT | Performed by: STUDENT IN AN ORGANIZED HEALTH CARE EDUCATION/TRAINING PROGRAM

## 2022-03-29 PROCEDURE — 3078F DIAST BP <80 MM HG: CPT | Performed by: STUDENT IN AN ORGANIZED HEALTH CARE EDUCATION/TRAINING PROGRAM

## 2022-03-29 PROCEDURE — 3008F BODY MASS INDEX DOCD: CPT | Performed by: STUDENT IN AN ORGANIZED HEALTH CARE EDUCATION/TRAINING PROGRAM

## 2022-03-29 RX ORDER — PHENAZOPYRIDINE HYDROCHLORIDE 100 MG/1
100 TABLET, FILM COATED ORAL 3 TIMES DAILY PRN
Qty: 10 TABLET | Refills: 0 | Status: SHIPPED | OUTPATIENT
Start: 2022-03-29

## 2022-03-29 NOTE — PROGRESS NOTES
Assessment/Plan:     Problem List Items Addressed This Visit        Other    Dysuria    Relevant Medications    phenazopyridine (PYRIDIUM) 100 mg tablet      Other Visit Diagnoses     Hematuria, unspecified type    -  Primary    Relevant Orders    Urine culture    Urinalysis with microscopic    S/P LEEP                Subjective:      Patient ID: Brandyn Palacio is a 61 y o  female  HPI  She presents today for consultation for hematuria  She is POD8 s/p office LEEP with Dr Li Notice    She had been experiencing brown discharge since the procedure, but this morning noted a small amount of bright red blood with wiping  Later in the morning, she noted more blood with wiping - checked and thought it was likely in the urine, rather than vagina  She has also been experiencing urinary pressure and mild dysuria  She has only had one prior UTI, is concerned that this is the cause  The following portions of the patient's history were reviewed and updated as appropriate: allergies, current medications, past family history, past medical history, past social history, past surgical history and problem list     Review of Systems  as above, no fevers/chills/nausea/vomiting    Objective:  /70 (BP Location: Right arm, Patient Position: Sitting, Cuff Size: Standard)   Ht 5' 4" (1 626 m)   Wt 75 9 kg (167 lb 6 4 oz)   LMP  (LMP Unknown)   BMI 28 73 kg/m²      Physical Exam  Vitals reviewed  Constitutional:       Appearance: She is well-developed  HENT:      Head: Normocephalic  Cardiovascular:      Rate and Rhythm: Normal rate  Pulmonary:      Effort: Pulmonary effort is normal    Abdominal:      General: There is no distension  Palpations: Abdomen is soft  Tenderness: There is no abdominal tenderness  There is no guarding or rebound  Genitourinary:     General: Normal vulva  Exam position: Lithotomy position  Vagina: No bleeding  Cervix: No cervical motion tenderness  Comments: No periurethral lesions, no blood noted at urethra  Vagina without laceration, LEEP bed healing well with pink-tinged surrounding discharge noted, no isai bleeding  Musculoskeletal:         General: Normal range of motion  Cervical back: Normal range of motion  Skin:     General: Skin is warm and dry  Neurological:      Mental Status: She is alert and oriented to person, place, and time     Psychiatric:         Behavior: Behavior normal          Overall MDM: moderate   Diagnosis moderate, Data low, Risk moderate (med management)

## 2022-03-31 DIAGNOSIS — N30.01 ACUTE CYSTITIS WITH HEMATURIA: Primary | ICD-10-CM

## 2022-03-31 LAB — BACTERIA UR CULT: ABNORMAL

## 2022-03-31 RX ORDER — NITROFURANTOIN 25; 75 MG/1; MG/1
100 CAPSULE ORAL 2 TIMES DAILY
Qty: 10 CAPSULE | Refills: 0 | Status: SHIPPED | OUTPATIENT
Start: 2022-03-31

## 2022-03-31 NOTE — RESULT ENCOUNTER NOTE
LM for pt to call [phone] [facility] office today or [place] Monday to set up an annual exam appt with repeat PAP/HPV collection after 10/12/22   cv

## 2022-04-06 ENCOUNTER — TELEPHONE (OUTPATIENT)
Dept: NEPHROLOGY | Facility: CLINIC | Age: 57
End: 2022-04-06

## 2022-04-15 ENCOUNTER — APPOINTMENT (OUTPATIENT)
Dept: LAB | Facility: CLINIC | Age: 57
End: 2022-04-15
Payer: COMMERCIAL

## 2022-04-15 LAB
25(OH)D3 SERPL-MCNC: 69.2 NG/ML (ref 30–100)
ALBUMIN SERPL BCP-MCNC: 3.7 G/DL (ref 3.5–5)
ALP SERPL-CCNC: 86 U/L (ref 46–116)
ALT SERPL W P-5'-P-CCNC: 30 U/L (ref 12–78)
ANION GAP SERPL CALCULATED.3IONS-SCNC: 4 MMOL/L (ref 4–13)
AST SERPL W P-5'-P-CCNC: 19 U/L (ref 5–45)
BACTERIA UR QL AUTO: ABNORMAL /HPF
BASOPHILS # BLD AUTO: 0.05 THOUSANDS/ΜL (ref 0–0.1)
BASOPHILS NFR BLD AUTO: 1 % (ref 0–1)
BILIRUB SERPL-MCNC: 0.47 MG/DL (ref 0.2–1)
BILIRUB UR QL STRIP: NEGATIVE
BUN SERPL-MCNC: 19 MG/DL (ref 5–25)
CALCIUM SERPL-MCNC: 10 MG/DL (ref 8.3–10.1)
CHLORIDE SERPL-SCNC: 104 MMOL/L (ref 100–108)
CLARITY UR: CLEAR
CO2 SERPL-SCNC: 28 MMOL/L (ref 21–32)
COLOR UR: ABNORMAL
CREAT SERPL-MCNC: 1.12 MG/DL (ref 0.6–1.3)
CREAT UR-MCNC: 61.8 MG/DL
EOSINOPHIL # BLD AUTO: 0.14 THOUSAND/ΜL (ref 0–0.61)
EOSINOPHIL NFR BLD AUTO: 2 % (ref 0–6)
ERYTHROCYTE [DISTWIDTH] IN BLOOD BY AUTOMATED COUNT: 12.9 % (ref 11.6–15.1)
GFR SERPL CREATININE-BSD FRML MDRD: 54 ML/MIN/1.73SQ M
GLUCOSE P FAST SERPL-MCNC: 143 MG/DL (ref 65–99)
GLUCOSE UR STRIP-MCNC: ABNORMAL MG/DL
HCT VFR BLD AUTO: 45.6 % (ref 34.8–46.1)
HGB BLD-MCNC: 14.4 G/DL (ref 11.5–15.4)
HGB UR QL STRIP.AUTO: NEGATIVE
IMM GRANULOCYTES # BLD AUTO: 0.02 THOUSAND/UL (ref 0–0.2)
IMM GRANULOCYTES NFR BLD AUTO: 0 % (ref 0–2)
KETONES UR STRIP-MCNC: NEGATIVE MG/DL
LEUKOCYTE ESTERASE UR QL STRIP: ABNORMAL
LYMPHOCYTES # BLD AUTO: 2.09 THOUSANDS/ΜL (ref 0.6–4.47)
LYMPHOCYTES NFR BLD AUTO: 25 % (ref 14–44)
MCH RBC QN AUTO: 29.8 PG (ref 26.8–34.3)
MCHC RBC AUTO-ENTMCNC: 31.6 G/DL (ref 31.4–37.4)
MCV RBC AUTO: 94 FL (ref 82–98)
MONOCYTES # BLD AUTO: 0.55 THOUSAND/ΜL (ref 0.17–1.22)
MONOCYTES NFR BLD AUTO: 7 % (ref 4–12)
NEUTROPHILS # BLD AUTO: 5.43 THOUSANDS/ΜL (ref 1.85–7.62)
NEUTS SEG NFR BLD AUTO: 65 % (ref 43–75)
NITRITE UR QL STRIP: NEGATIVE
NON-SQ EPI CELLS URNS QL MICRO: ABNORMAL /HPF
NRBC BLD AUTO-RTO: 0 /100 WBCS
PH UR STRIP.AUTO: 5.5 [PH]
PHOSPHATE SERPL-MCNC: 3.6 MG/DL (ref 2.7–4.5)
PLATELET # BLD AUTO: 291 THOUSANDS/UL (ref 149–390)
PMV BLD AUTO: 9.9 FL (ref 8.9–12.7)
POTASSIUM SERPL-SCNC: 4.8 MMOL/L (ref 3.5–5.3)
PROT SERPL-MCNC: 8.4 G/DL (ref 6.4–8.2)
PROT UR STRIP-MCNC: NEGATIVE MG/DL
PROT UR-MCNC: 10 MG/DL
PROT/CREAT UR: 0.16 MG/G{CREAT} (ref 0–0.1)
PTH-INTACT SERPL-MCNC: 26.6 PG/ML (ref 18.4–80.1)
RBC # BLD AUTO: 4.83 MILLION/UL (ref 3.81–5.12)
RBC #/AREA URNS AUTO: ABNORMAL /HPF
SODIUM SERPL-SCNC: 136 MMOL/L (ref 136–145)
SP GR UR STRIP.AUTO: 1.02 (ref 1–1.03)
UROBILINOGEN UR STRIP-ACNC: <2 MG/DL
WBC # BLD AUTO: 8.28 THOUSAND/UL (ref 4.31–10.16)
WBC #/AREA URNS AUTO: ABNORMAL /HPF

## 2022-04-15 PROCEDURE — 3061F NEG MICROALBUMINURIA REV: CPT | Performed by: INTERNAL MEDICINE

## 2022-04-15 PROCEDURE — 83970 ASSAY OF PARATHORMONE: CPT | Performed by: INTERNAL MEDICINE

## 2022-04-15 PROCEDURE — 36415 COLL VENOUS BLD VENIPUNCTURE: CPT | Performed by: INTERNAL MEDICINE

## 2022-04-15 PROCEDURE — 85025 COMPLETE CBC W/AUTO DIFF WBC: CPT | Performed by: INTERNAL MEDICINE

## 2022-04-15 PROCEDURE — 82570 ASSAY OF URINE CREATININE: CPT | Performed by: INTERNAL MEDICINE

## 2022-04-15 PROCEDURE — 84156 ASSAY OF PROTEIN URINE: CPT | Performed by: INTERNAL MEDICINE

## 2022-04-15 PROCEDURE — 81001 URINALYSIS AUTO W/SCOPE: CPT | Performed by: INTERNAL MEDICINE

## 2022-04-15 PROCEDURE — 82306 VITAMIN D 25 HYDROXY: CPT | Performed by: INTERNAL MEDICINE

## 2022-04-15 PROCEDURE — 84100 ASSAY OF PHOSPHORUS: CPT | Performed by: INTERNAL MEDICINE

## 2022-04-15 PROCEDURE — 80053 COMPREHEN METABOLIC PANEL: CPT | Performed by: INTERNAL MEDICINE

## 2022-04-19 ENCOUNTER — HOSPITAL ENCOUNTER (OUTPATIENT)
Dept: MAMMOGRAPHY | Facility: CLINIC | Age: 57
Discharge: HOME | End: 2022-04-19
Payer: COMMERCIAL

## 2022-04-19 ENCOUNTER — TELEPHONE (OUTPATIENT)
Dept: NEPHROLOGY | Facility: CLINIC | Age: 57
End: 2022-04-19

## 2022-04-19 VITALS — BODY MASS INDEX: 28.51 KG/M2 | WEIGHT: 167 LBS | HEIGHT: 64 IN

## 2022-04-19 DIAGNOSIS — Z12.31 SCREENING MAMMOGRAM FOR BREAST CANCER: ICD-10-CM

## 2022-04-19 PROCEDURE — 77063 BREAST TOMOSYNTHESIS BI: CPT

## 2022-04-19 PROCEDURE — 77067 SCR MAMMO BI INCL CAD: CPT

## 2022-04-20 ENCOUNTER — OFFICE VISIT (OUTPATIENT)
Dept: NEPHROLOGY | Facility: CLINIC | Age: 57
End: 2022-04-20
Payer: COMMERCIAL

## 2022-04-20 VITALS
DIASTOLIC BLOOD PRESSURE: 70 MMHG | SYSTOLIC BLOOD PRESSURE: 120 MMHG | OXYGEN SATURATION: 96 % | TEMPERATURE: 97.8 F | BODY MASS INDEX: 28 KG/M2 | RESPIRATION RATE: 16 BRPM | HEART RATE: 87 BPM | HEIGHT: 64 IN | WEIGHT: 164 LBS

## 2022-04-20 DIAGNOSIS — N18.31 STAGE 3A CHRONIC KIDNEY DISEASE (HCC): Primary | ICD-10-CM

## 2022-04-20 PROCEDURE — 3008F BODY MASS INDEX DOCD: CPT | Performed by: INTERNAL MEDICINE

## 2022-04-20 PROCEDURE — 1036F TOBACCO NON-USER: CPT | Performed by: INTERNAL MEDICINE

## 2022-04-20 PROCEDURE — 3074F SYST BP LT 130 MM HG: CPT | Performed by: INTERNAL MEDICINE

## 2022-04-20 PROCEDURE — 3066F NEPHROPATHY DOC TX: CPT | Performed by: INTERNAL MEDICINE

## 2022-04-20 PROCEDURE — 99215 OFFICE O/P EST HI 40 MIN: CPT | Performed by: INTERNAL MEDICINE

## 2022-04-20 PROCEDURE — 3078F DIAST BP <80 MM HG: CPT | Performed by: INTERNAL MEDICINE

## 2022-04-20 RX ORDER — TOBRAMYCIN AND DEXAMETHASONE 3; 1 MG/ML; MG/ML
SUSPENSION/ DROPS OPHTHALMIC
COMMUNITY
Start: 2022-04-01

## 2022-04-20 NOTE — PROGRESS NOTES
NEPHROLOGY PROGRESS NOTE    Patient: Ryan Mora               Sex: female          DOA: No admission date for patient encounter  YOB: 1965        Age:  61 y o         4/20/2022        BACKGROUND     79-year-old female with past medical history of diabetes mellitus type 2, proteinuria who is following up in the renal clinic    SUBJECTIVE     Patient presents to renal clinic after 6 months  Feels well and offers no major complaints  Noted change in patient's diabetic regimen and now noted to be on Semaglutide and Jardiance  Developed UTI recently and treated with antibiotic  REVIEW OF SYSTEMS     Review of Systems   Constitutional: Negative  HENT: Negative  Eyes: Negative  Respiratory: Negative  Cardiovascular: Negative  Gastrointestinal: Negative  Endocrine: Negative  Genitourinary: Negative  Musculoskeletal: Negative  Skin: Negative  Allergic/Immunologic: Negative  Neurological: Negative  Hematological: Negative  All other systems reviewed and are negative  OBJECTIVE     Current Weight: Weight - Scale: 74 4 kg (164 lb)  Vitals:    04/20/22 1541   BP: 120/70   Pulse: 87   Resp: 16   Temp: 97 8 °F (36 6 °C)   SpO2: 96%     Body mass index is 28 15 kg/m²    [unfilled]    CURRENT MEDICATIONS       Current Outpatient Medications:     ACCU-CHEK FASTCLIX LANCETS MISC, by Does not apply route daily, Disp: , Rfl:     Apple Cider Vinegar 500 MG TABS, Take by mouth, Disp: , Rfl:     atorvastatin (LIPITOR) 20 mg tablet, TAKE 1 TABLET DAILY, Disp: 90 tablet, Rfl: 3    B Complex-C (SUPER B COMPLEX PO), Take 1 tablet by mouth daily, Disp: , Rfl:     BIOTIN PO, Take 1 capsule by mouth daily, Disp: , Rfl:     Blood Glucose Monitoring Suppl (ACCU-CHEK JESSEE CONNECT) w/Device KIT, by Does not apply route, Disp: , Rfl:     cholecalciferol (VITAMIN D3) 1,000 units tablet, Take 1,000 Units by mouth 2 (two) times a week , Disp: , Rfl:     Chromium Picolinate 200 MCG CAPS, Take by mouth daily, Disp: , Rfl:     clobetasol (TEMOVATE) 0 05 % cream, Apply topically 2 (two) times a day, Disp: 30 g, Rfl: 11    clotrimazole-betamethasone (LOTRISONE) 1-0 05 % cream, Apply topically 2 (two) times a day, Disp: 30 g, Rfl: 11    cyclobenzaprine (FLEXERIL) 10 mg tablet, TAKE 1 TABLET BY MOUTH EVERY DAY AT NIGHT, Disp: , Rfl:     Empagliflozin (Jardiance) 10 MG TABS, Take 1 tablet (10 mg total) by mouth every morning, Disp: 90 tablet, Rfl: 1    gabapentin (NEURONTIN) 300 mg capsule, TAKE 1 CAPSULE THREE TIMES A DAY, Disp: 270 capsule, Rfl: 3    glucose blood test strip, by In Vitro route 4 (four) times a day, Disp: , Rfl:     Hyaluronic Acid-Vitamin C (HYALURONIC ACID PO), Take 100 mg by mouth daily, Disp: , Rfl:     ibuprofen (MOTRIN) 800 mg tablet, Take 800 mg by mouth as needed  , Disp: , Rfl:     lisinopril (ZESTRIL) 5 mg tablet, Take 0 5 tablets (2 5 mg total) by mouth daily, Disp: 90 tablet, Rfl: 5    metFORMIN (GLUCOPHAGE) 1000 MG tablet, TAKE 1 TABLET TWICE A DAY, Disp: 180 tablet, Rfl: 3    Multiple Vitamin (DAILY VALUE MULTIVITAMIN) TABS, Take by mouth, Disp: , Rfl:     Omega-3 Fatty Acids (FISH OIL) 1,000 mg, Take 1,000 mg by mouth daily, Disp: , Rfl:     omeprazole (PriLOSEC) 20 mg delayed release capsule, TAKE 1 CAPSULE EVERY MORNING BEFORE BREAKFAST, Disp: 90 capsule, Rfl: 3    Saw Palmetto 160 MG CAPS, Take 250 mg by mouth  , Disp: , Rfl:     Semaglutide (Rybelsus) 14 MG TABS, Take 1 tablet po daily 30 minutes prior to food/drinks, Disp: 90 tablet, Rfl: 0    tobramycin-dexamethasone (TOBRADEX) ophthalmic suspension, INSTIL 1 DROP INTO INTO LEFT EYE 3 TIMES A DAY, Disp: , Rfl:     Turmeric 500 MG CAPS, Take by mouth, Disp: , Rfl:     zinc gluconate 50 mg tablet, Take 50 mg by mouth daily, Disp: , Rfl:     nitrofurantoin (MACROBID) 100 mg capsule, Take 1 capsule (100 mg total) by mouth 2 (two) times a day (Patient not taking: Reported on 4/20/2022 ), Disp: 10 capsule, Rfl: 0    phenazopyridine (PYRIDIUM) 100 mg tablet, Take 1 tablet (100 mg total) by mouth 3 (three) times a day as needed for bladder spasms (Patient not taking: Reported on 4/20/2022 ), Disp: 10 tablet, Rfl: 0      PHYSICAL EXAMINATION     Physical Exam  Constitutional:       Appearance: She is well-developed  HENT:      Head: Normocephalic and atraumatic  Eyes:      Pupils: Pupils are equal, round, and reactive to light  Cardiovascular:      Rate and Rhythm: Normal rate and regular rhythm  Heart sounds: Normal heart sounds  Pulmonary:      Effort: Pulmonary effort is normal    Abdominal:      General: Bowel sounds are normal       Palpations: Abdomen is soft  Musculoskeletal:         General: Normal range of motion  Cervical back: Neck supple  Skin:     General: Skin is warm  Neurological:      Mental Status: She is alert and oriented to person, place, and time  LAB RESULTS     Results from last 7 days   Lab Units 04/15/22  0933   WBC Thousand/uL 8 28   HEMOGLOBIN g/dL 14 4   HEMATOCRIT % 45 6   PLATELETS Thousands/uL 291   POTASSIUM mmol/L 4 8   CHLORIDE mmol/L 104   CO2 mmol/L 28   BUN mg/dL 19   CREATININE mg/dL 1 12   EGFR ml/min/1 73sq m 54   CALCIUM mg/dL 10 0   PHOSPHORUS mg/dL 3 6           RADIOLOGY RESULTS      Reviewed    ASSESSMENT/PLAN     53-year-old female with past medical history of diabetes mellitus type 2, chronic kidney disease stage 3 who is following up in renal Clinic for management of CKD  1  Chronic kidney disease stage 3a:  Etiology likely diabetic nephropathy and hypertensive nephrosclerosis  Recent labs revealed serum creatinine 1 1 mg/dl with estimated GFR of 54 which is an improvement  2  Proteinuria:  Patient is on lisinopril 2 5 mg once daily  She has 160 mg of proteinuria  On anti proteinuric agents  3  Secondary hyperparathyroidism of renal origin:  Vitamin-D levels are within normal limits at 69    Calcium levels are 10 0 and normal as well  4  Anemia due to chronic kidney disease:  Patient's hemoglobin is on target at 14 4  Target hemoglobin for patient with CKD is 9 5-11 grams/deciliter  5  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:  Patient blood pressure is on target in the office today at 120/70  She is maintained on lisinopril 2 5 mg once daily  Target blood pressure for this patient is 130/80  6  Diabetes mellitus in CKD:  Blood sugar is on target  7  Nutrition:  Low-potassium, moderate protein and low-salt diet recommended  8  Hypertriglyceridemia:  Patient noted to be on Omega 3 fatty acids  Return in 6 months with CBC, BMP, phos, calcium, vitamin-D, urinalysis, urine protein creatinine ratio        Vilma Cook MD  Nephrology  4/20/2022

## 2022-05-09 DIAGNOSIS — E78.2 MIXED HYPERLIPIDEMIA: ICD-10-CM

## 2022-05-09 RX ORDER — ATORVASTATIN CALCIUM 20 MG/1
TABLET, FILM COATED ORAL
Qty: 90 TABLET | Refills: 3 | Status: SHIPPED | OUTPATIENT
Start: 2022-05-09

## 2022-05-10 DIAGNOSIS — E11.65 UNCONTROLLED TYPE 2 DIABETES MELLITUS WITH HYPERGLYCEMIA (HCC): ICD-10-CM

## 2022-06-20 ENCOUNTER — APPOINTMENT (OUTPATIENT)
Dept: LAB | Facility: HOSPITAL | Age: 57
End: 2022-06-20
Payer: COMMERCIAL

## 2022-06-20 ENCOUNTER — OFFICE VISIT (OUTPATIENT)
Dept: BARIATRICS | Facility: CLINIC | Age: 57
End: 2022-06-20
Payer: COMMERCIAL

## 2022-06-20 VITALS
RESPIRATION RATE: 16 BRPM | BODY MASS INDEX: 26.33 KG/M2 | HEIGHT: 65 IN | WEIGHT: 158 LBS | SYSTOLIC BLOOD PRESSURE: 98 MMHG | TEMPERATURE: 98.4 F | HEART RATE: 74 BPM | DIASTOLIC BLOOD PRESSURE: 68 MMHG

## 2022-06-20 DIAGNOSIS — N18.2 CHRONIC KIDNEY DISEASE, STAGE 2 (MILD): ICD-10-CM

## 2022-06-20 DIAGNOSIS — E11.69 DIABETES MELLITUS TYPE 2 IN OBESE (HCC): ICD-10-CM

## 2022-06-20 DIAGNOSIS — E78.2 MIXED HYPERLIPIDEMIA: ICD-10-CM

## 2022-06-20 DIAGNOSIS — E66.9 DIABETES MELLITUS TYPE 2 IN OBESE (HCC): ICD-10-CM

## 2022-06-20 DIAGNOSIS — R63.5 ABNORMAL WEIGHT GAIN: ICD-10-CM

## 2022-06-20 DIAGNOSIS — E66.3 OVERWEIGHT: Primary | ICD-10-CM

## 2022-06-20 LAB
25(OH)D3 SERPL-MCNC: 72.5 NG/ML (ref 30–100)
ALBUMIN SERPL BCP-MCNC: 3.8 G/DL (ref 3.5–5)
ALP SERPL-CCNC: 76 U/L (ref 46–116)
ALT SERPL W P-5'-P-CCNC: 24 U/L (ref 12–78)
ANION GAP SERPL CALCULATED.3IONS-SCNC: 9 MMOL/L (ref 4–13)
AST SERPL W P-5'-P-CCNC: 15 U/L (ref 5–45)
BACTERIA UR QL AUTO: ABNORMAL /HPF
BASOPHILS # BLD AUTO: 0.04 THOUSANDS/ΜL (ref 0–0.1)
BASOPHILS NFR BLD AUTO: 1 % (ref 0–1)
BILIRUB SERPL-MCNC: 0.48 MG/DL (ref 0.2–1)
BILIRUB UR QL STRIP: NEGATIVE
BUN SERPL-MCNC: 18 MG/DL (ref 5–25)
CALCIUM SERPL-MCNC: 9.9 MG/DL (ref 8.3–10.1)
CHLORIDE SERPL-SCNC: 102 MMOL/L (ref 100–108)
CLARITY UR: CLEAR
CO2 SERPL-SCNC: 29 MMOL/L (ref 21–32)
COLOR UR: YELLOW
CREAT SERPL-MCNC: 1.1 MG/DL (ref 0.6–1.3)
EOSINOPHIL # BLD AUTO: 0.15 THOUSAND/ΜL (ref 0–0.61)
EOSINOPHIL NFR BLD AUTO: 2 % (ref 0–6)
ERYTHROCYTE [DISTWIDTH] IN BLOOD BY AUTOMATED COUNT: 13 % (ref 11.6–15.1)
EST. AVERAGE GLUCOSE BLD GHB EST-MCNC: 148 MG/DL
GFR SERPL CREATININE-BSD FRML MDRD: 55 ML/MIN/1.73SQ M
GLUCOSE P FAST SERPL-MCNC: 143 MG/DL (ref 65–99)
GLUCOSE UR STRIP-MCNC: ABNORMAL MG/DL
HBA1C MFR BLD: 6.8 %
HCT VFR BLD AUTO: 47 % (ref 34.8–46.1)
HGB BLD-MCNC: 14.7 G/DL (ref 11.5–15.4)
HGB UR QL STRIP.AUTO: NEGATIVE
IMM GRANULOCYTES # BLD AUTO: 0.01 THOUSAND/UL (ref 0–0.2)
IMM GRANULOCYTES NFR BLD AUTO: 0 % (ref 0–2)
KETONES UR STRIP-MCNC: NEGATIVE MG/DL
LEUKOCYTE ESTERASE UR QL STRIP: ABNORMAL
LYMPHOCYTES # BLD AUTO: 1.93 THOUSANDS/ΜL (ref 0.6–4.47)
LYMPHOCYTES NFR BLD AUTO: 23 % (ref 14–44)
MCH RBC QN AUTO: 29.8 PG (ref 26.8–34.3)
MCHC RBC AUTO-ENTMCNC: 31.3 G/DL (ref 31.4–37.4)
MCV RBC AUTO: 95 FL (ref 82–98)
MONOCYTES # BLD AUTO: 0.56 THOUSAND/ΜL (ref 0.17–1.22)
MONOCYTES NFR BLD AUTO: 7 % (ref 4–12)
NEUTROPHILS # BLD AUTO: 5.88 THOUSANDS/ΜL (ref 1.85–7.62)
NEUTS SEG NFR BLD AUTO: 67 % (ref 43–75)
NITRITE UR QL STRIP: NEGATIVE
NON-SQ EPI CELLS URNS QL MICRO: ABNORMAL /HPF
NRBC BLD AUTO-RTO: 0 /100 WBCS
PH UR STRIP.AUTO: 5.5 [PH]
PHOSPHATE SERPL-MCNC: 4.2 MG/DL (ref 2.7–4.5)
PLATELET # BLD AUTO: 288 THOUSANDS/UL (ref 149–390)
PMV BLD AUTO: 9.7 FL (ref 8.9–12.7)
POTASSIUM SERPL-SCNC: 5.1 MMOL/L (ref 3.5–5.3)
PROT SERPL-MCNC: 8.5 G/DL (ref 6.4–8.2)
PROT UR STRIP-MCNC: NEGATIVE MG/DL
RBC # BLD AUTO: 4.93 MILLION/UL (ref 3.81–5.12)
RBC #/AREA URNS AUTO: ABNORMAL /HPF
SODIUM SERPL-SCNC: 140 MMOL/L (ref 136–145)
SP GR UR STRIP.AUTO: 1.01 (ref 1–1.03)
UROBILINOGEN UR QL STRIP.AUTO: 0.2 E.U./DL
WBC # BLD AUTO: 8.57 THOUSAND/UL (ref 4.31–10.16)
WBC #/AREA URNS AUTO: ABNORMAL /HPF

## 2022-06-20 PROCEDURE — 3008F BODY MASS INDEX DOCD: CPT | Performed by: INTERNAL MEDICINE

## 2022-06-20 PROCEDURE — 3074F SYST BP LT 130 MM HG: CPT | Performed by: INTERNAL MEDICINE

## 2022-06-20 PROCEDURE — 1036F TOBACCO NON-USER: CPT | Performed by: INTERNAL MEDICINE

## 2022-06-20 PROCEDURE — 84100 ASSAY OF PHOSPHORUS: CPT | Performed by: INTERNAL MEDICINE

## 2022-06-20 PROCEDURE — 83036 HEMOGLOBIN GLYCOSYLATED A1C: CPT

## 2022-06-20 PROCEDURE — 3066F NEPHROPATHY DOC TX: CPT | Performed by: INTERNAL MEDICINE

## 2022-06-20 PROCEDURE — 81001 URINALYSIS AUTO W/SCOPE: CPT | Performed by: INTERNAL MEDICINE

## 2022-06-20 PROCEDURE — 36415 COLL VENOUS BLD VENIPUNCTURE: CPT | Performed by: INTERNAL MEDICINE

## 2022-06-20 PROCEDURE — 82570 ASSAY OF URINE CREATININE: CPT | Performed by: INTERNAL MEDICINE

## 2022-06-20 PROCEDURE — 84156 ASSAY OF PROTEIN URINE: CPT | Performed by: INTERNAL MEDICINE

## 2022-06-20 PROCEDURE — 82306 VITAMIN D 25 HYDROXY: CPT | Performed by: INTERNAL MEDICINE

## 2022-06-20 PROCEDURE — 83970 ASSAY OF PARATHORMONE: CPT | Performed by: INTERNAL MEDICINE

## 2022-06-20 PROCEDURE — 80053 COMPREHEN METABOLIC PANEL: CPT | Performed by: INTERNAL MEDICINE

## 2022-06-20 PROCEDURE — 3078F DIAST BP <80 MM HG: CPT | Performed by: INTERNAL MEDICINE

## 2022-06-20 PROCEDURE — 99214 OFFICE O/P EST MOD 30 MIN: CPT | Performed by: INTERNAL MEDICINE

## 2022-06-20 PROCEDURE — 3044F HG A1C LEVEL LT 7.0%: CPT | Performed by: INTERNAL MEDICINE

## 2022-06-20 PROCEDURE — 85025 COMPLETE CBC W/AUTO DIFF WBC: CPT | Performed by: INTERNAL MEDICINE

## 2022-06-20 NOTE — PROGRESS NOTES
Assessment/Plan:  Jd Franco was seen today for follow-up  Diagnoses and all orders for this visit:    DM2 in obese  CKD stage 3 dt type 2 diabetes   Renal function stable  A1C:   Lab Results   Component Value Date    HGBA1C 8 3 (H) 01/24/2022    HGBA1C 5 7 (H) 11/07/2015   Currently on: metformin 1000mg bid, Jardiance 10mg, Rybelsus 14mg  Will wait for her a1c result and determine medication mgmt     Hypertriglyceridemia  Increase activity level to 150 minutes of moderate to intense exercise per week  Weight loss should help improve the lipid levels  Avoid foods with trans fat, limit saturated fats and refined carbohydrates  Increase fish/omega 3 consumption    Overweight  Abnormal weight gain  Has lost >10% of her weight  Will continue Rybelsus 14 mg for now  Her weight is stable, discussed adding strengthening exercises  Continue lifestyle changes  Add strengthening exercise 2x week      Total time spent: 30 minutes with >50% face-to-face time with the patient  Follow up in approximately 3 months with Non-Surgical Physician/Advanced Practitioner  Subjective:   Chief Complaint   Patient presents with    Follow-up       Patient ID: Guevara Lightite  is a 61 y o  female with excess weight/obesity here to pursue weight management  Patient has finished [facility]    Most recent notes and records were reviewed      HPI  -Initial weight loss goal of 5-10% weight loss for improved health  Wt Readings from Last 10 Encounters:   06/20/22 71 7 kg (158 lb)   04/20/22 74 4 kg (164 lb)   04/19/22 75 8 kg (167 lb)   03/29/22 75 9 kg (167 lb 6 4 oz)   03/21/22 77 1 kg (170 lb)   03/18/22 76 2 kg (168 lb)   03/02/22 77 1 kg (170 lb)   02/10/22 77 kg (169 lb 12 8 oz)   02/09/22 76 8 kg (169 lb 6 4 oz)   02/02/22 78 kg (172 lb)     Initial weight 9/2021: 179 lbs  Current weight: 158 lbs  Change in weight: -21 lbs (-12 lbs since LOV)  Goal:    Started on Rybelsus in 11/2021- now at 14mg  No side effects other than fullness-- less portions  Lost a lot of weight since last seen- portion control, watching what she eats  Also switched Manuel to Drew Ch in 12/2021 as her insurance coverage changed  Discontinued Januvia  Glucose averaging 130s in AM (non-fasting), 110s to 120s in PM   Drinks- 64 oz  Alcohol- no  Exercise- moving around more at work; averaging 9000 steps    Tries to walk more at work and play pickle ball       The following portions of the patient's history were reviewed and updated as appropriate: allergies, current medications, past family history, past medical history, past social history, past surgical history, and problem list     Review of Systems   Cardiovascular: Negative  Gastrointestinal: Negative  Objective:  BP 98/68 (BP Location: Left arm, Patient Position: Sitting, Cuff Size: Standard)   Pulse 74   Temp 98 4 °F (36 9 °C) (Tympanic)   Resp 16   Ht 5' 4 5" (1 638 m)   Wt 71 7 kg (158 lb)   LMP  (LMP Unknown)   BMI 26 70 kg/m²   Constitutional: Well-developed, well-nourished and obese Body mass index is 26 7 kg/m²  Remalla Crooked HEENT: No conjunctival pallor or jaundice  Pulmonary: No increased work of breathing or signs of respiratory distress  CV: Normal rate, well-perfused  GI: Obese  Non-distended  MSK: No edema   Neuro: Oriented to person, place and time  Normal Speech  Normal gait  Psych: Normal affect and mood       Labs and Imaging  Most recent labs and imaging reviewed

## 2022-06-21 LAB
CREAT UR-MCNC: 94.7 MG/DL
PROT UR-MCNC: 10 MG/DL
PROT/CREAT UR: 0.11 MG/G{CREAT} (ref 0–0.1)
PTH-INTACT SERPL-MCNC: 30.5 PG/ML (ref 18.4–80.1)

## 2022-06-21 PROCEDURE — 3061F NEG MICROALBUMINURIA REV: CPT | Performed by: INTERNAL MEDICINE

## 2022-07-19 ENCOUNTER — VBI (OUTPATIENT)
Dept: ADMINISTRATIVE | Facility: OTHER | Age: 57
End: 2022-07-19

## 2022-07-19 NOTE — TELEPHONE ENCOUNTER
07/19/22 7:30 AM     See documentation in the [employer] SmartForm       Kaitlyn Yen MA   Verified insurance gap in care/gap in data was previously closed in the [employer] Portal

## 2022-08-02 ENCOUNTER — TELEPHONE (OUTPATIENT)
Dept: BARIATRICS | Facility: CLINIC | Age: 57
End: 2022-08-02

## 2022-08-15 DIAGNOSIS — G62.9 NEUROPATHY: ICD-10-CM

## 2022-08-15 DIAGNOSIS — B35.9 TINEA: ICD-10-CM

## 2022-08-16 RX ORDER — GABAPENTIN 300 MG/1
CAPSULE ORAL
Qty: 270 CAPSULE | Refills: 3 | Status: SHIPPED | OUTPATIENT
Start: 2022-08-16

## 2022-08-16 RX ORDER — CLOBETASOL PROPIONATE 0.5 MG/G
CREAM TOPICAL
Qty: 30 G | Refills: 23 | Status: SHIPPED | OUTPATIENT
Start: 2022-08-16

## 2022-08-17 RX ORDER — CLOTRIMAZOLE AND BETAMETHASONE DIPROPIONATE 10; .64 MG/G; MG/G
CREAM TOPICAL
Qty: 30 G | Refills: 23 | Status: SHIPPED | OUTPATIENT
Start: 2022-08-17

## 2022-08-25 ENCOUNTER — TELEPHONE (OUTPATIENT)
Dept: INTERNAL MEDICINE CLINIC | Facility: CLINIC | Age: 57
End: 2022-08-25

## 2022-09-13 ENCOUNTER — TELEPHONE (OUTPATIENT)
Dept: NEPHROLOGY | Facility: CLINIC | Age: 57
End: 2022-09-13

## 2022-09-13 NOTE — TELEPHONE ENCOUNTER
I called and left a message on machine for patient to return our call about scheduling her 6 month follow up with Dr Sourav Durant from our recall list

## 2022-10-07 ENCOUNTER — RA CDI HCC (OUTPATIENT)
Dept: OTHER | Facility: HOSPITAL | Age: 57
End: 2022-10-07

## 2022-10-07 NOTE — PROGRESS NOTES
[facility]  coding opportunities          Chart Reviewed number of suggestions sent to Provider: 1     Patients Insurance        Commercial Insurance: [facility]       [id number] - Diabetes mellitus due to underlying condition with mild nonproliferative diabetic retinopathy without macular edema Bilateral    Refer to 3/18/2022 note - please review and assess if applicable for [id number]

## 2022-10-13 ENCOUNTER — OFFICE VISIT (OUTPATIENT)
Dept: INTERNAL MEDICINE CLINIC | Facility: CLINIC | Age: 57
End: 2022-10-13
Payer: COMMERCIAL

## 2022-10-13 ENCOUNTER — APPOINTMENT (OUTPATIENT)
Dept: LAB | Facility: CLINIC | Age: 57
End: 2022-10-13
Payer: COMMERCIAL

## 2022-10-13 VITALS
TEMPERATURE: 97 F | DIASTOLIC BLOOD PRESSURE: 62 MMHG | HEART RATE: 74 BPM | WEIGHT: 155 LBS | RESPIRATION RATE: 16 BRPM | HEIGHT: 65 IN | BODY MASS INDEX: 25.83 KG/M2 | SYSTOLIC BLOOD PRESSURE: 117 MMHG | OXYGEN SATURATION: 98 %

## 2022-10-13 DIAGNOSIS — E11.22 TYPE 2 DIABETES MELLITUS WITH STAGE 2 CHRONIC KIDNEY DISEASE, WITHOUT LONG-TERM CURRENT USE OF INSULIN (HCC): ICD-10-CM

## 2022-10-13 DIAGNOSIS — N18.2 TYPE 2 DIABETES MELLITUS WITH STAGE 2 CHRONIC KIDNEY DISEASE, WITHOUT LONG-TERM CURRENT USE OF INSULIN (HCC): ICD-10-CM

## 2022-10-13 DIAGNOSIS — N18.2 TYPE 2 DIABETES MELLITUS WITH STAGE 2 CHRONIC KIDNEY DISEASE, WITHOUT LONG-TERM CURRENT USE OF INSULIN (HCC): Primary | ICD-10-CM

## 2022-10-13 DIAGNOSIS — Z23 ENCOUNTER FOR IMMUNIZATION: ICD-10-CM

## 2022-10-13 DIAGNOSIS — E11.22 TYPE 2 DIABETES MELLITUS WITH STAGE 2 CHRONIC KIDNEY DISEASE, WITHOUT LONG-TERM CURRENT USE OF INSULIN (HCC): Primary | ICD-10-CM

## 2022-10-13 DIAGNOSIS — E78.2 MIXED HYPERLIPIDEMIA: ICD-10-CM

## 2022-10-13 DIAGNOSIS — I10 PRIMARY HYPERTENSION: ICD-10-CM

## 2022-10-13 PROCEDURE — 80048 BASIC METABOLIC PNL TOTAL CA: CPT

## 2022-10-13 PROCEDURE — 90682 RIV4 VACC RECOMBINANT DNA IM: CPT | Performed by: INTERNAL MEDICINE

## 2022-10-13 PROCEDURE — 90471 IMMUNIZATION ADMIN: CPT | Performed by: INTERNAL MEDICINE

## 2022-10-13 PROCEDURE — 83036 HEMOGLOBIN GLYCOSYLATED A1C: CPT

## 2022-10-13 PROCEDURE — 99213 OFFICE O/P EST LOW 20 MIN: CPT | Performed by: INTERNAL MEDICINE

## 2022-10-13 PROCEDURE — 36415 COLL VENOUS BLD VENIPUNCTURE: CPT

## 2022-10-13 NOTE — PROGRESS NOTES
Assessment/Plan:       Diagnoses and all orders for this visit:    Type 2 diabetes mellitus with stage 2 chronic kidney disease, without long-term current use of insulin ([facility])  -     Basic metabolic panel; Future  -     Hemoglobin A1C; Future    Encounter for immunization  -     influenza vaccine, quadrivalent, recombinant, PF, 0 5 mL, for patients 18 yr+ ([facility]UBL[facility])    Mixed hyperlipidemia    Primary hypertension                Subjective:      Patient ID: Charly Day is a 61 y o  female  Diabetic:Prior hemoglobin A1c at least the last was 8 3  Now follows with the bariatric physician and has been started on Rybulsis as of about 6 months ago and the most recent hemoglobin A1c was down to 6 8  This is a significant improved Hypertensive: Good blood pressure control on a reduced dose of lisinopril  No complaints , other than urinary incontinence issues and is followed by Urogynecology for this    Will recheck the A1c and if it has dropped further will discontinue       The following portions of the patient's history were reviewed and updated as appropriate:   She has a past medical history of Adjustment disorder with anxiety, Anemia, Anxiety, Atypical glandular cells of undetermined significance (ARIA) on cervical Pap smear (9/18/2019), Chronic kidney disease, Constipation, Dense breasts, GERD (gastroesophageal reflux disease), Heart murmur, History of atrial paroxysmal tachycardia, History of cerebral artery occlusion, History of chest pain, HPV (human papilloma virus) infection (2012), Hyperlipidemia, SHAE (iron deficiency anemia), Menopause, Ovarian cyst, Pancreatitis, Plantar fasciitis, Polyclonal hypergammaglobulinemia, Restless legs syndrome (RLS), and Trigger finger  ,  does not have any pertinent problems on file  ,   has a past surgical history that includes Cholecystectomy; pr esophagogastroduodenoscopy transoral diagnostic (N/A, 10/19/2017); Trigger finger release; and Colposcopy (2013)  ,  family history includes Tuberculosis in her mother  She was adopted  ,   reports that she quit smoking about 4 years ago  Her smoking use included cigarettes  She has a 3 50 pack-year smoking history  She has never used smokeless tobacco  She reports current alcohol use  She reports that she does not use drugs  ,  has No Known Allergies     Current Outpatient Medications   Medication Sig Dispense Refill   • ACCU-CHEK FASTCLIX LANCETS MISC by Does not apply route daily     • atorvastatin (LIPITOR) 20 mg tablet TAKE 1 TABLET DAILY 90 tablet 3   • B Complex-C (SUPER B COMPLEX PO) Take 1 tablet by mouth daily     • BIOTIN PO Take 1 capsule by mouth daily     • Blood Glucose Monitoring Suppl (ACCU-CHEK JESSEE CONNECT) w/Device KIT by Does not apply route     • cholecalciferol (VITAMIN D3) 1,000 units tablet Take 1,000 Units by mouth 2 (two) times a week      • Chromium Picolinate 200 MCG CAPS Take by mouth daily     • clobetasol (TEMOVATE) 0 05 % cream APPLY TOPICALLY TWICE A DAY 30 g 23   • clotrimazole-betamethasone (LOTRISONE) 1-0 05 % cream APPLY TOPICALLY TWICE A DAY 30 g 23   • cyclobenzaprine (FLEXERIL) 10 mg tablet TAKE 1 TABLET BY MOUTH EVERY DAY AT NIGHT     • gabapentin (NEURONTIN) 300 mg capsule TAKE 1 CAPSULE THREE TIMES A  capsule 3   • glucose blood test strip by In Vitro route 4 (four) times a day     • Hyaluronic Acid-Vitamin C (HYALURONIC ACID PO) Take 100 mg by mouth daily     • ibuprofen (MOTRIN) 800 mg tablet Take 800 mg by mouth as needed       • Jardiance 10 MG TABS TAKE 1 TABLET EVERY MORNING 90 tablet 0   • lisinopril (ZESTRIL) 5 mg tablet Take 0 5 tablets (2 5 mg total) by mouth daily 90 tablet 5   • metFORMIN (GLUCOPHAGE) 1000 MG tablet TAKE 1 TABLET TWICE A  tablet 3   • Multiple Vitamin (DAILY VALUE MULTIVITAMIN) TABS Take by mouth     • nitrofurantoin (MACROBID) 100 mg capsule Take 1 capsule (100 mg total) by mouth 2 (two) times a day 10 capsule 0   • Omega-3 Fatty Acids (FISH OIL) 1,000 mg Take 1,000 mg by mouth daily     • omeprazole (PriLOSEC) 20 mg delayed release capsule TAKE 1 CAPSULE EVERY MORNING BEFORE BREAKFAST 90 capsule 3   • phenazopyridine (PYRIDIUM) 100 mg tablet Take 1 tablet (100 mg total) by mouth 3 (three) times a day as needed for bladder spasms 10 tablet 0   • Saw Palmetto 160 MG CAPS Take 250 mg by mouth       • Semaglutide (Rybelsus) 14 MG TABS Take 1 tablet po daily 30 minutes prior to food/drinks 90 tablet 1   • tobramycin-dexamethasone (TOBRADEX) ophthalmic suspension INSTIL 1 DROP INTO INTO LEFT EYE 3 TIMES A DAY     • Turmeric 500 MG CAPS Take by mouth     • zinc gluconate 50 mg tablet Take 50 mg by mouth daily       No current facility-administered medications for this visit  Review of Systems   Respiratory: Negative for cough  Cardiovascular: Negative for chest pain  Endocrine: Negative for cold intolerance, heat intolerance, polydipsia, polyphagia and polyuria  Objective:  Vitals:    10/13/22 1600   BP: 117/62   Pulse: 74   Resp: 16   Temp: (!) 97 °F (36 1 °C)   SpO2: 98%      Physical Exam  Constitutional:       Appearance: Normal appearance  Cardiovascular:      Rate and Rhythm: Normal rate  Pulmonary:      Effort: Pulmonary effort is normal    Neurological:      General: No focal deficit present  Mental Status: She is alert  Psychiatric:         Mood and Affect: Mood normal          Judgment: Judgment normal            Patient Instructions   Overall improvement   Blood pressure   Diabetes  Weight     Recheck A1c     Schedule for a follow-up in 6 months  If the A1c is down further we can discontinue metformin and recheck another A1c in 3 months

## 2022-10-13 NOTE — PATIENT INSTRUCTIONS
Overall improvement   Blood pressure   Diabetes  Weight     Recheck A1c     Schedule for a follow-up in 6 months  If the A1c is down further we can discontinue metformin and recheck another A1c in 3 months

## 2022-10-14 LAB
ANION GAP SERPL CALCULATED.3IONS-SCNC: 2 MMOL/L (ref 4–13)
BUN SERPL-MCNC: 22 MG/DL (ref 5–25)
CALCIUM SERPL-MCNC: 10.4 MG/DL (ref 8.3–10.1)
CHLORIDE SERPL-SCNC: 104 MMOL/L (ref 96–108)
CO2 SERPL-SCNC: 28 MMOL/L (ref 21–32)
CREAT SERPL-MCNC: 1.1 MG/DL (ref 0.6–1.3)
EST. AVERAGE GLUCOSE BLD GHB EST-MCNC: 137 MG/DL
GFR SERPL CREATININE-BSD FRML MDRD: 55 ML/MIN/1.73SQ M
GLUCOSE P FAST SERPL-MCNC: 150 MG/DL (ref 65–99)
HBA1C MFR BLD: 6.4 %
POTASSIUM SERPL-SCNC: 4.9 MMOL/L (ref 3.5–5.3)
SODIUM SERPL-SCNC: 134 MMOL/L (ref 135–147)

## 2022-10-20 DIAGNOSIS — F41.8 SITUATIONAL ANXIETY: ICD-10-CM

## 2022-10-20 NOTE — TELEPHONE ENCOUNTER
Pt calling and stated was in the office on 10/14/2022 and stated that a prescription for Alprazolam was suppose to be written  Please call patient back to discuss

## 2022-10-21 DIAGNOSIS — F41.8 SITUATIONAL ANXIETY: Primary | ICD-10-CM

## 2022-10-21 RX ORDER — ALPRAZOLAM 0.5 MG/1
0.5 TABLET ORAL 2 TIMES DAILY PRN
Qty: 30 TABLET | Refills: 1 | OUTPATIENT
Start: 2022-10-21

## 2022-10-21 RX ORDER — ALPRAZOLAM 0.5 MG/1
0.5 TABLET ORAL 2 TIMES DAILY PRN
Qty: 30 TABLET | Refills: 1 | Status: SHIPPED | OUTPATIENT
Start: 2022-10-21 | End: 2022-11-03

## 2022-10-21 NOTE — TELEPHONE ENCOUNTER
Called the patient to let her know you sent in the prescription and she stated she would like to have it go through [phone]   I called [facility] and had the script canceled and pended a new one to [facility], please advise

## 2022-10-25 DIAGNOSIS — F41.8 SITUATIONAL ANXIETY: ICD-10-CM

## 2022-10-25 RX ORDER — ALPRAZOLAM 0.5 MG/1
0.5 TABLET ORAL 2 TIMES DAILY PRN
Qty: 30 TABLET | Refills: 0 | Status: CANCELLED | OUTPATIENT
Start: 2022-10-25

## 2022-10-26 ENCOUNTER — TELEPHONE (OUTPATIENT)
Dept: NEPHROLOGY | Facility: CLINIC | Age: 57
End: 2022-10-26

## 2022-10-31 ENCOUNTER — APPOINTMENT (OUTPATIENT)
Dept: LAB | Facility: MEDICAL CENTER | Age: 57
End: 2022-10-31

## 2022-11-01 DIAGNOSIS — F41.8 SITUATIONAL ANXIETY: ICD-10-CM

## 2022-11-02 ENCOUNTER — TELEPHONE (OUTPATIENT)
Dept: NEPHROLOGY | Facility: CLINIC | Age: 57
End: 2022-11-02

## 2022-11-02 RX ORDER — ALPRAZOLAM 0.5 MG/1
0.5 TABLET ORAL 2 TIMES DAILY PRN
Qty: 30 TABLET | Refills: 1 | OUTPATIENT
Start: 2022-11-02

## 2022-11-03 ENCOUNTER — OFFICE VISIT (OUTPATIENT)
Dept: NEPHROLOGY | Facility: CLINIC | Age: 57
End: 2022-11-03

## 2022-11-03 VITALS
WEIGHT: 154.2 LBS | HEART RATE: 80 BPM | TEMPERATURE: 96.8 F | HEIGHT: 65 IN | DIASTOLIC BLOOD PRESSURE: 70 MMHG | BODY MASS INDEX: 25.69 KG/M2 | RESPIRATION RATE: 16 BRPM | SYSTOLIC BLOOD PRESSURE: 110 MMHG | OXYGEN SATURATION: 98 %

## 2022-11-03 DIAGNOSIS — N18.31 STAGE 3A CHRONIC KIDNEY DISEASE (HCC): Primary | ICD-10-CM

## 2022-11-03 DIAGNOSIS — F41.9 ANXIETY: ICD-10-CM

## 2022-11-03 RX ORDER — ALPRAZOLAM 1 MG/1
0.5 TABLET ORAL
Qty: 30 TABLET | Refills: 0 | Status: SHIPPED | OUTPATIENT
Start: 2022-11-03

## 2022-11-03 NOTE — PROGRESS NOTES
NEPHROLOGY PROGRESS NOTE    Patient: Ananda Martines               Sex: female          DOA: No admission date for patient encounter  YOB: 1965        Age:  61 y o         LOS: @IVY@  11/3/2022        BACKGROUND     61 y o  F with PMH of HTN, DM-2, dyslipidemia, anxiety disorder who has been following up in Renal clinic since 2019    SUBJECTIVE     Patient following up after 6 months  No complaints  REVIEW OF SYSTEMS     Review of Systems   Constitutional: Negative  HENT: Negative  Eyes: Negative  Respiratory: Negative  Cardiovascular: Negative  Gastrointestinal: Negative  Endocrine: Negative  Genitourinary: Negative  Musculoskeletal: Negative  Skin: Negative  Allergic/Immunologic: Negative  Neurological: Negative  Hematological: Negative  All other systems reviewed and are negative  OBJECTIVE     Current Weight: Weight - Scale: 69 9 kg (154 lb 3 2 oz)  Vitals:    11/03/22 1550   BP: 110/70   Pulse: 80   Resp: 16   Temp: (!) 96 8 °F (36 °C)   SpO2: 98%     Body mass index is 25 66 kg/m²    [unfilled]    CURRENT MEDICATIONS       Current Outpatient Medications:   •  ACCU-CHEK FASTCLIX LANCETS MISC, by Does not apply route daily, Disp: , Rfl:   •  ALPRAZolam (XANAX) 1 mg tablet, Take 0 5 tablets (0 5 mg total) by mouth daily at bedtime as needed for anxiety, Disp: 30 tablet, Rfl: 0  •  atorvastatin (LIPITOR) 20 mg tablet, TAKE 1 TABLET DAILY, Disp: 90 tablet, Rfl: 3  •  B Complex-C (SUPER B COMPLEX PO), Take 1 tablet by mouth daily, Disp: , Rfl:   •  BIOTIN PO, Take 1 capsule by mouth daily, Disp: , Rfl:   •  Blood Glucose Monitoring Suppl (ACCU-CHEK JESSEE CONNECT) w/Device KIT, by Does not apply route, Disp: , Rfl:   •  cholecalciferol (VITAMIN D3) 1,000 units tablet, Take 1,000 Units by mouth 2 (two) times a week , Disp: , Rfl:   •  Chromium Picolinate 200 MCG CAPS, Take by mouth daily, Disp: , Rfl:   •  clobetasol (TEMOVATE) 0 05 % cream, APPLY TOPICALLY TWICE A DAY, Disp: 30 g, Rfl: 23  •  clotrimazole-betamethasone (LOTRISONE) 1-0 05 % cream, APPLY TOPICALLY TWICE A DAY, Disp: 30 g, Rfl: 23  •  cyclobenzaprine (FLEXERIL) 10 mg tablet, TAKE 1 TABLET BY MOUTH EVERY DAY AT NIGHT, Disp: , Rfl:   •  gabapentin (NEURONTIN) 300 mg capsule, TAKE 1 CAPSULE THREE TIMES A DAY, Disp: 270 capsule, Rfl: 3  •  glucose blood test strip, by In Vitro route 4 (four) times a day, Disp: , Rfl:   •  Hyaluronic Acid-Vitamin C (HYALURONIC ACID PO), Take 100 mg by mouth daily, Disp: , Rfl:   •  ibuprofen (MOTRIN) 800 mg tablet, Take 800 mg by mouth as needed  , Disp: , Rfl:   •  Jardiance 10 MG TABS, TAKE 1 TABLET EVERY MORNING, Disp: 90 tablet, Rfl: 0  •  lisinopril (ZESTRIL) 5 mg tablet, Take 0 5 tablets (2 5 mg total) by mouth daily, Disp: 90 tablet, Rfl: 5  •  metFORMIN (GLUCOPHAGE) 1000 MG tablet, TAKE 1 TABLET TWICE A DAY, Disp: 180 tablet, Rfl: 3  •  Multiple Vitamin (DAILY VALUE MULTIVITAMIN) TABS, Take by mouth, Disp: , Rfl:   •  Omega-3 Fatty Acids (FISH OIL) 1,000 mg, Take 1,000 mg by mouth daily, Disp: , Rfl:   •  omeprazole (PriLOSEC) 20 mg delayed release capsule, TAKE 1 CAPSULE EVERY MORNING BEFORE BREAKFAST, Disp: 90 capsule, Rfl: 3  •  phenazopyridine (PYRIDIUM) 100 mg tablet, Take 1 tablet (100 mg total) by mouth 3 (three) times a day as needed for bladder spasms, Disp: 10 tablet, Rfl: 0  •  Saw Palmetto 160 MG CAPS, Take 250 mg by mouth  , Disp: , Rfl:   •  Semaglutide (Rybelsus) 14 MG TABS, Take 1 tablet po daily 30 minutes prior to food/drinks, Disp: 90 tablet, Rfl: 1  •  tobramycin-dexamethasone (TOBRADEX) ophthalmic suspension, INSTIL 1 DROP INTO INTO LEFT EYE 3 TIMES A DAY, Disp: , Rfl:   •  Turmeric 500 MG CAPS, Take by mouth, Disp: , Rfl:   •  zinc gluconate 50 mg tablet, Take 50 mg by mouth daily, Disp: , Rfl:   •  nitrofurantoin (MACROBID) 100 mg capsule, Take 1 capsule (100 mg total) by mouth 2 (two) times a day (Patient not taking: No sig reported), Disp: 10 capsule, Rfl: 0      PHYSICAL EXAMINATION     Physical Exam  Constitutional:       Appearance: She is well-developed  HENT:      Head: Normocephalic and atraumatic  Eyes:      Pupils: Pupils are equal, round, and reactive to light  Cardiovascular:      Rate and Rhythm: Normal rate and regular rhythm  Heart sounds: Normal heart sounds  Pulmonary:      Effort: Pulmonary effort is normal    Abdominal:      General: Bowel sounds are normal       Palpations: Abdomen is soft  Musculoskeletal:         General: Normal range of motion  Cervical back: Neck supple  Skin:     General: Skin is warm  Neurological:      Mental Status: She is alert and oriented to person, place, and time  LAB RESULTS     Results from last 7 days   Lab Units 10/31/22  1523   WBC Thousand/uL 8 14   HEMOGLOBIN g/dL 13 8   HEMATOCRIT % 45 3   PLATELETS Thousands/uL 282   POTASSIUM mmol/L 4 3   CHLORIDE mmol/L 105   CO2 mmol/L 28   BUN mg/dL 16   CREATININE mg/dL 1 08   EGFR ml/min/1 73sq m 56   CALCIUM mg/dL 10 1   PHOSPHORUS mg/dL 3 7           RADIOLOGY RESULTS        IMPRESSION:     No hydronephrosis or solid renal mass      Bilateral ureteral jets visualized      Unremarkable bilateral renal echotexture  ASSESSMENT/PLAN     61 F with PMH of HTN, DM-2, dyslipidemia who is following up in Renal clinic    1) CKD stage IIIa: baseline creatinine of 1 0-1 1  Current Creatinine is 1 0 mg/dl and on target  Urinalysis has shown glycosuria due to h/o taking Jardiance    2) Proteinuria: noted 140 mg per quantification and acceptable  3) Hypertension: BP is 956 mm Hg systolic and acceptable while on Lisinopril 2 5 mg once daily    4) Secondary hyperparathyroidism of renal origin: Vitamin D, calcium and phosphorus levels are on target  5) Anemia due to CKD: Hemoglobin is 13 8 g/dl and on target               [facility]  Nephrology  11/3/2022

## 2022-12-19 ENCOUNTER — OFFICE VISIT (OUTPATIENT)
Dept: BARIATRICS | Facility: CLINIC | Age: 57
End: 2022-12-19

## 2022-12-19 VITALS
SYSTOLIC BLOOD PRESSURE: 104 MMHG | RESPIRATION RATE: 16 BRPM | BODY MASS INDEX: 25.18 KG/M2 | WEIGHT: 151.1 LBS | HEART RATE: 82 BPM | DIASTOLIC BLOOD PRESSURE: 64 MMHG | HEIGHT: 65 IN

## 2022-12-19 DIAGNOSIS — N18.2 TYPE 2 DIABETES MELLITUS WITH STAGE 2 CHRONIC KIDNEY DISEASE, WITHOUT LONG-TERM CURRENT USE OF INSULIN (HCC): Primary | ICD-10-CM

## 2022-12-19 DIAGNOSIS — E66.3 OVERWEIGHT: ICD-10-CM

## 2022-12-19 DIAGNOSIS — E11.65 UNCONTROLLED TYPE 2 DIABETES MELLITUS WITH HYPERGLYCEMIA (HCC): ICD-10-CM

## 2022-12-19 DIAGNOSIS — E11.22 TYPE 2 DIABETES MELLITUS WITH STAGE 2 CHRONIC KIDNEY DISEASE, WITHOUT LONG-TERM CURRENT USE OF INSULIN (HCC): Primary | ICD-10-CM

## 2022-12-19 DIAGNOSIS — N18.2 CHRONIC KIDNEY DISEASE, STAGE 2 (MILD): ICD-10-CM

## 2022-12-19 NOTE — PROGRESS NOTES
Assessment/Plan:  Krystal Baker was seen today for follow-up  Diagnoses and all orders for this visit:    Overweight  Reached goal weight advised nutrition as below  Type 2 diabetes mellitus with stage 2 chronic kidney disease, without long-term current use of insulin ([facility]CC)  cont Rybelus and Radiance may stop Metformin at night time, use only in am   Retest Glucose 3 mo    Chronic kidney disease, stage 2 (mild)  Max 70 g protein per day  decrease Metformin to one per day  Uncontrolled type 2 diabetes mellitus with hyperglycemia (HCC)  -     Semaglutide (Rybelsus) 14 MG TABS; Take 1 tablet po daily 30 minutes prior to food/drinks  Refilled today same dose do not stop unless side effects occur  She did have Hx of pancreatitis form gallstones , tolerates med so far well   Lab Results   Component Value Date    HGBA1C 6 4 (H) 10/13/2022     Stop Metformin BID take only once in am  recheck  HbA1c in 3 mo  Weight at goal   Nutrition prescription:  Calorie goal: maintain weight 1700kcal daily  Protein: 70g daily   eGFR ml/min/1 73sq m 56        Encourage mindful eating, portion control, motivational interview performed to help patient reach goals      Follow up as needed      Subjective:   Chief Complaint   Patient presents with   • Follow-up     Patient here to discuss weight associated problems and nutrition goals  HPI: Ang Bolden  is a 61 y o  female with excess weight/obesity here to pursue weight management  Patient is pursuing Conservative Program    Most recent notes and records were reviewed    Initial weight loss goal of 5-10% weight loss for improved health  Wt Readings from Last 10 Encounters:   12/19/22 68 5 kg (151 lb 1 6 oz)   11/03/22 69 9 kg (154 lb 3 2 oz)   10/13/22 70 3 kg (155 lb)   06/20/22 71 7 kg (158 lb)   04/20/22 74 4 kg (164 lb)   04/19/22 75 8 kg (167 lb)   03/29/22 75 9 kg (167 lb 6 4 oz)   03/21/22 77 1 kg (170 lb)   03/18/22 76 2 kg (168 lb)   03/02/22 77 1 kg (170 lb)       Initial weightInitial weight 9/2021: 179 lbs  Last OV weight: 6/20/22 158lbs  Current weight: 151lbs  Change in weight: -7lbs      The following portions of the patient's history were reviewed and updated as appropriate: allergies, current medications, past family history, past medical history, past social history, past surgical history, and problem list       Review of Systems   Constitutional: Negative for activity change  Fatigue  HENT: Negative for trouble swallowing  Respiratory: Negative for shortness of breath  Cardiovascular: Negative for chest pain, edema  Gastrointestinal: Negative for abdominal pain, nausea and vomiting, acid reflux, constipation/diarrhea  Psychiatric/Behavioral: Negative for behavioral problems , anxiety or depression    Objective:  /64 (BP Location: Left arm, Patient Position: Sitting, Cuff Size: Standard)   Pulse 82   Resp 16   Ht 5' 4 5" (1 638 m)   Wt 68 5 kg (151 lb 1 6 oz)   LMP  (LMP Unknown)   BMI 25 54 kg/m²   Constitutional: Well-developed, well-nourished and Overweight Body mass index is 25 54 kg/m²  Leni Garnica HEENT: No conjunctival injection  No thyroid masses  Pulmonary: No increased work of breathing or signs of respiratory distress  Clear respiratory sounds  CV: Well-perfused, Regular rate and rhythm, no murmurs, no edema  GI: increased abdominal girth  Non-distended  Not tender   Neuro: Oriented to person, place and time  Normal Speech  Normal gait  Psych: Normal affect and mood  No delusion or hallucinations, normal thought process    Labs and Imaging  Recent labs and imaging have been personally reviewed    Lab Results   Component Value Date    WBC 8 14 10/31/2022    HGB 13 8 10/31/2022    HCT 45 3 10/31/2022    MCV 97 10/31/2022     10/31/2022     Lab Results   Component Value Date     11/07/2015    SODIUM 137 10/31/2022    K 4 3 10/31/2022     10/31/2022    CO2 28 10/31/2022    ANIONGAP 5 11/07/2015    AGAP 4 10/31/2022    BUN 16 10/31/2022 CREATININE 1 08 10/31/2022    GLUC 195 (H) 09/18/2021    GLUF 108 (H) 10/31/2022    CALCIUM 10 1 10/31/2022    AST 16 10/31/2022    ALT 26 10/31/2022    ALKPHOS 92 10/31/2022    PROT 8 6 (H) 06/29/2015    TP 8 5 (H) 10/31/2022    BILITOT 0 35 06/27/2015    TBILI 0 34 10/31/2022    EGFR 56 10/31/2022     Lab Results   Component Value Date    HGBA1C 6 4 (H) 10/13/2022     Lab Results   Component Value Date    MQG0QUSDNWPJ 2 884 07/31/2021     Lab Results   Component Value Date    CHOLESTEROL 149 07/31/2021     Lab Results   Component Value Date    HDL 44 07/31/2021     Lab Results   Component Value Date    TRIG 170 (H) 07/31/2021     Lab Results   Component Value Date    LDLCALC 71 07/31/2021

## 2022-12-28 ENCOUNTER — OFFICE VISIT (OUTPATIENT)
Dept: URGENT CARE | Facility: CLINIC | Age: 57
End: 2022-12-28

## 2022-12-28 VITALS — HEART RATE: 78 BPM | RESPIRATION RATE: 18 BRPM | TEMPERATURE: 97.3 F | OXYGEN SATURATION: 99 %

## 2022-12-28 DIAGNOSIS — J06.9 ACUTE URI: Primary | ICD-10-CM

## 2022-12-28 DIAGNOSIS — R05.1 ACUTE COUGH: ICD-10-CM

## 2022-12-28 RX ORDER — AZITHROMYCIN 250 MG/1
TABLET, FILM COATED ORAL
Qty: 6 TABLET | Refills: 0 | Status: SHIPPED | OUTPATIENT
Start: 2022-12-28 | End: 2023-01-01

## 2022-12-28 NOTE — PROGRESS NOTES
[id number]        NAME: Anayeli Fong is a 61 y o  female  : 1965    MRN: [id number]  DATE: 2022  TIME: 2:21 PM    Assessment and Plan   Acute URI [J06 9]  1  Acute URI  azithromycin (ZITHROMAX) 250 mg tablet      2  Acute cough  Covid/Flu-Office Collect            Patient Instructions       Follow up with PCP in 3-5 days  Proceed to  ER if symptoms worsen  Chief Complaint     Chief Complaint   Patient presents with   • Cold Like Symptoms     Patient here with head and chest congestion for 3-4 days, as well as a cough  History of Present Illness       Patient is a 62 yo female who presents for evaluation of nasal congestion, sinus congestion, chest congestion, cough x 6 days  She denies fevers, SOB, CP  Review of Systems   Review of Systems   Constitutional: Negative for chills, fatigue and fever  HENT: Positive for congestion and rhinorrhea  Negative for ear discharge, ear pain, postnasal drip, sinus pressure, sinus pain and sore throat  Respiratory: Positive for cough  Negative for chest tightness, shortness of breath and wheezing  Cardiovascular: Negative for chest pain and palpitations  Musculoskeletal: Negative for arthralgias and myalgias  Neurological: Negative for weakness  Psychiatric/Behavioral: Negative for confusion           Current Medications       Current Outpatient Medications:   •  ACCU-CHEK FASTCLIX LANCETS MISC, by Does not apply route daily, Disp: , Rfl:   •  ALPRAZolam (XANAX) 1 mg tablet, Take 0 5 tablets (0 5 mg total) by mouth daily at bedtime as needed for anxiety, Disp: 30 tablet, Rfl: 0  •  atorvastatin (LIPITOR) 20 mg tablet, TAKE 1 TABLET DAILY, Disp: 90 tablet, Rfl: 3  •  azithromycin (ZITHROMAX) 250 mg tablet, Take 2 tablets today then 1 tablet daily x 4 days, Disp: 6 tablet, Rfl: 0  •  B Complex-C (SUPER B COMPLEX PO), Take 1 tablet by mouth daily, Disp: , Rfl:   •  BIOTIN PO, Take 1 capsule by mouth daily, Disp: , Rfl: •  Blood Glucose Monitoring Suppl (ACCU-CHEK JESSEE CONNECT) w/Device KIT, by Does not apply route, Disp: , Rfl:   •  cholecalciferol (VITAMIN D3) 1,000 units tablet, Take 1,000 Units by mouth 2 (two) times a week , Disp: , Rfl:   •  Chromium Picolinate 200 MCG CAPS, Take by mouth daily, Disp: , Rfl:   •  clobetasol (TEMOVATE) 0 05 % cream, APPLY TOPICALLY TWICE A DAY, Disp: 30 g, Rfl: 23  •  clotrimazole-betamethasone (LOTRISONE) 1-0 05 % cream, APPLY TOPICALLY TWICE A DAY, Disp: 30 g, Rfl: 23  •  Empagliflozin (Jardiance) 10 MG TABS tablet, Take 1 tablet (10 mg total) by mouth every morning, Disp: 30 tablet, Rfl: 0  •  gabapentin (NEURONTIN) 300 mg capsule, TAKE 1 CAPSULE THREE TIMES A DAY, Disp: 270 capsule, Rfl: 3  •  glucose blood test strip, by In Vitro route 4 (four) times a day, Disp: , Rfl:   •  Hyaluronic Acid-Vitamin C (HYALURONIC ACID PO), Take 100 mg by mouth daily, Disp: , Rfl:   •  ibuprofen (MOTRIN) 800 mg tablet, Take 800 mg by mouth as needed  , Disp: , Rfl:   •  lisinopril (ZESTRIL) 5 mg tablet, Take 0 5 tablets (2 5 mg total) by mouth daily, Disp: 90 tablet, Rfl: 5  •  metFORMIN (GLUCOPHAGE) 1000 MG tablet, TAKE 1 TABLET TWICE A DAY, Disp: 180 tablet, Rfl: 3  •  Multiple Vitamin (DAILY VALUE MULTIVITAMIN) TABS, Take by mouth, Disp: , Rfl:   •  Omega-3 Fatty Acids (FISH OIL) 1,000 mg, Take 1,000 mg by mouth daily, Disp: , Rfl:   •  omeprazole (PriLOSEC) 20 mg delayed release capsule, TAKE 1 CAPSULE EVERY MORNING BEFORE BREAKFAST, Disp: 90 capsule, Rfl: 3  •  phenazopyridine (PYRIDIUM) 100 mg tablet, Take 1 tablet (100 mg total) by mouth 3 (three) times a day as needed for bladder spasms, Disp: 10 tablet, Rfl: 0  •  Saw Palmetto 160 MG CAPS, Take 250 mg by mouth  , Disp: , Rfl:   •  Semaglutide (Rybelsus) 14 MG TABS, Take 1 tablet po daily 30 minutes prior to food/drinks, Disp: 90 tablet, Rfl: 1  •  tobramycin-dexamethasone (TOBRADEX) ophthalmic suspension, INSTIL 1 DROP INTO INTO LEFT EYE 3 TIMES A DAY, Disp: , Rfl:   •  Turmeric 500 MG CAPS, Take by mouth, Disp: , Rfl:   •  zinc gluconate 50 mg tablet, Take 50 mg by mouth daily, Disp: , Rfl:   •  cyclobenzaprine (FLEXERIL) 10 mg tablet, TAKE 1 TABLET BY MOUTH EVERY DAY AT NIGHT (Patient not taking: Reported on 12/19/2022), Disp: , Rfl:   •  nitrofurantoin (MACROBID) 100 mg capsule, Take 1 capsule (100 mg total) by mouth 2 (two) times a day (Patient not taking: Reported on 11/3/2022), Disp: 10 capsule, Rfl: 0    Current Allergies     Allergies as of 12/28/2022   • (No Known Allergies)            The following portions of the patient's history were reviewed and updated as appropriate: allergies, current medications, past family history, past medical history, past social history, past surgical history and problem list      Past Medical History:   Diagnosis Date   • Adjustment disorder with anxiety    • Anemia    • Anxiety    • Atypical glandular cells of undetermined significance (ARIA) on cervical Pap smear 9/18/2019   • Chronic kidney disease    • Constipation    • Dense breasts    • GERD (gastroesophageal reflux disease)    • Heart murmur    • History of atrial paroxysmal tachycardia    • History of cerebral artery occlusion    • History of chest pain    • HPV (human papilloma virus) infection 2012   • Hyperlipidemia    • SHAE (iron deficiency anemia)    • Menopause    • Ovarian cyst    • Pancreatitis    • Plantar fasciitis    • Polyclonal hypergammaglobulinemia    • Restless legs syndrome (RLS)    • Trigger finger        Past Surgical History:   Procedure Laterality Date   • CHOLECYSTECTOMY     • COLPOSCOPY  2013   • [facility] ESOPHAGOGASTRODUODENOSCOPY TRANSORAL DIAGNOSTIC N/A 10/19/2017    Procedure: EGD AND COLONOSCOPY;  Surgeon: Alan Grayson MD;  Location: [facility];   Service: Gastroenterology   • TRIGGER FINGER RELEASE         Family History   Adopted: Yes   Problem Relation Age of Onset   • Tuberculosis Mother    • Breast cancer Neg Hx    • Colon cancer Neg Hx    • Ovarian cancer Neg Hx    • Uterine cancer Neg Hx    • Cervical cancer Neg Hx          Medications have been verified  Objective   Pulse 78   Temp (!) 97 3 °F (36 3 °C)   Resp 18   LMP  (LMP Unknown)   SpO2 99%        Physical Exam     Physical Exam  Constitutional:       General: She is not in acute distress  Appearance: Normal appearance  She is not ill-appearing or diaphoretic  HENT:      Right Ear: Tympanic membrane, ear canal and external ear normal       Left Ear: Tympanic membrane, ear canal and external ear normal       Nose: Congestion present  Mouth/Throat:      Mouth: Mucous membranes are moist       Pharynx: Oropharynx is clear  Eyes:      Conjunctiva/sclera: Conjunctivae normal    Cardiovascular:      Rate and Rhythm: Normal rate and regular rhythm  Heart sounds: Normal heart sounds  Pulmonary:      Effort: Pulmonary effort is normal       Breath sounds: Normal breath sounds  No wheezing, rhonchi or rales  Skin:     General: Skin is warm and dry  Neurological:      Mental Status: She is alert     Psychiatric:         Mood and Affect: Mood normal          Behavior: Behavior normal

## 2022-12-29 ENCOUNTER — TELEPHONE (OUTPATIENT)
Dept: INTERNAL MEDICINE CLINIC | Facility: CLINIC | Age: 57
End: 2022-12-29

## 2022-12-29 LAB
FLUAV RNA RESP QL NAA+PROBE: NEGATIVE
FLUBV RNA RESP QL NAA+PROBE: NEGATIVE
SARS-COV-2 RNA RESP QL NAA+PROBE: POSITIVE

## 2022-12-29 NOTE — TELEPHONE ENCOUNTER
Went to Urgent Care in [facility]- positive for COVID  Given azythromycin- should she be taking anything else?   Symptoms: 3-4 days runny nose, cough; breaking up some, bringing up some phlegm

## 2023-01-02 DIAGNOSIS — R12 HEARTBURN: ICD-10-CM

## 2023-01-02 RX ORDER — OMEPRAZOLE 20 MG/1
CAPSULE, DELAYED RELEASE ORAL
Qty: 90 CAPSULE | Refills: 3 | Status: SHIPPED | OUTPATIENT
Start: 2023-01-02

## 2023-01-09 DIAGNOSIS — E11.65 UNCONTROLLED TYPE 2 DIABETES MELLITUS WITH HYPERGLYCEMIA (HCC): ICD-10-CM

## 2023-01-23 DIAGNOSIS — I10 ESSENTIAL HYPERTENSION: ICD-10-CM

## 2023-01-23 RX ORDER — LISINOPRIL 5 MG/1
TABLET ORAL
Qty: 90 TABLET | Refills: 3 | Status: SHIPPED | OUTPATIENT
Start: 2023-01-23

## 2023-02-13 DIAGNOSIS — E11.9 TYPE 2 DIABETES MELLITUS WITHOUT COMPLICATION, WITHOUT LONG-TERM CURRENT USE OF INSULIN (HCC): ICD-10-CM

## 2023-04-27 ENCOUNTER — RA CDI HCC (OUTPATIENT)
Dept: OTHER | Facility: HOSPITAL | Age: 58
End: 2023-04-27

## 2023-04-27 NOTE — PROGRESS NOTES
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) [[id number]]    Refer to 1/15/23 note - please review and assess if applicable for [id number]

## 2023-05-04 DIAGNOSIS — E78.2 MIXED HYPERLIPIDEMIA: ICD-10-CM

## 2023-05-05 RX ORDER — ATORVASTATIN CALCIUM 20 MG/1
TABLET, FILM COATED ORAL
Qty: 90 TABLET | Refills: 3 | Status: SHIPPED | OUTPATIENT
Start: 2023-05-05

## 2023-05-09 ENCOUNTER — APPOINTMENT (OUTPATIENT)
Dept: LAB | Facility: MEDICAL CENTER | Age: 58
End: 2023-05-09

## 2023-05-09 DIAGNOSIS — N18.2 TYPE 2 DIABETES MELLITUS WITH STAGE 2 CHRONIC KIDNEY DISEASE, WITHOUT LONG-TERM CURRENT USE OF INSULIN (HCC): ICD-10-CM

## 2023-05-09 DIAGNOSIS — E11.22 TYPE 2 DIABETES MELLITUS WITH STAGE 2 CHRONIC KIDNEY DISEASE, WITHOUT LONG-TERM CURRENT USE OF INSULIN (HCC): ICD-10-CM

## 2023-05-09 LAB
ANION GAP SERPL CALCULATED.3IONS-SCNC: 2 MMOL/L (ref 4–13)
BUN SERPL-MCNC: 17 MG/DL (ref 5–25)
CALCIUM SERPL-MCNC: 10.1 MG/DL (ref 8.3–10.1)
CHLORIDE SERPL-SCNC: 105 MMOL/L (ref 96–108)
CO2 SERPL-SCNC: 28 MMOL/L (ref 21–32)
CREAT SERPL-MCNC: 1.1 MG/DL (ref 0.6–1.3)
EST. AVERAGE GLUCOSE BLD GHB EST-MCNC: 143 MG/DL
GFR SERPL CREATININE-BSD FRML MDRD: 55 ML/MIN/1.73SQ M
GLUCOSE SERPL-MCNC: 114 MG/DL (ref 65–140)
HBA1C MFR BLD: 6.6 %
POTASSIUM SERPL-SCNC: 4.5 MMOL/L (ref 3.5–5.3)
SODIUM SERPL-SCNC: 135 MMOL/L (ref 135–147)

## 2023-05-10 ENCOUNTER — OFFICE VISIT (OUTPATIENT)
Age: 58
End: 2023-05-10

## 2023-05-10 VITALS
SYSTOLIC BLOOD PRESSURE: 102 MMHG | DIASTOLIC BLOOD PRESSURE: 60 MMHG | HEIGHT: 65 IN | OXYGEN SATURATION: 98 % | TEMPERATURE: 97.8 F | BODY MASS INDEX: 26.96 KG/M2 | HEART RATE: 65 BPM | RESPIRATION RATE: 16 BRPM | WEIGHT: 161.8 LBS

## 2023-05-10 DIAGNOSIS — I34.0 TRACE MITRAL REGURGITATION BY PRIOR ECHOCARDIOGRAM: ICD-10-CM

## 2023-05-10 DIAGNOSIS — Z12.31 ENCOUNTER FOR SCREENING MAMMOGRAM FOR BREAST CANCER: ICD-10-CM

## 2023-05-10 DIAGNOSIS — E11.22 TYPE 2 DIABETES MELLITUS WITH STAGE 2 CHRONIC KIDNEY DISEASE, WITHOUT LONG-TERM CURRENT USE OF INSULIN (HCC): Primary | ICD-10-CM

## 2023-05-10 DIAGNOSIS — E11.65 UNCONTROLLED TYPE 2 DIABETES MELLITUS WITH HYPERGLYCEMIA (HCC): ICD-10-CM

## 2023-05-10 DIAGNOSIS — N18.2 TYPE 2 DIABETES MELLITUS WITH STAGE 2 CHRONIC KIDNEY DISEASE, WITHOUT LONG-TERM CURRENT USE OF INSULIN (HCC): Primary | ICD-10-CM

## 2023-05-10 DIAGNOSIS — I10 PRIMARY HYPERTENSION: ICD-10-CM

## 2023-05-10 NOTE — PATIENT INSTRUCTIONS
Patient with good control of diabetes and blood pressure  Mammogram should be done  6-month follow-up with internal medicine

## 2023-05-10 NOTE — PROGRESS NOTES
Assessment/Plan:       Diagnoses and all orders for this visit:    Type 2 diabetes mellitus with stage 2 chronic kidney disease, without long-term current use of insulin ([facility])  -     Cancel: IRIS Diabetic eye exam  -     Diabetic foot exam; Future    Encounter for screening mammogram for breast cancer  -     Mammo screening bilateral w 3d & cad; Future    Uncontrolled type 2 diabetes mellitus with hyperglycemia ([facility] [address] )  -     semaglutide (Rybelsus) 14 MG tablet; Take 1 tablet po daily 30 minutes prior to food/drinks    Primary hypertension    Trace mitral regurgitation by prior echocardiogram  -     Echo complete w/ contrast if indicated; Future                Subjective:      Patient ID: Josiah Holliday is a 60 y o  female  Diabetic patient with pretty good control  Hemoglobin A1c is 6 6  Hypertension is well controlled  Urinary incontinence followed by urogynecology          The following portions of the patient's history were reviewed and updated as appropriate:   She has a past medical history of Adjustment disorder with anxiety, Anemia, Anxiety, Atypical glandular cells of undetermined significance (ARIA) on cervical Pap smear (9/18/2019), Chronic kidney disease, Constipation, Dense breasts, GERD (gastroesophageal reflux disease), Heart murmur, History of atrial paroxysmal tachycardia, History of cerebral artery occlusion, History of chest pain, HPV (human papilloma virus) infection (2012), Hyperlipidemia, SHAE (iron deficiency anemia), Menopause, Ovarian cyst, Pancreatitis, Plantar fasciitis, Polyclonal hypergammaglobulinemia, Restless legs syndrome (RLS), and Trigger finger  ,  does not have any pertinent problems on file  ,   has a past surgical history that includes Cholecystectomy; pr esophagogastroduodenoscopy transoral diagnostic (N/A, 10/19/2017); Trigger finger release; and Colposcopy (2013)  ,  family history includes Tuberculosis in her mother  She was adopted  ,   reports that she quit smoking about 4 years ago  Her smoking use included cigarettes  She has a 3 50 pack-year smoking history  She has never used smokeless tobacco  She reports current alcohol use  She reports that she does not use drugs  ,  has No Known Allergies     Current Outpatient Medications   Medication Sig Dispense Refill   • ACCU-CHEK FASTCLIX LANCETS MISC by Does not apply route daily     • ALPRAZolam (XANAX) 1 mg tablet Take 0 5 tablets (0 5 mg total) by mouth daily at bedtime as needed for anxiety 30 tablet 0   • atorvastatin (LIPITOR) 20 mg tablet TAKE 1 TABLET DAILY 90 tablet 3   • B Complex-C (SUPER B COMPLEX PO) Take 1 tablet by mouth daily     • BIOTIN PO Take 1 capsule by mouth daily     • Blood Glucose Monitoring Suppl (ACCU-CHEK JESSEE CONNECT) w/Device KIT by Does not apply route     • cholecalciferol (VITAMIN D3) 1,000 units tablet Take 1,000 Units by mouth 2 (two) times a week      • clobetasol (TEMOVATE) 0 05 % cream APPLY TOPICALLY TWICE A DAY 30 g 23   • clotrimazole-betamethasone (LOTRISONE) 1-0 05 % cream APPLY TOPICALLY TWICE A DAY 30 g 23   • gabapentin (NEURONTIN) 300 mg capsule TAKE 1 CAPSULE THREE TIMES A  capsule 3   • glucose blood test strip by In Vitro route 4 (four) times a day     • Hyaluronic Acid-Vitamin C (HYALURONIC ACID PO) Take 100 mg by mouth daily     • ibuprofen (MOTRIN) 800 mg tablet Take 800 mg by mouth as needed       • Jardiance 10 MG TABS tablet TAKE 1 TABLET EVERY MORNING 90 tablet 0   • lisinopril (ZESTRIL) 5 mg tablet TAKE ONE-HALF (1/2) TABLET DAILY 90 tablet 3   • metFORMIN (GLUCOPHAGE) 1000 MG tablet TAKE 1 TABLET TWICE A  tablet 3   • Multiple Vitamin (DAILY VALUE MULTIVITAMIN) TABS Take by mouth     • Omega-3 Fatty Acids (FISH OIL) 1,000 mg Take 1,000 mg by mouth daily     • omeprazole (PriLOSEC) 20 mg delayed release capsule TAKE 1 CAPSULE EVERY MORNING BEFORE BREAKFAST 90 capsule 3   • Saw Palmetto 160 MG CAPS Take 250 mg by mouth       • semaglutide (Rybelsus) 14 MG tablet Take 1 tablet po daily 30 minutes prior to food/drinks 90 tablet 1   • Turmeric 500 MG CAPS Take by mouth     • zinc gluconate 50 mg tablet Take 50 mg by mouth daily     • Chromium Picolinate 200 MCG CAPS Take by mouth daily     • cyclobenzaprine (FLEXERIL) 10 mg tablet TAKE 1 TABLET BY MOUTH EVERY DAY AT NIGHT (Patient not taking: Reported on 12/19/2022)       No current facility-administered medications for this visit  Review of Systems   Genitourinary:        Urgent continence   All other systems reviewed and are negative  Objective:  Vitals:    05/10/23 1653   BP: 102/60   Pulse: 65   Resp: 16   Temp: 97 8 °F (36 6 °C)   SpO2: 98%      Physical Exam  Constitutional:       Appearance: Normal appearance  Cardiovascular:      Rate and Rhythm: Normal rate and regular rhythm  Pulmonary:      Effort: Pulmonary effort is normal       Breath sounds: Normal breath sounds  Abdominal:      General: Abdomen is flat  Skin:     General: Skin is warm and dry  Neurological:      General: No focal deficit present  Mental Status: She is alert  Psychiatric:         Mood and Affect: Mood normal          Judgment: Judgment normal            Patient Instructions   Patient with good control of diabetes and blood pressure  Mammogram should be done  6-month follow-up with internal medicine

## 2023-05-18 ENCOUNTER — HOSPITAL ENCOUNTER (OUTPATIENT)
Dept: NON INVASIVE DIAGNOSTICS | Facility: CLINIC | Age: 58
Discharge: HOME | End: 2023-05-18

## 2023-05-18 VITALS
SYSTOLIC BLOOD PRESSURE: 102 MMHG | DIASTOLIC BLOOD PRESSURE: 60 MMHG | HEIGHT: 65 IN | WEIGHT: 161 LBS | HEART RATE: 64 BPM | BODY MASS INDEX: 26.82 KG/M2

## 2023-05-18 DIAGNOSIS — I34.0 TRACE MITRAL REGURGITATION BY PRIOR ECHOCARDIOGRAM: ICD-10-CM

## 2023-05-18 LAB
AORTIC ROOT: 3 CM
APICAL FOUR CHAMBER EJECTION FRACTION: 61 %
ASCENDING AORTA: 2.7 CM
E WAVE DECELERATION TIME: 195 MS
FRACTIONAL SHORTENING: 31 % (ref 28–44)
INTERVENTRICULAR SEPTUM IN DIASTOLE (PARASTERNAL SHORT AXIS VIEW): 0.9 CM
INTERVENTRICULAR SEPTUM: 0.9 CM (ref 0.6–1.1)
LAAS-AP2: 17.1 CM2
LAAS-AP4: 14.3 CM2
LEFT ATRIUM SIZE: 2.8 CM
LEFT INTERNAL DIMENSION IN SYSTOLE: 2.4 CM (ref 2.1–4)
LEFT VENTRICLE DIASTOLIC VOLUME (MOD BIPLANE): 77 ML
LEFT VENTRICLE SYSTOLIC VOLUME (MOD BIPLANE): 29 ML
LEFT VENTRICULAR INTERNAL DIMENSION IN DIASTOLE: 3.5 CM (ref 3.5–6)
LEFT VENTRICULAR POSTERIOR WALL IN END DIASTOLE: 0.9 CM
LEFT VENTRICULAR STROKE VOLUME: 31 ML
LV EF: 62 %
LVSV (TEICH): 31 ML
MV E'TISSUE VEL-SEP: 10 CM/S
MV PEAK A VEL: 0.84 M/S
MV PEAK E VEL: 95 CM/S
MV STENOSIS PRESSURE HALF TIME: 57 MS
MV VALVE AREA P 1/2 METHOD: 3.86 CM2
RIGHT ATRIUM AREA SYSTOLE A4C: 14.1 CM2
RIGHT VENTRICLE ID DIMENSION: 3.3 CM
SL CV LEFT ATRIUM LENGTH A2C: 5.4 CM
SL CV LV EF: 60
SL CV PED ECHO LEFT VENTRICLE DIASTOLIC VOLUME (MOD BIPLANE) 2D: 51 ML
SL CV PED ECHO LEFT VENTRICLE SYSTOLIC VOLUME (MOD BIPLANE) 2D: 20 ML
TRICUSPID ANNULAR PLANE SYSTOLIC EXCURSION: 2.6 CM

## 2023-06-01 ENCOUNTER — OFFICE VISIT (OUTPATIENT)
Dept: URGENT CARE | Facility: MEDICAL CENTER | Age: 58
End: 2023-06-01

## 2023-06-01 VITALS
HEIGHT: 65 IN | OXYGEN SATURATION: 97 % | SYSTOLIC BLOOD PRESSURE: 128 MMHG | RESPIRATION RATE: 18 BRPM | BODY MASS INDEX: 26.99 KG/M2 | DIASTOLIC BLOOD PRESSURE: 70 MMHG | WEIGHT: 162 LBS | TEMPERATURE: 97.3 F | HEART RATE: 76 BPM

## 2023-06-01 DIAGNOSIS — N30.01 ACUTE CYSTITIS WITH HEMATURIA: Primary | ICD-10-CM

## 2023-06-01 RX ORDER — CEPHALEXIN 500 MG/1
500 CAPSULE ORAL EVERY 12 HOURS SCHEDULED
Qty: 6 CAPSULE | Refills: 0 | Status: SHIPPED | OUTPATIENT
Start: 2023-06-01 | End: 2023-06-04

## 2023-06-01 NOTE — PROGRESS NOTES
[facility]        NAME: Damon Solis is a 60 y o  female  : 1965    MRN: [id number]  DATE: 2023  TIME: 8:39 AM    Assessment and Plan   Acute cystitis with hematuria [N30 01]  1  Acute cystitis with hematuria  Urine culture    cephalexin (KEFLEX) 500 mg capsule            Patient Instructions     1  Increase oral fluid consumption  2  Over-the-counter ibuprofen and/or acetaminophen as needed for pain  3  Return here or go to the ER for any worsening symptoms  4  Follow-up with primary care for any symptoms lasting longer than 5 days  Chief Complaint     Chief Complaint   Patient presents with   • Possible UTI     Pt  C/O dysuria, urinary frequency, urgency and bladder spasms that began yesterday  History of Present Illness       40-year-old female patient with a 24-hour history of dysuria, urinary frequency, hematuria, urgency  Patient denies any fever or chills  No back pain  Patient relates a slight fleeting episode of nausea yesterday but no vomiting  Denies any abdominal pain  Patient has had numerous UTIs in the past that have felt similar  Patient took Pyridium within a few hours of this visit which she states partially helped with the symptoms  Review of Systems   Review of Systems   Constitutional: Negative for chills and fever  HENT: Negative for ear pain and sore throat  Eyes: Negative for pain and visual disturbance  Respiratory: Negative for cough and shortness of breath  Cardiovascular: Negative for chest pain and palpitations  Gastrointestinal: Positive for nausea  Negative for abdominal pain and vomiting  Genitourinary: Positive for dysuria, frequency and urgency  Negative for hematuria  Musculoskeletal: Negative for arthralgias and back pain  Skin: Negative for color change and rash  Neurological: Negative for seizures and syncope  All other systems reviewed and are negative          Current Medications       Current Outpatient Medications:   •  ACCU-CHEK FASTCLIX LANCETS MISC, by Does not apply route daily, Disp: , Rfl:   •  ALPRAZolam (XANAX) 1 mg tablet, Take 0 5 tablets (0 5 mg total) by mouth daily at bedtime as needed for anxiety, Disp: 30 tablet, Rfl: 0  •  atorvastatin (LIPITOR) 20 mg tablet, TAKE 1 TABLET DAILY, Disp: 90 tablet, Rfl: 3  •  B Complex-C (SUPER B COMPLEX PO), Take 1 tablet by mouth daily, Disp: , Rfl:   •  BIOTIN PO, Take 1 capsule by mouth daily, Disp: , Rfl:   •  Blood Glucose Monitoring Suppl (ACCU-CHEK JESSEE CONNECT) w/Device KIT, by Does not apply route, Disp: , Rfl:   •  cephalexin (KEFLEX) 500 mg capsule, Take 1 capsule (500 mg total) by mouth every 12 (twelve) hours for 3 days, Disp: 6 capsule, Rfl: 0  •  cholecalciferol (VITAMIN D3) 1,000 units tablet, Take 1,000 Units by mouth 2 (two) times a week , Disp: , Rfl:   •  clobetasol (TEMOVATE) 0 05 % cream, APPLY TOPICALLY TWICE A DAY, Disp: 30 g, Rfl: 23  •  clotrimazole-betamethasone (LOTRISONE) 1-0 05 % cream, APPLY TOPICALLY TWICE A DAY, Disp: 30 g, Rfl: 23  •  gabapentin (NEURONTIN) 300 mg capsule, TAKE 1 CAPSULE THREE TIMES A DAY, Disp: 270 capsule, Rfl: 3  •  glucose blood test strip, by In Vitro route 4 (four) times a day, Disp: , Rfl:   •  Hyaluronic Acid-Vitamin C (HYALURONIC ACID PO), Take 100 mg by mouth daily, Disp: , Rfl:   •  ibuprofen (MOTRIN) 800 mg tablet, Take 800 mg by mouth as needed  , Disp: , Rfl:   •  Jardiance 10 MG TABS tablet, TAKE 1 TABLET EVERY MORNING, Disp: 90 tablet, Rfl: 0  •  lisinopril (ZESTRIL) 5 mg tablet, TAKE ONE-HALF (1/2) TABLET DAILY, Disp: 90 tablet, Rfl: 3  •  metFORMIN (GLUCOPHAGE) 1000 MG tablet, TAKE 1 TABLET TWICE A DAY, Disp: 180 tablet, Rfl: 3  •  Multiple Vitamin (DAILY VALUE MULTIVITAMIN) TABS, Take by mouth, Disp: , Rfl:   •  Omega-3 Fatty Acids (FISH OIL) 1,000 mg, Take 1,000 mg by mouth daily, Disp: , Rfl:   •  omeprazole (PriLOSEC) 20 mg delayed release capsule, TAKE 1 CAPSULE EVERY MORNING BEFORE BREAKFAST, Disp: 90 capsule, Rfl: 3  •  Saw Palmetto 160 MG CAPS, Take 250 mg by mouth  , Disp: , Rfl:   •  semaglutide (Rybelsus) 14 MG tablet, Take 1 tablet po daily 30 minutes prior to food/drinks, Disp: 90 tablet, Rfl: 1  •  Turmeric 500 MG CAPS, Take by mouth, Disp: , Rfl:   •  zinc gluconate 50 mg tablet, Take 50 mg by mouth daily, Disp: , Rfl:   •  Chromium Picolinate 200 MCG CAPS, Take by mouth daily, Disp: , Rfl:   •  cyclobenzaprine (FLEXERIL) 10 mg tablet, TAKE 1 TABLET BY MOUTH EVERY DAY AT NIGHT (Patient not taking: Reported on 12/19/2022), Disp: , Rfl:     Current Allergies     Allergies as of 06/01/2023   • (No Known Allergies)            The following portions of the patient's history were reviewed and updated as appropriate: allergies, current medications, past family history, past medical history, past social history, past surgical history and problem list      Past Medical History:   Diagnosis Date   • Adjustment disorder with anxiety    • Anemia    • Anxiety    • Atypical glandular cells of undetermined significance (ARIA) on cervical Pap smear 9/18/2019   • Chronic kidney disease    • Constipation    • Dense breasts    • GERD (gastroesophageal reflux disease)    • Heart murmur    • History of atrial paroxysmal tachycardia    • History of cerebral artery occlusion    • History of chest pain    • HPV (human papilloma virus) infection 2012   • Hyperlipidemia    • SHAE (iron deficiency anemia)    • Menopause    • Ovarian cyst    • Pancreatitis    • Plantar fasciitis    • Polyclonal hypergammaglobulinemia    • Restless legs syndrome (RLS)    • Trigger finger        Past Surgical History:   Procedure Laterality Date   • CHOLECYSTECTOMY     • COLPOSCOPY  2013   • OH ESOPHAGOGASTRODUODENOSCOPY TRANSORAL DIAGNOSTIC N/A 10/19/2017    Procedure: EGD AND COLONOSCOPY;  Surgeon: Jazmine Brantley MD;  Location: [facility];   Service: Gastroenterology   • TRIGGER FINGER RELEASE         Family History "  Adopted: Yes   Problem Relation Age of Onset   • Tuberculosis Mother    • Breast cancer Neg Hx    • Colon cancer Neg Hx    • Ovarian cancer Neg Hx    • Uterine cancer Neg Hx    • Cervical cancer Neg Hx          Medications have been verified  Objective   /70   Pulse 76   Temp (!) 97 3 °F (36 3 °C)   Resp 18   Ht 5' 5\" (1 651 m)   Wt 73 5 kg (162 lb)   LMP  (LMP Unknown)   SpO2 97%   BMI 26 96 kg/m²        Physical Exam     Physical Exam  Vitals and nursing note reviewed  Constitutional:       Appearance: Normal appearance  HENT:      Head: Normocephalic  Nose: Nose normal    Eyes:      Conjunctiva/sclera: Conjunctivae normal       Pupils: Pupils are equal, round, and reactive to light  Cardiovascular:      Rate and Rhythm: Normal rate and regular rhythm  Pulses: Normal pulses  Pulmonary:      Effort: Pulmonary effort is normal       Breath sounds: Normal breath sounds  Abdominal:      General: Abdomen is flat  Palpations: Abdomen is soft  Tenderness: There is no abdominal tenderness  There is no right CVA tenderness or left CVA tenderness  Musculoskeletal:         General: Normal range of motion  Cervical back: Normal range of motion  Skin:     General: Skin is warm and dry  Capillary Refill: Capillary refill takes less than 2 seconds  Neurological:      Mental Status: She is alert and oriented to person, place, and time     Psychiatric:         Mood and Affect: Mood normal          Behavior: Behavior normal                    "

## 2023-06-02 LAB — BACTERIA UR CULT: NORMAL

## 2023-06-06 ENCOUNTER — TELEPHONE (OUTPATIENT)
Age: 58
End: 2023-06-06

## 2023-06-06 ENCOUNTER — HOSPITAL ENCOUNTER (OUTPATIENT)
Age: 58
Discharge: HOME | End: 2023-06-06
Payer: COMMERCIAL

## 2023-06-06 VITALS — BODY MASS INDEX: 26.99 KG/M2 | WEIGHT: 162 LBS | HEIGHT: 65 IN

## 2023-06-06 DIAGNOSIS — Z12.31 ENCOUNTER FOR SCREENING MAMMOGRAM FOR BREAST CANCER: ICD-10-CM

## 2023-06-06 PROCEDURE — 77063 BREAST TOMOSYNTHESIS BI: CPT

## 2023-06-06 PROCEDURE — 77067 SCR MAMMO BI INCL CAD: CPT

## 2023-06-09 ENCOUNTER — TELEPHONE (OUTPATIENT)
Age: 58
End: 2023-06-09

## 2023-06-09 DIAGNOSIS — Z11.1 ENCOUNTER FOR SCREENING FOR RESPIRATORY TUBERCULOSIS: Primary | ICD-10-CM

## 2023-06-12 ENCOUNTER — CLINICAL SUPPORT (OUTPATIENT)
Age: 58
End: 2023-06-12
Payer: COMMERCIAL

## 2023-06-12 DIAGNOSIS — Z23 ENCOUNTER FOR IMMUNIZATION: Primary | ICD-10-CM

## 2023-06-12 PROCEDURE — 86580 TB INTRADERMAL TEST: CPT

## 2023-06-28 DIAGNOSIS — E11.65 UNCONTROLLED TYPE 2 DIABETES MELLITUS WITH HYPERGLYCEMIA (HCC): ICD-10-CM

## 2023-06-29 RX ORDER — EMPAGLIFLOZIN 10 MG/1
TABLET, FILM COATED ORAL
Qty: 90 TABLET | Refills: 3 | Status: SHIPPED | OUTPATIENT
Start: 2023-06-29

## 2023-08-26 DIAGNOSIS — F41.9 ANXIETY: ICD-10-CM

## 2023-08-28 RX ORDER — ALPRAZOLAM 1 MG/1
0.5 TABLET ORAL
Qty: 15 TABLET | Refills: 0 | OUTPATIENT
Start: 2023-08-28

## 2023-09-22 ENCOUNTER — OFFICE VISIT (OUTPATIENT)
Dept: FAMILY MEDICINE CLINIC | Facility: CLINIC | Age: 58
End: 2023-09-22
Payer: COMMERCIAL

## 2023-09-22 VITALS
SYSTOLIC BLOOD PRESSURE: 126 MMHG | BODY MASS INDEX: 26.33 KG/M2 | WEIGHT: 158 LBS | HEIGHT: 65 IN | DIASTOLIC BLOOD PRESSURE: 76 MMHG | OXYGEN SATURATION: 98 % | TEMPERATURE: 99 F | HEART RATE: 75 BPM

## 2023-09-22 DIAGNOSIS — F41.9 ANXIETY: ICD-10-CM

## 2023-09-22 DIAGNOSIS — E11.22 TYPE 2 DIABETES MELLITUS WITH STAGE 2 CHRONIC KIDNEY DISEASE, WITHOUT LONG-TERM CURRENT USE OF INSULIN: ICD-10-CM

## 2023-09-22 DIAGNOSIS — Z76.89 ENCOUNTER TO ESTABLISH CARE WITH NEW DOCTOR: Primary | ICD-10-CM

## 2023-09-22 DIAGNOSIS — Z00.00 ANNUAL PHYSICAL EXAM: ICD-10-CM

## 2023-09-22 DIAGNOSIS — N18.2 TYPE 2 DIABETES MELLITUS WITH STAGE 2 CHRONIC KIDNEY DISEASE, WITHOUT LONG-TERM CURRENT USE OF INSULIN: ICD-10-CM

## 2023-09-22 DIAGNOSIS — R31.0 GROSS HEMATURIA: ICD-10-CM

## 2023-09-22 LAB
BACTERIA UR QL AUTO: ABNORMAL /HPF
BILIRUB UR QL STRIP: NEGATIVE
CLARITY UR: CLEAR
COLOR UR: ABNORMAL
CREAT UR-MCNC: 65.3 MG/DL
GLUCOSE UR STRIP-MCNC: ABNORMAL MG/DL
HGB UR QL STRIP.AUTO: ABNORMAL
KETONES UR STRIP-MCNC: NEGATIVE MG/DL
LEFT EYE DIABETIC RETINOPATHY: ABNORMAL
LEFT EYE IMAGE QUALITY: ABNORMAL
LEFT EYE MACULAR EDEMA: ABNORMAL
LEFT EYE OTHER RETINOPATHY: ABNORMAL
LEUKOCYTE ESTERASE UR QL STRIP: ABNORMAL
MICROALBUMIN UR-MCNC: 134.3 MG/L
MICROALBUMIN/CREAT 24H UR: 206 MG/G CREATININE (ref 0–30)
NITRITE UR QL STRIP: NEGATIVE
NON-SQ EPI CELLS URNS QL MICRO: ABNORMAL /HPF
PH UR STRIP.AUTO: 6 [PH]
PROT UR STRIP-MCNC: ABNORMAL MG/DL
RBC #/AREA URNS AUTO: ABNORMAL /HPF
RIGHT EYE DIABETIC RETINOPATHY: ABNORMAL
RIGHT EYE IMAGE QUALITY: ABNORMAL
RIGHT EYE MACULAR EDEMA: POSITIVE
RIGHT EYE OTHER RETINOPATHY: ABNORMAL
SEVERITY (EYE EXAM): ABNORMAL
SL AMB  POCT GLUCOSE, UA: ABNORMAL
SL AMB LEUKOCYTE ESTERASE,UA: NEGATIVE
SL AMB POCT BILIRUBIN,UA: NEGATIVE
SL AMB POCT BLOOD,UA: ABNORMAL
SL AMB POCT CLARITY,UA: CLEAR
SL AMB POCT COLOR,UA: YELLOW
SL AMB POCT HEMOGLOBIN AIC: 6.9 (ref ?–6.5)
SL AMB POCT KETONES,UA: NEGATIVE
SL AMB POCT NITRITE,UA: NEGATIVE
SL AMB POCT PH,UA: 6
SL AMB POCT SPECIFIC GRAVITY,UA: 1.02
SL AMB POCT URINE PROTEIN: ABNORMAL
SL AMB POCT UROBILINOGEN: 0.2
SP GR UR STRIP.AUTO: 1.03 (ref 1–1.03)
UROBILINOGEN UR STRIP-ACNC: <2 MG/DL
WBC #/AREA URNS AUTO: ABNORMAL /HPF

## 2023-09-22 PROCEDURE — 90686 IIV4 VACC NO PRSV 0.5 ML IM: CPT

## 2023-09-22 PROCEDURE — 81002 URINALYSIS NONAUTO W/O SCOPE: CPT | Performed by: FAMILY MEDICINE

## 2023-09-22 PROCEDURE — 83036 HEMOGLOBIN GLYCOSYLATED A1C: CPT | Performed by: FAMILY MEDICINE

## 2023-09-22 PROCEDURE — 99386 PREV VISIT NEW AGE 40-64: CPT | Performed by: FAMILY MEDICINE

## 2023-09-22 PROCEDURE — 87086 URINE CULTURE/COLONY COUNT: CPT | Performed by: FAMILY MEDICINE

## 2023-09-22 PROCEDURE — 82570 ASSAY OF URINE CREATININE: CPT | Performed by: FAMILY MEDICINE

## 2023-09-22 PROCEDURE — 90471 IMMUNIZATION ADMIN: CPT

## 2023-09-22 PROCEDURE — 81001 URINALYSIS AUTO W/SCOPE: CPT | Performed by: FAMILY MEDICINE

## 2023-09-22 PROCEDURE — 82043 UR ALBUMIN QUANTITATIVE: CPT | Performed by: FAMILY MEDICINE

## 2023-09-22 RX ORDER — LORAZEPAM 0.5 MG/1
0.5 TABLET ORAL EVERY 12 HOURS PRN
Qty: 30 TABLET | Refills: 0 | Status: SHIPPED | OUTPATIENT
Start: 2023-09-22

## 2023-09-22 RX ORDER — CEPHALEXIN 500 MG/1
500 CAPSULE ORAL EVERY 12 HOURS SCHEDULED
Qty: 20 CAPSULE | Refills: 0 | Status: SHIPPED | OUTPATIENT
Start: 2023-09-22 | End: 2023-10-02

## 2023-09-22 RX ORDER — CEPHALEXIN 500 MG/1
500 CAPSULE ORAL EVERY 12 HOURS SCHEDULED
Qty: 20 CAPSULE | Refills: 0 | Status: SHIPPED | OUTPATIENT
Start: 2023-09-22 | End: 2023-09-22

## 2023-09-22 NOTE — PROGRESS NOTES
ADULT ANNUAL [address]    NAME: Luis Kwan  AGE: 60 y.o. SEX: female  : 1965     DATE: 2023     Assessment and Plan:     Problem List Items Addressed This Visit        Endocrine    Type 2 diabetes mellitus with stage 2 chronic kidney disease, without long-term current use of insulin (HCC)    Relevant Orders    POCT hemoglobin A1c (Completed)    IRIS Diabetic eye exam    Albumin / creatinine urine ratio   Other Visit Diagnoses     Encounter to establish care with new doctor    -  Primary    Gross hematuria        Relevant Medications    cephalexin (KEFLEX) 500 mg capsule    Other Relevant Orders    Urinalysis with microscopic    Urine culture    POCT urine dip (Completed)    Annual physical exam        BMI 26.0-26.9,adult            Immunizations and preventive care screenings were discussed with patient today. Appropriate education was printed on patient's after visit summary. Counseling:  Alcohol/drug use: discussed moderation in alcohol intake, the recommendations for healthy alcohol use, and avoidance of illicit drug use. Dental Health: discussed importance of regular tooth brushing, flossing, and dental visits. Sexual health: discussed sexually transmitted diseases, partner selection, use of condoms, avoidance of unintended pregnancy, and contraceptive alternatives. Exercise: the importance of regular exercise/physical activity was discussed. Recommend exercise 3-5 times per week for at least 30 minutes. BMI Counseling: Body mass index is 26.29 kg/m². The BMI is above normal. Nutrition recommendations include decreasing portion sizes, encouraging healthy choices of fruits and vegetables, consuming healthier snacks, limiting drinks that contain sugar, moderation in carbohydrate intake, increasing intake of lean protein and reducing intake of cholesterol.  Exercise recommendations include moderate physical activity 150 minutes/week and exercising 3-5 times per week. No pharmacotherapy was ordered. Rationale for BMI follow-up plan is due to patient being overweight or obese. Depression Screening and Follow-up Plan: Patient was screened for depression during today's encounter. They screened negative with a PHQ-2 score of 0. No follow-ups on file. Chief Complaint:     Chief Complaint   Patient presents with   • Establish Care   • Diabetes   • Blood in Urine     Onset since yesterday. History of Present Illness:     Adult Annual Physical   Patient here for a comprehensive physical exam. The patient reports problems - as documented below. Patient reports that she has noticed some blood in her urine since yesterday. States that she has had some more frequent bladder infections. Was previously on Invokana, unsure why this was changed. Has been having urinary frequency, blood in urine and urinary discomfort. Denies fever or chills. Denies flank pain. Diet and Physical Activity  Diet/Nutrition: well balanced diet and diabetic diet. Exercise: no formal exercise. Depression Screening  PHQ-2/9 Depression Screening    Little interest or pleasure in doing things: 0 - not at all  Feeling down, depressed, or hopeless: 0 - not at all  PHQ-2 Score: 0  PHQ-2 Interpretation: Negative depression screen       General Health  Sleep: sleeps well and gets 4-6 hours of sleep on average. Hearing: normal - bilateral.  Vision: goes for regular eye exams, most recent eye exam <1 year ago and wears glasses. Dental: regular dental visits, brushes teeth once daily and flosses teeth occasionally. /GYN Health  Patient is: postmenopausal  Last menstrual period: 4-5 years ago. Contraceptive method: none.      Review of Systems:     Review of Systems      Past Medical History:     Past Medical History:   Diagnosis Date   • Adjustment disorder with anxiety    • Anemia    • Anxiety    • Atypical glandular cells of undetermined significance (ARIA) on cervical Pap smear 2019   • Chronic kidney disease    • Constipation    • Dense breasts    • GERD (gastroesophageal reflux disease)    • Heart murmur    • History of atrial paroxysmal tachycardia    • History of cerebral artery occlusion    • History of chest pain    • HPV (human papilloma virus) infection    • Hyperlipidemia    • SHAE (iron deficiency anemia)    • Menopause    • Ovarian cyst    • Pancreatitis    • Plantar fasciitis    • Polyclonal hypergammaglobulinemia    • Restless legs syndrome (RLS)    • Trigger finger       Past Surgical History:     Past Surgical History:   Procedure Laterality Date   • CHOLECYSTECTOMY     • COLPOSCOPY     • CA ESOPHAGOGASTRODUODENOSCOPY TRANSORAL DIAGNOSTIC N/A 10/19/2017    Procedure: EGD AND COLONOSCOPY;  Surgeon: Jade Tripp MD;  Location: [facility];   Service: Gastroenterology   • TRIGGER FINGER RELEASE        Social History:     Social History     Socioeconomic History   • Marital status: /Civil Union     Spouse name: None   • Number of children: None   • Years of education: None   • Highest education level: None   Occupational History   • None   Tobacco Use   • Smoking status: Former     Packs/day: 0.50     Years: 10.00     Total pack years: 5.00     Types: Cigarettes     Quit date: 2018     Years since quittin.2   • Smokeless tobacco: Never   • Tobacco comments:     ocassional   Vaping Use   • Vaping Use: Never used   Substance and Sexual Activity   • Alcohol use: Yes     Comment: rarely   • Drug use: Never   • Sexual activity: Yes     Partners: Male     Birth control/protection: None   Other Topics Concern   • None   Social History Narrative    Drinks coffee     Social Determinants of Health     Financial Resource Strain: Not on file   Food Insecurity: Not on file   Transportation Needs: Not on file   Physical Activity: Inactive (2/10/2022)    Exercise Vital Sign    • Days of Exercise per Week: 0 days    • Minutes of Exercise per Session: 0 min   Stress: Stress Concern Present (2/10/2022)    [facility]    • Feeling of Stress :  To some extent   Social Connections: Not on file   Intimate Partner Violence: Not on file   Housing Stability: Not on file      Family History:     Family History   Adopted: Yes   Problem Relation Age of Onset   • Tuberculosis Mother    • Breast cancer Neg Hx    • Colon cancer Neg Hx    • Ovarian cancer Neg Hx    • Uterine cancer Neg Hx    • Cervical cancer Neg Hx       Current Medications:     Current Outpatient Medications   Medication Sig Dispense Refill   • ACCU-CHEK FASTCLIX LANCETS MISC by Does not apply route daily     • atorvastatin (LIPITOR) 20 mg tablet TAKE 1 TABLET DAILY 90 tablet 3   • B Complex-C (SUPER B COMPLEX PO) Take 1 tablet by mouth daily     • BIOTIN PO Take 1 capsule by mouth daily     • Blood Glucose Monitoring Suppl (ACCU-CHEK JESSEE [employer]CT) w/Device KIT by Does not apply route     • cephalexin (KEFLEX) 500 mg capsule Take 1 capsule (500 mg total) by mouth every 12 (twelve) hours for 10 days 20 capsule 0   • cholecalciferol (VITAMIN D3) 1,000 units tablet Take 1,000 Units by mouth 2 (two) times a week      • clobetasol (TEMOVATE) 0.05 % cream APPLY TOPICALLY TWICE A DAY 30 g 23   • clotrimazole-betamethasone (LOTRISONE) 1-0.05 % cream APPLY TOPICALLY TWICE A DAY 30 g 23   • gabapentin (NEURONTIN) 300 mg capsule TAKE 1 CAPSULE THREE TIMES A  capsule 3   • glucose blood test strip by In Vitro route 4 (four) times a day     • Hyaluronic Acid-Vitamin C (HYALURONIC ACID PO) Take 100 mg by mouth daily     • ibuprofen (MOTRIN) 800 mg tablet Take 800 mg by mouth as needed       • Jardiance 10 MG TABS tablet TAKE 1 TABLET EVERY MORNING 90 tablet 3   • lisinopril (ZESTRIL) 5 mg tablet TAKE ONE-HALF (1/2) TABLET DAILY 90 tablet 3   • metFORMIN (GLUCOPHAGE) 1000 MG tablet TAKE 1 TABLET TWICE A  tablet 3   • Multiple Vitamin (DAILY VALUE MULTIVITAMIN) TABS Take by mouth     • Omega-3 Fatty Acids (FISH OIL) 1,000 mg Take 1,000 mg by mouth daily     • omeprazole (PriLOSEC) 20 mg delayed release capsule TAKE 1 CAPSULE EVERY MORNING BEFORE BREAKFAST 90 capsule 3   • Saw Palmetto 160 MG CAPS Take 250 mg by mouth       • semaglutide (Rybelsus) 14 MG tablet Take 1 tablet po daily 30 minutes prior to food/drinks 90 tablet 1   • Turmeric 500 MG CAPS Take by mouth     • zinc gluconate 50 mg tablet Take 50 mg by mouth daily     • ALPRAZolam (XANAX) 1 mg tablet Take 0.5 tablets (0.5 mg total) by mouth daily at bedtime as needed for anxiety (Patient not taking: Reported on 9/22/2023) 30 tablet 0   • cyclobenzaprine (FLEXERIL) 10 mg tablet TAKE 1 TABLET BY MOUTH EVERY DAY AT NIGHT (Patient not taking: Reported on 12/19/2022)       No current facility-administered medications for this visit. Allergies:     No Known Allergies   Physical Exam:     /76 (BP Location: Left arm, Patient Position: Sitting, Cuff Size: Standard)   Pulse 75   Temp 99 °F (37.2 °C)   Ht 5' 5" (1.651 m)   Wt 71.7 kg (158 lb)   LMP  (LMP Unknown)   SpO2 98%   BMI 26.29 kg/m²     Physical Exam  Vitals reviewed. Constitutional:       General: She is not in acute distress. Appearance: Normal appearance. HENT:      Head: Normocephalic and atraumatic. Right Ear: External ear normal.      Left Ear: External ear normal.      Nose: Nose normal.      Mouth/Throat:      Mouth: Mucous membranes are moist.   Eyes:      Extraocular Movements: Extraocular movements intact. Conjunctiva/sclera: Conjunctivae normal.      Pupils: Pupils are equal, round, and reactive to light. Cardiovascular:      Rate and Rhythm: Normal rate and regular rhythm. Pulses: no weak pulses          Dorsalis pedis pulses are 2+ on the right side and 2+ on the left side.         Posterior tibial pulses are 2+ on the right side and 2+ on the left side. Heart sounds: Normal heart sounds. Pulmonary:      Effort: Pulmonary effort is normal.      Breath sounds: Normal breath sounds. Abdominal:      General: Bowel sounds are normal. There is no distension. Palpations: Abdomen is soft. Tenderness: There is no abdominal tenderness. Musculoskeletal:      Cervical back: Neck supple. Right lower leg: No edema. Left lower leg: No edema. Feet:      Right foot:      Skin integrity: Dry skin present. No ulcer, skin breakdown, erythema, warmth or callus. Left foot:      Skin integrity: Dry skin present. No ulcer, skin breakdown, erythema, warmth or callus. Lymphadenopathy:      Cervical: No cervical adenopathy. Skin:     General: Skin is warm. Capillary Refill: Capillary refill takes less than 2 seconds. Findings: No rash. Neurological:      Mental Status: She is alert. Mental status is at baseline. Patient's shoes and socks removed. Right Foot/Ankle   Right Foot Inspection  Skin Exam: skin normal, skin intact and dry skin. No warmth, no callus, no erythema, no maceration, no abnormal color, no pre-ulcer, no ulcer and no callus. Toe Exam: ROM and strength within normal limits. Sensory   Vibration: intact  Proprioception: intact  Monofilament testing: intact    Vascular  Capillary refills: < 3 seconds  The right DP pulse is 2+. The right PT pulse is 2+. Left Foot/Ankle  Left Foot Inspection  Skin Exam: skin normal, skin intact and dry skin. No warmth, no erythema, no maceration, normal color, no pre-ulcer, no ulcer and no callus. Toe Exam: ROM and strength within normal limits. Sensory   Vibration: intact  Proprioception: intact  Monofilament testing: intact    Vascular  Capillary refills: < 3 seconds  The left DP pulse is 2+. The left PT pulse is 2+.      Assign Risk Category  No deformity present  No loss of protective sensation  No weak pulses  Risk: [address] [place] [address]

## 2023-09-23 DIAGNOSIS — B35.9 TINEA: ICD-10-CM

## 2023-09-24 LAB — BACTERIA UR CULT: NORMAL

## 2023-09-25 RX ORDER — CLOTRIMAZOLE AND BETAMETHASONE DIPROPIONATE 10; .64 MG/G; MG/G
CREAM TOPICAL
Qty: 30 G | Refills: 23 | Status: SHIPPED | OUTPATIENT
Start: 2023-09-25

## 2023-11-17 ENCOUNTER — OFFICE VISIT (OUTPATIENT)
Dept: FAMILY MEDICINE CLINIC | Facility: CLINIC | Age: 58
End: 2023-11-17
Payer: COMMERCIAL

## 2023-11-17 VITALS
DIASTOLIC BLOOD PRESSURE: 64 MMHG | HEART RATE: 67 BPM | TEMPERATURE: 98.1 F | WEIGHT: 153 LBS | HEIGHT: 65 IN | SYSTOLIC BLOOD PRESSURE: 122 MMHG | OXYGEN SATURATION: 98 % | BODY MASS INDEX: 25.49 KG/M2

## 2023-11-17 DIAGNOSIS — F17.200 TOBACCO USE DISORDER: ICD-10-CM

## 2023-11-17 DIAGNOSIS — N18.2 TYPE 2 DIABETES MELLITUS WITH STAGE 2 CHRONIC KIDNEY DISEASE, WITHOUT LONG-TERM CURRENT USE OF INSULIN: ICD-10-CM

## 2023-11-17 DIAGNOSIS — R31.9 HEMATURIA, UNSPECIFIED TYPE: Primary | ICD-10-CM

## 2023-11-17 DIAGNOSIS — E11.22 TYPE 2 DIABETES MELLITUS WITH STAGE 2 CHRONIC KIDNEY DISEASE, WITHOUT LONG-TERM CURRENT USE OF INSULIN: ICD-10-CM

## 2023-11-17 LAB
SL AMB  POCT GLUCOSE, UA: ABNORMAL
SL AMB LEUKOCYTE ESTERASE,UA: ABNORMAL
SL AMB POCT BILIRUBIN,UA: 1
SL AMB POCT BLOOD,UA: ABNORMAL
SL AMB POCT CLARITY,UA: CLEAR
SL AMB POCT COLOR,UA: YELLOW
SL AMB POCT KETONES,UA: ABNORMAL
SL AMB POCT NITRITE,UA: ABNORMAL
SL AMB POCT PH,UA: 6
SL AMB POCT SPECIFIC GRAVITY,UA: 1.02
SL AMB POCT URINE PROTEIN: 15
SL AMB POCT UROBILINOGEN: 0.2

## 2023-11-17 PROCEDURE — 81002 URINALYSIS NONAUTO W/O SCOPE: CPT | Performed by: FAMILY MEDICINE

## 2023-11-17 PROCEDURE — 88112 CYTOPATH CELL ENHANCE TECH: CPT | Performed by: SPECIALIST

## 2023-11-17 PROCEDURE — 99214 OFFICE O/P EST MOD 30 MIN: CPT | Performed by: FAMILY MEDICINE

## 2023-11-17 NOTE — PROGRESS NOTES
Assessment/Plan:    No problem-specific Assessment & Plan notes found for this encounter. Diagnoses and all orders for this visit:    Hematuria, unspecified type  -     POCT urine dip  -     UA (URINE) with reflex to Scope  -     Urine culture; Future  -     Urine culture  -     Cytology, urine; Future  -     Cytology, urine    Type 2 diabetes mellitus with stage 2 chronic kidney disease, without long-term current use of insulin   Patient to work on dietary goals with decreased sugars and carbs as discussed today and increased hydration. Will follow up in 2 months, repeat Hgb A1c, can consider D/C jardiance based on Hgb A1c at that time. Tobacco use disorder  -     Cytology, urine; Future  -     Cytology, urine       Subjective:      Patient ID: Santos Serna is a 60 y.o. female. HPI    Patient presents to the office for evaluation. Notes that since she started that Jardiance she has noticed an uncomfortable feeling when she urinates and occasionally she has some blood in the urine. States that this comes and goes but is much more frequent since she has been on the Fiez. Reports that she took Pyridium and this helped the bladder spasms. No urinary symptoms today. Reports that her average glucose has been under 120. The following portions of the patient's history were reviewed and updated as appropriate: allergies, current medications, past family history, past medical history, past social history, past surgical history, and problem list.    Review of Systems      Objective:  /64   Pulse 67   Temp 98.1 °F (36.7 °C)   Ht 5' 5" (1.651 m)   Wt 69.4 kg (153 lb)   LMP  (LMP Unknown)   SpO2 98%   BMI 25.46 kg/m²      Physical Exam  Vitals reviewed. Constitutional:       General: She is not in acute distress. Appearance: Normal appearance. HENT:      Head: Normocephalic and atraumatic.       Right Ear: External ear normal.      Left Ear: External ear normal.      Nose: Nose normal.      Mouth/Throat:      Mouth: Mucous membranes are moist.   Eyes:      Extraocular Movements: Extraocular movements intact. Conjunctiva/sclera: Conjunctivae normal.   Cardiovascular:      Rate and Rhythm: Normal rate and regular rhythm. Heart sounds: Normal heart sounds. Pulmonary:      Effort: Pulmonary effort is normal.      Breath sounds: Normal breath sounds. No wheezing, rhonchi or rales. Abdominal:      General: Bowel sounds are normal. There is no distension. Palpations: Abdomen is soft. Tenderness: There is no abdominal tenderness. There is no right CVA tenderness or left CVA tenderness. Musculoskeletal:         General: No deformity. Cervical back: Neck supple. Right lower leg: No edema. Left lower leg: No edema. Lymphadenopathy:      Cervical: No cervical adenopathy. Skin:     General: Skin is warm. Capillary Refill: Capillary refill takes less than 2 seconds. Findings: No rash. Neurological:      Mental Status: She is alert. Mental status is at baseline.          Veronique Luong DO  [facility]  11/17/2023 11:07 AM

## 2023-11-20 ENCOUNTER — TELEMEDICINE (OUTPATIENT)
Dept: BEHAVIORAL/MENTAL HEALTH CLINIC | Facility: CLINIC | Age: 58
End: 2023-11-20
Payer: COMMERCIAL

## 2023-11-20 ENCOUNTER — TELEPHONE (OUTPATIENT)
Dept: FAMILY MEDICINE CLINIC | Facility: CLINIC | Age: 58
End: 2023-11-20

## 2023-11-20 DIAGNOSIS — F43.22 ADJUSTMENT DISORDER WITH ANXIETY: Primary | ICD-10-CM

## 2023-11-20 PROCEDURE — 90834 PSYTX W PT 45 MINUTES: CPT | Performed by: SOCIAL WORKER

## 2023-11-20 NOTE — TELEPHONE ENCOUNTER
T/c from [place] -- received samples from office for urine culture and analysis, but samples could not be used and culture and analysis could not be run (report cytology is in process). Lab requesting office reach out to pt to get new samples.

## 2023-11-21 DIAGNOSIS — E11.65 UNCONTROLLED TYPE 2 DIABETES MELLITUS WITH HYPERGLYCEMIA (HCC): ICD-10-CM

## 2023-11-21 RX ORDER — ORAL SEMAGLUTIDE 14 MG/1
TABLET ORAL
Qty: 90 TABLET | Refills: 3 | Status: SHIPPED | OUTPATIENT
Start: 2023-11-21
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
## 2023-11-22 PROCEDURE — 88112 CYTOPATH CELL ENHANCE TECH: CPT | Performed by: SPECIALIST

## 2023-12-18 ENCOUNTER — TELEMEDICINE (OUTPATIENT)
Dept: BEHAVIORAL/MENTAL HEALTH CLINIC | Facility: CLINIC | Age: 58
End: 2023-12-18
Payer: COMMERCIAL

## 2023-12-18 DIAGNOSIS — F43.22 ADJUSTMENT DISORDER WITH ANXIETY: Primary | ICD-10-CM

## 2023-12-18 PROCEDURE — 90834 PSYTX W PT 45 MINUTES: CPT | Performed by: SOCIAL WORKER

## 2023-12-19 NOTE — PSYCH
Visit start and stop times:    12/18/23  Start Time: 1500  Stop Time: 1545  Total Visit Time: 45 minutes  Virtual Regular Visit  Therapist met w/pt for individual session.  Pt shared that overall she is feeling pretty good.  She stated that it is almost regina and regina break which she is looking ofrward to.  She stated she hasn't felt as burnt out these past couple weeks.  She identified thsi time of year being hard for her in regards to her daughter that she lost.  Therapist and pt processed some of her feelings and ways she works on managing those feelings.    Verification of patient location:    Patient is located at Home in the following state in which I hold an active license [place]      Assessment/Plan:    Problem List Items Addressed This Visit          Other    Adjustment disorder with anxiety - Primary            Reason for visit is No chief complaint on file.       Encounter provider Willow Perdomo    Provider located at [facility] [address] [facility] [address]  [address] [facility] [place] [address] [phone]      Recent Visits  Date Type Provider Dept   12/18/23 Telemedicine Willow Perdomo [facility] [address]   Showing recent visits within past 7 days and meeting all other requirements  Future Appointments  No visits were found meeting these conditions.  Showing future appointments within next 150 days and meeting all other requirements       The patient was identified by name and date of birth. Terri Louis was informed that this is a telemedicine visit and that the visit is being conducted throughthe Epic Embedded platform. She agrees to proceed..  My office door was closed. No one else was in the room.  She acknowledged consent and understanding of privacy and security of the video platform. The patient has agreed to participate and understands they can discontinue the visit at any  time.    Patient is aware this is a billable service.     Subjective  Terri Louis is a 58 y.o. female  .      HPI     Past Medical History:   Diagnosis Date    Adjustment disorder with anxiety     Anemia     Anxiety     Atypical glandular cells of undetermined significance (ARIA) on cervical Pap smear 9/18/2019    Chronic kidney disease     Constipation     Dense breasts     GERD (gastroesophageal reflux disease)     Heart murmur     History of atrial paroxysmal tachycardia     History of cerebral artery occlusion     History of chest pain     HPV (human papilloma virus) infection 2012    Hyperlipidemia     SHAE (iron deficiency anemia)     Menopause     Ovarian cyst     Pancreatitis     Plantar fasciitis     Polyclonal hypergammaglobulinemia     Restless legs syndrome (RLS)     Trigger finger        Past Surgical History:   Procedure Laterality Date    CHOLECYSTECTOMY      COLPOSCOPY  2013    SD ESOPHAGOGASTRODUODENOSCOPY TRANSORAL DIAGNOSTIC N/A 10/19/2017    Procedure: EGD AND COLONOSCOPY;  Surgeon: Mekhi Dickey MD;  Location: [facility];  Service: Gastroenterology    TRIGGER FINGER RELEASE         Current Outpatient Medications   Medication Sig Dispense Refill    ACCU-CHEK FASTCLIX LANCETS MISC by Does not apply route daily      atorvastatin (LIPITOR) 20 mg tablet TAKE 1 TABLET DAILY 90 tablet 3    B Complex-C (SUPER B COMPLEX PO) Take 1 tablet by mouth daily      BIOTIN PO Take 1 capsule by mouth daily      Blood Glucose Monitoring Suppl (ACCU-CHEK JESSEE CONNECT) w/Device KIT by Does not apply route      cholecalciferol (VITAMIN D3) 1,000 units tablet Take 1,000 Units by mouth 2 (two) times a week       clobetasol (TEMOVATE) 0.05 % cream APPLY TOPICALLY TWICE A DAY 30 g 23    clotrimazole-betamethasone (LOTRISONE) 1-0.05 % cream APPLY TOPICALLY TWICE A DAY 30 g 23    cyclobenzaprine (FLEXERIL) 10 mg tablet TAKE 1 TABLET BY MOUTH EVERY DAY AT NIGHT (Patient not taking: Reported on 12/19/2022)       gabapentin (NEURONTIN) 300 mg capsule TAKE 1 CAPSULE THREE TIMES A  capsule 3    glucose blood test strip by In Vitro route 4 (four) times a day      Hyaluronic Acid-Vitamin C (HYALURONIC ACID PO) Take 100 mg by mouth daily      ibuprofen (MOTRIN) 800 mg tablet Take 800 mg by mouth as needed        Jardiance 10 MG TABS tablet TAKE 1 TABLET EVERY MORNING 90 tablet 3    lisinopril (ZESTRIL) 5 mg tablet TAKE ONE-HALF (1/2) TABLET DAILY 90 tablet 3    LORazepam (Ativan) 0.5 mg tablet Take 1 tablet (0.5 mg total) by mouth every 12 (twelve) hours as needed for anxiety 30 tablet 0    metFORMIN (GLUCOPHAGE) 1000 MG tablet TAKE 1 TABLET TWICE A  tablet 3    Multiple Vitamin (DAILY VALUE MULTIVITAMIN) TABS Take by mouth      Omega-3 Fatty Acids (FISH OIL) 1,000 mg Take 1,000 mg by mouth daily      omeprazole (PriLOSEC) 20 mg delayed release capsule TAKE 1 CAPSULE EVERY MORNING BEFORE BREAKFAST 90 capsule 3    Rybelsus 14 MG tablet TAKE 1 TABLET DAILY 30 MINUTES PRIOR TO FOOD/DRINKS 90 tablet 3    Saw Palmetto 160 MG CAPS Take 250 mg by mouth        Turmeric 500 MG CAPS Take by mouth      zinc gluconate 50 mg tablet Take 50 mg by mouth daily       No current facility-administered medications for this visit.        No Known Allergies    Review of Systems    Video Exam    There were no vitals filed for this visit.    Physical Exam  Pt denied any SI/HI/Ah/VH

## 2024-01-15 DIAGNOSIS — E11.65 UNCONTROLLED TYPE 2 DIABETES MELLITUS WITH HYPERGLYCEMIA (HCC): ICD-10-CM

## 2024-01-15 DIAGNOSIS — E11.9 TYPE 2 DIABETES MELLITUS WITHOUT COMPLICATION, WITHOUT LONG-TERM CURRENT USE OF INSULIN (HCC): ICD-10-CM

## 2024-01-15 DIAGNOSIS — E78.2 MIXED HYPERLIPIDEMIA: ICD-10-CM

## 2024-01-15 DIAGNOSIS — R12 HEARTBURN: ICD-10-CM

## 2024-01-15 DIAGNOSIS — I10 ESSENTIAL HYPERTENSION: ICD-10-CM

## 2024-01-15 DIAGNOSIS — G62.9 NEUROPATHY: ICD-10-CM

## 2024-01-15 DIAGNOSIS — F41.9 ANXIETY: ICD-10-CM

## 2024-01-15 RX ORDER — ATORVASTATIN CALCIUM 20 MG/1
20 TABLET, FILM COATED ORAL DAILY
Qty: 90 TABLET | Refills: 3 | Status: SHIPPED | OUTPATIENT
Start: 2024-01-15

## 2024-01-15 RX ORDER — OMEPRAZOLE 20 MG/1
20 CAPSULE, DELAYED RELEASE ORAL
Qty: 90 CAPSULE | Refills: 3 | Status: SHIPPED | OUTPATIENT
Start: 2024-01-15

## 2024-01-15 RX ORDER — LORAZEPAM 0.5 MG/1
0.5 TABLET ORAL EVERY 12 HOURS PRN
Qty: 30 TABLET | Refills: 0 | Status: SHIPPED | OUTPATIENT
Start: 2024-01-15

## 2024-01-15 RX ORDER — LISINOPRIL 5 MG/1
2.5 TABLET ORAL DAILY
Qty: 90 TABLET | Refills: 3 | Status: SHIPPED | OUTPATIENT
Start: 2024-01-15

## 2024-01-15 RX ORDER — GABAPENTIN 300 MG/1
CAPSULE ORAL
Qty: 270 CAPSULE | Refills: 3 | Status: SHIPPED | OUTPATIENT
Start: 2024-01-15

## 2024-01-15 NOTE — TELEPHONE ENCOUNTER
Medication:  PDMP     [id number] 09/22/2023 09/22/2023 LORazepam (Tablet) 30.0 15 0.5 MG NA CATHIE ELDER [facility] Commercial Insurance 0 / 0 [place]         Active agreement on file -No    Please advise in the absence of Dr Elder

## 2024-01-22 ENCOUNTER — TELEMEDICINE (OUTPATIENT)
Dept: BEHAVIORAL/MENTAL HEALTH CLINIC | Facility: CLINIC | Age: 59
End: 2024-01-22
Payer: COMMERCIAL

## 2024-01-22 DIAGNOSIS — F41.9 ANXIETY: Primary | ICD-10-CM

## 2024-01-22 PROCEDURE — 90834 PSYTX W PT 45 MINUTES: CPT | Performed by: SOCIAL WORKER

## 2024-01-23 NOTE — PSYCH
Visit start and stop times:    01/22/24  Start Time: 1500  Stop Time: 1540  Total Visit Time: 40 minutes  Virtual Regular Visit  Therapist met w/pt for individual session.  Symptoms include: tired, lack of energy, anxiety, racing thoughts. Pt stated she has been stressed out because report cards were due to today and she tends to procrastinate.  Pt shared that she did get them done but is feeling tired/drained.  She identified other stressors coming up but discussion was held around focusing on one stressor at a time.  Pt shared that she hasn't been getting too much sleep so plans on getting back into a healthy sleep routine again this week.    Verification of patient location:    Patient is located at Home in the following state in which I hold an active license [place]      Assessment/Plan: f/u in 3-4 weeks    Problem List Items Addressed This Visit    None  Visit Diagnoses       Anxiety    -  Primary                   Reason for visit is No chief complaint on file.       Encounter provider Willow Perdomo    Provider located at [facility] [address] [address]  [address] [facility] [place] [id number] [phone]      Recent Visits  Date Type Provider Dept   01/22/24 Telemedicine Willow Perdomo  [facility] [address]   Showing recent visits within past 7 days and meeting all other requirements  Future Appointments  No visits were found meeting these conditions.  Showing future appointments within next 150 days and meeting all other requirements       The patient was identified by name and date of birth. Terri CHRISTOPHER Louis was informed that this is a telemedicine visit and that the visit is being conducted throughthe Epic Embedded platform. She agrees to proceed..  My office door was closed. No one else was in the room.  She acknowledged consent and understanding of privacy and security of the video platform. The  patient has agreed to participate and understands they can discontinue the visit at any time.    Patient is aware this is a billable service.     Subjective  Terri Louis is a 58 y.o. female  .      HPI     Past Medical History:   Diagnosis Date    Adjustment disorder with anxiety     Anemia     Anxiety     Atypical glandular cells of undetermined significance (ARIA) on cervical Pap smear 9/18/2019    Chronic kidney disease     Constipation     Dense breasts     GERD (gastroesophageal reflux disease)     Heart murmur     History of atrial paroxysmal tachycardia     History of cerebral artery occlusion     History of chest pain     HPV (human papilloma virus) infection 2012    Hyperlipidemia     SHAE (iron deficiency anemia)     Menopause     Ovarian cyst     Pancreatitis     Plantar fasciitis     Polyclonal hypergammaglobulinemia     Restless legs syndrome (RLS)     Trigger finger        Past Surgical History:   Procedure Laterality Date    CHOLECYSTECTOMY      COLPOSCOPY  2013    DE ESOPHAGOGASTRODUODENOSCOPY TRANSORAL DIAGNOSTIC N/A 10/19/2017    Procedure: EGD AND COLONOSCOPY;  Surgeon: Mekhi Dickey MD;  Location: [facility];  Service: Gastroenterology    TRIGGER FINGER RELEASE         Current Outpatient Medications   Medication Sig Dispense Refill    ACCU-CHEK FASTCLIX LANCETS MISC by Does not apply route daily      atorvastatin (LIPITOR) 20 mg tablet Take 1 tablet (20 mg total) by mouth daily 90 tablet 3    B Complex-C (SUPER B COMPLEX PO) Take 1 tablet by mouth daily      BIOTIN PO Take 1 capsule by mouth daily      Blood Glucose Monitoring Suppl (ACCU-CHEK JESSEE CONNECT) w/Device KIT by Does not apply route      cholecalciferol (VITAMIN D3) 1,000 units tablet Take 1,000 Units by mouth 2 (two) times a week       clobetasol (TEMOVATE) 0.05 % cream APPLY TOPICALLY TWICE A DAY 30 g 23    clotrimazole-betamethasone (LOTRISONE) 1-0.05 % cream APPLY TOPICALLY TWICE A DAY 30 g 23    cyclobenzaprine  (FLEXERIL) 10 mg tablet TAKE 1 TABLET BY MOUTH EVERY DAY AT NIGHT (Patient not taking: Reported on 12/19/2022)      Empagliflozin (Jardiance) 10 MG TABS tablet Take 1 tablet (10 mg total) by mouth every morning 90 tablet 3    gabapentin (NEURONTIN) 300 mg capsule TAKE 1 CAPSULE THREE TIMES A  capsule 3    glucose blood test strip by In Vitro route 4 (four) times a day      Hyaluronic Acid-Vitamin C (HYALURONIC ACID PO) Take 100 mg by mouth daily      ibuprofen (MOTRIN) 800 mg tablet Take 800 mg by mouth as needed        lisinopril (ZESTRIL) 5 mg tablet Take 0.5 tablets (2.5 mg total) by mouth daily 90 tablet 3    LORazepam (Ativan) 0.5 mg tablet Take 1 tablet (0.5 mg total) by mouth every 12 (twelve) hours as needed for anxiety 30 tablet 0    metFORMIN (GLUCOPHAGE) 1000 MG tablet Take 1 tablet (1,000 mg total) by mouth 2 (two) times a day 180 tablet 3    Multiple Vitamin (DAILY VALUE MULTIVITAMIN) TABS Take by mouth      Omega-3 Fatty Acids (FISH OIL) 1,000 mg Take 1,000 mg by mouth daily      omeprazole (PriLOSEC) 20 mg delayed release capsule Take 1 capsule (20 mg total) by mouth daily before breakfast 90 capsule 3    Saw Palmetto 160 MG CAPS Take 250 mg by mouth        semaglutide (Rybelsus) 14 MG tablet Take 1 tablet (14 mg total) by mouth daily before breakfast 90 tablet 3    Turmeric 500 MG CAPS Take by mouth      zinc gluconate 50 mg tablet Take 50 mg by mouth daily       No current facility-administered medications for this visit.        No Known Allergies    Review of Systems    Video Exam    There were no vitals filed for this visit.    Physical Exam Pt denied any SI/HI/AH/VH

## 2024-01-31 ENCOUNTER — OFFICE VISIT (OUTPATIENT)
Dept: URGENT CARE | Facility: MEDICAL CENTER | Age: 59
End: 2024-01-31
Payer: COMMERCIAL

## 2024-01-31 VITALS
HEIGHT: 65 IN | HEART RATE: 73 BPM | OXYGEN SATURATION: 96 % | SYSTOLIC BLOOD PRESSURE: 118 MMHG | WEIGHT: 166 LBS | BODY MASS INDEX: 27.66 KG/M2 | RESPIRATION RATE: 18 BRPM | DIASTOLIC BLOOD PRESSURE: 62 MMHG | TEMPERATURE: 97.6 F

## 2024-01-31 DIAGNOSIS — R39.9 UTI SYMPTOMS: Primary | ICD-10-CM

## 2024-01-31 LAB
SL AMB  POCT GLUCOSE, UA: 2000
SL AMB LEUKOCYTE ESTERASE,UA: ABNORMAL
SL AMB POCT BILIRUBIN,UA: ABNORMAL
SL AMB POCT BLOOD,UA: ABNORMAL
SL AMB POCT CLARITY,UA: ABNORMAL
SL AMB POCT COLOR,UA: YELLOW
SL AMB POCT KETONES,UA: ABNORMAL
SL AMB POCT NITRITE,UA: ABNORMAL
SL AMB POCT PH,UA: 6.5
SL AMB POCT SPECIFIC GRAVITY,UA: 1010
SL AMB POCT URINE PROTEIN: ABNORMAL
SL AMB POCT UROBILINOGEN: 0.2

## 2024-01-31 PROCEDURE — 99213 OFFICE O/P EST LOW 20 MIN: CPT

## 2024-01-31 PROCEDURE — 81002 URINALYSIS NONAUTO W/O SCOPE: CPT

## 2024-01-31 PROCEDURE — 87186 SC STD MICRODIL/AGAR DIL: CPT

## 2024-01-31 PROCEDURE — 87086 URINE CULTURE/COLONY COUNT: CPT

## 2024-01-31 RX ORDER — CEPHALEXIN 500 MG/1
500 CAPSULE ORAL EVERY 12 HOURS SCHEDULED
Qty: 6 CAPSULE | Refills: 0 | Status: SHIPPED | OUTPATIENT
Start: 2024-01-31 | End: 2024-02-03

## 2024-01-31 NOTE — PROGRESS NOTES
[facility]        NAME: Terri Louis is a 58 y.o. female  : 1965    MRN: [id number]  DATE: 2024  TIME: 1:10 PM    Assessment and Plan   UTI symptoms [R39.9]  1. UTI symptoms  POCT urine dip    Urine culture    cephalexin (KEFLEX) 500 mg capsule        Urine dip positive for leukocytes and blood. Pending culture results.     Patient Instructions     Please take Keflex 2 times daily for the next 3 days.  Ensure adequate fluid intake.  If any changes need to be made to your therapy based on urine culture results, we will contact you.  Follow up with PCP in 3-5 days.  Proceed to  ER if symptoms worsen.    Chief Complaint     Chief Complaint   Patient presents with    Possible UTI     Started couple months ago with urine problems and has seen pcp Dr. Elder that stated because she's on Jardiance that is probably why she is getting this feeling but that medication is controlling her A1C.  Couple days now sensation feeling, frequency urination and blood.         History of Present Illness       58-year-old female presenting for evaluation of UTI symptoms.  Patient states that over the past few days she has been experiencing frequency of urination, and urgency of urination.  She denies any burning with urination.  Patient notes that her urine is pink-tinged if she is not experiencing drinking enough fluids.  She denies any fevers, chills, nausea, vomiting or diarrhea.  Patient notes that she has been having experiencing UTIs every 2 to 3 months after being started on Jardiance.        Review of Systems   Review of Systems   Constitutional:  Negative for chills and fever.   Gastrointestinal:  Negative for diarrhea, nausea and vomiting.   Genitourinary:  Positive for frequency, hematuria and urgency. Negative for dysuria.   All other systems reviewed and are negative.        Current Medications       Current Outpatient Medications:     ACCU-CHEK FASTCLIX LANCETS MISC, by Does not apply route  daily, Disp: , Rfl:     atorvastatin (LIPITOR) 20 mg tablet, Take 1 tablet (20 mg total) by mouth daily, Disp: 90 tablet, Rfl: 3    B Complex-C (SUPER B COMPLEX PO), Take 1 tablet by mouth daily, Disp: , Rfl:     BIOTIN PO, Take 1 capsule by mouth daily, Disp: , Rfl:     Blood Glucose Monitoring Suppl (ACCU-CHEK JESSEE CONNECT) w/Device KIT, by Does not apply route, Disp: , Rfl:     cephalexin (KEFLEX) 500 mg capsule, Take 1 capsule (500 mg total) by mouth every 12 (twelve) hours for 3 days, Disp: 6 capsule, Rfl: 0    cholecalciferol (VITAMIN D3) 1,000 units tablet, Take 1,000 Units by mouth 2 (two) times a week , Disp: , Rfl:     clobetasol (TEMOVATE) 0.05 % cream, APPLY TOPICALLY TWICE A DAY, Disp: 30 g, Rfl: 23    clotrimazole-betamethasone (LOTRISONE) 1-0.05 % cream, APPLY TOPICALLY TWICE A DAY, Disp: 30 g, Rfl: 23    Empagliflozin (Jardiance) 10 MG TABS tablet, Take 1 tablet (10 mg total) by mouth every morning, Disp: 90 tablet, Rfl: 3    gabapentin (NEURONTIN) 300 mg capsule, TAKE 1 CAPSULE THREE TIMES A DAY, Disp: 270 capsule, Rfl: 3    glucose blood test strip, by In Vitro route 4 (four) times a day, Disp: , Rfl:     Hyaluronic Acid-Vitamin C (HYALURONIC ACID PO), Take 100 mg by mouth daily, Disp: , Rfl:     ibuprofen (MOTRIN) 800 mg tablet, Take 800 mg by mouth as needed  , Disp: , Rfl:     lisinopril (ZESTRIL) 5 mg tablet, Take 0.5 tablets (2.5 mg total) by mouth daily, Disp: 90 tablet, Rfl: 3    LORazepam (Ativan) 0.5 mg tablet, Take 1 tablet (0.5 mg total) by mouth every 12 (twelve) hours as needed for anxiety, Disp: 30 tablet, Rfl: 0    metFORMIN (GLUCOPHAGE) 1000 MG tablet, Take 1 tablet (1,000 mg total) by mouth 2 (two) times a day, Disp: 180 tablet, Rfl: 3    Multiple Vitamin (DAILY VALUE MULTIVITAMIN) TABS, Take by mouth, Disp: , Rfl:     Omega-3 Fatty Acids (FISH OIL) 1,000 mg, Take 1,000 mg by mouth daily, Disp: , Rfl:     omeprazole (PriLOSEC) 20 mg delayed release capsule, Take 1 capsule (20 mg  total) by mouth daily before breakfast, Disp: 90 capsule, Rfl: 3    Saw Palmetto 160 MG CAPS, Take 250 mg by mouth  , Disp: , Rfl:     semaglutide (Rybelsus) 14 MG tablet, Take 1 tablet (14 mg total) by mouth daily before breakfast, Disp: 90 tablet, Rfl: 3    Turmeric 500 MG CAPS, Take by mouth, Disp: , Rfl:     zinc gluconate 50 mg tablet, Take 50 mg by mouth daily, Disp: , Rfl:     cyclobenzaprine (FLEXERIL) 10 mg tablet, TAKE 1 TABLET BY MOUTH EVERY DAY AT NIGHT (Patient not taking: Reported on 1/31/2024), Disp: , Rfl:     Current Allergies     Allergies as of 01/31/2024    (No Known Allergies)            The following portions of the patient's history were reviewed and updated as appropriate: allergies, current medications, past family history, past medical history, past social history, past surgical history and problem list.     Past Medical History:   Diagnosis Date    Adjustment disorder with anxiety     Anemia     Anxiety     Atypical glandular cells of undetermined significance (ARIA) on cervical Pap smear 9/18/2019    Chronic kidney disease     Constipation     Dense breasts     GERD (gastroesophageal reflux disease)     Heart murmur     History of atrial paroxysmal tachycardia     History of cerebral artery occlusion     History of chest pain     HPV (human papilloma virus) infection 2012    Hyperlipidemia     SHAE (iron deficiency anemia)     Menopause     Ovarian cyst     Pancreatitis     Plantar fasciitis     Polyclonal hypergammaglobulinemia     Restless legs syndrome (RLS)     Trigger finger        Past Surgical History:   Procedure Laterality Date    CHOLECYSTECTOMY      COLPOSCOPY  2013    CA ESOPHAGOGASTRODUODENOSCOPY TRANSORAL DIAGNOSTIC N/A 10/19/2017    Procedure: EGD AND COLONOSCOPY;  Surgeon: Mekhi Dickey MD;  Location: [facility];  Service: Gastroenterology    TRIGGER FINGER RELEASE         Family History   Adopted: Yes   Problem Relation Age of Onset    Tuberculosis Mother     Breast  "cancer Neg Hx     Colon cancer Neg Hx     Ovarian cancer Neg Hx     Uterine cancer Neg Hx     Cervical cancer Neg Hx          Medications have been verified.        Objective   /62   Pulse 73   Temp 97.6 °F (36.4 °C) (Temporal)   Resp 18   Ht 5' 5\" (1.651 m)   Wt 75.3 kg (166 lb)   LMP  (LMP Unknown)   SpO2 96%   BMI 27.62 kg/m²        Physical Exam     Physical Exam  Vitals and nursing note reviewed.   Constitutional:       General: She is not in acute distress.     Appearance: Normal appearance. She is normal weight. She is not ill-appearing, toxic-appearing or diaphoretic.   HENT:      Head: Normocephalic and atraumatic.      Nose: Nose normal.   Eyes:      General:         Right eye: No discharge.         Left eye: No discharge.   Cardiovascular:      Rate and Rhythm: Normal rate and regular rhythm.      Pulses: Normal pulses.      Heart sounds: Normal heart sounds. No murmur heard.     No friction rub. No gallop.   Pulmonary:      Effort: Pulmonary effort is normal. No respiratory distress.      Breath sounds: Normal breath sounds. No stridor. No wheezing, rhonchi or rales.   Chest:      Chest wall: No tenderness.   Abdominal:      General: Bowel sounds are normal.      Palpations: Abdomen is soft.      Tenderness: There is no abdominal tenderness. There is no right CVA tenderness, left CVA tenderness, guarding or rebound.   Skin:     General: Skin is warm and dry.   Neurological:      Mental Status: She is alert.   Psychiatric:         Mood and Affect: Mood normal.         Behavior: Behavior normal.                   "

## 2024-01-31 NOTE — PATIENT INSTRUCTIONS
Please take Keflex 2 times daily for the next 3 days.  Ensure adequate fluid intake.  If any changes need to be made to your therapy based on urine culture results, we will contact you.  1.  Drink plenty fluids.    2.  Take probiotics [i.e. Yogurt, Acidophilus, Florastor (liquid)] daily.    3.  Over-the-counter medications as needed for symptomatic care.    4.   Advance activities as tolerated.    5.   Follow-up with your primary care physician in 3-4 days.    6.  Go to emergency room if symptoms are worsening.    7.  Use a humidifier at bedtime

## 2024-02-02 ENCOUNTER — RA CDI HCC (OUTPATIENT)
Dept: OTHER | Facility: HOSPITAL | Age: 59
End: 2024-02-02

## 2024-02-02 LAB — BACTERIA UR CULT: ABNORMAL

## 2024-02-02 NOTE — PROGRESS NOTES
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

## 2024-02-07 ENCOUNTER — TELEMEDICINE (OUTPATIENT)
Dept: FAMILY MEDICINE CLINIC | Facility: CLINIC | Age: 59
End: 2024-02-07
Payer: COMMERCIAL

## 2024-02-07 DIAGNOSIS — E11.22 TYPE 2 DIABETES MELLITUS WITH STAGE 2 CHRONIC KIDNEY DISEASE, WITHOUT LONG-TERM CURRENT USE OF INSULIN: ICD-10-CM

## 2024-02-07 DIAGNOSIS — N18.2 TYPE 2 DIABETES MELLITUS WITH STAGE 2 CHRONIC KIDNEY DISEASE, WITHOUT LONG-TERM CURRENT USE OF INSULIN: ICD-10-CM

## 2024-02-07 DIAGNOSIS — U07.1 COVID-19: Primary | ICD-10-CM

## 2024-02-07 PROCEDURE — 99214 OFFICE O/P EST MOD 30 MIN: CPT | Performed by: NURSE PRACTITIONER

## 2024-02-07 NOTE — PROGRESS NOTES
COVID-19 Outpatient Progress Note    Assessment/Plan:    Problem List Items Addressed This Visit          Endocrine    Type 2 diabetes mellitus with stage 2 chronic kidney disease, without long-term current use of insulin      Advised patient to continue to monitor sugars.  To continue on current medications as prescribed.  Advised to increase hydration, avoid nephrotoxic medications like ibuprofen.  Safe to use Tylenol every 4-6 hours as discussed.    Lab Results   Component Value Date    HGBA1C 6.9 (A) 09/22/2023               Other    COVID-19 - Primary     Discussed with patient management of COVID-19 including symptomatic care and oral antivirals.    At this time patient is refusing to be started on oral anti-virals.    Educated patient on length of quarantine.    Discussed with patient importance of increased rest, hydration, adequate nutrition.    Advised may start over-the-counter vitamin C, D, zinc.  Mucinex twice daily as needed.    Made aware to call office is changes her mind about oral anti-virals as she has until 5 days from symptom onset.  Advised to seek immediate care for development of shortness of breath.              Disposition:     Patient with asymptomatic/mild COVID-19: They were recommended to isolate for at least 5 days (day 0 is the day symptoms appeared or the date the specimen was collected for the positive test for people who are asymptomatic). If they are asymptomatic or symptoms are improving with no fevers in the past 24 hours, isolation may be ended followed by 5 days of wearing a high quality mask when around others to minimize risk of infecting others. They should wear a mask through day 10 and a test-based strategy may be used to remove a mask sooner.      Discussed symptom directed medication options with patient. Discussed vitamin D, vitamin C, and/or zinc supplementation with patient.     I have spent a total time of 15 minutes on the day of the encounter for this patient  including risks and benefits of treatment options, instructions for management, patient and family education, importance of treatment compliance, impressions, counseling/coordination of care, documenting in the medical record and obtaining or reviewing history. Patient does not wish to start oral anti-virals at this time      Encounter provider: ANN Thompson     Provider located at: [facility] [address] [place] [address] [address] [place] [address]  [address] [place] [id number] [phone]     Recent Visits  No visits were found meeting these conditions.  Showing recent visits within past 7 days and meeting all other requirements  Today's Visits  Date Type Provider Dept   02/07/24 Telemedicine ANN Thompson [facility] [address] [place] [address]   Showing today's visits and meeting all other requirements  Future Appointments  No visits were found meeting these conditions.  Showing future appointments within next 150 days and meeting all other requirements     This virtual check-in was done via [employer] and patient was informed that this is a secure, HIPAA-compliant platform. She agrees to proceed.    Patient agrees to participate in a virtual check in via telephone or video visit instead of presenting to the office to address urgent/immediate medical needs. Patient is aware this is a billable service. She acknowledged consent and understanding of privacy and security of the video platform. The patient has agreed to participate and understands they can discontinue the visit at any time.    After connecting through [employer], the patient was identified by name and date of birth. Terri Louis was informed that this was a telemedicine visit and that the exam was being conducted confidentially over secure lines. My office door was closed. No one else was in the room. Terri Louis acknowledged consent and understanding of privacy and security of the  telemedicine visit. I informed the patient that I have reviewed her record in Epic and presented the opportunity for her to ask any questions regarding the visit today. The patient agreed to participate.     Verification of patient location:  Patient is located in the following state in which I hold an active license: [place]    Subjective:   Terri Louis is a 58 y.o. female who has been screened for COVID-19. Symptom change since last report: improving. Patient's symptoms include fever, chills, fatigue, malaise, nasal congestion and myalgias. Patient denies rhinorrhea, sore throat, anosmia, loss of taste, cough, shortness of breath, chest tightness, abdominal pain, nausea, vomiting, diarrhea and headaches.     - Date of symptom onset: 2/4/2024  - Date of positive COVID-19 test: 2/5/2024. Type of test: Home antigen.     COVID-19 vaccination status: Fully vaccinated with booster    Terri has been staying home and has isolated themselves in her home. She is taking care to not share personal items and is cleaning all surfaces that are touched often, like counters, tabletops, and doorknobs using household cleaning sprays or wipes. She is wearing a mask when she leaves her room.     Lab Results   Component Value Date    SARSCOV2 Positive (A) 12/28/2022       Review of Systems   Constitutional:  Positive for chills, fatigue and fever.   HENT:  Positive for congestion. Negative for ear pain, rhinorrhea and sore throat.    Eyes:  Negative for photophobia and visual disturbance.   Respiratory:  Negative for cough, chest tightness and shortness of breath.    Gastrointestinal:  Negative for abdominal pain, diarrhea, nausea and vomiting.   Genitourinary:  Negative for decreased urine volume.   Musculoskeletal:  Positive for myalgias.   Skin:  Negative for color change and rash.   Neurological:  Negative for headaches.   Hematological:  Negative for adenopathy.   Psychiatric/Behavioral:  Negative for agitation and confusion.       Current Outpatient Medications on File Prior to Visit   Medication Sig    atorvastatin (LIPITOR) 20 mg tablet Take 1 tablet (20 mg total) by mouth daily    B Complex-C (SUPER B COMPLEX PO) Take 1 tablet by mouth daily    BIOTIN PO Take 1 capsule by mouth daily    cholecalciferol (VITAMIN D3) 1,000 units tablet Take 1,000 Units by mouth 2 (two) times a week     clobetasol (TEMOVATE) 0.05 % cream APPLY TOPICALLY TWICE A DAY    clotrimazole-betamethasone (LOTRISONE) 1-0.05 % cream APPLY TOPICALLY TWICE A DAY    cyclobenzaprine (FLEXERIL) 10 mg tablet     Empagliflozin (Jardiance) 10 MG TABS tablet Take 1 tablet (10 mg total) by mouth every morning    gabapentin (NEURONTIN) 300 mg capsule TAKE 1 CAPSULE THREE TIMES A DAY    Hyaluronic Acid-Vitamin C (HYALURONIC ACID PO) Take 100 mg by mouth daily    ibuprofen (MOTRIN) 800 mg tablet Take 800 mg by mouth as needed      lisinopril (ZESTRIL) 5 mg tablet Take 0.5 tablets (2.5 mg total) by mouth daily    LORazepam (Ativan) 0.5 mg tablet Take 1 tablet (0.5 mg total) by mouth every 12 (twelve) hours as needed for anxiety    metFORMIN (GLUCOPHAGE) 1000 MG tablet Take 1 tablet (1,000 mg total) by mouth 2 (two) times a day    Multiple Vitamin (DAILY VALUE MULTIVITAMIN) TABS Take by mouth    Omega-3 Fatty Acids (FISH OIL) 1,000 mg Take 1,000 mg by mouth daily    omeprazole (PriLOSEC) 20 mg delayed release capsule Take 1 capsule (20 mg total) by mouth daily before breakfast    Saw [place]arrass 160 MG CAPS Take 250 mg by mouth      semaglutide (Rybelsus) 14 MG tablet Take 1 tablet (14 mg total) by mouth daily before breakfast    Turmeric 500 MG CAPS Take by mouth    zinc gluconate 50 mg tablet Take 50 mg by mouth daily    ACCU-CHEK FASTCLIX LANCETS MISC by Does not apply route daily    Blood Glucose Monitoring Suppl (ACCU-CHEK JESSEE CONNECT) w/Device KIT by Does not apply route    glucose blood test strip by In Vitro route 4 (four) times a day       Objective:    LMP  (LMP Unknown)         Physical Exam  Constitutional:       General: She is not in acute distress.     Appearance: Normal appearance. She is not ill-appearing.   HENT:      Head: Normocephalic.      Right Ear: External ear normal.      Left Ear: External ear normal.      Nose: Nose normal.   Pulmonary:      Effort: Pulmonary effort is normal. No respiratory distress.   Musculoskeletal:         General: Normal range of motion.      Cervical back: Normal range of motion.   Skin:     Coloration: Skin is not pale.   Neurological:      Mental Status: She is alert and oriented to person, place, and time.   Psychiatric:         Mood and Affect: Mood normal.         Behavior: Behavior normal.       ANN Thompson

## 2024-02-07 NOTE — PATIENT INSTRUCTIONS
COVID-19 and Chronic Health Conditions   AMBULATORY CARE:   What you need to know about coronavirus disease 2019 (COVID-19) and chronic health conditions:  Your chronic condition may increase your risk for COVID-19 or serious problems it can cause. Healthcare providers might need to make changes that affect how you usually manage your chronic health condition. Providers may change hours of operation or not have patients come in to be seen. You may not be able to make appointments to get blood drawn or to have tests or procedures. This may continue until the virus that causes COVID-19 is controlled. Until then, you can take steps to manage your condition. The steps will also lower your risk for COVID-19 or the serious problems it causes. If you do develop COVID-19, healthcare providers will tell you when it is okay to be around others after you recover. This depends on your chronic condition, any symptoms of COVID-19 that developed, and how severe the symptoms were.  What you need to know about serious problems from the virus:  You may develop long-term health problems caused by the virus. Your risk is higher if you are 65 or older. A weak immune system, obesity, diabetes, chronic kidney disease, or a heart or lung condition can also increase your risk. Your risk is also higher if you are a current or former cigarette smoker. COVID-19 can lead to any of the following:  Multisymptom inflammatory syndrome in adults (MIS-A) or in children (MIS-C), causing inflammation in the heart, digestive system, skin, or brain    Shortness of breath, serious lower respiratory conditions, such as pneumonia or acute respiratory distress syndrome (ARDS)    Blood clots or blood vessel damage    Organ damage from a lack of oxygen or from blood clots    Sleep problems    Problems thinking clearly, remembering information, or concentrating    Mood changes, depression, or anxiety    Long-term problems tasting or smelling    Loss of appetite  and weight loss    Nerve pain    Fatigue (feeling mentally and physically tired)    Call your local emergency number (911 in the [place]) if:   You have trouble breathing or shortness of breath.    You have chest pain or pressure that lasts longer than 5 minutes.    You become confused or hard to wake.    Seek care immediately if:   The skin on your face, fingers, or toes look blue or darker than usual.      Call your doctor or specialist if:   You have a fever.    You have symptoms of COVID-19.    You have questions or concerns about COVID-19 or your chronic condition.    How the 2019 coronavirus spreads:  Close personal contact with an infected person increases your risk for infection. This means being within 6 feet (2 meters) of the person for at least 15 minutes over 24 hours.  The virus travels in droplets that form when a person talks, sings, coughs, or sneezes.  The droplets can also float in the air for minutes or hours. Infection happens when you breathe in the droplets or get them in your eyes or nose.    Person-to-person contact can spread the virus.  For example, a person with the virus on his or her hands can spread it by shaking hands with someone.    The virus can stay on objects and surfaces for hours to days.  You may become infected by touching the object or surface and then touching your eyes or mouth.    What you need to know about the signs and symptoms of COVID-19:  Signs and symptoms usually start about 5 days after infection but can take 2 to 14 days. Signs and symptoms range from mild to severe. You may feel like you have the flu or a bad cold. Your chronic health condition may cause some of the same symptoms COVID-19 causes. This can make it hard to know if a symptom is from COVID-19 or your chronic condition. Keep a record of any new or worsening symptom you have. This is especially important if you have a condition that often causes shortness of breath. Your provider can tell you if you should  be tested for COVID-19. Tell your healthcare provider if you think you were infected but develop signs or symptoms not listed below:  A cough    Shortness of breath or trouble breathing that may become severe    A fever of at least 100.4°F, or 38°C (may be lower in adults 65 or older)    Chills that might include shaking    Muscle pain, body aches, or a headache    A sore throat    Suddenly not being able to taste or smell anything    Feeling very tired (fatigue)    Congestion (stuffy head and nose), or a runny nose    Diarrhea, nausea, or vomiting    Manage your chronic health condition:  If you do not have a regular healthcare provider, experts recommend you contact a local [facility] health center or health department. The following can help you manage your condition and prevent COVID-19 during an active outbreak in your area:  Get emergency care for your condition if needed.  Talk to your healthcare providers about symptoms of your chronic condition that need immediate care. Your providers can help you create a plan or add exacerbation management to your plan. The plan will include when to go to an emergency department and when to call your local emergency number. This will depend on where you live and the services that are available.    Go to dialysis appointments as scheduled.  It is important to stay on schedule. You will need to have enough food to be able to follow the emergency diet plan if you must miss a session. The emergency diet needs to be part of the management plan for your condition.    Ask your healthcare provider for other ways to have appointments.  Some providers offer phone, video, or other types of appointments.    Follow any regular management plan you use.  Your healthcare provider will tell you if you need to make any changes to your regular management plan. For example, if you have asthma, continue to follow your asthma action plan. If you have diabetes, you may need to check your blood  sugar level more often. Stress and illness can make blood sugar levels go up. You may need to adjust medicine such as insulin. If you have a heart condition or high blood pressure, you may need to check your blood pressure more often. Stress and illness can also raise your blood pressure.    Talk to your healthcare providers about your medicines.  You may be able to get more than 1 month of medicine at a time. This will lower the number of times you need to go to a pharmacy to get your medicines. Make sure you have enough medicine if you have a condition that can lead to an emergency. Examples include asthma medicines, insulin, or an epinephrine pen. Check the expiration dates on the medicines you currently have. Ask for refills as soon as possible, if needed. If it is not time to refill prescriptions, you may be able to get an emergency supply of some medicines. Medicine plans vary, so ask your healthcare provider or pharmacist for options.    Have supplies available in your home or delivered as needed.  If possible, get extra supplies you use regularly. Examples include absorbent pads, syringes, and wound cleaning solutions. This will limit the number of trips out of your home to get supplies. Have food, medicines, and other supplies delivered. You can instead ask someone who does not have COVID-19 to get items you need.    What you need to know about COVID-19 vaccines:  Your healthcare provider can give you more information about what to expect, depending on your chronic condition. You are considered fully vaccinated against COVID-19 two weeks after the final dose of any COVID-19 vaccine. Let your healthcare provider know when you have received the final dose of the vaccine. Make a copy of your vaccination card. Keep the original with you in case you need to show it. Keep the copy in a safe place.  Get a COVID-19 vaccine as directed.  Vaccination is recommended for everyone 6 months or older. COVID-19 vaccines  are given as a shot in 1 to 3 doses as a primary series. This depends on the vaccine brand and the age of the person who receives it. A booster dose is recommended for everyone 5 years or older after the primary series is complete. A second booster  is recommended for all adults 50 or older and for immunocompromised adolescents. The second booster is also recommended for anyone who got the 1-dose brand of vaccine for the first dose and a booster. Your provider can give you more information on boosters and help you schedule all needed doses.         Continue to protect yourself and others.  You can become infected even after you get the vaccine. You may also be able to pass the virus to others without knowing you are infected.    After you get the vaccine, check local, national, and international travel rules.  You may need to be tested before you travel. Some countries require proof of a negative test before you travel. You may also need to quarantine after you return.    Medicine may be given to prevent infection.  The medicine can be given if you are at high risk for infection and cannot get the vaccine. It can also be given if your immune system does not respond well to the vaccine.    Lower your risk for COVID-19:   Stay home if you are sick or think you may have COVID-19.  It is important to stay home if you are waiting for a testing appointment or for test results.    Wash your hands often throughout the day.  Use soap and water. Rub your soapy hands together, lacing your fingers, for at least 20 seconds. Rinse with warm, running water. Dry your hands with a clean towel or paper towel. Use hand  that contains alcohol if soap and water are not available. Teach children how to wash their hands and use hand .         Cover sneezes and coughs.  Turn your face away and cover your mouth and nose with a tissue. Throw the tissue away. Use the bend of your arm if a tissue is not available. Then wash  your hands with soap and water or use hand . Teach children how to cover a cough or sneeze.    Wear a face covering (mask) when needed.  Use a cloth covering with at least 2 layers. You can also create layers by putting a cloth covering over a disposable non-medical mask. Cover your mouth and your nose.         Try to keep space between you and others when you are out of the house.  Avoid crowds as much as possible. Wear a face covering when you must be around a large group and cannot keep space between you and others.    Clean and disinfect high-touch surfaces and objects in your home often.  Use disinfecting wipes, or make a solution by mixing 4 teaspoons of bleach with 1 quart (4 cups) of water. Do not  use any cleaning or disinfecting products that can trigger an asthma attack or other breathing problems. Open windows or have circulating air as you clean. Do not  mix ammonia with bleach. This will create toxic fumes.       Help strengthen your immune system:   Ask about other vaccines you may need.  Get the influenza (flu) vaccine as soon as recommended each year, usually starting in September or October. Get the pneumonia vaccine if recommended. Your healthcare provider can tell you if you should also get other vaccines, and when to get them.    Do not smoke.  Nicotine and other chemicals in cigarettes and cigars can increase your risk for infection and for serious COVID-19 effects. Ask your healthcare provider for information if you currently smoke and need help to quit. E-cigarettes or smokeless tobacco still contain nicotine. Talk to your healthcare provider before you use these products.    Eat a variety of healthy foods.  Examples include vegetables, fruits, whole-grain breads and cereals, lean meats and poultry, fish, low-fat dairy products, and cooked beans. Healthy foods contain nutrients that help keep your immune system strong.         Find ways to manage stress.  Talk to your healthcare  providers about ways to manage stress. Stress can lead to breathing problems or trigger an attack or exacerbation of many health conditions. Do things you enjoy to help you relax. For example, talk to a friend, read a book or magazine, or listen to soothing music.    Follow up with your doctor or specialist as directed:  Your providers will tell you when you can come in for tests, procedures, or check-ups. Bring your symptom record with you to all appointments. Write down your questions so you remember to ask them during your visits.  For more information:   Centers for Disease Control and Prevention  [address] [place] [address]  Phone: 8- 730 - 5123851  Phone: 1- 503 - 4687306  Web Address: http://www.cdc.gov    © Copyright Merative 2023 Information is for End User's use only and may not be sold, redistributed or otherwise used for commercial purposes.  The above information is an  only. It is not intended as medical advice for individual conditions or treatments. Talk to your doctor, nurse or pharmacist before following any medical regimen to see if it is safe and effective for you.

## 2024-02-07 NOTE — ASSESSMENT & PLAN NOTE
Advised patient to continue to monitor sugars.  To continue on current medications as prescribed.  Advised to increase hydration, avoid nephrotoxic medications like ibuprofen.  Safe to use Tylenol every 4-6 hours as discussed.    Lab Results   Component Value Date    HGBA1C 6.9 (A) 09/22/2023

## 2024-02-07 NOTE — ASSESSMENT & PLAN NOTE
Discussed with patient management of COVID-19 including symptomatic care and oral antivirals.    At this time patient is refusing to be started on oral anti-virals.    Educated patient on length of quarantine.    Discussed with patient importance of increased rest, hydration, adequate nutrition.    Advised may start over-the-counter vitamin C, D, zinc.  Mucinex twice daily as needed.    Made aware to call office is changes her mind about oral anti-virals as she has until 5 days from symptom onset.  Advised to seek immediate care for development of shortness of breath.

## 2024-02-07 NOTE — LETTER
February 7, 2024    Patient: Terri Louis  YOB: 1965  Date of Last Encounter: 2/7/2024      To whom it may concern:     Terri Louis has tested positive for COVID-19 (Coronavirus). She may return to work on 2/12/2024, which is 5 days from illness onset (provided symptoms are improving) and 24 hours without fever.  Upon return to work, she is to adhere to 5 days of mask wearing.    Sincerely,         ANN Thompson

## 2024-02-12 ENCOUNTER — TELEMEDICINE (OUTPATIENT)
Dept: BEHAVIORAL/MENTAL HEALTH CLINIC | Facility: CLINIC | Age: 59
End: 2024-02-12

## 2024-02-12 DIAGNOSIS — F41.9 ANXIETY: Primary | ICD-10-CM

## 2024-02-13 NOTE — PSYCH
Visit start and stop times:    02/12/24  Start Time: 1445  Stop Time: 1515  Total Visit Time: 30 minutes  Virtual Regular Visit  Therapist met w/pt for individual session.  Pt stated she was out of work all last week due to having COVID.  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  She stated that in some ways it feels good to be back at work and not isolated at home.  She shared how much she enjoys what she does despite it being stressful.  Pt shared that she feels as if she is managing the stress better than she had several months ago.  Ongoing discussion was held around coping skills she uses to help manage her symptoms.  Pt shared that venting and talking about how she feels is a big stress reliever for her.    Verification of patient location:    Patient is located at Home in the following state in which I hold an active license [place]      Assessment/Plan: f/u in 3-4 weeks    Problem List Items Addressed This Visit    None  Visit Diagnoses       Anxiety    -  Primary                 Reason for visit is No chief complaint on file.       Encounter provider Willow Perdomo    Provider located at [facility] [address] [facility] [address]  [address] [place] [address] [phone]      Recent Visits  Date Type Provider Dept   02/12/24 Telemedicine Willow Perdomo [facility] [address]   Showing recent visits within past 7 days and meeting all other requirements  Future Appointments  No visits were found meeting these conditions.  Showing future appointments within next 150 days and meeting all other requirements       The patient was identified by name and date of birth. Terri CHRISTOPHER Louis was informed that this is a telemedicine visit and that the visit is being conducted throughthe Epic Embedded platform. She agrees to proceed..  My office  door was closed. No one else was in the room.  She acknowledged consent and understanding of privacy and security of the video platform. The patient has agreed to participate and understands they can discontinue the visit at any time.    Patient is aware this is a billable service.     Subjective  Terri Louis is a 58 y.o. female  .      HPI     Past Medical History:   Diagnosis Date    Adjustment disorder with anxiety     Anemia     Anxiety     Atypical glandular cells of undetermined significance (ARIA) on cervical Pap smear 9/18/2019    Chronic kidney disease     Constipation     Dense breasts     GERD (gastroesophageal reflux disease)     Heart murmur     History of atrial paroxysmal tachycardia     History of cerebral artery occlusion     History of chest pain     HPV (human papilloma virus) infection 2012    Hyperlipidemia     SHAE (iron deficiency anemia)     Menopause     Ovarian cyst     Pancreatitis     Plantar fasciitis     Polyclonal hypergammaglobulinemia     Restless legs syndrome (RLS)     Trigger finger        Past Surgical History:   Procedure Laterality Date    CHOLECYSTECTOMY      COLPOSCOPY  2013    [place] ESOPHAGOGASTRODUODENOSCOPY TRANSORAL DIAGNOSTIC N/A 10/19/2017    Procedure: EGD AND COLONOSCOPY;  Surgeon: Mekhi Dickey MD;  Location: [facility];  Service: Gastroenterology    TRIGGER FINGER RELEASE         Current Outpatient Medications   Medication Sig Dispense Refill    ACCU-CHEK FASTCLIX LANCETS MISC by Does not apply route daily      atorvastatin (LIPITOR) 20 mg tablet Take 1 tablet (20 mg total) by mouth daily 90 tablet 3    B Complex-C (SUPER B COMPLEX PO) Take 1 tablet by mouth daily      BIOTIN PO Take 1 capsule by mouth daily      Blood Glucose Monitoring Suppl (ACCU-CHEK JESSEE CONNECT) w/Device KIT by Does not apply route      cholecalciferol (VITAMIN D3) 1,000 units tablet Take 1,000 Units by mouth 2 (two) times a week       clobetasol (TEMOVATE) 0.05 % cream APPLY TOPICALLY  TWICE A DAY 30 g 23    clotrimazole-betamethasone (LOTRISONE) 1-0.05 % cream APPLY TOPICALLY TWICE A DAY 30 g 23    cyclobenzaprine (FLEXERIL) 10 mg tablet       Empagliflozin (Jardiance) 10 MG TABS tablet Take 1 tablet (10 mg total) by mouth every morning 90 tablet 3    gabapentin (NEURONTIN) 300 mg capsule TAKE 1 CAPSULE THREE TIMES A  capsule 3    glucose blood test strip by In Vitro route 4 (four) times a day      Hyaluronic Acid-Vitamin C (HYALURONIC ACID PO) Take 100 mg by mouth daily      ibuprofen (MOTRIN) 800 mg tablet Take 800 mg by mouth as needed        lisinopril (ZESTRIL) 5 mg tablet Take 0.5 tablets (2.5 mg total) by mouth daily 90 tablet 3    LORazepam (Ativan) 0.5 mg tablet Take 1 tablet (0.5 mg total) by mouth every 12 (twelve) hours as needed for anxiety 30 tablet 0    metFORMIN (GLUCOPHAGE) 1000 MG tablet Take 1 tablet (1,000 mg total) by mouth 2 (two) times a day 180 tablet 3    Multiple Vitamin (DAILY VALUE MULTIVITAMIN) TABS Take by mouth      Omega-3 Fatty Acids (FISH OIL) 1,000 mg Take 1,000 mg by mouth daily      omeprazole (PriLOSEC) 20 mg delayed release capsule Take 1 capsule (20 mg total) by mouth daily before breakfast 90 capsule 3    Saw Palmetto 160 MG CAPS Take 250 mg by mouth        semaglutide (Rybelsus) 14 MG tablet Take 1 tablet (14 mg total) by mouth daily before breakfast 90 tablet 3    Turmeric 500 MG CAPS Take by mouth      zinc gluconate 50 mg tablet Take 50 mg by mouth daily       No current facility-administered medications for this visit.        No Known Allergies    Review of Systems    Video Exam    There were no vitals filed for this visit.    Physical Exam  Pt denied any SI/HI/AH/VH

## 2024-02-14 ENCOUNTER — TELEPHONE (OUTPATIENT)
Dept: OTHER | Facility: OTHER | Age: 59
End: 2024-02-14

## 2024-02-14 NOTE — TELEPHONE ENCOUNTER
Patient is calling regarding cancelling an appointment.    Date/Time: 2/14/2024, 7 AM    Patient was rescheduled: YES [] NO [x]    Patient requesting call back to reschedule: YES [] NO [x]    Patient reports that she will call back to reschedule.

## 2024-02-22 ENCOUNTER — TELEPHONE (OUTPATIENT)
Dept: NEPHROLOGY | Facility: CLINIC | Age: 59
End: 2024-02-22

## 2024-02-22 NOTE — TELEPHONE ENCOUNTER
Called spoke with patient advised patient to get non-fasting labs done 1 week prior to 03/06/24 fu appt with Dr.Adeem Roldan. patient verbalized understanding and is okay with it.

## 2024-02-27 ENCOUNTER — APPOINTMENT (OUTPATIENT)
Dept: LAB | Facility: MEDICAL CENTER | Age: 59
End: 2024-02-27
Payer: COMMERCIAL

## 2024-03-05 ENCOUNTER — TELEPHONE (OUTPATIENT)
Dept: NEPHROLOGY | Facility: CLINIC | Age: 59
End: 2024-03-05

## 2024-03-06 ENCOUNTER — OFFICE VISIT (OUTPATIENT)
Dept: NEPHROLOGY | Facility: CLINIC | Age: 59
End: 2024-03-06
Payer: COMMERCIAL

## 2024-03-06 VITALS
HEART RATE: 75 BPM | RESPIRATION RATE: 16 BRPM | OXYGEN SATURATION: 98 % | DIASTOLIC BLOOD PRESSURE: 70 MMHG | BODY MASS INDEX: 26.99 KG/M2 | TEMPERATURE: 96.9 F | SYSTOLIC BLOOD PRESSURE: 110 MMHG | WEIGHT: 162 LBS | HEIGHT: 65 IN

## 2024-03-06 DIAGNOSIS — E11.22 TYPE 2 DIABETES MELLITUS WITH STAGE 2 CHRONIC KIDNEY DISEASE, WITHOUT LONG-TERM CURRENT USE OF INSULIN: ICD-10-CM

## 2024-03-06 DIAGNOSIS — N18.2 TYPE 2 DIABETES MELLITUS WITH STAGE 2 CHRONIC KIDNEY DISEASE, WITHOUT LONG-TERM CURRENT USE OF INSULIN: ICD-10-CM

## 2024-03-06 DIAGNOSIS — N18.2 CKD (CHRONIC KIDNEY DISEASE) STAGE 2, GFR 60-89 ML/MIN: Primary | ICD-10-CM

## 2024-03-06 DIAGNOSIS — N30.01 ACUTE CYSTITIS WITH HEMATURIA: ICD-10-CM

## 2024-03-06 PROCEDURE — 99214 OFFICE O/P EST MOD 30 MIN: CPT | Performed by: INTERNAL MEDICINE

## 2024-03-06 RX ORDER — FLUCONAZOLE 150 MG/1
150 TABLET ORAL ONCE
Qty: 1 TABLET | Refills: 0 | Status: SHIPPED | OUTPATIENT
Start: 2024-03-06 | End: 2024-03-06

## 2024-03-06 RX ORDER — CEPHALEXIN 250 MG/1
250 CAPSULE ORAL EVERY 12 HOURS SCHEDULED
Qty: 28 CAPSULE | Refills: 0 | Status: SHIPPED | OUTPATIENT
Start: 2024-03-06 | End: 2024-03-20

## 2024-03-06 NOTE — PROGRESS NOTES
NEPHROLOGY PROGRESS NOTE    Patient: Terri Louis               Sex: female          DOA: No admission date for patient encounter.   YOB: 1965        Age:  59 y.o.          3/6/2024        BACKGROUND     57 y.o. F with PMH of HTN, DM-2, dyslipidemia, anxiety disorder who has been following up in Renal clinic since 2019    SUBJECTIVE     Patient following up after greater than 1 year.  Patient has been diagnosed with UTI at least 2 times in the past 1 year.  This has been attributed to use of Jardiance.    REVIEW OF SYSTEMS     Review of Systems   Constitutional: Negative.    HENT: Negative.     Eyes: Negative.    Respiratory: Negative.     Cardiovascular: Negative.    Gastrointestinal: Negative.    Endocrine: Negative.    Genitourinary: Negative.    Musculoskeletal: Negative.    Skin: Negative.    Allergic/Immunologic: Negative.    Neurological: Negative.    Hematological: Negative.    All other systems reviewed and are negative.      OBJECTIVE     Current Weight: Weight - Scale: 73.5 kg (162 lb)  Vitals:    03/06/24 1532   BP: 110/70   Pulse: 75   Resp: 16   Temp: (!) 96.9 °F (36.1 °C)   SpO2: 98%     Body mass index is 26.96 kg/m².  [unfilled]    CURRENT MEDICATIONS       Current Outpatient Medications:     ACCU-CHEK FASTCLIX LANCETS MISC, by Does not apply route daily, Disp: , Rfl:     atorvastatin (LIPITOR) 20 mg tablet, Take 1 tablet (20 mg total) by mouth daily, Disp: 90 tablet, Rfl: 3    B Complex-C (SUPER B COMPLEX PO), Take 1 tablet by mouth daily, Disp: , Rfl:     BIOTIN PO, Take 1 capsule by mouth daily, Disp: , Rfl:     Blood Glucose Monitoring Suppl (ACCU-CHEK JESSEE CONNECT) w/Device KIT, by Does not apply route, Disp: , Rfl:     cephalexin (KEFLEX) 250 mg capsule, Take 1 capsule (250 mg total) by mouth every 12 (twelve) hours for 14 days, Disp: 28 capsule, Rfl: 0    cholecalciferol (VITAMIN D3) 1,000 units tablet, Take 1,000 Units by mouth 2 (two) times a week , Disp: , Rfl:      clobetasol (TEMOVATE) 0.05 % cream, APPLY TOPICALLY TWICE A DAY, Disp: 30 g, Rfl: 23    clotrimazole-betamethasone (LOTRISONE) 1-0.05 % cream, APPLY TOPICALLY TWICE A DAY, Disp: 30 g, Rfl: 23    cyclobenzaprine (FLEXERIL) 10 mg tablet, if needed, Disp: , Rfl:     Empagliflozin (Jardiance) 10 MG TABS tablet, Take 1 tablet (10 mg total) by mouth every morning, Disp: 90 tablet, Rfl: 3    fluconazole (DIFLUCAN) 150 mg tablet, Take 1 tablet (150 mg total) by mouth once for 1 dose, Disp: 1 tablet, Rfl: 0    gabapentin (NEURONTIN) 300 mg capsule, TAKE 1 CAPSULE THREE TIMES A DAY, Disp: 270 capsule, Rfl: 3    glucose blood test strip, by In Vitro route 4 (four) times a day, Disp: , Rfl:     Hyaluronic Acid-Vitamin C (HYALURONIC ACID PO), Take 100 mg by mouth daily, Disp: , Rfl:     ibuprofen (MOTRIN) 800 mg tablet, Take 800 mg by mouth as needed  , Disp: , Rfl:     lisinopril (ZESTRIL) 5 mg tablet, Take 0.5 tablets (2.5 mg total) by mouth daily, Disp: 90 tablet, Rfl: 3    LORazepam (Ativan) 0.5 mg tablet, Take 1 tablet (0.5 mg total) by mouth every 12 (twelve) hours as needed for anxiety, Disp: 30 tablet, Rfl: 0    metFORMIN (GLUCOPHAGE) 1000 MG tablet, Take 1 tablet (1,000 mg total) by mouth 2 (two) times a day, Disp: 180 tablet, Rfl: 3    Multiple Vitamin (DAILY VALUE MULTIVITAMIN) TABS, Take by mouth, Disp: , Rfl:     Omega-3 Fatty Acids (FISH OIL) 1,000 mg, Take 1,000 mg by mouth daily, Disp: , Rfl:     omeprazole (PriLOSEC) 20 mg delayed release capsule, Take 1 capsule (20 mg total) by mouth daily before breakfast, Disp: 90 capsule, Rfl: 3    Saw Palmetto 160 MG CAPS, Take 250 mg by mouth  , Disp: , Rfl:     semaglutide (Rybelsus) 14 MG tablet, Take 1 tablet (14 mg total) by mouth daily before breakfast, Disp: 90 tablet, Rfl: 3    Turmeric 500 MG CAPS, Take by mouth, Disp: , Rfl:     zinc gluconate 50 mg tablet, Take 50 mg by mouth daily, Disp: , Rfl:       PHYSICAL EXAMINATION     Physical Exam  Constitutional:        Appearance: She is well-developed.   HENT:      Head: Normocephalic and atraumatic.   Eyes:      Pupils: Pupils are equal, round, and reactive to light.   Cardiovascular:      Rate and Rhythm: Normal rate and regular rhythm.      Heart sounds: Normal heart sounds.   Pulmonary:      Effort: Pulmonary effort is normal.   Abdominal:      General: Bowel sounds are normal.      Palpations: Abdomen is soft.   Musculoskeletal:         General: Normal range of motion.      Cervical back: Neck supple.   Skin:     General: Skin is warm.   Neurological:      Mental Status: She is alert and oriented to person, place, and time.           LAB RESULTS     Results from last 6 Months   Lab Units 02/27/24  1035   WBC Thousand/uL 6.72   HEMOGLOBIN g/dL 12.8   HEMATOCRIT % 42.4   PLATELETS Thousands/uL 301   POTASSIUM mmol/L 4.6   CHLORIDE mmol/L 101   CO2 mmol/L 29   BUN mg/dL 19   CREATININE mg/dL 0.91   CALCIUM mg/dL 9.9   PHOSPHORUS mg/dL 3.5               RADIOLOGY RESULTS        IMPRESSION:     No hydronephrosis or solid renal mass.     Bilateral ureteral jets visualized.     Unremarkable bilateral renal echotexture.     ASSESSMENT/PLAN     57 F with PMH of HTN, DM-2, dyslipidemia who is following up in Renal clinic    1) CKD stage IIIa: baseline creatinine of 1.0-1.1. Current Creatinine is 0.9 mg/dl and on target  Urinalysis has shown glycosuria due to h/o taking Jardiance    2) Proteinuria: noted 150 mg per quantification and acceptable.  Patient is currently taking lisinopril 2.5 mg once daily    3) Hypertension: BP is 110 mm Hg systolic and acceptable while on Lisinopril 2.5 mg once daily    4) Secondary hyperparathyroidism of renal origin: Vitamin D, calcium and phosphorus levels are on target.     5) Anemia due to CKD: Hemoglobin is 12.8 g/dl and on target.    6.  Abnormal urinalysis: Noted high specific gravity suggestive of concentrated urine.  Recommended patient to strictly ingest 64 ounces of water daily minimum  Given  history of UTI, have given prescription for Keflex to 50 mg p.o. twice daily to be taken in case patient develops UTI.  Recommended patient to take 1 dose of Diflucan to 150 mg in case of UTI.            Tessa Roldan  Nephrology  3/6/2024

## 2024-03-09 DIAGNOSIS — F41.9 ANXIETY: ICD-10-CM

## 2024-03-11 RX ORDER — LORAZEPAM 0.5 MG/1
0.5 TABLET ORAL EVERY 12 HOURS PRN
Qty: 30 TABLET | Refills: 0 | Status: SHIPPED | OUTPATIENT
Start: 2024-03-11

## 2024-03-11 NOTE — TELEPHONE ENCOUNTER
Medication:  PDMP   1 [id number] 01/17/2024 01/15/2024 LORazepam (Tablet) 30.0 15 0.5 MG NA [facility] [employer] 0 / 0 [place]    1 [id number] 09/22/2023 09/22/2023 LORazepam (Tablet) 30.0 15 0.5 MG NA CATHIE JUNG [facility] Commercial Insurance 0 / 0 [place]        Active agreement on file -Yes

## 2024-03-12 ENCOUNTER — TELEMEDICINE (OUTPATIENT)
Dept: BEHAVIORAL/MENTAL HEALTH CLINIC | Facility: CLINIC | Age: 59
End: 2024-03-12
Payer: COMMERCIAL

## 2024-03-12 DIAGNOSIS — F41.9 ANXIETY: Primary | ICD-10-CM

## 2024-03-12 PROCEDURE — 90834 PSYTX W PT 45 MINUTES: CPT | Performed by: SOCIAL WORKER

## 2024-03-13 NOTE — PSYCH
Visit start and stop times:    03/12/24  3:00 PM  3:40 PM     Virtual Regular Visit  Therapist met w/pt for individual session.  Symptoms include: racing thoughts, stress, worry. Pt stated that work has been very chaotic.  Therapist and pt discussed several different stressors at work and ways she grounds herself during the day and how she then decompresses once she leaves work.  Therapist and pt discussed how she feels she does a good job with not taking her work home w/her mentally.  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      Verification of patient location:    Patient is located at Home in the following state in which I hold an active license [place]      Assessment/Plan:    Problem List Items Addressed This Visit    None  Visit Diagnoses       Anxiety    -  Primary                 Reason for visit is No chief complaint on file.       Encounter provider Willow Perdomo    Provider located at [facility] [address] [address] [facility]  [address] [place] [address] [phone]      Recent Visits  No visits were found meeting these conditions.  Showing recent visits within past 7 days and meeting all other requirements  Future Appointments  No visits were found meeting these conditions.  Showing future appointments within next 150 days and meeting all other requirements       The patient was identified by name and date of birth. Terri WHITE Louis was informed that this is a telemedicine visit and that the visit is being conducted throughthe Epic Embedded platform. She agrees to proceed..  My office door was closed. No one else was in the room.  She acknowledged consent and understanding of privacy and security of the video platform. The patient has agreed to participate and understands they can discontinue the visit at  any time.    Patient is aware this is a billable service.     Subjective  Terri Louis is a 59 y.o. female  .      HPI     Past Medical History:   Diagnosis Date    Adjustment disorder with anxiety     Anemia     Anxiety     Atypical glandular cells of undetermined significance (ARIA) on cervical Pap smear 9/18/2019    Chronic kidney disease     Constipation     Dense breasts     GERD (gastroesophageal reflux disease)     Heart murmur     History of atrial paroxysmal tachycardia     History of cerebral artery occlusion     History of chest pain     HPV (human papilloma virus) infection 2012    Hyperlipidemia     SHAE (iron deficiency anemia)     Menopause     Ovarian cyst     Pancreatitis     Plantar fasciitis     Polyclonal hypergammaglobulinemia     Restless legs syndrome (RLS)     Trigger finger        Past Surgical History:   Procedure Laterality Date    CHOLECYSTECTOMY      COLPOSCOPY  2013    [place] ESOPHAGOGASTRODUODENOSCOPY TRANSORAL DIAGNOSTIC N/A 10/19/2017    Procedure: EGD AND COLONOSCOPY;  Surgeon: Mekhi Dickey MD;  Location: [facility];  Service: Gastroenterology    TRIGGER FINGER RELEASE         Current Outpatient Medications   Medication Sig Dispense Refill    ACCU-CHEK FASTCLIX LANCETS MISC by Does not apply route daily      atorvastatin (LIPITOR) 20 mg tablet Take 1 tablet (20 mg total) by mouth daily 90 tablet 3    B Complex-C (SUPER B COMPLEX PO) Take 1 tablet by mouth daily      BIOTIN PO Take 1 capsule by mouth daily      Blood Glucose Monitoring Suppl (ACCU-CHEK JESSEE CONNECT) w/Device KIT by Does not apply route      cephalexin (KEFLEX) 250 mg capsule Take 1 capsule (250 mg total) by mouth every 12 (twelve) hours for 14 days 28 capsule 0    cholecalciferol (VITAMIN D3) 1,000 units tablet Take 1,000 Units by mouth 2 (two) times a week       clobetasol (TEMOVATE) 0.05 % cream APPLY TOPICALLY TWICE A DAY 30 g 23    clotrimazole-betamethasone (LOTRISONE) 1-0.05 % cream APPLY TOPICALLY TWICE  A DAY 30 g 23    cyclobenzaprine (FLEXERIL) 10 mg tablet if needed      Empagliflozin (Jardiance) 10 MG TABS tablet Take 1 tablet (10 mg total) by mouth every morning 90 tablet 3    gabapentin (NEURONTIN) 300 mg capsule TAKE 1 CAPSULE THREE TIMES A  capsule 3    glucose blood test strip by In Vitro route 4 (four) times a day      Hyaluronic Acid-Vitamin C (HYALURONIC ACID PO) Take 100 mg by mouth daily      ibuprofen (MOTRIN) 800 mg tablet Take 800 mg by mouth as needed        lisinopril (ZESTRIL) 5 mg tablet Take 0.5 tablets (2.5 mg total) by mouth daily 90 tablet 3    LORazepam (ATIVAN) 0.5 mg tablet TAKE 1 TABLET EVERY 12 HOURS AS NEEDED FOR ANXIETY 30 tablet 0    metFORMIN (GLUCOPHAGE) 1000 MG tablet Take 1 tablet (1,000 mg total) by mouth 2 (two) times a day 180 tablet 3    Multiple Vitamin (DAILY VALUE MULTIVITAMIN) TABS Take by mouth      Omega-3 Fatty Acids (FISH OIL) 1,000 mg Take 1,000 mg by mouth daily      omeprazole (PriLOSEC) 20 mg delayed release capsule Take 1 capsule (20 mg total) by mouth daily before breakfast 90 capsule 3    Saw Palmetto 160 MG CAPS Take 250 mg by mouth        semaglutide (Rybelsus) 14 MG tablet Take 1 tablet (14 mg total) by mouth daily before breakfast 90 tablet 3    Turmeric 500 MG CAPS Take by mouth      zinc gluconate 50 mg tablet Take 50 mg by mouth daily       No current facility-administered medications for this visit.        No Known Allergies    Review of Systems    Video Exam    There were no vitals filed for this visit.    Physical Exam Pt denied any SI/HI/AH/VH

## 2024-03-20 ENCOUNTER — OFFICE VISIT (OUTPATIENT)
Dept: FAMILY MEDICINE CLINIC | Facility: CLINIC | Age: 59
End: 2024-03-20
Payer: COMMERCIAL

## 2024-03-20 VITALS
HEART RATE: 74 BPM | TEMPERATURE: 98.3 F | SYSTOLIC BLOOD PRESSURE: 112 MMHG | DIASTOLIC BLOOD PRESSURE: 68 MMHG | BODY MASS INDEX: 27.49 KG/M2 | HEIGHT: 65 IN | OXYGEN SATURATION: 98 % | WEIGHT: 165 LBS

## 2024-03-20 DIAGNOSIS — N18.2 TYPE 2 DIABETES MELLITUS WITH STAGE 2 CHRONIC KIDNEY DISEASE, WITHOUT LONG-TERM CURRENT USE OF INSULIN  (HCC): Primary | ICD-10-CM

## 2024-03-20 DIAGNOSIS — E11.22 TYPE 2 DIABETES MELLITUS WITH STAGE 2 CHRONIC KIDNEY DISEASE, WITHOUT LONG-TERM CURRENT USE OF INSULIN  (HCC): Primary | ICD-10-CM

## 2024-03-20 DIAGNOSIS — Z12.31 BREAST CANCER SCREENING BY MAMMOGRAM: ICD-10-CM

## 2024-03-20 PROCEDURE — 99214 OFFICE O/P EST MOD 30 MIN: CPT | Performed by: FAMILY MEDICINE

## 2024-03-20 NOTE — PROGRESS NOTES
"Assessment/Plan:    No problem-specific Assessment & Plan notes found for this encounter.     Diagnoses and all orders for this visit:    Type 2 diabetes mellitus with stage 2 chronic kidney disease, without long-term current use of insulin   -     Hemoglobin A1C; Future    Breast cancer screening by mammogram  -     Mammo screening bilateral w 3d & cad; Future        Subjective:      Patient ID: Terri Louis is a 59 y.o. female.    HPI    Recently saw nephrology, states that she was told her numbers were good.     She is due for Hgb A1c.     Has had not been good with her diet recently.     The following portions of the patient's history were reviewed and updated as appropriate: allergies, current medications, past family history, past medical history, past social history, past surgical history, and problem list.    Review of Systems      Objective:  /68   Pulse 74   Temp 98.3 °F (36.8 °C)   Ht 5' 5\" (1.651 m)   Wt 74.8 kg (165 lb)   LMP  (LMP Unknown)   SpO2 98%   BMI 27.46 kg/m²      Physical Exam  Vitals reviewed.   Constitutional:       General: She is not in acute distress.     Appearance: Normal appearance.   HENT:      Head: Normocephalic and atraumatic.      Right Ear: External ear normal.      Left Ear: External ear normal.      Nose: Nose normal.      Mouth/Throat:      Mouth: Mucous membranes are moist.   Eyes:      Extraocular Movements: Extraocular movements intact.      Conjunctiva/sclera: Conjunctivae normal.      Pupils: Pupils are equal, round, and reactive to light.   Cardiovascular:      Rate and Rhythm: Normal rate and regular rhythm.      Heart sounds: Normal heart sounds.   Pulmonary:      Effort: Pulmonary effort is normal.      Breath sounds: Normal breath sounds. No wheezing, rhonchi or rales.   Abdominal:      General: Bowel sounds are normal. There is no distension.      Palpations: Abdomen is soft.      Tenderness: There is no abdominal tenderness.   Musculoskeletal: "      Right lower leg: No edema.      Left lower leg: No edema.   Skin:     General: Skin is warm.      Capillary Refill: Capillary refill takes less than 2 seconds.      Findings: No rash.   Neurological:      Mental Status: She is alert. Mental status is at baseline.         DO Jay Frost [facility]  3/20/2024 3:30 PM

## 2024-04-30 ENCOUNTER — TELEMEDICINE (OUTPATIENT)
Dept: BEHAVIORAL/MENTAL HEALTH CLINIC | Facility: CLINIC | Age: 59
End: 2024-04-30

## 2024-04-30 DIAGNOSIS — F41.1 GENERALIZED ANXIETY DISORDER: Primary | ICD-10-CM

## 2024-05-01 ENCOUNTER — OFFICE VISIT (OUTPATIENT)
Dept: OBGYN CLINIC | Facility: CLINIC | Age: 59
End: 2024-05-01
Payer: COMMERCIAL

## 2024-05-01 VITALS
DIASTOLIC BLOOD PRESSURE: 82 MMHG | BODY MASS INDEX: 27.32 KG/M2 | SYSTOLIC BLOOD PRESSURE: 116 MMHG | WEIGHT: 164 LBS | HEIGHT: 65 IN

## 2024-05-01 DIAGNOSIS — Z78.0 ASYMPTOMATIC POSTMENOPAUSAL STATUS: ICD-10-CM

## 2024-05-01 DIAGNOSIS — R39.9 UTI SYMPTOMS: ICD-10-CM

## 2024-05-01 DIAGNOSIS — R92.30 DENSE BREASTS: ICD-10-CM

## 2024-05-01 DIAGNOSIS — Z12.31 SCREENING MAMMOGRAM FOR BREAST CANCER: ICD-10-CM

## 2024-05-01 DIAGNOSIS — Z01.419 ENCOUNTER FOR GYNECOLOGICAL EXAMINATION WITHOUT ABNORMAL FINDING: Primary | ICD-10-CM

## 2024-05-01 PROBLEM — R30.0 DYSURIA: Status: RESOLVED | Noted: 2019-10-16 | Resolved: 2024-05-01

## 2024-05-01 PROBLEM — N30.90 CYSTITIS: Status: RESOLVED | Noted: 2022-02-10 | Resolved: 2024-05-01

## 2024-05-01 PROBLEM — Z72.0 TOBACCO ABUSE: Status: RESOLVED | Noted: 2018-06-18 | Resolved: 2024-05-01

## 2024-05-01 PROBLEM — E66.9 OBESITY, CLASS I, BMI 30-34.9: Status: RESOLVED | Noted: 2018-10-11 | Resolved: 2024-05-01

## 2024-05-01 PROBLEM — Z91.89 AT INCREASED RISK OF EXPOSURE TO COVID-19 VIRUS: Status: RESOLVED | Noted: 2021-01-15 | Resolved: 2024-05-01

## 2024-05-01 PROBLEM — U07.1 COVID-19: Status: RESOLVED | Noted: 2024-02-07 | Resolved: 2024-05-01

## 2024-05-01 PROBLEM — E66.811 OBESITY, CLASS I, BMI 30-34.9: Status: RESOLVED | Noted: 2018-10-11 | Resolved: 2024-05-01

## 2024-05-01 PROBLEM — M76.71 PERONEAL TENDINITIS, RIGHT: Status: ACTIVE | Noted: 2017-11-29

## 2024-05-01 PROBLEM — R63.5 ABNORMAL WEIGHT GAIN: Status: RESOLVED | Noted: 2018-10-11 | Resolved: 2024-05-01

## 2024-05-01 PROBLEM — K63.9 SMALL BOWEL LESION: Status: ACTIVE | Noted: 2017-11-06

## 2024-05-01 PROBLEM — R79.89 ELEVATED LFTS: Status: RESOLVED | Noted: 2017-11-21 | Resolved: 2024-05-01

## 2024-05-01 PROBLEM — N83.209 OVARIAN CYST: Status: RESOLVED | Noted: 2017-09-12 | Resolved: 2024-05-01

## 2024-05-01 LAB
SL AMB  POCT GLUCOSE, UA: ABNORMAL
SL AMB LEUKOCYTE ESTERASE,UA: ABNORMAL
SL AMB POCT BILIRUBIN,UA: ABNORMAL
SL AMB POCT BLOOD,UA: ABNORMAL
SL AMB POCT CLARITY,UA: ABNORMAL
SL AMB POCT COLOR,UA: ABNORMAL
SL AMB POCT KETONES,UA: ABNORMAL
SL AMB POCT NITRITE,UA: ABNORMAL
SL AMB POCT PH,UA: ABNORMAL
SL AMB POCT SPECIFIC GRAVITY,UA: ABNORMAL
SL AMB POCT URINE PROTEIN: ABNORMAL
SL AMB POCT UROBILINOGEN: ABNORMAL

## 2024-05-01 PROCEDURE — S0612 ANNUAL GYNECOLOGICAL EXAMINA: HCPCS | Performed by: NURSE PRACTITIONER

## 2024-05-01 PROCEDURE — G0476 HPV COMBO ASSAY CA SCREEN: HCPCS | Performed by: NURSE PRACTITIONER

## 2024-05-01 PROCEDURE — 81002 URINALYSIS NONAUTO W/O SCOPE: CPT | Performed by: NURSE PRACTITIONER

## 2024-05-01 PROCEDURE — 87086 URINE CULTURE/COLONY COUNT: CPT | Performed by: NURSE PRACTITIONER

## 2024-05-01 PROCEDURE — G0145 SCR C/V CYTO,THINLAYER,RESCR: HCPCS | Performed by: NURSE PRACTITIONER

## 2024-05-01 NOTE — PATIENT INSTRUCTIONS
HPV (Human Papillomavirus)   WHAT YOU NEED TO KNOW:   What is human papillomavirus (HPV)?  HPV is the name for a group of viruses that can infect your skin or other parts of your body. HPV is the most common infection spread by sexual contact. It can also be spread from a mother to her baby during delivery.  What are the symptoms of HPV?   Painless warts on your skin, in your mouth, or on your genitals    Genital or anal discharge, bleeding, itching, or pain    Pain when you urinate    How is HPV diagnosed?  Your healthcare provider may use a vinegar liquid to help diagnose HPV genital warts. Women 30 to 65 years old can be checked for HPV during regular cervical cancer screenings. An HPV test checks for certain types of HPV that can cause changes in cervical cells. Without treatment, the changed cells can become cancer. An HPV test can be done every 5 years if the results show no infection. The test can be done with or without a Pap smear. A Pap smear checks for cancer or for abnormal cells that can become cancer. You may be tested for HPV if you have mouth or throat cancer.  What treatment may be needed?  HPV cannot be cured, but an infection may go away on its own in about 2 years without causing problems. If the infection continues, some types of HPV can lead to health conditions that need to be treated. Examples include warts and certain cancers, especially squamous cell carcinoma (SCC). HPV-linked SCCs commonly develop in the anus, throat (called oropharyngeal cancer), cervix, vagina, penis, or mouth. HPV can also cause a type of cervical cancer called adenocarcinoma. Symptoms of any of these conditions may not develop for several years after you were exposed to HPV. You will need to be monitored closely. Ask your healthcare provider for more information about monitoring, conditions caused by HPV, and available treatments.  How can I prevent an HPV infection?  HPV is usually spread through sexual activity. The  following can help prevent infection:  Ask about the HPV vaccine.  The HPV vaccine is given to females and males, usually at 11 or 12 years of age. It can be given from 9 years through 45 years of age, if needed. It is most effective if given before sexual activity begins.         Use a new condom, contraceptive barrier, or dental dam each time you have sex.  This includes oral, vaginal, and anal sex. Talk to your healthcare provider if you have any questions about what to use or how to use it.    When should I call my doctor?   You have new or worsening symptoms.    You have questions or concerns about your condition or care.    CARE AGREEMENT:   You have the right to help plan your care. Learn about your health condition and how it may be treated. Discuss treatment options with your healthcare providers to decide what care you want to receive. You always have the right to refuse treatment. The above information is an  only. It is not intended as medical advice for individual conditions or treatments. Talk to your doctor, nurse or pharmacist before following any medical regimen to see if it is safe and effective for you.  © Copyright [employer] 2023 Information is for End User's use only and may not be sold, redistributed or otherwise used for commercial purposes.  Breast Self Exam for Women   AMBULATORY CARE:   A breast self-exam (BSE)  is a way to check your breasts for lumps and other changes. Regular BSEs can help you know how your breasts normally look and feel. Most breast lumps or changes are not cancer, but you should always have them checked by a healthcare provider.  Why you should do a BSE:  Breast cancer is the most common type of cancer in women. Even if you have mammograms, you may still want to do a BSE regularly. If you know how your breasts normally feel and look, it may help you know when to contact your healthcare provider. Mammograms can miss some cancers. You may find a lump during  a BSE that did not show up on a mammogram.  When you should do a BSE:  If you have periods, you may want to do your BSE 1 week after your period ends. This is the time when your breasts may be the least swollen, lumpy, or tender. You can do regular BSEs even if you are breastfeeding or have breast implants.  Call your doctor if:   You find any lumps or changes in your breasts.    You have breast pain or fluid coming from your nipples.    You have questions or concerns about your condition or care.    How to do a BSE:       Look at your breasts in a mirror.  Look at the size and shape of each breast and nipple. Check for swelling, lumps, dimpling, scaly skin, or other skin changes. Look for nipple changes, such as a nipple that is painful or beginning to pull inward. Gently squeeze both nipples and check to see if fluid (that is not breast milk) comes out of them. If you find any of these or other breast changes, contact your healthcare provider. Check your breasts while you sit or  the following 3 positions:    Hang your arms down at your sides.    Raise your hands and join them behind your head.    Put firm pressure with your hands on your hips. Bend slightly forward while you look at your breasts in the mirror.    Lie down and feel your breasts.  When you lie down, your breast tissue spreads out evenly over your chest. This makes it easier for you to feel for lumps and anything that may not be normal for your breasts. Do a BSE on one breast at a time.    Place a small pillow or towel under your left shoulder.  Put your left arm behind your head.    Use the 3 middle fingers of your right hand.  Use your fingertip pads, on the top of your fingers. Your fingertip pad is the most sensitive part of your finger.    Use small circles to feel your breast tissue.  Use your fingertip pads to make dime-sized, overlapping circles on your breast and armpits. Use light, medium, and firm pressure. First, press lightly.  Second, press with medium pressure to feel a little deeper into the breast. Last, use firm pressure to feel deep within your breast.    Examine your entire breast area.  Examine the breast area from above the breast to below the breast where you feel only ribs. Make small circles with your fingertips, starting in the middle of your armpit. Make circles going up and down the breast area. Continue toward your breast and all the way across it. Examine the area from your armpit all the way over to the middle of your chest (breastbone). Stop at the middle of your chest.    Move the pillow or towel to your right shoulder, and put your right arm behind your head.  Use the 3 fingertip pads of your left hand, and repeat the above steps to do a BSE on your right breast.  What else you can do to check for breast problems or cancer:  Talk to your healthcare provider about mammograms. A mammogram is an x-ray of your breasts to screen for breast cancer or other problems. Your provider can tell you the benefits and risks of mammograms. The first mammogram is usually at age 45 or 50. Your provider may recommend you start at 40 or younger if your risk for breast cancer is high. Mammograms usually continue every 1 to 2 years until age 74.       Follow up with your doctor as directed:  Write down your questions so you remember to ask them during your visits.  © Copyright [employer] 2023 Information is for End User's use only and may not be sold, redistributed or otherwise used for commercial purposes.  The above information is an  only. It is not intended as medical advice for individual conditions or treatments. Talk to your doctor, nurse or pharmacist before following any medical regimen to see if it is safe and effective for you.

## 2024-05-01 NOTE — PSYCH
"    Visit start and stop times:    04/30/24  Start Time: 1535  Stop Time: 1615  Total Visit Time: 40 minutes  Virtual Regular Visit  Therapist met w/pt for individual session. Pt apologized for missing last session and stated that a lot has happened the past month.  Pt shared how there is a lot of work stress and she is having a hard time \"leaving it at work.\" Therapist and pt continued to discuss the different layers of work stress and healthy ways to help pt manage those stressors.  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  Therapist and pt discussed different things that pt has to look forward to outside of work to help get her through this stressful time.   Verification of patient location:    Patient is located at Home in the following state in which I hold an active license [place]      Assessment/Plan: f/u in 2 weeks    Problem List Items Addressed This Visit    None  Visit Diagnoses       Generalized anxiety disorder    -  Primary                   Reason for visit is No chief complaint on file.       Encounter provider Willow Perdomo      Recent Visits  Date Type Provider Dept   04/30/24 Telemedicine Willow Perdomo [facility] [address]   Showing recent visits within past 7 days and meeting all other requirements  Future Appointments  No visits were found meeting these conditions.  Showing future appointments within next 150 days and meeting all other requirements       The patient was identified by name and date of birth. Terri CHRISTOPHER Louis was informed that this is a telemedicine visit and that the visit is being conducted throughthe Epic Embedded platform. She agrees to proceed..  My office door was closed. No one else was in the room.  She acknowledged consent and understanding of privacy and security of the video platform. The patient has agreed to participate and understands they " can discontinue the visit at any time.    Patient is aware this is a billable service.     Subjective  Terri Louis is a 59 y.o. female  .      HPI     Past Medical History:   Diagnosis Date    Adjustment disorder with anxiety     Anemia     Anxiety     Atypical glandular cells of undetermined significance (ARIA) on cervical Pap smear 9/18/2019    Chronic kidney disease     Constipation     Dense breasts     GERD (gastroesophageal reflux disease)     Heart murmur     History of atrial paroxysmal tachycardia     History of cerebral artery occlusion     History of chest pain     HPV (human papilloma virus) infection 2012    Hyperlipidemia     SHAE (iron deficiency anemia)     Menopause     Ovarian cyst     Pancreatitis     Plantar fasciitis     Polyclonal hypergammaglobulinemia     Restless legs syndrome (RLS)     Trigger finger        Past Surgical History:   Procedure Laterality Date    CHOLECYSTECTOMY      COLPOSCOPY  2013    SC ESOPHAGOGASTRODUODENOSCOPY TRANSORAL DIAGNOSTIC N/A 10/19/2017    Procedure: EGD AND COLONOSCOPY;  Surgeon: Mekhi Dickey MD;  Location: [facility];  Service: Gastroenterology    TRIGGER FINGER RELEASE         Current Outpatient Medications   Medication Sig Dispense Refill    ACCU-CHEK FASTCLIX LANCETS MISC by Does not apply route daily      atorvastatin (LIPITOR) 20 mg tablet Take 1 tablet (20 mg total) by mouth daily 90 tablet 3    B Complex-C (SUPER B COMPLEX PO) Take 1 tablet by mouth daily      BIOTIN PO Take 1 capsule by mouth daily      Blood Glucose Monitoring Suppl (ACCU-CHEK JESSEE CONNECT) w/Device KIT by Does not apply route      cholecalciferol (VITAMIN D3) 1,000 units tablet Take 1,000 Units by mouth 2 (two) times a week       clobetasol (TEMOVATE) 0.05 % cream APPLY TOPICALLY TWICE A DAY 30 g 23    clotrimazole-betamethasone (LOTRISONE) 1-0.05 % cream APPLY TOPICALLY TWICE A DAY 30 g 23    cyclobenzaprine (FLEXERIL) 10 mg tablet if needed      Empagliflozin  (Jardiance) 10 MG TABS tablet Take 1 tablet (10 mg total) by mouth every morning 90 tablet 3    gabapentin (NEURONTIN) 300 mg capsule TAKE 1 CAPSULE THREE TIMES A  capsule 3    glucose blood test strip by In Vitro route 4 (four) times a day      Hyaluronic Acid-Vitamin C (HYALURONIC ACID PO) Take 100 mg by mouth daily      ibuprofen (MOTRIN) 800 mg tablet Take 800 mg by mouth as needed        lisinopril (ZESTRIL) 5 mg tablet Take 0.5 tablets (2.5 mg total) by mouth daily 90 tablet 3    LORazepam (ATIVAN) 0.5 mg tablet TAKE 1 TABLET EVERY 12 HOURS AS NEEDED FOR ANXIETY 30 tablet 0    metFORMIN (GLUCOPHAGE) 1000 MG tablet Take 1 tablet (1,000 mg total) by mouth 2 (two) times a day 180 tablet 3    Multiple Vitamin (DAILY VALUE MULTIVITAMIN) TABS Take by mouth      Omega-3 Fatty Acids (FISH OIL) 1,000 mg Take 1,000 mg by mouth daily      omeprazole (PriLOSEC) 20 mg delayed release capsule Take 1 capsule (20 mg total) by mouth daily before breakfast 90 capsule 3    Saw Palmetto 160 MG CAPS Take 250 mg by mouth        semaglutide (Rybelsus) 14 MG tablet Take 1 tablet (14 mg total) by mouth daily before breakfast 90 tablet 3    Turmeric 500 MG CAPS Take by mouth      zinc gluconate 50 mg tablet Take 50 mg by mouth daily       No current facility-administered medications for this visit.        No Known Allergies    Review of Systems    Video Exam    There were no vitals filed for this visit.    Physical Exam Pt denied any SI/HI/AH/VH

## 2024-05-01 NOTE — PROGRESS NOTES
Diagnoses and all orders for this visit:    Encounter for gynecological examination without abnormal finding  -     Liquid-based pap, screening    Asymptomatic postmenopausal status    Dense breasts  -     US breast screening bilateral complete (ABUS); Future    UTI symptoms  -     POCT urine dip  -     Urine culture    Screening mammogram for breast cancer        -     Mammogram ordered by PCP      Call as needed, encouraged calcium/vit D in her diet, call with any PMB, all questions answered          Pleasant 59 y.o. postmenopausal female here for annual exam. She denies postmenopausal bleeding. +history of abnormal pap smears. Pap NIL in 2017, last Pap NIL 2019 ARIA/ASCUS HPV +, Benign colposcopy with possible HPV effect in 2019.  Pap in 2020 and 2021 was Normal HPV +16, Colposcopy showed KAREN 1 in 11/2020. LEEP done in 2022 showed KAREN 2-3. She did not return for her follow up Pap until now. A repap and hpv were done today. Denies vaginal issues. Denies pelvic pain. Denies postmenopausal issues. She is sexually active. Colonoscopy due 2022, will call to schedule this. Stage 2 kidney disease-sees Nephrology. She is having frequent UTI's followed by her PCP, they feel it is related to her Jardiance. I recommended seeing Urology if issues continue.    Past Medical History:   Diagnosis Date    Adjustment disorder with anxiety     Anemia     Anxiety     Atypical glandular cells of undetermined significance (ARIA) on cervical Pap smear 09/18/2019    Chronic kidney disease     Constipation     COVID-19 02/07/2024    Dense breasts     GERD (gastroesophageal reflux disease)     Heart murmur     History of atrial paroxysmal tachycardia     History of cerebral artery occlusion     History of chest pain     HPV (human papilloma virus) infection 2012    Hyperlipidemia     SHAE (iron deficiency anemia)     Menopause     Ovarian cyst     Pancreatitis     Plantar fasciitis     Polyclonal hypergammaglobulinemia     Restless legs  syndrome (RLS)     Trigger finger      Past Surgical History:   Procedure Laterality Date    CHOLECYSTECTOMY      COLPOSCOPY      LA ESOPHAGOGASTRODUODENOSCOPY TRANSORAL DIAGNOSTIC N/A 10/19/2017    Procedure: EGD AND COLONOSCOPY;  Surgeon: Mekhi Dickey MD;  Location: [facility];  Service: Gastroenterology    TRIGGER FINGER RELEASE       Family History   Adopted: Yes   Problem Relation Age of Onset    Tuberculosis Mother     Breast cancer Neg Hx     Colon cancer Neg Hx     Ovarian cancer Neg Hx     Uterine cancer Neg Hx     Cervical cancer Neg Hx      Social History     Tobacco Use    Smoking status: Former     Current packs/day: 0.00     Average packs/day: 0.5 packs/day for 10.0 years (5.0 ttl pk-yrs)     Types: Cigarettes     Start date: 2008     Quit date: 2018     Years since quittin.8     Passive exposure: Never    Smokeless tobacco: Never    Tobacco comments:     ocassional   Vaping Use    Vaping status: Never Used   Substance Use Topics    Alcohol use: Yes     Comment: rarely    Drug use: Never       Current Outpatient Medications:     ACCU-CHEK FASTCLIX LANCETS MISC, by Does not apply route daily, Disp: , Rfl:     atorvastatin (LIPITOR) 20 mg tablet, Take 1 tablet (20 mg total) by mouth daily, Disp: 90 tablet, Rfl: 3    B Complex-C (SUPER B COMPLEX PO), Take 1 tablet by mouth daily, Disp: , Rfl:     BIOTIN PO, Take 1 capsule by mouth daily, Disp: , Rfl:     Blood Glucose Monitoring Suppl (ACCU-CHEK JESSEE CONNECT) w/Device KIT, by Does not apply route, Disp: , Rfl:     cholecalciferol (VITAMIN D3) 1,000 units tablet, Take 1,000 Units by mouth 2 (two) times a week , Disp: , Rfl:     clotrimazole-betamethasone (LOTRISONE) 1-0.05 % cream, APPLY TOPICALLY TWICE A DAY, Disp: 30 g, Rfl: 23    cyclobenzaprine (FLEXERIL) 10 mg tablet, if needed, Disp: , Rfl:     Empagliflozin (Jardiance) 10 MG TABS tablet, Take 1 tablet (10 mg total) by mouth every morning, Disp: 90 tablet, Rfl: 3    gabapentin  (NEURONTIN) 300 mg capsule, TAKE 1 CAPSULE THREE TIMES A DAY, Disp: 270 capsule, Rfl: 3    glucose blood test strip, by In Vitro route 4 (four) times a day, Disp: , Rfl:     Hyaluronic Acid-Vitamin C (HYALURONIC ACID PO), Take 100 mg by mouth daily, Disp: , Rfl:     ibuprofen (MOTRIN) 800 mg tablet, Take 800 mg by mouth as needed  , Disp: , Rfl:     lisinopril (ZESTRIL) 5 mg tablet, Take 0.5 tablets (2.5 mg total) by mouth daily, Disp: 90 tablet, Rfl: 3    LORazepam (ATIVAN) 0.5 mg tablet, TAKE 1 TABLET EVERY 12 HOURS AS NEEDED FOR ANXIETY, Disp: 30 tablet, Rfl: 0    metFORMIN (GLUCOPHAGE) 1000 MG tablet, Take 1 tablet (1,000 mg total) by mouth 2 (two) times a day, Disp: 180 tablet, Rfl: 3    Multiple Vitamin (DAILY VALUE MULTIVITAMIN) TABS, Take by mouth, Disp: , Rfl:     Omega-3 Fatty Acids (FISH OIL) 1,000 mg, Take 1,000 mg by mouth daily, Disp: , Rfl:     omeprazole (PriLOSEC) 20 mg delayed release capsule, Take 1 capsule (20 mg total) by mouth daily before breakfast, Disp: 90 capsule, Rfl: 3    Saw Palmetto 160 MG CAPS, Take 250 mg by mouth  , Disp: , Rfl:     semaglutide (Rybelsus) 14 MG tablet, Take 1 tablet (14 mg total) by mouth daily before breakfast, Disp: 90 tablet, Rfl: 3    Turmeric 500 MG CAPS, Take by mouth, Disp: , Rfl:     zinc gluconate 50 mg tablet, Take 50 mg by mouth daily, Disp: , Rfl:     clobetasol (TEMOVATE) 0.05 % cream, APPLY TOPICALLY TWICE A DAY, Disp: 30 g, Rfl: 23  Patient Active Problem List    Diagnosis Date Noted    KAREN 2-3, HPV 16+ 03/21/2022    Type 2 diabetes mellitus with stage 2 chronic kidney disease, without long-term current use of insulin  (HCC) 04/17/2020    Allergic reaction caused by a drug 03/20/2019    Chronic kidney disease, stage 2 (mild) 02/06/2019    TMJ arthritis 02/06/2019    Peroneal tendinitis, right 11/29/2017    Small bowel lesion 11/06/2017    Heart murmur 03/25/2016    Polyclonal hypergammaglobulinemia 11/07/2015    Esophageal reflux 08/10/2015    Iron  "deficiency anemia 2015    Restless leg syndrome 2015    Adjustment disorder with anxiety 2014    Hyperlipidemia 2014    Hypertension 2014       No Known Allergies    OB History    Para Term  AB Living   6 3 3   3 3   SAB IAB Ectopic Multiple Live Births   2 1     3      # Outcome Date GA Lbr Edson/2nd Weight Sex Delivery Anes PTL Lv   6 IAB            5 SAB            4 SAB            3 Term            2 Term            1 Term               Obstetric Comments   Menarche: 9 y/o      Has 2 grandsons, youngest son is 23 yo  Limited fam hx d/t being in foster care  Finishing up on grad school!      Vitals:    24 1504   BP: 116/82   BP Location: Left arm   Patient Position: Sitting   Cuff Size: Standard   Weight: 74.4 kg (164 lb)   Height: 5' 5\" (1.651 m)       Body mass index is 27.29 kg/m².    Review of Systems   Constitutional: Negative for chills, fatigue, fever and unexpected weight change. On semaglutide  Respiratory: Negative for shortness of breath.    Gastrointestinal: Negative for anal bleeding, blood in stool, constipation and diarrhea.   Genitourinary: Negative for difficulty urinating, dysuria and hematuria. +Frequent UTIs    Physical Exam   Constitutional: She appears well-developed and well-nourished. No distress.   HENT: atraumatic, EOMI  Head: Normocephalic.   Neck: Normal range of motion. Neck supple.   Pulmonary: Effort normal.  Breasts: bilateral without masses, skin changes or nipple discharge. Bilaterally soft and warm to touch. No areas of erythema or pain.  Abdominal: Soft.   Pelvic exam was performed with patient supine. No labial fusion. There is no rash, tenderness, lesion or injury on the right labia. There is no rash, tenderness, lesion or injury on the left labia. Urethral meatus does not show any tenderness, inflammation or discharge. Palpation of midline bladder without pain or discomfort. Uterus is not deviated, not enlarged, not fixed and " not tender. Cervix exhibits no motion tenderness, no discharge and no friability. STENOTIC OS. Right adnexum displays no mass, no tenderness and no fullness. Left adnexum displays no mass, no tenderness and no fullness. No erythema or tenderness in the vagina. No foreign body in the vagina. No signs of injury around the vagina or anus. Perineum without lesions, signs of injury, erythema or swelling. No vaginal discharge found.   Lymphadenopathy:        Right: No inguinal adenopathy present.        Left: No inguinal adenopathy present.

## 2024-05-02 LAB
BACTERIA UR CULT: NORMAL
HPV HR 12 DNA CVX QL NAA+PROBE: NEGATIVE
HPV16 DNA CVX QL NAA+PROBE: NEGATIVE
HPV18 DNA CVX QL NAA+PROBE: NEGATIVE

## 2024-05-09 LAB
LAB AP GYN PRIMARY INTERPRETATION: NORMAL
Lab: NORMAL

## 2024-05-14 ENCOUNTER — TELEMEDICINE (OUTPATIENT)
Dept: BEHAVIORAL/MENTAL HEALTH CLINIC | Facility: CLINIC | Age: 59
End: 2024-05-14
Payer: COMMERCIAL

## 2024-05-14 DIAGNOSIS — F41.9 ANXIETY DISORDER, UNSPECIFIED TYPE: Primary | ICD-10-CM

## 2024-05-14 PROCEDURE — 90834 PSYTX W PT 45 MINUTES: CPT | Performed by: SOCIAL WORKER

## 2024-05-20 NOTE — PSYCH
"    Visit start and stop times:    05/14/24  Start Time: 1615  Stop Time: 1700  Total Visit Time: 45 minutes  Virtual Regular Visit  Therapist met w/pt for individual session.  She identified that school/work is almost done and she is ready for a \"break.\"  She identified feeling stressed and overwhelmed.  However, she still really enjoys her job and what she does.  She identified having a hard time \"turning it off\" when she gets home so wants to focus on more self care over the summer although she will be busy w/taking a class.  She went to the [facility] to see family/friends and her son was very sick so she brought him to the ER.  Therapist and pt discussed how that triggered a lot of her grief w/her daughter.  Therapist and pt continued to process pt's grief and ongoing strategies to deal w/triggers as they arise.      Verification of patient location:    Patient is located at Home in the following state in which I hold an active license [place]      Assessment/Plan:    Problem List Items Addressed This Visit    None  Visit Diagnoses       Anxiety disorder, unspecified type    -  Primary                     Reason for visit is No chief complaint on file.       Encounter provider Willow Perdomo      Recent Visits  Date Type Provider Dept   05/14/24 Telemedicine Willow Perdomo [facility] [address]   Showing recent visits within past 7 days and meeting all other requirements  Future Appointments  No visits were found meeting these conditions.  Showing future appointments within next 150 days and meeting all other requirements       The patient was identified by name and date of birth. Terri CHRISTOPHER Louis was informed that this is a telemedicine visit and that the visit is being conducted throughthe Epic Embedded platform. She agrees to proceed..  My office door was closed. No one else was in the room.  She acknowledged consent and understanding of privacy and security of the video platform. The patient has " agreed to participate and understands they can discontinue the visit at any time.    Patient is aware this is a billable service.     Subjective  Terri Louis is a 59 y.o. female  .      HPI     Past Medical History:   Diagnosis Date    Adjustment disorder with anxiety     Anemia     Anxiety     Atypical glandular cells of undetermined significance (ARIA) on cervical Pap smear 09/18/2019    Chronic kidney disease     Constipation     COVID-19 02/07/2024    Dense breasts     GERD (gastroesophageal reflux disease)     Heart murmur     History of atrial paroxysmal tachycardia     History of cerebral artery occlusion     History of chest pain     HPV (human papilloma virus) infection 2012    Hyperlipidemia     SHAE (iron deficiency anemia)     Menopause     Ovarian cyst     Pancreatitis     Plantar fasciitis     Polyclonal hypergammaglobulinemia     Restless legs syndrome (RLS)     Trigger finger        Past Surgical History:   Procedure Laterality Date    CHOLECYSTECTOMY      COLPOSCOPY  2013    [place] ESOPHAGOGASTRODUODENOSCOPY TRANSORAL DIAGNOSTIC N/A 10/19/2017    Procedure: EGD AND COLONOSCOPY;  Surgeon: Mekhi Dickey MD;  Location: [facility];  Service: Gastroenterology    TRIGGER FINGER RELEASE         Current Outpatient Medications   Medication Sig Dispense Refill    ACCU-CHEK FASTCLIX LANCETS MISC by Does not apply route daily      atorvastatin (LIPITOR) 20 mg tablet Take 1 tablet (20 mg total) by mouth daily 90 tablet 3    B Complex-C (SUPER B COMPLEX PO) Take 1 tablet by mouth daily      BIOTIN PO Take 1 capsule by mouth daily      Blood Glucose Monitoring Suppl (ACCU-CHEK JESSEE CONNECT) w/Device KIT by Does not apply route      cholecalciferol (VITAMIN D3) 1,000 units tablet Take 1,000 Units by mouth 2 (two) times a week       clobetasol (TEMOVATE) 0.05 % cream APPLY TOPICALLY TWICE A DAY 30 g 23    clotrimazole-betamethasone (LOTRISONE) 1-0.05 % cream APPLY TOPICALLY TWICE A DAY 30 g 23     cyclobenzaprine (FLEXERIL) 10 mg tablet if needed      Empagliflozin (Jardiance) 10 MG TABS tablet Take 1 tablet (10 mg total) by mouth every morning 90 tablet 3    gabapentin (NEURONTIN) 300 mg capsule TAKE 1 CAPSULE THREE TIMES A  capsule 3    glucose blood test strip by In Vitro route 4 (four) times a day      Hyaluronic Acid-Vitamin C (HYALURONIC ACID PO) Take 100 mg by mouth daily      ibuprofen (MOTRIN) 800 mg tablet Take 800 mg by mouth as needed        lisinopril (ZESTRIL) 5 mg tablet Take 0.5 tablets (2.5 mg total) by mouth daily 90 tablet 3    LORazepam (ATIVAN) 0.5 mg tablet TAKE 1 TABLET EVERY 12 HOURS AS NEEDED FOR ANXIETY 30 tablet 0    metFORMIN (GLUCOPHAGE) 1000 MG tablet Take 1 tablet (1,000 mg total) by mouth 2 (two) times a day 180 tablet 3    Multiple Vitamin (DAILY VALUE MULTIVITAMIN) TABS Take by mouth      Omega-3 Fatty Acids (FISH OIL) 1,000 mg Take 1,000 mg by mouth daily      omeprazole (PriLOSEC) 20 mg delayed release capsule Take 1 capsule (20 mg total) by mouth daily before breakfast 90 capsule 3    Saw Palmetto 160 MG CAPS Take 250 mg by mouth        semaglutide (Rybelsus) 14 MG tablet Take 1 tablet (14 mg total) by mouth daily before breakfast 90 tablet 3    Turmeric 500 MG CAPS Take by mouth      zinc gluconate 50 mg tablet Take 50 mg by mouth daily       No current facility-administered medications for this visit.        No Known Allergies    Review of Systems    Video Exam    There were no vitals filed for this visit.    Physical Exam Pt denied any SI/HI/AH/VH

## 2024-06-07 ENCOUNTER — HOSPITAL ENCOUNTER (OUTPATIENT)
Age: 59
Discharge: HOME | End: 2024-06-07
Payer: COMMERCIAL

## 2024-06-07 ENCOUNTER — TELEMEDICINE (OUTPATIENT)
Dept: BEHAVIORAL/MENTAL HEALTH CLINIC | Facility: CLINIC | Age: 59
End: 2024-06-07
Payer: COMMERCIAL

## 2024-06-07 VITALS — HEIGHT: 65 IN | WEIGHT: 164 LBS | BODY MASS INDEX: 27.32 KG/M2

## 2024-06-07 DIAGNOSIS — Z12.31 BREAST CANCER SCREENING BY MAMMOGRAM: ICD-10-CM

## 2024-06-07 DIAGNOSIS — F41.1 GENERALIZED ANXIETY DISORDER: Primary | ICD-10-CM

## 2024-06-07 PROCEDURE — 77067 SCR MAMMO BI INCL CAD: CPT

## 2024-06-07 PROCEDURE — 77063 BREAST TOMOSYNTHESIS BI: CPT

## 2024-06-07 PROCEDURE — 90834 PSYTX W PT 45 MINUTES: CPT | Performed by: SOCIAL WORKER

## 2024-06-10 DIAGNOSIS — E11.65 UNCONTROLLED TYPE 2 DIABETES MELLITUS WITH HYPERGLYCEMIA (HCC): ICD-10-CM

## 2024-06-10 DIAGNOSIS — F41.9 ANXIETY: ICD-10-CM

## 2024-06-10 RX ORDER — LORAZEPAM 0.5 MG/1
0.5 TABLET ORAL EVERY 12 HOURS PRN
Qty: 30 TABLET | Refills: 0 | Status: SHIPPED | OUTPATIENT
Start: 2024-06-10

## 2024-06-10 NOTE — RESULT ENCOUNTER NOTE
Negative mammogram, follow up with routine screening in 1 year.     Sara Russo DO  [facility]  6/10/2024 3:22 PM

## 2024-06-10 NOTE — PSYCH
Visit start and stop times:    06/7/24  Start Time: 1300  Stop Time: 1345  Total Visit Time: 45 minutes  Virtual Regular Visit  Therapist met w/pt for individual session.  Pt shared that she is done w/the school year but has a busy summer w/summer school and classes she is taking.  She also expressed going on a vacation to [place] in July to see her brother which she is excited about.  She expressed a lot of different feelings due to it being the 10 year anniversary of her daughter's death.  Therapist and pt processed this as well as other ongoing stressors/worries.      Verification of patient location:    Patient is located at Home in the following state in which I hold an active license [place]      Assessment/Plan:    Problem List Items Addressed This Visit    None  Visit Diagnoses       Generalized anxiety disorder    -  Primary                   Reason for visit is No chief complaint on file.       Encounter provider Willow Perdomo      Recent Visits  Date Type Provider Dept   06/07/24 Telemedicine Willow Perdomo [facility] [address]   Showing recent visits within past 7 days and meeting all other requirements  Future Appointments  No visits were found meeting these conditions.  Showing future appointments within next 150 days and meeting all other requirements       The patient was identified by name and date of birth. Terri Louis was informed that this is a telemedicine visit and that the visit is being conducted throughthe Epic Embedded platform. She agrees to proceed..  My office door was closed. No one else was in the room.  She acknowledged consent and understanding of privacy and security of the video platform. The patient has agreed to participate and understands they can discontinue the visit at any time.    Patient is aware this is a billable service.     Subjective  Terri Louis is a 59 y.o. female  .      HPI     Past Medical History:   Diagnosis Date    Adjustment  disorder with anxiety     Anemia     Anxiety     Atypical glandular cells of undetermined significance (ARIA) on cervical Pap smear 09/18/2019    Chronic kidney disease     Constipation     COVID-19 02/07/2024    Dense breasts     GERD (gastroesophageal reflux disease)     Heart murmur     History of atrial paroxysmal tachycardia     History of cerebral artery occlusion     History of chest pain     HPV (human papilloma virus) infection 2012    Hyperlipidemia     SHAE (iron deficiency anemia)     Menopause     Ovarian cyst     Pancreatitis     Plantar fasciitis     Polyclonal hypergammaglobulinemia     Restless legs syndrome (RLS)     Trigger finger        Past Surgical History:   Procedure Laterality Date    CHOLECYSTECTOMY      COLPOSCOPY  2013    [place] ESOPHAGOGASTRODUODENOSCOPY TRANSORAL DIAGNOSTIC N/A 10/19/2017    Procedure: EGD AND COLONOSCOPY;  Surgeon: Mekhi Dickey MD;  Location: [facility];  Service: Gastroenterology    TRIGGER FINGER RELEASE         Current Outpatient Medications   Medication Sig Dispense Refill    ACCU-CHEK FASTCLIX LANCETS MISC by Does not apply route daily      atorvastatin (LIPITOR) 20 mg tablet Take 1 tablet (20 mg total) by mouth daily 90 tablet 3    B Complex-C (SUPER B COMPLEX PO) Take 1 tablet by mouth daily      BIOTIN PO Take 1 capsule by mouth daily      Blood Glucose Monitoring Suppl (ACCU-CHEK JESSEE CONNECT) w/Device KIT by Does not apply route      cholecalciferol (VITAMIN D3) 1,000 units tablet Take 1,000 Units by mouth 2 (two) times a week       clobetasol (TEMOVATE) 0.05 % cream APPLY TOPICALLY TWICE A DAY 30 g 23    clotrimazole-betamethasone (LOTRISONE) 1-0.05 % cream APPLY TOPICALLY TWICE A DAY 30 g 23    cyclobenzaprine (FLEXERIL) 10 mg tablet if needed      Empagliflozin (Jardiance) 10 MG TABS tablet Take 1 tablet (10 mg total) by mouth every morning 90 tablet 3    gabapentin (NEURONTIN) 300 mg capsule TAKE 1 CAPSULE THREE TIMES A  capsule 3    glucose blood  test strip by In Vitro route 4 (four) times a day      Hyaluronic Acid-Vitamin C (HYALURONIC ACID PO) Take 100 mg by mouth daily      ibuprofen (MOTRIN) 800 mg tablet Take 800 mg by mouth as needed        lisinopril (ZESTRIL) 5 mg tablet Take 0.5 tablets (2.5 mg total) by mouth daily 90 tablet 3    LORazepam (ATIVAN) 0.5 mg tablet TAKE 1 TABLET EVERY 12 HOURS AS NEEDED FOR ANXIETY 30 tablet 0    metFORMIN (GLUCOPHAGE) 1000 MG tablet Take 1 tablet (1,000 mg total) by mouth 2 (two) times a day 180 tablet 3    Multiple Vitamin (DAILY VALUE MULTIVITAMIN) TABS Take by mouth      Omega-3 Fatty Acids (FISH OIL) 1,000 mg Take 1,000 mg by mouth daily      omeprazole (PriLOSEC) 20 mg delayed release capsule Take 1 capsule (20 mg total) by mouth daily before breakfast 90 capsule 3    Saw Palmetto 160 MG CAPS Take 250 mg by mouth        semaglutide (Rybelsus) 14 MG tablet Take 1 tablet (14 mg total) by mouth daily before breakfast 90 tablet 3    Turmeric 500 MG CAPS Take by mouth      zinc gluconate 50 mg tablet Take 50 mg by mouth daily       No current facility-administered medications for this visit.        No Known Allergies    Review of Systems    Video Exam    There were no vitals filed for this visit.    Physical Exam Pt denied any SI/HI/AH/VH

## 2024-06-10 NOTE — BH TREATMENT PLAN
"Outpatient Behavioral Health Psychotherapy Treatment Plan    Terri Louis  1965     Date of Initial Psychotherapy Assessment: 6/7/2024  Date of Current Treatment Plan: 06/7/24  Treatment Plan Target Date: 12/7/2024  Treatment Plan Expiration Date: TBD    Diagnosis:   1. Generalized anxiety disorder            Area(s) of Need: coping skills    Long Term Goal 1 (in the client's own words): \"I want to better manage my stress.\"    Stage of Change: Preparation    Target Date for completion: TBD     Anticipated therapeutic modalities: CBT, DBT, solution focused     People identified to complete this goal: Pt      Objective 1: (identify the means of measuring success in meeting the objective): Pt will assert herself at least 1x/day      Objective 2: (identify the means of measuring success in meeting the objective): Pt will identify and replace distorted cognitive messages associated with feelings of job stress and share at least 1x/month in session.      I am currently under the care of a [facility] psychiatric provider: no    My [facility] psychiatric provider is: n/a    I am currently taking psychiatric medications: No    I feel that I will be ready for discharge from mental health care when I reach the following (measurable goal/objective): \"I am not sure.\"    For children and adults who have a legal guardian:   Has there been any change to custody orders and/or guardianship status? NA. If yes, attach updated documentation.    I have created my Crisis Plan and have been offered a copy of this plan    Behavioral Health Treatment Plan  Luke: Diagnosis and Treatment Plan explained to Terri Louis acknowledges an understanding of their diagnosis. Terri Louis agrees to this treatment plan.    I have been offered a copy of this Treatment Plan. Yes    Terri Louis, 1965, actively participated in the creation of this treatment plan during a virtual session, using the Epic " Embedded platform.   Terri Louis  provided verbal consent on 6/7/2024 at 1:30 PM. The treatment plan was transcribed into the Electronic Health Record at a later time.

## 2024-07-09 ENCOUNTER — TELEMEDICINE (OUTPATIENT)
Dept: BEHAVIORAL/MENTAL HEALTH CLINIC | Facility: CLINIC | Age: 59
End: 2024-07-09
Payer: COMMERCIAL

## 2024-07-09 DIAGNOSIS — F41.9 ANXIETY DISORDER, UNSPECIFIED TYPE: Primary | ICD-10-CM

## 2024-07-09 PROCEDURE — 90832 PSYTX W PT 30 MINUTES: CPT | Performed by: SOCIAL WORKER

## 2024-07-10 NOTE — PSYCH
Behavioral Health Psychotherapy Progress Note    Psychotherapy Provided: Individual Psychotherapy     1. Anxiety disorder, unspecified type              Visit start and stop times:    07/10/24  Start Time: 1600  Stop Time: 1634  Total Visit Time: 34 minutes  Virtual Regular Visit    Verification of patient location:    Patient is located at Home in the following state in which I hold an active license [place]      Assessment/Plan:    Problem List Items Addressed This Visit    None  Visit Diagnoses       Anxiety disorder, unspecified type    -  Primary                     Reason for visit is No chief complaint on file.       Encounter provider Willow Perdomo      Recent Visits  Date Type Provider Dept   07/09/24 Telemedicine Willow Perdomo [facility] [address]   Showing recent visits within past 7 days and meeting all other requirements  Future Appointments  No visits were found meeting these conditions.  Showing future appointments within next 150 days and meeting all other requirements       The patient was identified by name and date of birth. Terri Louis was informed that this is a telemedicine visit and that the visit is being conducted throughthe Epic Embedded platform. She agrees to proceed..  My office door was closed. No one else was in the room.  She acknowledged consent and understanding of privacy and security of the video platform. The patient has agreed to participate and understands they can discontinue the visit at any time.    Patient is aware this is a billable service.     Subjective  Terri Louis is a 59 y.o. female  .      HPI     Past Medical History:   Diagnosis Date    Adjustment disorder with anxiety     Anemia     Anxiety     Atypical glandular cells of undetermined significance (ARIA) on cervical Pap smear 09/18/2019    Chronic kidney disease     Constipation     COVID-19 02/07/2024    Dense breasts     GERD (gastroesophageal reflux disease)     Heart murmur      History of atrial paroxysmal tachycardia     History of cerebral artery occlusion     History of chest pain     HPV (human papilloma virus) infection 2012    Hyperlipidemia     SHAE (iron deficiency anemia)     Menopause     Ovarian cyst     Pancreatitis     Plantar fasciitis     Polyclonal hypergammaglobulinemia     Restless legs syndrome (RLS)     Trigger finger        Past Surgical History:   Procedure Laterality Date    CHOLECYSTECTOMY      COLPOSCOPY  2013    [place] ESOPHAGOGASTRODUODENOSCOPY TRANSORAL DIAGNOSTIC N/A 10/19/2017    Procedure: EGD AND COLONOSCOPY;  Surgeon: Mekhi Dickey MD;  Location: [facility];  Service: Gastroenterology    TRIGGER FINGER RELEASE         Current Outpatient Medications   Medication Sig Dispense Refill    ACCU-CHEK FASTCLIX LANCETS MISC by Does not apply route daily      atorvastatin (LIPITOR) 20 mg tablet Take 1 tablet (20 mg total) by mouth daily 90 tablet 3    B Complex-C (SUPER B COMPLEX PO) Take 1 tablet by mouth daily      BIOTIN PO Take 1 capsule by mouth daily      Blood Glucose Monitoring Suppl (ACCU-CHEK JESSEE CONNECT) w/Device KIT by Does not apply route      cholecalciferol (VITAMIN D3) 1,000 units tablet Take 1,000 Units by mouth 2 (two) times a week       clobetasol (TEMOVATE) 0.05 % cream APPLY TOPICALLY TWICE A DAY 30 g 23    clotrimazole-betamethasone (LOTRISONE) 1-0.05 % cream APPLY TOPICALLY TWICE A DAY 30 g 23    cyclobenzaprine (FLEXERIL) 10 mg tablet if needed      Empagliflozin (Jardiance) 10 MG TABS tablet Take 1 tablet (10 mg total) by mouth every morning 90 tablet 1    gabapentin (NEURONTIN) 300 mg capsule TAKE 1 CAPSULE THREE TIMES A  capsule 3    glucose blood test strip by In Vitro route 4 (four) times a day      Hyaluronic Acid-Vitamin C (HYALURONIC ACID PO) Take 100 mg by mouth daily      ibuprofen (MOTRIN) 800 mg tablet Take 800 mg by mouth as needed        lisinopril (ZESTRIL) 5 mg tablet Take 0.5 tablets (2.5 mg total) by mouth daily 90  tablet 3    LORazepam (ATIVAN) 0.5 mg tablet Take 1 tablet (0.5 mg total) by mouth every 12 (twelve) hours as needed for anxiety 30 tablet 0    metFORMIN (GLUCOPHAGE) 1000 MG tablet Take 1 tablet (1,000 mg total) by mouth 2 (two) times a day 180 tablet 3    Multiple Vitamin (DAILY VALUE MULTIVITAMIN) TABS Take by mouth      Omega-3 Fatty Acids (FISH OIL) 1,000 mg Take 1,000 mg by mouth daily      omeprazole (PriLOSEC) 20 mg delayed release capsule Take 1 capsule (20 mg total) by mouth daily before breakfast 90 capsule 3    Saw Palmetto 160 MG CAPS Take 250 mg by mouth        semaglutide (Rybelsus) 14 MG tablet Take 1 tablet (14 mg total) by mouth daily before breakfast 90 tablet 3    Turmeric 500 MG CAPS Take by mouth      zinc gluconate 50 mg tablet Take 50 mg by mouth daily       No current facility-administered medications for this visit.        No Known Allergies    Review of Systems    Video Exam    There were no vitals filed for this visit.    Physical Exam Pt denied any SI/HI/AH/Vh

## 2024-08-13 ENCOUNTER — TELEMEDICINE (OUTPATIENT)
Dept: BEHAVIORAL/MENTAL HEALTH CLINIC | Facility: CLINIC | Age: 59
End: 2024-08-13
Payer: COMMERCIAL

## 2024-08-13 DIAGNOSIS — F41.9 ANXIETY DISORDER, UNSPECIFIED TYPE: Primary | ICD-10-CM

## 2024-08-13 PROCEDURE — 90834 PSYTX W PT 45 MINUTES: CPT | Performed by: SOCIAL WORKER

## 2024-08-14 NOTE — PSYCH
"Behavioral Health Psychotherapy Progress Note    Psychotherapy Provided: Individual Psychotherapy     1. Anxiety disorder, unspecified type              Visit start and stop times:    08/14/24  Start Time: 1100  Stop Time: 1145  Total Visit Time: 45 minutes  Virtual Regular Visit  Therapist met w/pt for individual session. Symptoms include: Pt stated that she is preparing for going back to work and has an orientation day this week which occurs every year.  She identified finding out who is going to be on her \"team\" and she is looking forward to working with those individuals.  It has almost been a year since the incident pt had at work so therapist and pt discussed this further.  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  Therapist and pt discussed ongoing strategies to help her manage her anxiety symptoms.    Verification of patient location:    Patient is located at Home in the following state in which I hold an active license [place]      Assessment/Plan: f/u in one month    Problem List Items Addressed This Visit    None  Visit Diagnoses       Anxiety disorder, unspecified type    -  Primary                     Reason for visit is No chief complaint on file.       Encounter provider Willow Perdomo      Recent Visits  Date Type Provider Dept   08/13/24 Telemedicine Willow Perdomo  [facility] [address]   Showing recent visits within past 7 days and meeting all other requirements  Future Appointments  No visits were found meeting these conditions.  Showing future appointments within next 150 days and meeting all other requirements       The patient was identified by name and date of birth. Terri CHRISTOPHER Louis was informed that this is a telemedicine visit and that the visit is being conducted throughthe Epic Embedded platform. She agrees to proceed..  My office door was closed. No one else was in the room.  She acknowledged consent and " understanding of privacy and security of the video platform. The patient has agreed to participate and understands they can discontinue the visit at any time.    Patient is aware this is a billable service.     Subjective  Terri Louis is a 59 y.o. female  .      HPI     Past Medical History:   Diagnosis Date    Adjustment disorder with anxiety     Anemia     Anxiety     Atypical glandular cells of undetermined significance (ARIA) on cervical Pap smear 09/18/2019    Chronic kidney disease     Constipation     COVID-19 02/07/2024    Dense breasts     GERD (gastroesophageal reflux disease)     Heart murmur     History of atrial paroxysmal tachycardia     History of cerebral artery occlusion     History of chest pain     HPV (human papilloma virus) infection 2012    Hyperlipidemia     SHAE (iron deficiency anemia)     Menopause     Ovarian cyst     Pancreatitis     Plantar fasciitis     Polyclonal hypergammaglobulinemia     Restless legs syndrome (RLS)     Trigger finger        Past Surgical History:   Procedure Laterality Date    CHOLECYSTECTOMY      COLPOSCOPY  2013    [place] ESOPHAGOGASTRODUODENOSCOPY TRANSORAL DIAGNOSTIC N/A 10/19/2017    Procedure: EGD AND COLONOSCOPY;  Surgeon: Mekhi Dickey MD;  Location: [facility];  Service: Gastroenterology    TRIGGER FINGER RELEASE         Current Outpatient Medications   Medication Sig Dispense Refill    ACCU-CHEK FASTCLIX LANCETS MISC by Does not apply route daily      atorvastatin (LIPITOR) 20 mg tablet Take 1 tablet (20 mg total) by mouth daily 90 tablet 3    B Complex-C (SUPER B COMPLEX PO) Take 1 tablet by mouth daily      BIOTIN PO Take 1 capsule by mouth daily      Blood Glucose Monitoring Suppl (ACCU-CHEK JESSEE CONNECT) w/Device KIT by Does not apply route      cholecalciferol (VITAMIN D3) 1,000 units tablet Take 1,000 Units by mouth 2 (two) times a week       clobetasol (TEMOVATE) 0.05 % cream APPLY TOPICALLY TWICE A DAY 30 g 23    clotrimazole-betamethasone  (LOTRISONE) 1-0.05 % cream APPLY TOPICALLY TWICE A DAY 30 g 23    cyclobenzaprine (FLEXERIL) 10 mg tablet if needed      Empagliflozin (Jardiance) 10 MG TABS tablet Take 1 tablet (10 mg total) by mouth every morning 90 tablet 1    gabapentin (NEURONTIN) 300 mg capsule TAKE 1 CAPSULE THREE TIMES A  capsule 3    glucose blood test strip by In Vitro route 4 (four) times a day      Hyaluronic Acid-Vitamin C (HYALURONIC ACID PO) Take 100 mg by mouth daily      ibuprofen (MOTRIN) 800 mg tablet Take 800 mg by mouth as needed        lisinopril (ZESTRIL) 5 mg tablet Take 0.5 tablets (2.5 mg total) by mouth daily 90 tablet 3    LORazepam (ATIVAN) 0.5 mg tablet Take 1 tablet (0.5 mg total) by mouth every 12 (twelve) hours as needed for anxiety 30 tablet 0    metFORMIN (GLUCOPHAGE) 1000 MG tablet Take 1 tablet (1,000 mg total) by mouth 2 (two) times a day 180 tablet 3    Multiple Vitamin (DAILY VALUE MULTIVITAMIN) TABS Take by mouth      Omega-3 Fatty Acids (FISH OIL) 1,000 mg Take 1,000 mg by mouth daily      omeprazole (PriLOSEC) 20 mg delayed release capsule Take 1 capsule (20 mg total) by mouth daily before breakfast 90 capsule 3    Saw Palmetto 160 MG CAPS Take 250 mg by mouth        semaglutide (Rybelsus) 14 MG tablet Take 1 tablet (14 mg total) by mouth daily before breakfast 90 tablet 3    Turmeric 500 MG CAPS Take by mouth      zinc gluconate 50 mg tablet Take 50 mg by mouth daily       No current facility-administered medications for this visit.        No Known Allergies    Review of Systems    Video Exam    There were no vitals filed for this visit.    Physical Exam Pt denied any SI/HI/AH/VH

## 2024-09-29 DIAGNOSIS — F41.9 ANXIETY: ICD-10-CM

## 2024-09-29 DIAGNOSIS — E11.65 UNCONTROLLED TYPE 2 DIABETES MELLITUS WITH HYPERGLYCEMIA (HCC): ICD-10-CM

## 2024-09-29 DIAGNOSIS — I10 ESSENTIAL HYPERTENSION: ICD-10-CM

## 2024-09-29 DIAGNOSIS — B35.9 TINEA: ICD-10-CM

## 2024-09-29 DIAGNOSIS — E11.9 TYPE 2 DIABETES MELLITUS WITHOUT COMPLICATION, WITHOUT LONG-TERM CURRENT USE OF INSULIN (HCC): ICD-10-CM

## 2024-09-29 DIAGNOSIS — R12 HEARTBURN: ICD-10-CM

## 2024-09-29 DIAGNOSIS — E78.2 MIXED HYPERLIPIDEMIA: ICD-10-CM

## 2024-09-30 DIAGNOSIS — B35.9 TINEA: ICD-10-CM

## 2024-09-30 RX ORDER — CLOTRIMAZOLE AND BETAMETHASONE DIPROPIONATE 10; .64 MG/G; MG/G
CREAM TOPICAL
Qty: 30 G | Refills: 23 | Status: SHIPPED | OUTPATIENT
Start: 2024-09-30 | End: 2024-09-30 | Stop reason: SDUPTHER

## 2024-09-30 RX ORDER — ATORVASTATIN CALCIUM 20 MG/1
20 TABLET, FILM COATED ORAL DAILY
Qty: 90 TABLET | Refills: 0 | Status: SHIPPED | OUTPATIENT
Start: 2024-09-30

## 2024-09-30 RX ORDER — LISINOPRIL 5 MG/1
2.5 TABLET ORAL DAILY
Qty: 90 TABLET | Refills: 0 | Status: SHIPPED | OUTPATIENT
Start: 2024-09-30

## 2024-09-30 RX ORDER — LORAZEPAM 0.5 MG/1
0.5 TABLET ORAL EVERY 12 HOURS PRN
Qty: 30 TABLET | Refills: 0 | Status: SHIPPED | OUTPATIENT
Start: 2024-09-30

## 2024-09-30 NOTE — TELEPHONE ENCOUNTER
[facility] Palliative Medicine Focus Note    Patient: Haydee Samaniego Date: 5/15/2023   : 1961 Attending: Mendez Frederick MD   61 year old female PCP: Perry Pendleton MD   MRN: [id number]  Date of admission: 2023     Discussed with Dr. Frederick. MARTIN agreeable with hospice and has been accepted to [facility].  No need for Palliative Medicine consult at this time.       Montse Christy, MSN, APNP, ACNP-[facility] Palliative Medicine  [facility]   Secure Chat or Team pager: [phone] weekdays  For urgent issues after 1700 pm, weekends, or holidays please call the answering service at [phone]     Mychart request

## 2024-10-01 RX ORDER — CLOTRIMAZOLE AND BETAMETHASONE DIPROPIONATE 10; .64 MG/G; MG/G
1 CREAM TOPICAL 2 TIMES DAILY
Qty: 30 G | Refills: 23 | Status: SHIPPED | OUTPATIENT
Start: 2024-10-01

## 2024-10-01 RX ORDER — CLOBETASOL PROPIONATE 0.5 MG/G
1 CREAM TOPICAL 2 TIMES DAILY
Qty: 30 G | Refills: 23 | Status: SHIPPED | OUTPATIENT
Start: 2024-10-01

## 2024-10-14 ENCOUNTER — TELEPHONE (OUTPATIENT)
Age: 59
End: 2024-10-14

## 2024-10-21 ENCOUNTER — TELEPHONE (OUTPATIENT)
Dept: FAMILY MEDICINE CLINIC | Facility: CLINIC | Age: 59
End: 2024-10-21

## 2024-10-21 NOTE — TELEPHONE ENCOUNTER
----- Message from Sara Elder DO sent at 10/21/2024  7:28 AM EDT -----  Please call patient to schedule, overdue for Hgb A1c. Once appointment scheduled, labs can be ordered for completion prior to appointment.     Sara Elder DO  [facility]  10/21/2024 7:28 AM

## 2024-10-25 ENCOUNTER — TELEMEDICINE (OUTPATIENT)
Dept: BEHAVIORAL/MENTAL HEALTH CLINIC | Facility: CLINIC | Age: 59
End: 2024-10-25
Payer: COMMERCIAL

## 2024-10-25 DIAGNOSIS — F41.1 GENERALIZED ANXIETY DISORDER: Primary | ICD-10-CM

## 2024-10-25 PROCEDURE — 90832 PSYTX W PT 30 MINUTES: CPT | Performed by: SOCIAL WORKER

## 2024-10-25 NOTE — PSYCH
Behavioral Health Psychotherapy Progress Note    Psychotherapy Provided: Individual Psychotherapy     1. Generalized anxiety disorder              Visit start and stop times:    10/25/24  Start Time: 0700  Stop Time: 0730  Total Visit Time: 30 minutes  Virtual Regular Visit  Therapist met w/pt for individual session.  Pt apologized for missing last session.  She stated that she was consumed w/work and didn't realize what time it was.  She stated that things have continued to be busy and stressful but she is almost done w/her class and is expected to graduate in a few months.  Therapist and pt discussed pt struggling to make time for herself, however, last weekend took some time which she really enjoyed.  Therapist and pt discussed ongoing importance of self care and healthy work/life balance.    Verification of patient location:    Patient is located at Home in the following state in which I hold an active license [place]      Assessment/Plan: f/u in a month    Problem List Items Addressed This Visit    None  Visit Diagnoses       Generalized anxiety disorder    -  Primary                 Reason for visit is No chief complaint on file.       Encounter provider Willow Perdomo      Recent Visits  Date Type Provider Dept   10/21/24 Telephone Sara Elder DO [facility] [address] [facility]   Showing recent visits within past 7 days and meeting all other requirements  Today's Visits  Date Type Provider Dept   10/25/24 Telemedicine Willow Perdomo [facility] [address]   Showing today's visits and meeting all other requirements  Future Appointments  No visits were found meeting these conditions.  Showing future appointments within next 150 days and meeting all other requirements       The patient was identified by name and date of birth. Terri Louis was informed that this is a telemedicine visit and that the visit is being conducted throughthe Epic Embedded platform. She agrees to proceed..   My office door was closed. No one else was in the room.  She acknowledged consent and understanding of privacy and security of the video platform. The patient has agreed to participate and understands they can discontinue the visit at any time.    Patient is aware this is a billable service.     Subjective  Terri Louis is a 59 y.o. adult  .      HPI     Past Medical History:   Diagnosis Date    Adjustment disorder with anxiety     Anemia     Anxiety     Atypical glandular cells of undetermined significance (ARIA) on cervical Pap smear 09/18/2019    Chronic kidney disease     Constipation     COVID-19 02/07/2024    Dense breasts     GERD (gastroesophageal reflux disease)     Heart murmur     History of atrial paroxysmal tachycardia     History of cerebral artery occlusion     History of chest pain     HPV (human papilloma virus) infection 2012    Hyperlipidemia     SHAE (iron deficiency anemia)     Menopause     Ovarian cyst     Pancreatitis     Plantar fasciitis     Polyclonal hypergammaglobulinemia     Restless legs syndrome (RLS)     Trigger finger        Past Surgical History:   Procedure Laterality Date    CHOLECYSTECTOMY      COLPOSCOPY  2013    [place] ESOPHAGOGASTRODUODENOSCOPY TRANSORAL DIAGNOSTIC N/A 10/19/2017    Procedure: EGD AND COLONOSCOPY;  Surgeon: Mekhi Dickey MD;  Location: [facility];  Service: Gastroenterology    TRIGGER FINGER RELEASE         Current Outpatient Medications   Medication Sig Dispense Refill    ACCU-CHEK FASTCLIX LANCETS MISC by Does not apply route daily      atorvastatin (LIPITOR) 20 mg tablet Take 1 tablet (20 mg total) by mouth daily 90 tablet 0    B Complex-C (SUPER B COMPLEX PO) Take 1 tablet by mouth daily      BIOTIN PO Take 1 capsule by mouth daily      Blood Glucose Monitoring Suppl (ACCU-CHEK JESSEE CONNECT) w/Device KIT by Does not apply route      cholecalciferol (VITAMIN D3) 1,000 units tablet Take 1,000 Units by mouth 2 (two) times a week       clobetasol  (TEMOVATE) 0.05 % cream Apply 5 g (1 Application total) topically 2 (two) times a day 30 g 23    clotrimazole-betamethasone (LOTRISONE) 1-0.05 % cream Apply 1 Application topically 2 (two) times a day 30 g 23    cyclobenzaprine (FLEXERIL) 10 mg tablet if needed      Empagliflozin (Jardiance) 10 MG TABS tablet Take 1 tablet (10 mg total) by mouth every morning 90 tablet 0    gabapentin (NEURONTIN) 300 mg capsule Take 1 capsule (300 mg total) by mouth 3 (three) times a day 270 capsule 0    glucose blood test strip by In Vitro route 4 (four) times a day      Hyaluronic Acid-Vitamin C (HYALURONIC ACID PO) Take 100 mg by mouth daily      ibuprofen (MOTRIN) 800 mg tablet Take 800 mg by mouth as needed        lisinopril (ZESTRIL) 5 mg tablet Take 0.5 tablets (2.5 mg total) by mouth daily 90 tablet 0    LORazepam (ATIVAN) 0.5 mg tablet TAKE 1 TABLET EVERY 12 HOURS AS NEEDED FOR ANXIETY 30 tablet 0    metFORMIN (GLUCOPHAGE) 1000 MG tablet Take 1 tablet (1,000 mg total) by mouth 2 (two) times a day 180 tablet 0    Multiple Vitamin (DAILY VALUE MULTIVITAMIN) TABS Take by mouth      Omega-3 Fatty Acids (FISH OIL) 1,000 mg Take 1,000 mg by mouth daily      omeprazole (PriLOSEC) 20 mg delayed release capsule Take 1 capsule (20 mg total) by mouth daily before breakfast 90 capsule 0    Saw [place] 160 MG CAPS Take 250 mg by mouth        semaglutide (Rybelsus) 14 MG tablet Take 1 tablet (14 mg total) by mouth daily before breakfast 90 tablet 0    Turmeric 500 MG CAPS Take by mouth      zinc gluconate 50 mg tablet Take 50 mg by mouth daily       No current facility-administered medications for this visit.        No Known Allergies    Review of Systems    Video Exam    There were no vitals filed for this visit.    Physical Exam Pt denied any SI/Hi/Ah/VH

## 2024-11-15 ENCOUNTER — TELEMEDICINE (OUTPATIENT)
Dept: BEHAVIORAL/MENTAL HEALTH CLINIC | Facility: CLINIC | Age: 59
End: 2024-11-15
Payer: COMMERCIAL

## 2024-11-15 DIAGNOSIS — F41.1 GENERALIZED ANXIETY DISORDER: Primary | ICD-10-CM

## 2024-11-15 PROCEDURE — 90834 PSYTX W PT 45 MINUTES: CPT | Performed by: SOCIAL WORKER

## 2024-11-20 NOTE — PSYCH
Behavioral Health Psychotherapy Progress Note    Psychotherapy Provided: Individual Psychotherapy     1. Generalized anxiety disorder              Visit start and stop times:    24  Start Time: 1500  Stop Time: 1545  Total Visit Time: 45 minutes  Virtual Regular Visit  Therapist met w/pt for individual session.  Symptoms include: sadness, racing thoughts, grief, worry. She stated that her daughter's friend from years ago passed away.  She stated that it brought up a lot of feelings for pt and pt can't stop worrying about the mother b/c pt has experienced losing a child and knows how difficult it is.  Therapist and pt discussed that she found out while at school and needed to step out and ground herself so she could finish out with the day.  Pt stated that once she knows when the /wake is she will attend.  Therapist and pt discussed ongoing things pt can do to help her manage the stress/anxiety.    Verification of patient location:    Patient is located at Home in the following state in which I hold an active license [place]      Assessment/Plan: f/u in 2-3 weeks    Problem List Items Addressed This Visit    None  Visit Diagnoses         Generalized anxiety disorder    -  Primary                   Reason for visit is No chief complaint on file.       Encounter provider Willow Perdomo      Recent Visits  Date Type Provider Dept   11/15/24 Telemedicine Willow Perdomo [facility] [address]   Showing recent visits within past 7 days and meeting all other requirements  Future Appointments  No visits were found meeting these conditions.  Showing future appointments within next 150 days and meeting all other requirements       The patient was identified by name and date of birth. Terri CHRISTOPHER Louis was informed that this is a telemedicine visit and that the visit is being conducted throughthe Epic Embedded platform. She agrees to proceed..  My office door was closed. No one else was in the room.   She acknowledged consent and understanding of privacy and security of the video platform. The patient has agreed to participate and understands they can discontinue the visit at any time.    Patient is aware this is a billable service.     Subjective  Terri Louis is a 59 y.o. adult  .      HPI     Past Medical History:   Diagnosis Date    Adjustment disorder with anxiety     Anemia     Anxiety     Atypical glandular cells of undetermined significance (ARIA) on cervical Pap smear 09/18/2019    Chronic kidney disease     Constipation     COVID-19 02/07/2024    Dense breasts     GERD (gastroesophageal reflux disease)     Heart murmur     History of atrial paroxysmal tachycardia     History of cerebral artery occlusion     History of chest pain     HPV (human papilloma virus) infection 2012    Hyperlipidemia     SHAE (iron deficiency anemia)     Menopause     Ovarian cyst     Pancreatitis     Plantar fasciitis     Polyclonal hypergammaglobulinemia     Restless legs syndrome (RLS)     Trigger finger        Past Surgical History:   Procedure Laterality Date    CHOLECYSTECTOMY      COLPOSCOPY  2013    [place] ESOPHAGOGASTRODUODENOSCOPY TRANSORAL DIAGNOSTIC N/A 10/19/2017    Procedure: EGD AND COLONOSCOPY;  Surgeon: Mekhi Dickey MD;  Location: [facility];  Service: Gastroenterology    TRIGGER FINGER RELEASE         Current Outpatient Medications   Medication Sig Dispense Refill    ACCU-CHEK FASTCLIX LANCETS MISC by Does not apply route daily      atorvastatin (LIPITOR) 20 mg tablet Take 1 tablet (20 mg total) by mouth daily 90 tablet 0    B Complex-C (SUPER B COMPLEX PO) Take 1 tablet by mouth daily      BIOTIN PO Take 1 capsule by mouth daily      Blood Glucose Monitoring Suppl (ACCU-CHEK JESSEE CONNECT) w/Device KIT by Does not apply route      cholecalciferol (VITAMIN D3) 1,000 units tablet Take 1,000 Units by mouth 2 (two) times a week       clobetasol (TEMOVATE) 0.05 % cream Apply 5 g (1 Application total)  topically 2 (two) times a day 30 g 23    clotrimazole-betamethasone (LOTRISONE) 1-0.05 % cream Apply 1 Application topically 2 (two) times a day 30 g 23    cyclobenzaprine (FLEXERIL) 10 mg tablet if needed      Empagliflozin (Jardiance) 10 MG TABS tablet Take 1 tablet (10 mg total) by mouth every morning 90 tablet 0    gabapentin (NEURONTIN) 300 mg capsule Take 1 capsule (300 mg total) by mouth 3 (three) times a day 270 capsule 0    glucose blood test strip by In Vitro route 4 (four) times a day      Hyaluronic Acid-Vitamin C (HYALURONIC ACID PO) Take 100 mg by mouth daily      ibuprofen (MOTRIN) 800 mg tablet Take 800 mg by mouth as needed        lisinopril (ZESTRIL) 5 mg tablet Take 0.5 tablets (2.5 mg total) by mouth daily 90 tablet 0    LORazepam (ATIVAN) 0.5 mg tablet TAKE 1 TABLET EVERY 12 HOURS AS NEEDED FOR ANXIETY 30 tablet 0    metFORMIN (GLUCOPHAGE) 1000 MG tablet Take 1 tablet (1,000 mg total) by mouth 2 (two) times a day 180 tablet 0    Multiple Vitamin (DAILY VALUE MULTIVITAMIN) TABS Take by mouth      Omega-3 Fatty Acids (FISH OIL) 1,000 mg Take 1,000 mg by mouth daily      omeprazole (PriLOSEC) 20 mg delayed release capsule Take 1 capsule (20 mg total) by mouth daily before breakfast 90 capsule 0    Saw [place] 160 MG CAPS Take 250 mg by mouth        semaglutide (Rybelsus) 14 MG tablet Take 1 tablet (14 mg total) by mouth daily before breakfast 90 tablet 0    Turmeric 500 MG CAPS Take by mouth      zinc gluconate 50 mg tablet Take 50 mg by mouth daily       No current facility-administered medications for this visit.        No Known Allergies    Review of Systems    Video Exam    There were no vitals filed for this visit.    Physical Exam Pt denied any SI/HI/Ah/VH

## 2024-11-26 ENCOUNTER — TELEMEDICINE (OUTPATIENT)
Dept: BEHAVIORAL/MENTAL HEALTH CLINIC | Facility: CLINIC | Age: 59
End: 2024-11-26
Payer: COMMERCIAL

## 2024-11-26 DIAGNOSIS — F41.1 GENERALIZED ANXIETY DISORDER: Primary | ICD-10-CM

## 2024-11-26 PROCEDURE — 90834 PSYTX W PT 45 MINUTES: CPT | Performed by: SOCIAL WORKER

## 2024-12-02 NOTE — PSYCH
Behavioral Health Psychotherapy Progress Note    Psychotherapy Provided: Individual Psychotherapy     1. Generalized anxiety disorder            Visit start and stop times:    11/26/24  Start Time: 1500  Stop Time: 1545  Total Visit Time: 45 minutes  Virtual Regular Visit  Therapist met w/pt for individual session.  Symptoms include: heart palpitations, stress, excessive worry, feeling overwhelmed.  Pt shared that she knows she tends to get like this before the end of the semester and in a few more weeks it will be over and she will have graduated.  She continued to utilize self talk in order to help work through her symptoms.  Pt shared that in regards to the loss she experienced a few weeks ago there were no services for the public which she understood and feels as if she is in a better place now with that.  Therapist and pt discussed steps pt plans on taking over the next weeks to accomplish everything that needs to be done while managing her symptoms effectively.    Verification of patient location:    Patient is located at Home in the following state in which I hold an active license [place]      Assessment/Plan: f/u in 2 weeks    Problem List Items Addressed This Visit    None  Visit Diagnoses         Generalized anxiety disorder    -  Primary               Reason for visit is No chief complaint on file.       Encounter provider Willow Perdomo      Recent Visits  Date Type Provider Dept   11/26/24 Telemedicine Willow Perdomo [facility] [address]   Showing recent visits within past 7 days and meeting all other requirements  Future Appointments  No visits were found meeting these conditions.  Showing future appointments within next 150 days and meeting all other requirements       The patient was identified by name and date of birth. Terri Louis was informed that this is a telemedicine visit and that the visit is being conducted throughthe Epic Embedded platform. She agrees to proceed..  My  office door was closed. No one else was in the room.  She acknowledged consent and understanding of privacy and security of the video platform. The patient has agreed to participate and understands they can discontinue the visit at any time.    Patient is aware this is a billable service.     Subjective  Terri Louis is a 59 y.o. adult  .      HPI     Past Medical History:   Diagnosis Date    Adjustment disorder with anxiety     Anemia     Anxiety     Atypical glandular cells of undetermined significance (ARIA) on cervical Pap smear 09/18/2019    Chronic kidney disease     Constipation     COVID-19 02/07/2024    Dense breasts     GERD (gastroesophageal reflux disease)     Heart murmur     History of atrial paroxysmal tachycardia     History of cerebral artery occlusion     History of chest pain     HPV (human papilloma virus) infection 2012    Hyperlipidemia     SHAE (iron deficiency anemia)     Menopause     Ovarian cyst     Pancreatitis     Plantar fasciitis     Polyclonal hypergammaglobulinemia     Restless legs syndrome (RLS)     Trigger finger        Past Surgical History:   Procedure Laterality Date    CHOLECYSTECTOMY      COLPOSCOPY  2013    [place] ESOPHAGOGASTRODUODENOSCOPY TRANSORAL DIAGNOSTIC N/A 10/19/2017    Procedure: EGD AND COLONOSCOPY;  Surgeon: Mekhi Dickey MD;  Location: [facility];  Service: Gastroenterology    TRIGGER FINGER RELEASE         Current Outpatient Medications   Medication Sig Dispense Refill    ACCU-CHEK FASTCLIX LANCETS MISC by Does not apply route daily      atorvastatin (LIPITOR) 20 mg tablet Take 1 tablet (20 mg total) by mouth daily 90 tablet 0    B Complex-C (SUPER B COMPLEX PO) Take 1 tablet by mouth daily      BIOTIN PO Take 1 capsule by mouth daily      Blood Glucose Monitoring Suppl (ACCU-CHEK JESSEE CONNECT) w/Device KIT by Does not apply route      cholecalciferol (VITAMIN D3) 1,000 units tablet Take 1,000 Units by mouth 2 (two) times a week       clobetasol  (TEMOVATE) 0.05 % cream Apply 5 g (1 Application total) topically 2 (two) times a day 30 g 23    clotrimazole-betamethasone (LOTRISONE) 1-0.05 % cream Apply 1 Application topically 2 (two) times a day 30 g 23    cyclobenzaprine (FLEXERIL) 10 mg tablet if needed      Empagliflozin (Jardiance) 10 MG TABS tablet Take 1 tablet (10 mg total) by mouth every morning 90 tablet 0    gabapentin (NEURONTIN) 300 mg capsule Take 1 capsule (300 mg total) by mouth 3 (three) times a day 270 capsule 0    glucose blood test strip by In Vitro route 4 (four) times a day      Hyaluronic Acid-Vitamin C (HYALURONIC ACID PO) Take 100 mg by mouth daily      ibuprofen (MOTRIN) 800 mg tablet Take 800 mg by mouth as needed        lisinopril (ZESTRIL) 5 mg tablet Take 0.5 tablets (2.5 mg total) by mouth daily 90 tablet 0    LORazepam (ATIVAN) 0.5 mg tablet TAKE 1 TABLET EVERY 12 HOURS AS NEEDED FOR ANXIETY 30 tablet 0    metFORMIN (GLUCOPHAGE) 1000 MG tablet Take 1 tablet (1,000 mg total) by mouth 2 (two) times a day 180 tablet 0    Multiple Vitamin (DAILY VALUE MULTIVITAMIN) TABS Take by mouth      Omega-3 Fatty Acids (FISH OIL) 1,000 mg Take 1,000 mg by mouth daily      omeprazole (PriLOSEC) 20 mg delayed release capsule Take 1 capsule (20 mg total) by mouth daily before breakfast 90 capsule 0    Saw [place] 160 MG CAPS Take 250 mg by mouth        semaglutide (Rybelsus) 14 MG tablet Take 1 tablet (14 mg total) by mouth daily before breakfast 90 tablet 0    Turmeric 500 MG CAPS Take by mouth      zinc gluconate 50 mg tablet Take 50 mg by mouth daily       No current facility-administered medications for this visit.        No Known Allergies    Review of Systems    Video Exam    There were no vitals filed for this visit.    Physical Exam Pt denied any SI/Hi/Ah/VH

## 2024-12-11 ENCOUNTER — TELEMEDICINE (OUTPATIENT)
Dept: BEHAVIORAL/MENTAL HEALTH CLINIC | Facility: CLINIC | Age: 59
End: 2024-12-11
Payer: COMMERCIAL

## 2024-12-11 DIAGNOSIS — F41.9 ANXIETY DISORDER, UNSPECIFIED TYPE: Primary | ICD-10-CM

## 2024-12-11 PROCEDURE — 90834 PSYTX W PT 45 MINUTES: CPT | Performed by: SOCIAL WORKER

## 2024-12-13 NOTE — PSYCH
"Behavioral Health Psychotherapy Progress Note    Psychotherapy Provided: Individual Psychotherapy     1. Anxiety disorder, unspecified type              Visit start and stop times:    12/11/24  Start Time: 1515  Stop Time: 1600  Total Visit Time: 45 minutes      Virtual Regular Visit  Therapist met w/pt for individual session.  Pt shared that she is feeling a lot of relief.  She presented this weekend and felt that it went well and is all done with school.  She acknowledged how much she has overcome with working full time and being in school and felt a sense of accomplishment.  Therapist and pt dsicusesd that pt tends to be a \"busy\" person but is going to try to utilize the free time she has now to relax and figure out what her next steps will be.  She shared that she is feeling less anxious and overwhelmed today.    Verification of patient location:    Patient is located at Home in the following state in which I hold an active license [place]      Assessment/Plan: f/u in a month    Problem List Items Addressed This Visit    None  Visit Diagnoses         Anxiety disorder, unspecified type    -  Primary                  Reason for visit is No chief complaint on file.       Encounter provider Willow Perdomo      Recent Visits  Date Type Provider Dept   12/11/24 Telemedicine Willow Perdomo [facility] [address]   Showing recent visits within past 7 days and meeting all other requirements  Future Appointments  No visits were found meeting these conditions.  Showing future appointments within next 150 days and meeting all other requirements       The patient was identified by name and date of birth. Terri CHRISTOPHER Louis was informed that this is a telemedicine visit and that the visit is being conducted throughthe Epic Embedded platform. She agrees to proceed..  My office door was closed. No one else was in the room.  She acknowledged consent and understanding of privacy and security of the video platform. " The patient has agreed to participate and understands they can discontinue the visit at any time.    Patient is aware this is a billable service.     Subjective  Terri Louis is a 59 y.o. adult  .      HPI     Past Medical History:   Diagnosis Date    Adjustment disorder with anxiety     Anemia     Anxiety     Atypical glandular cells of undetermined significance (ARIA) on cervical Pap smear 09/18/2019    Chronic kidney disease     Constipation     COVID-19 02/07/2024    Dense breasts     GERD (gastroesophageal reflux disease)     Heart murmur     History of atrial paroxysmal tachycardia     History of cerebral artery occlusion     History of chest pain     HPV (human papilloma virus) infection 2012    Hyperlipidemia     SHAE (iron deficiency anemia)     Menopause     Ovarian cyst     Pancreatitis     Plantar fasciitis     Polyclonal hypergammaglobulinemia     Restless legs syndrome (RLS)     Trigger finger        Past Surgical History:   Procedure Laterality Date    CHOLECYSTECTOMY      COLPOSCOPY  2013    [place] ESOPHAGOGASTRODUODENOSCOPY TRANSORAL DIAGNOSTIC N/A 10/19/2017    Procedure: EGD AND COLONOSCOPY;  Surgeon: Mekhi Dickey MD;  Location: [facility];  Service: Gastroenterology    TRIGGER FINGER RELEASE         Current Outpatient Medications   Medication Sig Dispense Refill    ACCU-CHEK FASTCLIX LANCETS MISC by Does not apply route daily      atorvastatin (LIPITOR) 20 mg tablet Take 1 tablet (20 mg total) by mouth daily 90 tablet 0    B Complex-C (SUPER B COMPLEX PO) Take 1 tablet by mouth daily      BIOTIN PO Take 1 capsule by mouth daily      Blood Glucose Monitoring Suppl (ACCU-CHEK JESSEE CONNECT) w/Device KIT by Does not apply route      cholecalciferol (VITAMIN D3) 1,000 units tablet Take 1,000 Units by mouth 2 (two) times a week       clobetasol (TEMOVATE) 0.05 % cream Apply 5 g (1 Application total) topically 2 (two) times a day 30 g 23    clotrimazole-betamethasone (LOTRISONE) 1-0.05 % cream  Apply 1 Application topically 2 (two) times a day 30 g 23    cyclobenzaprine (FLEXERIL) 10 mg tablet if needed      Empagliflozin (Jardiance) 10 MG TABS tablet Take 1 tablet (10 mg total) by mouth every morning 90 tablet 0    gabapentin (NEURONTIN) 300 mg capsule Take 1 capsule (300 mg total) by mouth 3 (three) times a day 270 capsule 0    glucose blood test strip by In Vitro route 4 (four) times a day      Hyaluronic Acid-Vitamin C (HYALURONIC ACID PO) Take 100 mg by mouth daily      ibuprofen (MOTRIN) 800 mg tablet Take 800 mg by mouth as needed        lisinopril (ZESTRIL) 5 mg tablet Take 0.5 tablets (2.5 mg total) by mouth daily 90 tablet 0    LORazepam (ATIVAN) 0.5 mg tablet TAKE 1 TABLET EVERY 12 HOURS AS NEEDED FOR ANXIETY 30 tablet 0    metFORMIN (GLUCOPHAGE) 1000 MG tablet Take 1 tablet (1,000 mg total) by mouth 2 (two) times a day 180 tablet 0    Multiple Vitamin (DAILY VALUE MULTIVITAMIN) TABS Take by mouth      Omega-3 Fatty Acids (FISH OIL) 1,000 mg Take 1,000 mg by mouth daily      omeprazole (PriLOSEC) 20 mg delayed release capsule Take 1 capsule (20 mg total) by mouth daily before breakfast 90 capsule 0    Saw [place] 160 MG CAPS Take 250 mg by mouth        semaglutide (Rybelsus) 14 MG tablet Take 1 tablet (14 mg total) by mouth daily before breakfast 90 tablet 0    Turmeric 500 MG CAPS Take by mouth      zinc gluconate 50 mg tablet Take 50 mg by mouth daily       No current facility-administered medications for this visit.        No Known Allergies    Review of Systems    Video Exam    There were no vitals filed for this visit.    Physical Exam Pt denied any SI/HI/AH/VH

## 2024-12-30 DIAGNOSIS — E11.65 UNCONTROLLED TYPE 2 DIABETES MELLITUS WITH HYPERGLYCEMIA (HCC): ICD-10-CM

## 2024-12-30 DIAGNOSIS — E11.9 TYPE 2 DIABETES MELLITUS WITHOUT COMPLICATION, WITHOUT LONG-TERM CURRENT USE OF INSULIN (HCC): ICD-10-CM

## 2024-12-30 RX ORDER — ORAL SEMAGLUTIDE 14 MG/1
14 TABLET ORAL
Qty: 90 TABLET | Refills: 0 | Status: SHIPPED | OUTPATIENT
Start: 2024-12-30

## 2025-01-14 ENCOUNTER — TELEMEDICINE (OUTPATIENT)
Dept: BEHAVIORAL/MENTAL HEALTH CLINIC | Facility: CLINIC | Age: 60
End: 2025-01-14
Payer: COMMERCIAL

## 2025-01-14 DIAGNOSIS — F41.1 GENERALIZED ANXIETY DISORDER: Primary | ICD-10-CM

## 2025-01-14 PROCEDURE — 90834 PSYTX W PT 45 MINUTES: CPT | Performed by: SOCIAL WORKER

## 2025-01-15 NOTE — PSYCH
Behavioral Health Psychotherapy Progress Note    Psychotherapy Provided: Individual Psychotherapy     1. Generalized anxiety disorder            Behavioral Health Treatment Plan and Discharge Planning: Terri Louis is aware of and agrees to continue to work on their treatment plan. They have identified and are working toward their discharge goals. yes    Depression Follow-up Plan Completed: Not applicable    Visit start and stop times:    01/14/25  Start Time: 1546  Stop Time: 1630  Total Visit Time: 44 minutes  Virtual Regular Visit  Therapist met w/pt for individual session.  Symptoms include: stress and worry, however, feeling better than last session.  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  Pt shared that she is realizing she isn't feeling as scattered since she is done w/school.  She shared that she continues to have turnover at her job and really misses the team environment at work.  She expressed having an interest in PHP and is talking to someone about it later this week.    Verification of patient location:    Patient is located at Home in the following state in which I hold an active license [place]      Assessment/Plan: f/u in 2-3 weeks    Problem List Items Addressed This Visit    None  Visit Diagnoses         Generalized anxiety disorder    -  Primary            Reason for visit is No chief complaint on file.       Encounter provider Willow Perdomo      Recent Visits  Date Type Provider Dept   01/14/25 Telemedicine Willow Perdomo [facility] [address]   Showing recent visits within past 7 days and meeting all other requirements  Future Appointments  No visits were found meeting these conditions.  Showing future appointments within next 150 days and meeting all other requirements       The patient was identified by name and date of birth. Terri Louis was informed that this is a telemedicine visit and that the visit is being conducted  throughthe Epic Embedded platform. She agrees to proceed..  My office door was closed. No one else was in the room.  She acknowledged consent and understanding of privacy and security of the video platform. The patient has agreed to participate and understands they can discontinue the visit at any time.    Patient is aware this is a billable service.     Subjective  Terri Louis is a 59 y.o. female  .      HPI     Past Medical History:   Diagnosis Date    Adjustment disorder with anxiety     Anemia     Anxiety     Atypical glandular cells of undetermined significance (ARIA) on cervical Pap smear 09/18/2019    Chronic kidney disease     Constipation     COVID-19 02/07/2024    Dense breasts     GERD (gastroesophageal reflux disease)     Heart murmur     History of atrial paroxysmal tachycardia     History of cerebral artery occlusion     History of chest pain     HPV (human papilloma virus) infection 2012    Hyperlipidemia     SHAE (iron deficiency anemia)     Menopause     Ovarian cyst     Pancreatitis     Plantar fasciitis     Polyclonal hypergammaglobulinemia     Restless legs syndrome (RLS)     Trigger finger        Past Surgical History:   Procedure Laterality Date    CHOLECYSTECTOMY      COLPOSCOPY  2013    [place] ESOPHAGOGASTRODUODENOSCOPY TRANSORAL DIAGNOSTIC N/A 10/19/2017    Procedure: EGD AND COLONOSCOPY;  Surgeon: Mekhi Dickey MD;  Location: [facility];  Service: Gastroenterology    TRIGGER FINGER RELEASE         Current Outpatient Medications   Medication Sig Dispense Refill    ACCU-CHEK FASTCLIX LANCETS MISC by Does not apply route daily      atorvastatin (LIPITOR) 20 mg tablet Take 1 tablet (20 mg total) by mouth daily 90 tablet 0    B Complex-C (SUPER B COMPLEX PO) Take 1 tablet by mouth daily      BIOTIN PO Take 1 capsule by mouth daily      Blood Glucose Monitoring Suppl (ACCU-CHEK JESSEE CONNECT) w/Device KIT by Does not apply route      cholecalciferol (VITAMIN D3) 1,000 units tablet Take  1,000 Units by mouth 2 (two) times a week       clobetasol (TEMOVATE) 0.05 % cream Apply 5 g (1 Application total) topically 2 (two) times a day 30 g 23    clotrimazole-betamethasone (LOTRISONE) 1-0.05 % cream Apply 1 Application topically 2 (two) times a day 30 g 23    cyclobenzaprine (FLEXERIL) 10 mg tablet if needed      Empagliflozin (Jardiance) 10 MG TABS tablet Take 1 tablet (10 mg total) by mouth every morning 90 tablet 0    gabapentin (NEURONTIN) 300 mg capsule Take 1 capsule (300 mg total) by mouth 3 (three) times a day 270 capsule 0    glucose blood test strip by In Vitro route 4 (four) times a day      Hyaluronic Acid-Vitamin C (HYALURONIC ACID PO) Take 100 mg by mouth daily      ibuprofen (MOTRIN) 800 mg tablet Take 800 mg by mouth as needed        lisinopril (ZESTRIL) 5 mg tablet Take 0.5 tablets (2.5 mg total) by mouth daily 90 tablet 0    LORazepam (ATIVAN) 0.5 mg tablet TAKE 1 TABLET EVERY 12 HOURS AS NEEDED FOR ANXIETY 30 tablet 0    metFORMIN (GLUCOPHAGE) 1000 MG tablet TAKE 1 TABLET TWICE A  tablet 0    Multiple Vitamin (DAILY VALUE MULTIVITAMIN) TABS Take by mouth      Omega-3 Fatty Acids (FISH OIL) 1,000 mg Take 1,000 mg by mouth daily      omeprazole (PriLOSEC) 20 mg delayed release capsule Take 1 capsule (20 mg total) by mouth daily before breakfast 90 capsule 0    Saw [place] 160 MG CAPS Take 250 mg by mouth        semaglutide (Rybelsus) 14 MG tablet TAKE 1 TABLET DAILY BEFORE BREAKFAST 90 tablet 0    Turmeric 500 MG CAPS Take by mouth      zinc gluconate 50 mg tablet Take 50 mg by mouth daily       No current facility-administered medications for this visit.        No Known Allergies    Review of Systems    Video Exam    There were no vitals filed for this visit.    Physical Exam Pt denied any SI/Hi/Ah/Vh

## 2025-01-20 ENCOUNTER — NURSE TRIAGE (OUTPATIENT)
Age: 60
End: 2025-01-20

## 2025-01-20 DIAGNOSIS — R39.9 UTI SYMPTOMS: Primary | ICD-10-CM

## 2025-01-20 NOTE — TELEPHONE ENCOUNTER
Called left voice message for patient advised per . that he has reviewed her chart and has placed orders under  for urine analysis and urine culture advised patient to go to the [facility]'s lab.

## 2025-01-20 NOTE — TELEPHONE ENCOUNTER
Patient scheduled appointment for 3/6/25 via [facility] stating she had urinary symptoms. Called patient to go over symptoms. She states she is currently taking Rybelsus and Jardiance for diabetes. Since being on both medications she has been getting frequent UTIs.   Patient presents with foul odor, burning at the end of urination and unable to hold her urine. Per protocol, patient should be seen in the office today. Patient asking if she should do a urinalysis  Please call patient

## 2025-01-20 NOTE — TELEPHONE ENCOUNTER
"Reason for Disposition  • Bad or foul-smelling urine    Answer Assessment - Initial Assessment Questions  1. SYMPTOM: \"What's the main symptom you're concerned about?\" (e.g., frequency, incontinence)      Odor, burning, unable to hold urine.    2. ONSET: \"When did the  symptoms  start?\"      On and off for months    3. PAIN: \"Is there any pain?\" If Yes, ask: \"How bad is it?\" (Scale: 1-10; mild, moderate, severe)      Slight burning/discomfort at the end of urination    4. CAUSE: \"What do you think is causing the symptoms?\"      Medication    5. OTHER SYMPTOMS: \"Do you have any other symptoms?\" (e.g., blood in urine, fever, flank pain, pain with urination)      Denies    Protocols used: Urinary Symptoms-Adult-[place]    "

## 2025-01-21 ENCOUNTER — APPOINTMENT (OUTPATIENT)
Dept: LAB | Facility: MEDICAL CENTER | Age: 60
End: 2025-01-21
Payer: COMMERCIAL

## 2025-01-21 DIAGNOSIS — R39.9 UTI SYMPTOMS: ICD-10-CM

## 2025-01-21 PROCEDURE — 87186 SC STD MICRODIL/AGAR DIL: CPT

## 2025-01-21 PROCEDURE — 87077 CULTURE AEROBIC IDENTIFY: CPT

## 2025-01-21 PROCEDURE — 81001 URINALYSIS AUTO W/SCOPE: CPT

## 2025-01-21 PROCEDURE — 87086 URINE CULTURE/COLONY COUNT: CPT

## 2025-01-22 LAB
BACTERIA UR QL AUTO: ABNORMAL /HPF
BILIRUB UR QL STRIP: NEGATIVE
CLARITY UR: CLEAR
COLOR UR: YELLOW
GLUCOSE UR STRIP-MCNC: ABNORMAL MG/DL
HGB UR QL STRIP.AUTO: NEGATIVE
KETONES UR STRIP-MCNC: NEGATIVE MG/DL
LEUKOCYTE ESTERASE UR QL STRIP: ABNORMAL
NITRITE UR QL STRIP: POSITIVE
NON-SQ EPI CELLS URNS QL MICRO: ABNORMAL /HPF
PH UR STRIP.AUTO: 6 [PH]
PROT UR STRIP-MCNC: ABNORMAL MG/DL
RBC #/AREA URNS AUTO: ABNORMAL /HPF
SP GR UR STRIP.AUTO: 1.03 (ref 1–1.03)
UROBILINOGEN UR STRIP-ACNC: <2 MG/DL
WBC #/AREA URNS AUTO: ABNORMAL /HPF

## 2025-01-23 ENCOUNTER — TELEPHONE (OUTPATIENT)
Dept: NEPHROLOGY | Facility: CLINIC | Age: 60
End: 2025-01-23

## 2025-01-23 DIAGNOSIS — N30.00 ACUTE CYSTITIS WITHOUT HEMATURIA: Primary | ICD-10-CM

## 2025-01-23 RX ORDER — CEPHALEXIN 500 MG/1
500 CAPSULE ORAL EVERY 12 HOURS SCHEDULED
Qty: 6 CAPSULE | Refills: 0 | Status: SHIPPED | OUTPATIENT
Start: 2025-01-23 | End: 2025-01-26

## 2025-01-23 NOTE — TELEPHONE ENCOUNTER
----- Message from Tessa Roldan MD sent at 1/23/2025  7:51 AM EST -----  Hi,  Please call patient and inform that her urinalysis on 1/21/25 may be showing early signs of UTI. I have sent a Rx for Keflex to [facility] pharmacy to be taken for 3 days.  Dr. Roldan

## 2025-01-23 NOTE — TELEPHONE ENCOUNTER
Called and LM stating that urinalysis may be showing early signs of UTI and Dr Roldan sent a Rx for Keflex to [facility] pharmacy to be taken for 3 days. Asked pt to call us back if any questions or concerns.

## 2025-01-24 DIAGNOSIS — N39.0 FREQUENT UTI: Primary | ICD-10-CM

## 2025-01-24 LAB — BACTERIA UR CULT: ABNORMAL

## 2025-01-24 RX ORDER — CEPHALEXIN 500 MG/1
500 CAPSULE ORAL EVERY 12 HOURS SCHEDULED
Qty: 4 CAPSULE | Refills: 0 | Status: SHIPPED | OUTPATIENT
Start: 2025-01-24 | End: 2025-01-26

## 2025-01-24 NOTE — TELEPHONE ENCOUNTER
LM stating that Keflex for additional 2 days (total 5 days) was called to her pharmacy. Asked pt to call us back if any questions or concerns

## 2025-01-27 ENCOUNTER — RA CDI HCC (OUTPATIENT)
Dept: OTHER | Facility: HOSPITAL | Age: 60
End: 2025-01-27

## 2025-01-27 NOTE — PROGRESS NOTES
HCC coding opportunities          Chart Reviewed number of suggestions sent to Provider: 2     Patients Insurance        Commercial Insurance: [employer]       E11.3392: Type 2 diabetes mellitus with moderate nonproliferative diabetic retinopathy without macular edema, left eye [E11.3392]     E11.3311: Type 2 diabetes mellitus with moderate nonproliferative diabetic retinopathy with macular edema, right eye [E11.3311]     Refer to Ophthalmology  note from 9/23/2023 - please review and assess and document per MEAT if applicable for 2025

## 2025-01-28 ENCOUNTER — TELEMEDICINE (OUTPATIENT)
Dept: BEHAVIORAL/MENTAL HEALTH CLINIC | Facility: CLINIC | Age: 60
End: 2025-01-28
Payer: COMMERCIAL

## 2025-01-28 DIAGNOSIS — F41.1 GENERALIZED ANXIETY DISORDER: Primary | ICD-10-CM

## 2025-01-28 PROCEDURE — 90834 PSYTX W PT 45 MINUTES: CPT | Performed by: SOCIAL WORKER

## 2025-01-29 LAB
LEFT EYE DIABETIC RETINOPATHY: POSITIVE
RIGHT EYE DIABETIC RETINOPATHY: POSITIVE

## 2025-01-31 NOTE — PSYCH
Behavioral Health Psychotherapy Progress Note    Psychotherapy Provided: Individual Psychotherapy     1. Generalized anxiety disorder            Behavioral Health Treatment Plan and Discharge Planning: Tamera Louis is aware of and agrees to continue to work on their treatment plan. They have identified and are working toward their discharge goals. yes    Depression Follow-up Plan Completed: Not applicable    Visit start and stop times:    01/28/25  Start Time: 1550  Stop Time: 1640  Total Visit Time: 50 minutes  Virtual Regular Visit  Therapist met w/pt for individual session.  Symptoms include: heart palpitations, stress, excessive worry, irritability.  Pt shared that work has been very stressful.  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  Therapist and pt discussed how she feels more on edge and more impatient so she is working on doing more outside of work to help regulate her emotions.    Verification of patient location:    Patient is located at Home in the following state in which I hold an active license [place]      Assessment/Plan: f/u in 2 weeks    Problem List Items Addressed This Visit    None  Visit Diagnoses         Generalized anxiety disorder    -  Primary            Reason for visit is No chief complaint on file.       Encounter provider Willow Perdomo      Recent Visits  Date Type Provider Dept   01/28/25 Telemedicine Willow Perdomo [facility] [address]   Showing recent visits within past 7 days and meeting all other requirements  Future Appointments  No visits were found meeting these conditions.  Showing future appointments within next 150 days and meeting all other requirements       The patient was identified by name and date of birth. Terri Louis was informed that this is a telemedicine visit and that the visit is being conducted throughthe Epic Embedded platform. She  agrees to proceed..  My office door was closed. No one else was in the room.  She acknowledged consent and understanding of privacy and security of the video platform. The patient has agreed to participate and understands they can discontinue the visit at any time.    Patient is aware this is a billable service.     Subjective  Terri Louis is a 59 y.o. female  .      HPI     Past Medical History:   Diagnosis Date    Adjustment disorder with anxiety     Anemia     Anxiety     Atypical glandular cells of undetermined significance (ARIA) on cervical Pap smear 09/18/2019    Chronic kidney disease     Constipation     COVID-19 02/07/2024    Dense breasts     GERD (gastroesophageal reflux disease)     Heart murmur     History of atrial paroxysmal tachycardia     History of cerebral artery occlusion     History of chest pain     HPV (human papilloma virus) infection 2012    Hyperlipidemia     SHAE (iron deficiency anemia)     Menopause     Ovarian cyst     Pancreatitis     Plantar fasciitis     Polyclonal hypergammaglobulinemia     Restless legs syndrome (RLS)     Trigger finger        Past Surgical History:   Procedure Laterality Date    CHOLECYSTECTOMY      COLPOSCOPY  2013    AR ESOPHAGOGASTRODUODENOSCOPY TRANSORAL DIAGNOSTIC N/A 10/19/2017    Procedure: EGD AND COLONOSCOPY;  Surgeon: Mekhi Dickey MD;  Location: [facility];  Service: Gastroenterology    TRIGGER FINGER RELEASE         Current Outpatient Medications   Medication Sig Dispense Refill    ACCU-CHEK FASTCLIX LANCETS MISC by Does not apply route daily      atorvastatin (LIPITOR) 20 mg tablet Take 1 tablet (20 mg total) by mouth daily 90 tablet 0    B Complex-C (SUPER B COMPLEX PO) Take 1 tablet by mouth daily      BIOTIN PO Take 1 capsule by mouth daily      Blood Glucose Monitoring Suppl (ACCU-CHEK JESSEE CONNECT) w/Device KIT by Does not apply route      cholecalciferol (VITAMIN D3) 1,000 units tablet Take 1,000 Units by mouth 2 (two) times a week        clobetasol (TEMOVATE) 0.05 % cream Apply 5 g (1 Application total) topically 2 (two) times a day 30 g 23    clotrimazole-betamethasone (LOTRISONE) 1-0.05 % cream Apply 1 Application topically 2 (two) times a day 30 g 23    cyclobenzaprine (FLEXERIL) 10 mg tablet if needed      Empagliflozin (Jardiance) 10 MG TABS tablet Take 1 tablet (10 mg total) by mouth every morning 90 tablet 0    gabapentin (NEURONTIN) 300 mg capsule Take 1 capsule (300 mg total) by mouth 3 (three) times a day 270 capsule 0    glucose blood test strip by In Vitro route 4 (four) times a day      Hyaluronic Acid-Vitamin C (HYALURONIC ACID PO) Take 100 mg by mouth daily      ibuprofen (MOTRIN) 800 mg tablet Take 800 mg by mouth as needed        lisinopril (ZESTRIL) 5 mg tablet Take 0.5 tablets (2.5 mg total) by mouth daily 90 tablet 0    LORazepam (ATIVAN) 0.5 mg tablet TAKE 1 TABLET EVERY 12 HOURS AS NEEDED FOR ANXIETY 30 tablet 0    metFORMIN (GLUCOPHAGE) 1000 MG tablet TAKE 1 TABLET TWICE A  tablet 0    Multiple Vitamin (DAILY VALUE MULTIVITAMIN) TABS Take by mouth      Omega-3 Fatty Acids (FISH OIL) 1,000 mg Take 1,000 mg by mouth daily      omeprazole (PriLOSEC) 20 mg delayed release capsule Take 1 capsule (20 mg total) by mouth daily before breakfast 90 capsule 0    Saw [place] 160 MG CAPS Take 250 mg by mouth        semaglutide (Rybelsus) 14 MG tablet TAKE 1 TABLET DAILY BEFORE BREAKFAST 90 tablet 0    Turmeric 500 MG CAPS Take by mouth      zinc gluconate 50 mg tablet Take 50 mg by mouth daily       No current facility-administered medications for this visit.        No Known Allergies    Review of Systems    Video Exam    There were no vitals filed for this visit.    Physical Exam Pt denied any SI/HI/AH/VH

## 2025-02-07 ENCOUNTER — OFFICE VISIT (OUTPATIENT)
Dept: FAMILY MEDICINE CLINIC | Facility: CLINIC | Age: 60
End: 2025-02-07
Payer: COMMERCIAL

## 2025-02-07 ENCOUNTER — TELEPHONE (OUTPATIENT)
Dept: ADMINISTRATIVE | Facility: OTHER | Age: 60
End: 2025-02-07

## 2025-02-07 VITALS
DIASTOLIC BLOOD PRESSURE: 70 MMHG | WEIGHT: 170 LBS | SYSTOLIC BLOOD PRESSURE: 110 MMHG | HEIGHT: 65 IN | TEMPERATURE: 98.5 F | BODY MASS INDEX: 28.32 KG/M2 | OXYGEN SATURATION: 97 % | HEART RATE: 70 BPM

## 2025-02-07 DIAGNOSIS — E78.2 MIXED HYPERLIPIDEMIA: ICD-10-CM

## 2025-02-07 DIAGNOSIS — N18.2 TYPE 2 DIABETES MELLITUS WITH STAGE 2 CHRONIC KIDNEY DISEASE, WITHOUT LONG-TERM CURRENT USE OF INSULIN  (HCC): ICD-10-CM

## 2025-02-07 DIAGNOSIS — I10 PRIMARY HYPERTENSION: ICD-10-CM

## 2025-02-07 DIAGNOSIS — E11.65 UNCONTROLLED TYPE 2 DIABETES MELLITUS WITH HYPERGLYCEMIA (HCC): ICD-10-CM

## 2025-02-07 DIAGNOSIS — E11.22 TYPE 2 DIABETES MELLITUS WITH STAGE 2 CHRONIC KIDNEY DISEASE, WITHOUT LONG-TERM CURRENT USE OF INSULIN  (HCC): ICD-10-CM

## 2025-02-07 DIAGNOSIS — Z00.00 ANNUAL PHYSICAL EXAM: Primary | ICD-10-CM

## 2025-02-07 LAB
CREAT UR-MCNC: 80.2 MG/DL
MICROALBUMIN UR-MCNC: 90.4 MG/L
MICROALBUMIN/CREAT 24H UR: 113 MG/G CREATININE (ref 0–30)
SL AMB POCT HEMOGLOBIN AIC: 7.4 (ref ?–6.5)

## 2025-02-07 PROCEDURE — 99396 PREV VISIT EST AGE 40-64: CPT | Performed by: FAMILY MEDICINE

## 2025-02-07 PROCEDURE — 82043 UR ALBUMIN QUANTITATIVE: CPT | Performed by: FAMILY MEDICINE

## 2025-02-07 PROCEDURE — 99214 OFFICE O/P EST MOD 30 MIN: CPT | Performed by: FAMILY MEDICINE

## 2025-02-07 PROCEDURE — 83036 HEMOGLOBIN GLYCOSYLATED A1C: CPT | Performed by: FAMILY MEDICINE

## 2025-02-07 PROCEDURE — 82570 ASSAY OF URINE CREATININE: CPT | Performed by: FAMILY MEDICINE

## 2025-02-07 NOTE — ASSESSMENT & PLAN NOTE
Orders:    CBC and Platelet; Future    Comprehensive metabolic panel; Future    Lipid Panel with Direct LDL reflex; Future

## 2025-02-07 NOTE — ASSESSMENT & PLAN NOTE
Lab Results   Component Value Date    HGBA1C 7.4 (A) 02/07/2025     Orders:    POCT hemoglobin A1c    Albumin / creatinine urine ratio; Future    CBC and Platelet; Future    Comprehensive metabolic panel; Future    Lipid Panel with Direct LDL reflex; Future    Empagliflozin (Jardiance) 25 MG TABS; Take 1 tablet (25 mg total) by mouth in the morning

## 2025-02-07 NOTE — TELEPHONE ENCOUNTER
----- Message from Devora THOMASON sent at 2/7/2025  7:31 AM EST -----  Regarding: Care gap request.  02/07/25 7:31 AM    Hello, our patient listed above has had Diabetic Eye Exam completed/performed. Please assist in updating the patient chart by pulling the document from the Media Tab. The date of service is 1/29/25.     Thank you,  Devora CEBALLOS [address]

## 2025-02-07 NOTE — PATIENT INSTRUCTIONS
"Patient Education     Type 2 diabetes   The Basics   Written by the doctors and editors at [facility]   What is type 2 diabetes? -- This is a disorder that disrupts the way the body uses sugar. It is sometimes called type 2 diabetes mellitus.  All of the cells in the body need sugar to work normally. Sugar gets into the cells with the help of a hormone called insulin. Insulin is made by the pancreas, an organ in the belly. If there is not enough insulin, or if cells in the body don't respond normally to insulin, sugar builds up in the blood. That is what happens to people with diabetes.  There are 2 different types of diabetes:   In type 1 diabetes, the pancreas makes little or no insulin.   In type 2 diabetes, the pancreas still makes some insulin, but the cells in the body stop responding normally. Eventually, the pancreas cannot make enough insulin to keep up.  Having excess body weight or obesity increases a person's risk of developing type 2 diabetes. But people without excess body weight can get diabetes, too.  What are the symptoms of type 2 diabetes? -- Type 2 diabetes usually causes no symptoms. When symptoms do happen, they include:   Needing to urinate often   Intense thirst   Blurry vision  Can diabetes lead to other health problems? -- Yes. Type 2 diabetes might not make you feel sick. But if it is not managed, it can lead to serious problems over time, such as:   Heart attacks   Strokes   Kidney disease   Vision problems (or even blindness)   Pain or loss of feeling in the hands and feet   Needing to have fingers, toes, or other body parts removed (amputated)  How do I know if I have type 2 diabetes? -- Your doctor or nurse can do a blood test. There are 2 tests that can be used for this. Both involve measuring the amount of sugar in your blood, called your \"blood sugar\" or \"blood glucose\":   One of the tests measures your blood sugar at the time the blood sample is taken. This test is done in the " "morning. You can't eat or drink anything except water for at least 8 hours before the test.   The other test shows what your average blood sugar has been for the past 2 to 3 months. This blood test is called \"hemoglobin A1C\" or just \"A1C.\" It can be checked at any time of the day, even if you have recently eaten.  How is type 2 diabetes treated? -- The goals of treatment are to manage your blood sugar and lower the risk of future problems that can happen in people with diabetes.  Treatment might include:   Lifestyle changes - This is an important part of managing diabetes. It includes eating healthy foods and getting plenty of physical activity.   Medicines - There are a few medicines that help lower blood sugar. Some people need to take pills that help the body make more insulin or that help insulin do its job. Others need insulin shots.  Depending on what medicines you take, you might need to check your blood sugar regularly at home. But not everyone with type 2 diabetes needs to do this. Your doctor or nurse will tell you if you should be checking your blood sugar, and when and how to do this.  Sometimes, people with type 2 diabetes also need medicines to help prevent problems caused by the disease. For instance, medicines used to lower blood pressure can reduce the chances of a heart attack or stroke.   General medical care - It's also important to take care of other areas of your health. This includes watching your blood pressure and cholesterol levels. You should also get certain vaccines, such as vaccines to protect against the flu and coronavirus disease 2019 (\"COVID-19\"). Some people also need a vaccine to prevent pneumonia.  Can type 2 diabetes be prevented? -- Yes. To lower your chances of getting type 2 diabetes, the most important thing you can do is eat a healthy diet and get plenty of physical activity. This can help you lose weight if you are overweight. But eating well and being active are also good " for your overall health. Even gentle activity, like walking, has benefits.  If you smoke, quitting can also lower your risk of type 2 diabetes. Quitting smoking can be difficult, but your doctor or nurse can help.  All topics are updated as new evidence becomes available and our peer review process is complete.  This topic retrieved from [employer] on: Apr 24, 2024.  Topic 42694 Version 23.0  Release: 32.3.2 - C32.113  © 2024 UpToDate, Inc. and/or its affiliates. All rights reserved.  Consumer Information Use and Disclaimer   Disclaimer: This generalized information is a limited summary of diagnosis, treatment, and/or medication information. It is not meant to be comprehensive and should be used as a tool to help the user understand and/or assess potential diagnostic and treatment options. It does NOT include all information about conditions, treatments, medications, side effects, or risks that may apply to a specific patient. It is not intended to be medical advice or a substitute for the medical advice, diagnosis, or treatment of a health care provider based on the health care provider's examination and assessment of a patient's specific and unique circumstances. Patients must speak with a health care provider for complete information about their health, medical questions, and treatment options, including any risks or benefits regarding use of medications. This information does not endorse any treatments or medications as safe, effective, or approved for treating a specific patient. UpToDate, Inc. and its affiliates disclaim any warranty or liability relating to this information or the use thereof.The use of this information is governed by the Terms of Use, available at https://www.wolterskluwer.com/en/know/clinical-effectiveness-terms. 2024© UpToDate, Inc. and its affiliates and/or licensors. All rights reserved.  Copyright   © 2024 UpToDate, Inc. and/or its affiliates. All rights reserved.    Patient Education    "  Carb counting for adults with diabetes   The Basics   Written by the doctors and editors at [facility]   What is carb counting? -- This is a type of meal planning that many people with diabetes use. It is a way to figure out how many carbohydrates, or \"carbs,\" you eat.  The body breaks down the food we eat into 3 main types of nutrients: carbs, proteins, and fats. Carbs are sugars and starches that come from food. The body uses carbs for energy.  Why do I need to count carbs? -- People with diabetes need to pay attention to how many carbs they eat. This is because carbs raise your blood sugar level.  Carb counting helps you:   Choose the right amount of insulin to take before meals and snacks - If you take insulin before meals, the dose depends on several things, including how many carbs you plan to eat. (It also depends on how much you plan to exercise and your blood sugar level.)   Plan your meals and snacks for the day - You can use carb counting to figure out how many carbs to eat at each meal and snack. This helps you make sure that you eat the right amount over the entire day.   Keep your blood sugar levels well managed - Spreading out the carbs you eat over a whole day can help keep your blood sugar from getting too high. If you take insulin or another diabetes medicine that can cause low blood sugar, eating about the same amount of carbs at each meal every day also helps keep your blood sugar from getting too low. Reducing the amount of carbs you eat can help you manage your diabetes better and prevent medical problems that diabetes can cause.  Your doctor, nurse, or dietitian (food expert) can help you figure out how many carbs to try to eat each day. This will depend on your eating habits, weight, activity level, and which diabetes medicines you take.  People who take insulin before meals might need to be very careful when they count the carbs in every meal and snack. This is so they can give themselves " "the right amount of insulin. If the insulin dose doesn't match the amount of carbs, their blood sugar might get too low or too high. Other people might be able to be a little more flexible as long as they get about the same amount of carbs at each meal or throughout the day.  Which foods have carbs? -- Foods with a lot of carbs include:   Grains - These include bread, pasta, rice, and cereal.   Fruits and starchy vegetables - Starchy vegetables include potatoes, corn, and squash.   Milk and other dairy products - Dairy products include cheese and yogurt.   Foods with added sugar - These include sweets and baked goods likes cookies and cakes, as well as sugary drinks like juice and soda.  It is best to get most of your carbs from fruits, vegetables, whole grains (like whole-wheat bread, whole-grain cereals, and brown rice), and low-fat milk and dairy products.  How do I count carbs? -- To count carbs in packaged foods, check the food's nutrition label (if it has one).  On the label (figure 1), check for:   \"Total Carbohydrate\" number - This tells you how many carbs are in 1 serving size of the food. If you eat 1 serving, then the number of carbs you eat is the same as the number of total carbohydrates.   \"Serving size\" - This tells you how much food is in 1 serving. If you have 2 servings, the number of carbs will be 2 times the number of carbohydrates listed.   \"Dietary Fiber\" - Fiber is a carb that is not digested, which means that it does not raise blood sugar. Foods with a lot of fiber can help manage your blood sugar. If a food has more than 5 grams (g) of fiber, you need less insulin to cover the total carbs in that food. So, if you are calculating an insulin dose, only count the carbs that are not from fiber (figure 1).  What is exchange planning? -- Exchange planning, or the \"exchange system,\" is a way for people to plan their meals without reading labels. This can be helpful since many foods don't come with " "a nutrition label.  The exchange system involves knowing how much of different foods have about 15 grams of carbs (table 1 and table 2 and table 3). Your doctor, nurse, or dietitian gives you a certain number of \"carb choices\" to eat with each meal and snack (table 4). Each \"choice\" is a portion of food that has about 15 grams of carbs. Knowing your options makes it easier to \"exchange\" 1 carb choice for another as you plan your meals and snacks. For example, 1 small apple could be exchanged for 1/3 cup of pasta.  How can I plan my meals? -- First, make sure that you know how many carbs you should be eating each day. Ask your doctor, nurse, or dietitian if you are not sure.  Here are some tips that might help:   Spread out your carbs over 4 to 6 small meals each day instead of 3 big ones.   Eat a similar number of carbs at each meal, for example, at each dinner.   Eat your meals at a similar time each day.   Plan your meals ahead of time.   Use the \"plate method.\" This is a simpler way to make sure that you get a good balance of carbs and other nutrients with each meal. It is not as exact as counting all of your carbs, but it can be helpful for people who prefer a simpler approach. If you take insulin before meals, it is generally better to adjust your insulin dose by counting how many carbs you plan to eat or using the exchange planning strategy.  For the plate method, you start with a plate about 9 inches (23 cm) across. Fill it with (figure 2):   1/2 non-starchy vegetables   1/4 protein   1/4 carbs   Follow your doctor's instructions for how and when to check your blood sugar. This can help you learn how certain foods affect your blood sugar.   Keep track of your meals and blood sugar levels. Show this to your doctor or nurse so they can adjust your treatment if needed. If you take insulin, you will also need to keep track of your exercise patterns and how much insulin you give yourself with each dose.   If you " "take insulin, make sure that you understand how to use it. This includes knowing how to adjust the dose based on your blood sugar level and what you plan to eat. Foods that have a lot of protein or fat also can affect your blood sugar level. Some people need to adjust their insulin doses when they eat these foods.   Remember that other things besides carbs can raise or lower your blood sugar level. These things can include exercise, getting sick, drinking alcohol, traveling, and stress. If you take insulin, make sure that you know how and when to adjust your dose in these situations.  If you are having trouble counting carbs or managing your blood sugar, talk to your doctor or nurse. They can help. A dietitian can also help you plan specific menus that will give you the right amount of carbs each day.  For more information, you can also get a book on counting carbs or check the American Diabetes Association website (www.diabetes.org).  All topics are updated as new evidence becomes available and our peer review process is complete.  This topic retrieved from [employer] on: Mar 27, 2024.  Topic 35229 Version 11.0  Release: 32.2.4 - C32.85  © 2024 UpToDate, Inc. and/or its affiliates. All rights reserved.  figure 1: Counting carbohydrates     To figure out the \"carb count\" in 1 serving, start with the number of grams of total carbohydrates (46 grams), then subtract the number of grams of dietary fiber (7 grams). It's also important to look at the serving size. In this example, the carb count is 39 grams. You can use this number when counting carbs for your insulin dose.  Graphic 38679 Version 8.0  table 1: Bread and grains with 15 grams of carbs*  Bread    Food  Serving size    Bagel 1/4 large bagel (1 oz)   Biscuit 1 biscuit (2.5 inches across)   Bread, reduced calorie, light 2 slices (1.5 oz)   Cornbread 1.75 inch cube (1.5 oz)   English muffin 1/2 muffin   Hot dog or hamburger bun 1/2 bun (3/4 oz)   Naan, chapati, or " "roti 1 oz   Pancake 1 pancake (4 inches across, 1/4 inch thick)   Luzma (6 inches across) 1/2 luzma   Tortilla, corn 1 small tortilla (6 inches across)   Tortilla, flour (white or whole wheat) 1 small tortilla (6 inches across) or 1/3 large tortilla (10 inches across)   Waffle 1 waffle (4-inch square or 4 inches across)   Cereals and grains (including pasta and rice)    Food  Serving size (cooked)    Barley, couscous, millet, pasta (white or whole wheat, all shapes and sizes), polenta, quinoa (all colors), or rice (white, brown, and other colors and types) 1/3 cup   Bran cereal (twigs, buds, or flakes), shredded wheat (plain), or sugar-coated cereal 1/2 cup   Bulgur, kasha, tabbouleh (tabouli), or wild rice 1/2 cup   Granola cereal 1/4 cup   Hot cereal (oats, oatmeal, grits) 1/2 cup   Unsweetened, ready-to-eat cereal 3/4 cup   * For bread and grains, 15 grams of carbs is considered 1 serving or \"choice\" for people who need to count carbs.  Graphic 475709 Version 1.0  table 2: Fruits with 15 grams of carbs*  Food  Serving size    Applesauce, unsweetened 1/2 cup   Banana 1 extra small banana, about 4 inches long (4 oz)   Blueberries 3/4 cup   Dried fruits (blueberries, cherries, cranberries, mixed fruit, raisins) 2 tbsp   Fruit, canned 1/2 cup   Fruit, whole, small (apple) 1 small fruit (4 oz)   Fruit, whole, medium (nectarine, orange, pear, tangerine) 1 medium fruit (6 oz)   Fruit juice, unsweetened 1/2 cup   Grapes 17 small grapes (3 oz)   Melon, diced 1 cup   Strawberries, whole 1 and 1/4 cups   When listed, weight (oz) includes skin and seeds. If you are not sure if your fruit is the right size for 1 serving, you can use a food scale to check the weight.  * For fruits, 15 grams of carbs is considered 1 serving or \"choice\" for people who need to count carbs.  Graphic 451842 Version 1.0  table 3: Starchy vegetables with 15 grams of carbs*  Food  Serving size (cooked)    Cassava, dasheen, or plantain 1/3 cup   Corn, " "green peas, mixed vegetables, or parsnips 1/2 cup   Marinara, pasta, or spaghetti sauce 1/2 cup   Mixed vegetables (with corn or peas) 1 cup   Potato, baked with skin 1/4 large (3 oz)   Potato, Yi-fried (oven-baked) 1 cup (2 oz)   Potato, mashed with milk and fat 1/2 cup   Squash, winter (acorn, butternut) 1 cup   Yam or sweet potato, plain 1/2 cup (3 and 1/2 oz)   If you are not sure if your vegetable is the right size for 1 serving, you can use a food scale to check the weight.  * For starchy vegetables, 15 grams of carbs is considered 1 serving or \"choice\" for people who need to count carbs.  Graphic 464794 Version 1.0  table 4: Sample exchange system meal plan  Time  Exchange pattern  Sample menu  Carbohydrate count (g)    8 am 3 carbohydrate group    2 starch 1 English muffin 30    1 fruit 1 1/4 c strawberries 15    1 protein group 1/4 c cottage cheese -    1 fat group 1 tsp margarine -      Total: 45    12 noon 4 carbohydrate group    2 starch 2 slices of bread 30    1 fruit 1 orange 15    1 vegetable 1 c salad -    1 milk 8 oz skim milk 12    3 protein group 3 oz chicken -    1 fat group 1 tbsp low fat blue -      Total: 57    3 pm 1 carbohydrate group    1 fruit or 1 starch 1 apple or 6 crackers 15      Total: 15    6 pm 4 carbohydrate group    2 starch 1 c potato 30    1 fruit 1/2 c fruit salad 15    1 vegetable 1 c salad -    1 milk 8 oz skim milk 12    6 protein group 6 oz fish -    1 fat group 2 tbsp low fat salad dressing -      Total: 57    9 pm 1 carbohydrate group    1 starch 6 crackers 15    1 protein 2 tbsp peanut butter -      Total: 15    Graphic 07451 Version 3.0  figure 2: The \"plate method\"     For the plate method, you start with a plate about 9 inches (23 cm) across. Then fill it with 1/2 non-starchy vegetables, 1/4 protein, and 1/4 carbs.  Graphic [id number] Version 2.0  Consumer Information Use and Disclaimer   Disclaimer: This generalized information is a limited summary of diagnosis, " treatment, and/or medication information. It is not meant to be comprehensive and should be used as a tool to help the user understand and/or assess potential diagnostic and treatment options. It does NOT include all information about conditions, treatments, medications, side effects, or risks that may apply to a specific patient. It is not intended to be medical advice or a substitute for the medical advice, diagnosis, or treatment of a health care provider based on the health care provider's examination and assessment of a patient's specific and unique circumstances. Patients must speak with a health care provider for complete information about their health, medical questions, and treatment options, including any risks or benefits regarding use of medications. This information does not endorse any treatments or medications as safe, effective, or approved for treating a specific patient. UpToDate, Inc. and its affiliates disclaim any warranty or liability relating to this information or the use thereof.The use of this information is governed by the Terms of Use, available at https://www.[employer].com/en/know/clinical-effectiveness-terms. 2024© UpToDate, Inc. and its affiliates and/or licensors. All rights reserved.  Copyright   © 2024 UpToDate, Inc. and/or its affiliates. All rights reserved.    Patient Education     Treatment for type 2 diabetes   The Basics   Written by the doctors and editors at [facility]   What are the goals of type 2 diabetes treatment? -- The goals of treatment for type 2 diabetes are to:   Manage your blood sugar   Prevent future health problems that can happen in people with diabetes  How is type 2 diabetes treated? -- Type 2 diabetes can be treated with:   Diet changes   Lifestyle changes   Medicines  Your doctor or nurse will work with you to make a treatment plan that is right for you.  What diet and lifestyle changes might be part of my treatment? -- As part of your treatment, your  "doctor or nurse might recommend that you:   Eat healthy foods   Lose weight if you have excess body weight   Get plenty of physical activity   Avoid smoking  Making these lifestyle changes is as important as taking your medicines. They will also help improve your overall health.  What medicines are used to treat type 2 diabetes? -- Different medicines can be used to treat type 2 diabetes. The first medicine that most people with type 2 diabetes take is a pill called metformin (brand name: Glucophage).  How do I know if my treatment is working? -- Your doctor can do a blood test called an \"A1C.\" This test shows what your blood sugar level has been over the past 2 to 3 months.  Another way to know if your treatment is working is to check your blood sugar level yourself. Many people with type 2 diabetes do not need to do this, but some do. It usually involves using a device called a \"blood glucose monitor.\" If your doctor recommends doing this, they will explain how and when to use the device.  What if my blood sugar level is still higher than normal? -- If your blood sugar level is still higher than normal after taking metformin for 2 to 3 months, your doctor might increase your dose. If you are already taking the highest possible dose, your doctor might suggest adding a second medicine.  Which second medicine will I take? -- There are different medicines that your doctor can prescribe. The second medicine could be another pill that you take every day, or a shot that you give yourself once a day or once a week. The choice depends on different things, including how high your blood sugar is, your weight, your other health problems, and whether you are comfortable giving yourself a shot.  A few of these medicines can cause low blood sugar as a side effect. Symptoms of low blood sugar can include:   Sweating and shaking   Feeling hungry   Feeling worried  Low blood sugar should be treated quickly because it can cause you " to pass out. Your doctor or nurse will tell you if your medicine can cause low blood sugar and how to treat it.  What is insulin? -- Insulin is a hormone normally made by the pancreas, an organ in the belly. It helps sugar get into your body's cells. In most people with type 2 diabetes, the body does not respond to insulin normally. Then, over time, the pancreas stops making enough insulin.  Most people with type 2 diabetes can manage their blood sugar with healthy eating, physical activity, and medicines. Some people need to take insulin as part of their treatment plan.  Insulin might be prescribed as a second medicine, or as the only medicine. It usually comes as a shot that people give themselves (figure 1).  If your doctor prescribes insulin, they will tell you:   Which type to use - There are different types of insulin. Some types work faster or last longer than others.   How much to use   When to use it   How to give yourself the shot   When to check your blood sugar level   How to avoid low blood sugar  If you take insulin, your dose will need to change if you get sick, have surgery, travel, or eat out. Your doctor or nurse will talk to you about when and how to change your dose.  What other treatments might I need? -- Sometimes, people with type 2 diabetes need medicines to treat or prevent other health problems. For example, if you have high blood pressure, you might take medicine to lower your blood pressure. This can reduce your chances of having a heart attack or stroke.  When should I see my doctor or nurse? -- Most people with diabetes see their doctor or nurse every 3 or 4 months. During these visits, they will talk with you about your medicines and blood sugar levels. If your blood sugar levels are not where they should be, your doctor or nurse might make changes to your treatment plan.  Taking care of diabetes can be hard, and some people feel sad, stressed, or anxious. Let your doctor or nurse know  if you are struggling, so they can help.  All topics are updated as new evidence becomes available and our peer review process is complete.  This topic retrieved from [employer] on: Feb 26, 2024.  Topic 85714 Version 11.0  Release: 32.2.4 - C32.56  © 2024 UpToDate, Inc. and/or its affiliates. All rights reserved.  figure 1: Giving insulin with a needle and syringe     To give yourself insulin using a needle and syringe:  Pinch up some skin, and quickly insert the needle. Keep the skin pinched to avoid having the insulin go into the muscle.  Push the plunger down all of the way.  Let go of the skin, and remove the needle. If you can see blood or clear fluid (insulin) where the shot went in, press on the area for 5 to 8 seconds, but do not rub.  Graphic 03666 Version 14.0  Consumer Information Use and Disclaimer   Disclaimer: This generalized information is a limited summary of diagnosis, treatment, and/or medication information. It is not meant to be comprehensive and should be used as a tool to help the user understand and/or assess potential diagnostic and treatment options. It does NOT include all information about conditions, treatments, medications, side effects, or risks that may apply to a specific patient. It is not intended to be medical advice or a substitute for the medical advice, diagnosis, or treatment of a health care provider based on the health care provider's examination and assessment of a patient's specific and unique circumstances. Patients must speak with a health care provider for complete information about their health, medical questions, and treatment options, including any risks or benefits regarding use of medications. This information does not endorse any treatments or medications as safe, effective, or approved for treating a specific patient. UpToDate, Inc. and its affiliates disclaim any warranty or liability relating to this information or the use thereof.The use of this information is  "governed by the Terms of Use, available at https://www.woltersParacosmuwer.com/en/know/clinical-effectiveness-terms. 2024© UpToDate, Inc. and its affiliates and/or licensors. All rights reserved.  Copyright   © 2024 UpToDate, Inc. and/or its affiliates. All rights reserved.    Patient Education     Routine physical for adults   The Basics   Written by the doctors and editors at [employer]   What is a physical? -- A physical is a routine visit, or \"check-up,\" with your doctor. You might also hear it called a \"wellness visit\" or \"preventive visit.\"  During each visit, the doctor will:   Ask about your physical and mental health   Ask about your habits, behaviors, and lifestyle   Do an exam   Give you vaccines if needed   Talk to you about any medicines you take   Give advice about your health   Answer your questions  Getting regular check-ups is an important part of taking care of your health. It can help your doctor find and treat any problems you have. But it's also important for preventing health problems.  A routine physical is different from a \"sick visit.\" A sick visit is when you see a doctor because of a health concern or problem. Since physicals are scheduled ahead of time, you can think about what you want to ask the doctor.  How often should I get a physical? -- It depends on your age and health. In general, for people age 21 years and older:   If you are younger than 50 years, you might be able to get a physical every 3 years.   If you are 50 years or older, your doctor might recommend a physical every year.  If you have an ongoing health condition, like diabetes or high blood pressure, your doctor will probably want to see you more often.  What happens during a physical? -- In general, each visit will include:   Physical exam - The doctor or nurse will check your height, weight, heart rate, and blood pressure. They will also look at your eyes and ears. They will ask about how you are feeling and whether you have " "any symptoms that bother you.   Medicines - It's a good idea to bring a list of all the medicines you take to each doctor visit. Your doctor will talk to you about your medicines and answer any questions. Tell them if you are having any side effects that bother you. You should also tell them if you are having trouble paying for any of your medicines.   Habits and behaviors - This includes:   Your diet   Your exercise habits   Whether you smoke, drink alcohol, or use drugs   Whether you are sexually active   Whether you feel safe at home  Your doctor will talk to you about things you can do to improve your health and lower your risk of health problems. They will also offer help and support. For example, if you want to quit smoking, they can give you advice and might prescribe medicines. If you want to improve your diet or get more physical activity, they can help you with this, too.   Lab tests, if needed - The tests you get will depend on your age and situation. For example, your doctor might want to check your:   Cholesterol   Blood sugar   Iron level   Vaccines - The recommended vaccines will depend on your age, health, and what vaccines you already had. Vaccines are very important because they can prevent certain serious or deadly infections.   Discussion of screening - \"Screening\" means checking for diseases or other health problems before they cause symptoms. Your doctor can recommend screening based on your age, risk, and preferences. This might include tests to check for:   Cancer, such as breast, prostate, cervical, ovarian, colorectal, prostate, lung, or skin cancer   Sexually transmitted infections, such as chlamydia and gonorrhea   Mental health conditions like depression and anxiety  Your doctor will talk to you about the different types of screening tests. They can help you decide which screenings to have. They can also explain what the results might mean.   Answering questions - The physical is a good " time to ask the doctor or nurse questions about your health. If needed, they can refer you to other doctors or specialists, too.  Adults older than 65 years often need other care, too. As you get older, your doctor will talk to you about:   How to prevent falling at home   Hearing or vision tests   Memory testing   How to take your medicines safely   Making sure that you have the help and support you need at home  All topics are updated as new evidence becomes available and our peer review process is complete.  This topic retrieved from [employer] on: May 02, 2024.  Topic 982213 Version 1.0  Release: 32.4.3 - C32.122  © 2024 UpToDate, Inc. and/or its affiliates. All rights reserved.  Consumer Information Use and Disclaimer   Disclaimer: This generalized information is a limited summary of diagnosis, treatment, and/or medication information. It is not meant to be comprehensive and should be used as a tool to help the user understand and/or assess potential diagnostic and treatment options. It does NOT include all information about conditions, treatments, medications, side effects, or risks that may apply to a specific patient. It is not intended to be medical advice or a substitute for the medical advice, diagnosis, or treatment of a health care provider based on the health care provider's examination and assessment of a patient's specific and unique circumstances. Patients must speak with a health care provider for complete information about their health, medical questions, and treatment options, including any risks or benefits regarding use of medications. This information does not endorse any treatments or medications as safe, effective, or approved for treating a specific patient. UpToDate, Inc. and its affiliates disclaim any warranty or liability relating to this information or the use thereof.The use of this information is governed by the Terms of Use, available at  https://www.wolterseSnipsuwer.com/en/know/clinical-effectiveness-terms. 2024© [employer] and its affiliates and/or licensors. All rights reserved.  Copyright   © 2024 [employer] and/or its affiliates. All rights reserved.

## 2025-02-07 NOTE — PROGRESS NOTES
Adult Annual Physical  Name: Terri Louis      : 1965      MRN: [id number]  Encounter Provider: Sara Elder DO  Encounter Date: 2025   Encounter department: [facility] [address]  [facility]    Assessment & Plan  Type 2 diabetes mellitus with stage 2 chronic kidney disease, without long-term current use of insulin  (HCC)    Lab Results   Component Value Date    HGBA1C 7.4 (A) 2025     Orders:    POCT hemoglobin A1c    Albumin / creatinine urine ratio; Future    CBC and Platelet; Future    Comprehensive metabolic panel; Future    Lipid Panel with Direct LDL reflex; Future    Empagliflozin (Jardiance) 25 MG TABS; Take 1 tablet (25 mg total) by mouth in the morning    Mixed hyperlipidemia    Orders:    CBC and Platelet; Future    Comprehensive metabolic panel; Future    Lipid Panel with Direct LDL reflex; Future    Primary hypertension    Orders:    CBC and Platelet; Future    Comprehensive metabolic panel; Future    Lipid Panel with Direct LDL reflex; Future    Annual physical exam         BMI 27.0-27.9,adult         Uncontrolled type 2 diabetes mellitus with hyperglycemia (HCC)    Lab Results   Component Value Date    HGBA1C 7.4 (A) 2025            Immunizations and preventive care screenings were discussed with patient today. Appropriate education was printed on patient's after visit summary.    Counseling:  Alcohol/drug use: discussed moderation in alcohol intake, the recommendations for healthy alcohol use, and avoidance of illicit drug use.  Dental Health: discussed importance of regular tooth brushing, flossing, and dental visits.  Sexual health: discussed sexually transmitted diseases, partner selection, use of condoms, avoidance of unintended pregnancy, and contraceptive alternatives.  Exercise: the importance of regular exercise/physical activity was discussed. Recommend exercise 3-5 times per week for at least 30 minutes.       Depression Screening  "and Follow-up Plan: Patient was screened for depression during today's encounter. They screened negative with a PHQ-2 score of 0.      History of Present Illness     Adult Annual Physical:  Patient presents for annual physical. Has been compliant with her medications, has not been eating a good diet. States that she is snacking a lot. She is not exercising.    Saw eye doctor for retina, states that she was told things are improving there.     Has had some UTIs recently, treated by nephrology with Keflex. States that things improved. .     Diet and Physical Activity:  - Diet/Nutrition: poor diet.  - Exercise: no formal exercise.    Depression Screening:  - PHQ-2 Score: 0    General Health:  - Sleep: sleeps well and 4-6 hours of sleep on average.  - Hearing: normal hearing bilateral ears.  - Vision: no vision problems, goes for regular eye exams and most recent eye exam < 1 year ago.  - Dental: regular dental visits, brushes teeth once daily and does not floss.    /GYN Health:  - Follows with GYN: yes.   - Menopause: postmenopausal.     Review of Systems    Objective   /70 (BP Location: Left arm, Patient Position: Sitting, Cuff Size: Standard)   Pulse 70   Temp 98.5 °F (36.9 °C) (Temporal)   Ht 5' 5\" (1.651 m)   Wt 77.1 kg (170 lb)   LMP  (LMP Unknown)   SpO2 97%   BMI 28.29 kg/m²     Physical Exam  Vitals reviewed.   Constitutional:       General: She is not in acute distress.     Appearance: Normal appearance.   HENT:      Head: Normocephalic and atraumatic.      Right Ear: External ear normal.      Left Ear: External ear normal.      Nose: Nose normal.      Mouth/Throat:      Mouth: Mucous membranes are moist.   Eyes:      Extraocular Movements: Extraocular movements intact.      Conjunctiva/sclera: Conjunctivae normal.   Cardiovascular:      Rate and Rhythm: Normal rate and regular rhythm.      Pulses: no weak pulses.           Dorsalis pedis pulses are 2+ on the right side and 2+ on the left side. "        Posterior tibial pulses are 2+ on the right side and 2+ on the left side.      Heart sounds: Murmur heard.   Pulmonary:      Effort: Pulmonary effort is normal.      Breath sounds: Normal breath sounds.   Abdominal:      General: Bowel sounds are normal. There is no distension.      Palpations: Abdomen is soft.      Tenderness: There is no abdominal tenderness.   Musculoskeletal:      Cervical back: Neck supple.      Right lower leg: No edema.      Left lower leg: No edema.   Feet:      Right foot:      Skin integrity: Dry skin present. No ulcer, skin breakdown, erythema, warmth or callus.      Left foot:      Skin integrity: Dry skin present. No ulcer, skin breakdown, erythema, warmth or callus.   Lymphadenopathy:      Cervical: No cervical adenopathy.   Skin:     General: Skin is warm.      Capillary Refill: Capillary refill takes less than 2 seconds.      Findings: No rash.   Neurological:      Mental Status: She is alert. Mental status is at baseline.     Diabetic Foot Exam    Patient's shoes and socks removed.    Right Foot/Ankle   Right Foot Inspection  Skin Exam: skin normal, skin intact and dry skin. No warmth, no callus, no erythema, no maceration, no abnormal color, no pre-ulcer, no ulcer and no callus.     Toe Exam: ROM and strength within normal limits.     Sensory   Vibration: intact  Proprioception: intact  Monofilament testing: intact    Vascular  Capillary refills: < 3 seconds  The right DP pulse is 2+. The right PT pulse is 2+.     Left Foot/Ankle  Left Foot Inspection  Skin Exam: skin normal, skin intact and dry skin. No warmth, no erythema, no maceration, normal color, no pre-ulcer, no ulcer and no callus.     Toe Exam: ROM and strength within normal limits.     Sensory   Vibration: intact  Proprioception: intact  Monofilament testing: intact    Vascular  Capillary refills: < 3 seconds  The left DP pulse is 2+. The left PT pulse is 2+.     Assign Risk Category  No deformity present  No loss  of protective sensation  No weak pulses  Risk: 0        DO [facility]  2/7/2025 7:45 AM

## 2025-02-07 NOTE — TELEPHONE ENCOUNTER
Upon review of the In Basket request we were able to locate, review, and update the patient chart as requested for Diabetic Eye Exam.    Any additional questions or concerns should be emailed to the Practice Liaisons via the appropriate education email address, please do not reply via In Basket.    Thank you  Jacqueline Osborne MA   [facility]

## 2025-02-10 ENCOUNTER — RESULTS FOLLOW-UP (OUTPATIENT)
Dept: FAMILY MEDICINE CLINIC | Facility: CLINIC | Age: 60
End: 2025-02-10

## 2025-02-11 ENCOUNTER — TELEMEDICINE (OUTPATIENT)
Dept: BEHAVIORAL/MENTAL HEALTH CLINIC | Facility: CLINIC | Age: 60
End: 2025-02-11
Payer: COMMERCIAL

## 2025-02-11 DIAGNOSIS — F41.9 ANXIETY DISORDER, UNSPECIFIED TYPE: Primary | ICD-10-CM

## 2025-02-11 PROCEDURE — 90834 PSYTX W PT 45 MINUTES: CPT | Performed by: SOCIAL WORKER

## 2025-02-12 NOTE — PSYCH
"Behavioral Health Psychotherapy Progress Note    Psychotherapy Provided: Individual Psychotherapy     1. Anxiety disorder, unspecified type              Behavioral Health Treatment Plan and Discharge Planning: Tamera Louis is aware of and agrees to continue to work on their treatment plan. They have identified and are working toward their discharge goals. yes    Depression Follow-up Plan Completed: Not applicable    Visit start and stop times:    02/11/25  Start Time: 1545  Stop Time: 1630  Total Visit Time: 45 minutes  Virtual Regular Visit  Therapist met w/pt for individual session.  Symptoms include: stress, racing thoughts, worry.  She shared that she continues to work on utilizing her time outside of work well to help offset the stress at work.  She stated that she has gotten another person in her room at work which seems to have also made a difference.  She shared that she feels more supported and is starting to feel as if she has a \"team\" again.  Therapist and pt discussed that she continues to adjust to having more down time due to being done with school but does recognize her overall anxiety level to be less than it was last year.    Verification of patient location:    Patient is located at Home in the following state in which I hold an active license [place]      Assessment/Plan: f/u in 3 weeks    Problem List Items Addressed This Visit    None  Visit Diagnoses         Anxiety disorder, unspecified type    -  Primary              Reason for visit is No chief complaint on file.       Encounter provider Willow Perdomo      Recent Visits  Date Type Provider Dept   02/11/25 Telemedicine Willow Perdomo [facility] [address]   02/07/25 Office Visit DO [facility] [address]   Showing recent visits within past 7 days and meeting all other requirements  Future Appointments  No visits were found meeting these conditions.  Showing future appointments within next 150 " days and meeting all other requirements       The patient was identified by name and date of birth. Terri Louis was informed that this is a telemedicine visit and that the visit is being conducted throughthe Epic Embedded platform. She agrees to proceed..  My office door was closed. No one else was in the room.  She acknowledged consent and understanding of privacy and security of the video platform. The patient has agreed to participate and understands they can discontinue the visit at any time.    Patient is aware this is a billable service.     Subjective  Terri Louis is a 59 y.o. female  .      HPI     Past Medical History:   Diagnosis Date    Adjustment disorder with anxiety     Anemia     Anxiety     Atypical glandular cells of undetermined significance (ARIA) on cervical Pap smear 09/18/2019    Chronic kidney disease     Constipation     COVID-19 02/07/2024    Dense breasts     Diabetes mellitus (HCC) 2001    GERD (gastroesophageal reflux disease)     Heart murmur     History of atrial paroxysmal tachycardia     History of cerebral artery occlusion     History of chest pain     HPV (human papilloma virus) infection 2012    Hyperlipidemia     SHAE (iron deficiency anemia)     Menopause     Ovarian cyst     Pancreatitis     Plantar fasciitis     Polyclonal hypergammaglobulinemia     Restless legs syndrome (RLS)     Trigger finger        Past Surgical History:   Procedure Laterality Date    CHOLECYSTECTOMY      COLPOSCOPY  2013    [place] ESOPHAGOGASTRODUODENOSCOPY TRANSORAL DIAGNOSTIC N/A 10/19/2017    Procedure: EGD AND COLONOSCOPY;  Surgeon: Mekhi Dickey MD;  Location: [facility];  Service: Gastroenterology    TRIGGER FINGER RELEASE         Current Outpatient Medications   Medication Sig Dispense Refill    ACCU-CHEK FASTCLIX LANCETS MISC by Does not apply route daily      atorvastatin (LIPITOR) 20 mg tablet Take 1 tablet (20 mg total) by mouth daily 90 tablet 0    B Complex-C (SUPER B COMPLEX PO)  Take 1 tablet by mouth daily      BIOTIN PO Take 1 capsule by mouth daily      Blood Glucose Monitoring Suppl (ACCU-CHEK JESSEE [facility]) w/Device KIT by Does not apply route      cholecalciferol (VITAMIN D3) 1,000 units tablet Take 1,000 Units by mouth 2 (two) times a week       clobetasol (TEMOVATE) 0.05 % cream Apply 5 g (1 Application total) topically 2 (two) times a day 30 g 23    clotrimazole-betamethasone (LOTRISONE) 1-0.05 % cream Apply 1 Application topically 2 (two) times a day 30 g 23    cyclobenzaprine (FLEXERIL) 10 mg tablet if needed      Empagliflozin (Jardiance) 25 MG TABS Take 1 tablet (25 mg total) by mouth in the morning 90 tablet 2    gabapentin (NEURONTIN) 300 mg capsule Take 1 capsule (300 mg total) by mouth 3 (three) times a day 270 capsule 0    glucose blood test strip by In Vitro route 4 (four) times a day      Hyaluronic Acid-Vitamin C (HYALURONIC ACID PO) Take 100 mg by mouth daily      ibuprofen (MOTRIN) 800 mg tablet Take 800 mg by mouth as needed        lisinopril (ZESTRIL) 5 mg tablet Take 0.5 tablets (2.5 mg total) by mouth daily 90 tablet 0    LORazepam (ATIVAN) 0.5 mg tablet TAKE 1 TABLET EVERY 12 HOURS AS NEEDED FOR ANXIETY 30 tablet 0    metFORMIN (GLUCOPHAGE) 1000 MG tablet TAKE 1 TABLET TWICE A  tablet 0    Multiple Vitamin (DAILY VALUE MULTIVITAMIN) TABS Take by mouth      Omega-3 Fatty Acids (FISH OIL) 1,000 mg Take 1,000 mg by mouth daily      omeprazole (PriLOSEC) 20 mg delayed release capsule Take 1 capsule (20 mg total) by mouth daily before breakfast 90 capsule 0    Saw [place] 160 MG CAPS Take 250 mg by mouth        semaglutide (Rybelsus) 14 MG tablet TAKE 1 TABLET DAILY BEFORE BREAKFAST 90 tablet 0    Turmeric 500 MG CAPS Take by mouth      zinc gluconate 50 mg tablet Take 50 mg by mouth daily       No current facility-administered medications for this visit.        No Known Allergies    Review of Systems    Video Exam    There were no vitals filed for this  visit.    Physical Exam Pt denied any SI/HI/AH/VH

## 2025-02-19 ENCOUNTER — TELEPHONE (OUTPATIENT)
Dept: NEPHROLOGY | Facility: CLINIC | Age: 60
End: 2025-02-19

## 2025-02-27 DIAGNOSIS — R12 HEARTBURN: ICD-10-CM

## 2025-02-27 RX ORDER — OMEPRAZOLE 20 MG/1
20 CAPSULE, DELAYED RELEASE ORAL
Qty: 90 CAPSULE | Refills: 1 | Status: SHIPPED | OUTPATIENT
Start: 2025-02-27

## 2025-02-28 ENCOUNTER — APPOINTMENT (OUTPATIENT)
Dept: LAB | Facility: MEDICAL CENTER | Age: 60
End: 2025-02-28
Payer: COMMERCIAL

## 2025-02-28 DIAGNOSIS — N18.2 CKD (CHRONIC KIDNEY DISEASE) STAGE 2, GFR 60-89 ML/MIN: ICD-10-CM

## 2025-02-28 LAB
25(OH)D3 SERPL-MCNC: 65.2 NG/ML (ref 30–100)
ALBUMIN SERPL BCG-MCNC: 4.1 G/DL (ref 3.5–5)
ALP SERPL-CCNC: 70 U/L (ref 34–104)
ALT SERPL W P-5'-P-CCNC: 17 U/L (ref 7–52)
ANION GAP SERPL CALCULATED.3IONS-SCNC: 11 MMOL/L (ref 4–13)
AST SERPL W P-5'-P-CCNC: 20 U/L (ref 13–39)
BACTERIA UR QL AUTO: ABNORMAL /HPF
BASOPHILS # BLD AUTO: 0.06 THOUSANDS/ÂΜL (ref 0–0.1)
BASOPHILS NFR BLD AUTO: 1 % (ref 0–1)
BILIRUB SERPL-MCNC: 0.38 MG/DL (ref 0.2–1)
BILIRUB UR QL STRIP: NEGATIVE
BUN SERPL-MCNC: 18 MG/DL (ref 5–25)
CALCIUM SERPL-MCNC: 9.9 MG/DL (ref 8.4–10.2)
CHLORIDE SERPL-SCNC: 101 MMOL/L (ref 96–108)
CLARITY UR: CLEAR
CO2 SERPL-SCNC: 26 MMOL/L (ref 21–32)
COLOR UR: ABNORMAL
CREAT SERPL-MCNC: 0.92 MG/DL (ref 0.6–1.3)
CREAT UR-MCNC: 47.1 MG/DL
EOSINOPHIL # BLD AUTO: 0.19 THOUSAND/ÂΜL (ref 0–0.61)
EOSINOPHIL NFR BLD AUTO: 3 % (ref 0–6)
ERYTHROCYTE [DISTWIDTH] IN BLOOD BY AUTOMATED COUNT: 14.1 % (ref 11.6–15.1)
GFR SERPL CREATININE-BSD FRML MDRD: 67 ML/MIN/1.73SQ M
GLUCOSE P FAST SERPL-MCNC: 133 MG/DL (ref 65–99)
GLUCOSE UR STRIP-MCNC: ABNORMAL MG/DL
HCT VFR BLD AUTO: 41.9 % (ref 34.8–46.1)
HGB BLD-MCNC: 12.4 G/DL (ref 11.5–15.4)
HGB UR QL STRIP.AUTO: NEGATIVE
IMM GRANULOCYTES # BLD AUTO: 0.02 THOUSAND/UL (ref 0–0.2)
IMM GRANULOCYTES NFR BLD AUTO: 0 % (ref 0–2)
KETONES UR STRIP-MCNC: NEGATIVE MG/DL
LEUKOCYTE ESTERASE UR QL STRIP: ABNORMAL
LYMPHOCYTES # BLD AUTO: 2.21 THOUSANDS/ÂΜL (ref 0.6–4.47)
LYMPHOCYTES NFR BLD AUTO: 29 % (ref 14–44)
MCH RBC QN AUTO: 26.9 PG (ref 26.8–34.3)
MCHC RBC AUTO-ENTMCNC: 29.6 G/DL (ref 31.4–37.4)
MCV RBC AUTO: 91 FL (ref 82–98)
MONOCYTES # BLD AUTO: 0.7 THOUSAND/ÂΜL (ref 0.17–1.22)
MONOCYTES NFR BLD AUTO: 9 % (ref 4–12)
NEUTROPHILS # BLD AUTO: 4.41 THOUSANDS/ÂΜL (ref 1.85–7.62)
NEUTS SEG NFR BLD AUTO: 58 % (ref 43–75)
NITRITE UR QL STRIP: NEGATIVE
NON-SQ EPI CELLS URNS QL MICRO: ABNORMAL /HPF
NRBC BLD AUTO-RTO: 0 /100 WBCS
PH UR STRIP.AUTO: 5.5 [PH]
PHOSPHATE SERPL-MCNC: 3.5 MG/DL (ref 2.3–4.1)
PLATELET # BLD AUTO: 277 THOUSANDS/UL (ref 149–390)
PMV BLD AUTO: 10 FL (ref 8.9–12.7)
POTASSIUM SERPL-SCNC: 5.1 MMOL/L (ref 3.5–5.3)
PROT SERPL-MCNC: 7.9 G/DL (ref 6.4–8.4)
PROT UR STRIP-MCNC: NEGATIVE MG/DL
PROT UR-MCNC: 11.4 MG/DL
PROT/CREAT UR: 0.2 MG/G{CREAT} (ref 0–0.1)
PTH-INTACT SERPL-MCNC: 33.2 PG/ML (ref 12–88)
RBC # BLD AUTO: 4.61 MILLION/UL (ref 3.81–5.12)
RBC #/AREA URNS AUTO: ABNORMAL /HPF
SODIUM SERPL-SCNC: 138 MMOL/L (ref 135–147)
SP GR UR STRIP.AUTO: 1.02 (ref 1–1.03)
UROBILINOGEN UR STRIP-ACNC: <2 MG/DL
WBC # BLD AUTO: 7.59 THOUSAND/UL (ref 4.31–10.16)
WBC #/AREA URNS AUTO: ABNORMAL /HPF

## 2025-02-28 PROCEDURE — 81001 URINALYSIS AUTO W/SCOPE: CPT

## 2025-02-28 PROCEDURE — 84156 ASSAY OF PROTEIN URINE: CPT

## 2025-02-28 PROCEDURE — 84100 ASSAY OF PHOSPHORUS: CPT

## 2025-02-28 PROCEDURE — 36415 COLL VENOUS BLD VENIPUNCTURE: CPT

## 2025-02-28 PROCEDURE — 82306 VITAMIN D 25 HYDROXY: CPT

## 2025-02-28 PROCEDURE — 80053 COMPREHEN METABOLIC PANEL: CPT

## 2025-02-28 PROCEDURE — 85025 COMPLETE CBC W/AUTO DIFF WBC: CPT

## 2025-02-28 PROCEDURE — 83970 ASSAY OF PARATHORMONE: CPT

## 2025-02-28 PROCEDURE — 82570 ASSAY OF URINE CREATININE: CPT

## 2025-03-04 ENCOUNTER — TELEMEDICINE (OUTPATIENT)
Dept: BEHAVIORAL/MENTAL HEALTH CLINIC | Facility: CLINIC | Age: 60
End: 2025-03-04
Payer: COMMERCIAL

## 2025-03-04 DIAGNOSIS — F41.9 ANXIETY DISORDER, UNSPECIFIED TYPE: Primary | ICD-10-CM

## 2025-03-04 PROCEDURE — 90834 PSYTX W PT 45 MINUTES: CPT | Performed by: SOCIAL WORKER

## 2025-03-05 NOTE — PSYCH
"Behavioral Health Psychotherapy Progress Note    Psychotherapy Provided: Individual Psychotherapy     1. Anxiety disorder, unspecified type            DATA: Therapist met w/pt for individual session.  Symptoms include: stress and anxiety.  She stated that this week has been better than previous weeks.  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 Pt stated that she was very appreciative of her coworkers.  Pt stated that she just got her diploma in the mail and can't wait for her graduation.  Therapist and pt discussed growth she has made professionally and personally.      During this session, this clinician used the following therapeutic modalities: Cognitive Behavioral Therapy    Substance Abuse was not addressed during this session. If the client is diagnosed with a co-occurring substance use disorder, please indicate any changes in the frequency or amount of use: n/a. Stage of change for addressing substance use diagnoses: No substance use/Not applicable    ASSESSMENT:  Tamera Louis presents with a Euthymic/ normal mood.     her affect is Normal range and intensity, which is congruent, with her mood and the content of the session. The client has made progress on their goals.     Tamera Louis presents with a none risk of suicide, none risk of self-harm, and none risk of harm to others.    For any risk assessment that surpasses a \"low\" rating, a safety plan must be developed.    A safety plan was indicated: no  If yes, describe in detail n/a    PLAN: Between sessions, Tamera Louis will continue to work on engaging in self care and managing stressors. At the next session, the therapist will use Cognitive Behavioral Therapy to address symptoms of anxiety.    Behavioral Health Treatment Plan and Discharge Planning: Tamera Louis is aware of and agrees to continue to work on their treatment plan. They have identified and are " working toward their discharge goals. yes    Depression Follow-up Plan Completed: Not applicable    Visit start and stop times:    25  Start Time: 1545  Stop Time: 1630  Total Visit Time: 45 minutesVirtual Regular VisitName: Terri Louis      : 1965      MRN: [id number]  Encounter Provider: Willow Perdomo  Encounter Date: 3/4/2025   Encounter department: [facility] [address]  :  Assessment & Plan  Anxiety disorder, unspecified type             History of Present Illness     HPI  Review of Systems    Objective   LMP  (LMP Unknown)     Physical Exam    Administrative Statements   Encounter provider Willow Perdomo    The Patient is located at Home and in the following state in which I hold an active license [place].    The patient was identified by name and date of birth. Terri Louis was informed that this is a telemedicine visit and that the visit is being conducted through the Epic Embedded platform. She agrees to proceed..  My office door was closed. No one else was in the room.  She acknowledged consent and understanding of privacy and security of the video platform. The patient has agreed to participate and understands they can discontinue the visit at any time.    I have spent a total time of 45 minutes in caring for this patient on the day of the visit/encounter including Counseling / Coordination of care, not including the time spent for establishing the audio/video connection.

## 2025-03-06 ENCOUNTER — OFFICE VISIT (OUTPATIENT)
Dept: NEPHROLOGY | Facility: CLINIC | Age: 60
End: 2025-03-06
Payer: COMMERCIAL

## 2025-03-06 VITALS
OXYGEN SATURATION: 96 % | TEMPERATURE: 97.2 F | BODY MASS INDEX: 28.32 KG/M2 | DIASTOLIC BLOOD PRESSURE: 70 MMHG | HEART RATE: 83 BPM | WEIGHT: 170 LBS | SYSTOLIC BLOOD PRESSURE: 118 MMHG | RESPIRATION RATE: 16 BRPM | HEIGHT: 65 IN

## 2025-03-06 DIAGNOSIS — I10 PRIMARY HYPERTENSION: ICD-10-CM

## 2025-03-06 DIAGNOSIS — N18.2 CKD (CHRONIC KIDNEY DISEASE) STAGE 2, GFR 60-89 ML/MIN: Primary | ICD-10-CM

## 2025-03-06 DIAGNOSIS — N18.2 TYPE 2 DIABETES MELLITUS WITH STAGE 2 CHRONIC KIDNEY DISEASE, WITHOUT LONG-TERM CURRENT USE OF INSULIN  (HCC): ICD-10-CM

## 2025-03-06 DIAGNOSIS — E11.22 TYPE 2 DIABETES MELLITUS WITH STAGE 2 CHRONIC KIDNEY DISEASE, WITHOUT LONG-TERM CURRENT USE OF INSULIN  (HCC): ICD-10-CM

## 2025-03-06 PROCEDURE — 99214 OFFICE O/P EST MOD 30 MIN: CPT | Performed by: INTERNAL MEDICINE

## 2025-03-06 NOTE — BH TREATMENT PLAN
"Outpatient Behavioral Health Psychotherapy Treatment Plan    Tamera M Heriberto  1965     Date of Initial Psychotherapy Assessment: 3/4/25   Date of Current Treatment Plan: 03/04/25  Treatment Plan Target Date: 9/4/25  Treatment Plan Expiration Date: 9/4/25    Diagnosis:   1. Anxiety disorder, unspecified type            Area(s) of Need: coping skills, support    Long Term Goal 1 (in the client's own words): To be able to manage my stress/anxiety    Stage of Change: Action    Target Date for completion: TBD     Anticipated therapeutic modalities: individual     People identified to complete this goal: pt      Objective 1: (identify the means of measuring success in meeting the objective): Pt will engage in self care at least 1x/week      Objective 2: (identify the means of measuring success in meeting the objective): Pt will identify and implement 2 or more coping skills           I am currently under the care of a [facility] psychiatric provider: no    My [facility] psychiatric provider is: n/a    I am currently taking psychiatric medications: No    I feel that I will be ready for discharge from mental health care when I reach the following (measurable goal/objective): \"I am not sure.\"    For children and adults who have a legal guardian:   Has there been any change to custody orders and/or guardianship status? NA. If yes, attach updated documentation.    I have updated my Crisis Plan and have been offered a copy of this plan    Behavioral Health Treatment Plan [facility]: Diagnosis and Treatment Plan explained to Tamera Huffmana CHRISTOPHER Louis acknowledges an understanding of their diagnosis. Tamera Louis agrees to this treatment plan.    I have been offered a copy of this Treatment Plan. yes        "

## 2025-03-06 NOTE — ASSESSMENT & PLAN NOTE
Lab Results   Component Value Date    HGBA1C 7.4 (A) 02/07/2025     Etiology of CKD has been diabetic nephropathy, hypertensive kidney disease.  Labs obtained revealed serum creatinine of 0.92 mg/dL and EGFR of 69 and within baseline.  Urinalysis is bland.  Quantification of proteinuria revealed 0.2 g and minimally elevated while on low-dose lisinopril and Jardiance  Increased Jardiance to 25 mg daily was noted due to hemoglobin A1c peaking of 7.4 recently.  Weight loss, low-carb diet recommended

## 2025-03-06 NOTE — PROGRESS NOTES
"Name: Terri Louis      : 1965      MRN: [id number]  Encounter Provider: Tessa Roldan MD  Encounter Date: 3/6/2025   Encounter department: [facility]  :  Assessment & Plan  Type 2 diabetes mellitus with stage 2 chronic kidney disease, without long-term current use of insulin  ([facility])    Lab Results   Component Value Date    HGBA1C 7.4 (A) 2025     Etiology of CKD has been diabetic nephropathy, hypertensive kidney disease.  Labs obtained revealed serum creatinine of 0.92 mg/dL and EGFR of 69 and within baseline.  Urinalysis is bland.  Quantification of proteinuria revealed 0.2 g and minimally elevated while on low-dose lisinopril and Jardiance  Increased Jardiance to 25 mg daily was noted due to hemoglobin A1c peaking of 7.4 recently.  Weight loss, low-carb diet recommended       Primary hypertension  Blood pressure within acceptable range at 118/70.           History of Present Illness   HPI  Terri Louis is a 60 y.o. female who presents to renal office for management of CKD.  Patient was diagnosed with urinary tract infection last month and prescribed antibiotic with resolution of UTI.  Admits to having gained weight.  History obtained from: patient    Review of Systems   Constitutional: Negative.    HENT: Negative.     Eyes: Negative.    Respiratory: Negative.     Cardiovascular: Negative.    Gastrointestinal: Negative.    Endocrine: Negative.    Genitourinary: Negative.    Musculoskeletal: Negative.    Skin: Negative.    Allergic/Immunologic: Negative.    Neurological: Negative.    Hematological: Negative.    All other systems reviewed and are negative.    Medical History Reviewed by provider this encounter:     .     Objective   /70 (Patient Position: Sitting, Cuff Size: Large)   Pulse 83   Temp (!) 97.2 °F (36.2 °C) (Temporal)   Resp 16   Ht 5' 5\" (1.651 m)   Wt 77.1 kg (170 lb)   LMP  (LMP Unknown)   SpO2 96%   BMI 28.29 kg/m²    "   Physical Exam  Constitutional:       Appearance: She is well-developed.   HENT:      Head: Normocephalic and atraumatic.   Eyes:      Pupils: Pupils are equal, round, and reactive to light.   Cardiovascular:      Rate and Rhythm: Normal rate and regular rhythm.      Heart sounds: Normal heart sounds.   Pulmonary:      Effort: Pulmonary effort is normal.   Abdominal:      General: Bowel sounds are normal.      Palpations: Abdomen is soft.   Musculoskeletal:         General: Normal range of motion.      Cervical back: Neck supple.   Skin:     General: Skin is warm.   Neurological:      Mental Status: She is alert and oriented to person, place, and time.         Administrative Statements   I have spent a total time of 43 minutes in caring for this patient on the day of the visit/encounter including Diagnostic results, Prognosis, Risks and benefits of tx options, Instructions for management, Importance of tx compliance, Risk factor reductions, Impressions, and Counseling / Coordination of care.

## 2025-03-13 DIAGNOSIS — E78.2 MIXED HYPERLIPIDEMIA: ICD-10-CM

## 2025-03-13 RX ORDER — ATORVASTATIN CALCIUM 20 MG/1
20 TABLET, FILM COATED ORAL DAILY
Qty: 90 TABLET | Refills: 3 | Status: SHIPPED | OUTPATIENT
Start: 2025-03-13

## 2025-03-30 DIAGNOSIS — F41.9 ANXIETY: ICD-10-CM

## 2025-03-31 DIAGNOSIS — E11.9 TYPE 2 DIABETES MELLITUS WITHOUT COMPLICATION, WITHOUT LONG-TERM CURRENT USE OF INSULIN (HCC): ICD-10-CM

## 2025-03-31 DIAGNOSIS — I10 ESSENTIAL HYPERTENSION: ICD-10-CM

## 2025-03-31 DIAGNOSIS — E11.65 UNCONTROLLED TYPE 2 DIABETES MELLITUS WITH HYPERGLYCEMIA (HCC): ICD-10-CM

## 2025-03-31 RX ORDER — LISINOPRIL 5 MG/1
2.5 TABLET ORAL
Qty: 45 TABLET | Refills: 1 | Status: SHIPPED | OUTPATIENT
Start: 2025-03-31

## 2025-03-31 RX ORDER — ORAL SEMAGLUTIDE 14 MG/1
14 TABLET ORAL
Qty: 90 TABLET | Refills: 1 | Status: SHIPPED | OUTPATIENT
Start: 2025-03-31

## 2025-03-31 RX ORDER — LORAZEPAM 0.5 MG/1
0.5 TABLET ORAL EVERY 12 HOURS PRN
Qty: 30 TABLET | Refills: 0 | Status: SHIPPED | OUTPATIENT
Start: 2025-03-31

## 2025-04-01 ENCOUNTER — TELEMEDICINE (OUTPATIENT)
Dept: BEHAVIORAL/MENTAL HEALTH CLINIC | Facility: CLINIC | Age: 60
End: 2025-04-01
Payer: COMMERCIAL

## 2025-04-01 DIAGNOSIS — F41.9 ANXIETY DISORDER, UNSPECIFIED TYPE: Primary | ICD-10-CM

## 2025-04-01 PROCEDURE — 90832 PSYTX W PT 30 MINUTES: CPT | Performed by: SOCIAL WORKER

## 2025-04-03 NOTE — PSYCH
"Virtual Regular VisitName: Terri Louis      : 1965      MRN: [id number]  Encounter Provider: Willowsofia Perdomo  Encounter Date: 2025   Encounter department: [facility] [address]  :  Assessment & Plan  Anxiety disorder, unspecified type           DATA: Therapist met w/pt for individual session.  Symptoms include: stress, feeling overwhelmed, racing thoughts. Pt shared that work has been stressful but she continues to be grateful for the support team she has.  Therapist and pt discussed how it is difficult to implement strategies due to the lack of consistency and \"curveballs\" that occur during the day.    Therapist and pt discussed what pt can control to help manage the stress.    During this session, this clinician used the following therapeutic modalities: Cognitive Behavioral Therapy    Substance Abuse was not addressed during this session. If the client is diagnosed with a co-occurring substance use disorder, please indicate any changes in the frequency or amount of use: unknown. Stage of change for addressing substance use diagnoses: No substance use/Not applicable    ASSESSMENT:  Terri presents with a Euthymic/ normal and Anxious mood. Terri's affect is Normal range and intensity, which is congruent, with their mood and the content of the session. The client has made progress on their goals as evidenced by pts self report.    Terri presents with a none risk of suicide, none risk of self-harm, and none risk of harm to others.    For any risk assessment that surpasses a \"low\" rating, a safety plan must be developed.    A safety plan was indicated: no  If yes, describe in detail n/a    PLAN: Between sessions, Terri will continue to focus on stress management skills. At the next session, the therapist will use Cognitive Behavioral Therapy to address symptoms of anxiety.    Behavioral Health Treatment Plan [facility]: Diagnosis and Treatment Plan " explained to Terri, Terri relates understanding diagnosis and is agreeable to Treatment Plan. No    Depression Follow-up Plan Completed: Not applicable     Reason for visit is No chief complaint on file.     Recent Visits  Date Type Provider Dept   04/01/25 Telemedicine Willow Perdomo [facility] [address]   Showing recent visits within past 7 days and meeting all other requirements  Future Appointments  No visits were found meeting these conditions.  Showing future appointments within next 150 days and meeting all other requirements     History of Present Illness     HPI    Past Medical History   Past Medical History:   Diagnosis Date    Adjustment disorder with anxiety     Anemia     Anxiety     Atypical glandular cells of undetermined significance (ARIA) on cervical Pap smear 09/18/2019    Chronic kidney disease     Constipation     COVID-19 02/07/2024    Dense breasts     Diabetes mellitus (HCC) 2001    GERD (gastroesophageal reflux disease)     Heart murmur     History of atrial paroxysmal tachycardia     History of cerebral artery occlusion     History of chest pain     HPV (human papilloma virus) infection 2012    Hyperlipidemia     SHAE (iron deficiency anemia)     Menopause     Ovarian cyst     Pancreatitis     Plantar fasciitis     Polyclonal hypergammaglobulinemia     Restless legs syndrome (RLS)     Trigger finger      Past Surgical History:   Procedure Laterality Date    CHOLECYSTECTOMY      COLPOSCOPY  2013    FL ESOPHAGOGASTRODUODENOSCOPY TRANSORAL DIAGNOSTIC N/A 10/19/2017    Procedure: EGD AND COLONOSCOPY;  Surgeon: Mekhi Dickey MD;  Location: [facility];  Service: Gastroenterology    TRIGGER FINGER RELEASE       Current Outpatient Medications   Medication Instructions    ACCU-CHEK FASTCLIX LANCETS MISC Daily    atorvastatin (LIPITOR) 20 mg, Oral, Daily    B Complex-C (SUPER B COMPLEX PO) 1 tablet, Daily    BIOTIN PO 1 capsule, Daily    Blood Glucose Monitoring Suppl  (ACCU-CHEK JESSEE CONNECT) w/Device KIT by Does not apply route    cholecalciferol (VITAMIN D3) 1,000 Units, 2 times weekly    clobetasol (TEMOVATE) 0.05 % cream 1 Application, Topical, 2 times daily    clotrimazole-betamethasone (LOTRISONE) 1-0.05 % cream 1 Application, Topical, 2 times daily    cyclobenzaprine (FLEXERIL) 10 mg tablet As needed    Empagliflozin 25 mg, Oral, Daily    fish oil 1,000 mg, Daily    gabapentin (NEURONTIN) 300 mg, Oral, 3 times daily    glucose blood test strip 4 times daily    Hyaluronic Acid-Vitamin C (HYALURONIC ACID PO) 100 mg, Daily    lisinopril (ZESTRIL) 2.5 mg, Oral    LORazepam (ATIVAN) 0.5 mg, Oral, Every 12 hours PRN    metFORMIN (GLUCOPHAGE) 1,000 mg, 2 times daily    Multiple Vitamin (DAILY VALUE MULTIVITAMIN) TABS Take by mouth    omeprazole (PRILOSEC) 20 mg, Oral, Daily before breakfast    Rybelsus 14 mg, Daily before breakfast    Saw Palmetto 250 mg    Turmeric 500 MG CAPS Take by mouth    zinc gluconate 50 mg, Daily     No Known Allergies    Objective   LMP  (LMP Unknown)     Video Exam  Physical Exam     Administrative Statements   Encounter provider Willow Perdomo    The Patient is located at Home and in the following state in which I hold an active license [place].    The patient was identified by name and date of birth. Terri Louis was informed that this is a telemedicine visit and that the visit is being conducted through the Epic Embedded platform. She agrees to proceed..  My office door was closed. No one else was in the room.  She acknowledged consent and understanding of privacy and security of the video platform. The patient has agreed to participate and understands they can discontinue the visit at any time.        Visit Time  Start Time: 1545  Stop Time: 1615  Total Visit Time: 30 minutes

## 2025-04-08 ENCOUNTER — OFFICE VISIT (OUTPATIENT)
Dept: FAMILY MEDICINE CLINIC | Facility: CLINIC | Age: 60
End: 2025-04-08
Payer: COMMERCIAL

## 2025-04-08 VITALS
BODY MASS INDEX: 28.32 KG/M2 | WEIGHT: 170 LBS | HEART RATE: 71 BPM | OXYGEN SATURATION: 98 % | SYSTOLIC BLOOD PRESSURE: 114 MMHG | DIASTOLIC BLOOD PRESSURE: 70 MMHG | HEIGHT: 65 IN

## 2025-04-08 DIAGNOSIS — M72.2 PLANTAR FASCIAL FIBROMATOSIS OF LEFT FOOT: ICD-10-CM

## 2025-04-08 DIAGNOSIS — Z12.31 ENCOUNTER FOR SCREENING MAMMOGRAM FOR BREAST CANCER: Primary | ICD-10-CM

## 2025-04-08 DIAGNOSIS — B35.9 TINEA: ICD-10-CM

## 2025-04-08 PROCEDURE — 99214 OFFICE O/P EST MOD 30 MIN: CPT | Performed by: FAMILY MEDICINE

## 2025-04-08 RX ORDER — CLOTRIMAZOLE AND BETAMETHASONE DIPROPIONATE 10; .64 MG/G; MG/G
1 CREAM TOPICAL 2 TIMES DAILY
Qty: 30 G | Refills: 23 | Status: SHIPPED | OUTPATIENT
Start: 2025-04-08

## 2025-04-08 NOTE — PROGRESS NOTES
"Name: Terri Louis      : 1965      MRN: [id number]  Encounter Provider: Sara Elder DO  Encounter Date: 2025   [facility] [address]  :  Assessment & Plan  Encounter for screening mammogram for breast cancer    Orders:  •  Mammo screening bilateral w 3d and cad; Future    Plantar fascial fibromatosis of left foot    Orders:  •  US extremity soft tissue; Future  •  Ambulatory Referral to Podiatry; Future    Tinea  Refill   Orders:  •  clotrimazole-betamethasone (LOTRISONE) 1-0.05 % cream; Apply 1 Application topically 2 (two) times a day           History of Present Illness   HPI    Notes that she had a painful bump under the left foot. States that this has been there for 3-4 weeks. This has not been changing in size. About the size of a pea.    Has a history of plantar fascitis years ago, had a shot and this has not come back.     Review of Systems    Objective   /70 (BP Location: Left arm, Patient Position: Sitting, Cuff Size: Adult)   Pulse 71   Ht 5' 5\" (1.651 m)   Wt 77.1 kg (170 lb)   LMP  (LMP Unknown)   SpO2 98%   BMI 28.29 kg/m²      Physical Exam  Vitals reviewed.   Constitutional:       General: She is not in acute distress.     Appearance: Normal appearance.   HENT:      Head: Normocephalic and atraumatic.      Right Ear: External ear normal.      Left Ear: External ear normal.      Nose: Nose normal.   Eyes:      Extraocular Movements: Extraocular movements intact.      Conjunctiva/sclera: Conjunctivae normal.   Pulmonary:      Effort: Pulmonary effort is normal. No respiratory distress.   Musculoskeletal:      Comments: Left plantar arch with small firm nodule noted. Tender to deep palpation.    Skin:     General: Skin is warm.   Neurological:      Mental Status: She is alert. Mental status is at baseline.           Sara Elder DO  [facility]  2025 8:22 AM      "

## 2025-04-16 ENCOUNTER — HOSPITAL ENCOUNTER (OUTPATIENT)
Dept: ULTRASOUND IMAGING | Facility: HOSPITAL | Age: 60
Discharge: HOME | End: 2025-04-16
Attending: FAMILY MEDICINE
Payer: COMMERCIAL

## 2025-04-16 DIAGNOSIS — M72.2 PLANTAR FASCIAL FIBROMATOSIS OF LEFT FOOT: ICD-10-CM

## 2025-04-16 PROCEDURE — 76882 US LMTD JT/FCL EVL NVASC XTR: CPT

## 2025-04-19 NOTE — PATIENT INSTRUCTIONS
[facility]    Patient Name: Ronal Olivas  Patient : 1984    Scribe Attestation  By signing my name below, IBibiana Scribe attest that this documentation has been prepared under the direction and in the presence of Kanu Quarles DO.    Scribe Attestation  By signing my name below, Maira CLEMENTS Scribe attest that this documentation has been prepared under the direction and in the presence of Kanu Quarles DO.      Physician Attestation  Kanu CLEMENTS DO, personally performed the services described in the documentation as scribed by Maira Ayoub in my presence, and confirm it is both accurate and complete.    Reason for visit:  This is a 40-year-old male here for follow-up regarding right ventricular dilatation, LVH, hypertension, and morbid obesity.     HPI:  This is a 40-year-old male here for follow-up regarding right ventricular dilatation, LVH, hypertension, and morbid obesity.  The patient was last evaluated by me in May 2024.  At that visit I had ordered blood work including BMP/BNP/magnesium be drawn in 6 months and asked the patient to follow-up in 6 months and sooner if necessary.  Patient was subsequently seen by the cardiology PA in 2024 at which time he was doing reasonably well.  BMP done in 2024 showed normal serum sodium and potassium with a serum creatinine of 0.79, serum magnesium was 2.04, BNP was 19. ECG done today showed sinus rhythm with a heart rate of 90 bpm.  The patient reports that he has been feeling generally well from the cardiac standpoint.  He denies any new chest pain, shortness of breath, palpitations and lightheadedness.  He states that he takes all of his medications as prescribed.  He checks his blood pressure regularly at home and states his systolic range is never over the 130 range.  Patient states he has been losing weight since starting Ozempic.  He reports less edema in his feet.   Urinary Tract Infection in Women   AMBULATORY CARE:   A urinary tract infection (UTI)  is caused by bacteria that get inside your urinary tract  Most bacteria that enter your urinary tract come out when you urinate  If the bacteria stay in your urinary tract, you may get an infection  Your urinary tract includes your kidneys, ureters, bladder, and urethra  Urine is made in your kidneys, and it flows from the ureters to the bladder  Urine leaves the bladder through the urethra  A UTI is more common in your lower urinary tract, which includes your bladder and urethra  Common symptoms include the following:   · Urinating more often or waking from sleep to urinate    · Pain or burning when you urinate    · Pain or pressure in your lower abdomen     · Urine that smells bad    · Blood in your urine    · Leaking urine    Seek care immediately if:   · You are urinating very little or not at all  · You have a high fever with shaking chills  · You have side or back pain that gets worse  Call your doctor if:   · You have a fever  · You do not feel better after 2 days of taking antibiotics  · You are vomiting  · You have questions or concerns about your condition or care  Treatment for a UTI  may include antibiotics to treat a bacterial infection  You may also need medicines to decrease pain and burning, or decrease the urge to urinate often  If you have UTIs often (called recurrent UTIs), you may be given antibiotics to take regularly  You will be given directions for when and how to use antibiotics  The goal is to prevent UTIs but not cause antibiotic resistance by using antibiotics too often  Prevent a UTI:   · Empty your bladder often  Urinate and empty your bladder as soon as you feel the need  Do not hold your urine for long periods of time  · Wipe from front to back after you urinate or have a bowel movement    This will help prevent germs from getting into your urinary tract through During my exam, he was resting comfortably on the exam table.      Assessment/Plan:   Right ventricular dilatation  The patient has a history of right ventricular dilatation.  Echocardiogram done in January 2024 showed normal left ventricular systolic function with an ejection fraction of 60 to 65%, mild septal LVH, dilated right ventricle with normal right ventricular systolic function, and no significant valve abnormalities.  ECG done today showed showed sinus rhythm with a heart rate of 90 bpm.   He denies chest pain, shortness of breath, palpitations or lightheadedness.   He does not appear significantly volume overloaded on exam today.   He should continue current cardiac medications.  Recent lab works as noted in the HPI.   Lab works were ordered as noted below.   I discussed with him the importance of following a low-sodium heart healthy diet as well as weight loss.  Follow up in 1 year and sooner if necessary.      LVH  The patient has a history of LVH.  Echocardiogram done in January 2024 showed normal left ventricular systolic function with an ejection fraction of 60 to 65%, mild septal LVH, dilated right ventricle with normal right ventricular systolic function, and no significant valve abnormalities.  ECG done today showed showed sinus rhythm with a heart rate of 90 bpm.   He denies chest pain, shortness of breath, palpitations or lightheadedness.   Blood pressure appears controlled on exam today.  He should continue current antihypertensive medications.  Recent lab works as noted in the HPI.  Lab works were ordered as noted below.   Continue risk factor modification.  Follow up in 1 year and sooner if necessary.      3. Hypertension  The patient has a history of hypertension which appears controlled on exam today.  He should continue his current antihypertensive medications and monitor his blood pressure at home.      4. Morbid obesity  Patient recently started Ozempic and has lost a total of 25 pounds  your urethra  · Drink liquids as directed  Ask how much liquid to drink each day and which liquids are best for you  You may need to drink more liquids than usual to help flush out the bacteria  Do not drink alcohol, caffeine, or citrus juices  These can irritate your bladder and increase your symptoms  Your healthcare provider may recommend cranberry juice to help prevent a UTI  · Urinate after you have sex  This can help flush out bacteria passed during sex  · Do not douche or use feminine deodorants  These can change the chemical balance in your vagina  · Change sanitary pads or tampons often  This will help prevent germs from getting into your urinary tract  · Talk to your healthcare provider about your birth control method  You may need to change your method if it is increasing your risk for UTIs  · Wear cotton underwear and clothes that are loose  Tight pants and nylon underwear can trap moisture and cause bacteria to grow  · Vaginal estrogen may be recommended  This medicine helps prevent UTIs in women who have gone through menopause or are in amie-menopause  · Do pelvic muscle exercises often  Pelvic muscle exercises may help you start and stop urinating  Strong pelvic muscles may help you empty your bladder easier  Squeeze these muscles tightly for 5 seconds like you are trying to hold back urine  Then relax for 5 seconds  Gradually work up to squeezing for 10 seconds  Do 3 sets of 15 repetitions a day, or as directed  Follow up with your doctor as directed:  Write down your questions so you remember to ask them during your visits  © [employer] 2021 Information is for End User's use only and may not be sold, redistributed or otherwise used for commercial purposes  All illustrations and images included in CareNotes® are the copyrighted property of [employer]  or [employer]   The above information is an  only   It is not intended as medical advice for individual conditions or treatments  Talk to your doctor, nurse or pharmacist before following any medical regimen to see if it is safe and effective for you  so far.   Please see lifestyle recommendations below.      Orders:   BMP/magnesium,   Follow-up in 1 year.    Lifestyle Recommendations  I recommend a whole-food plant-based diet, an eating pattern that encourages the consumption of unrefined plant foods (such as fruits, vegetables, tubers, whole grains, legumes, nuts and seeds) and discourages meats, dairy products, eggs and processed foods.     The AHA/ACC recommends that the patient consume a dietary pattern that emphasizes intake of vegetables, fruits, and whole grains; includes low-fat dairy products, poultry, fish, legumes, non-tropical vegetable oils, and nuts; and limits intake of sodium, sweets, sugar-sweetened beverages, and red meats.  Adapt this dietary pattern to appropriate calorie requirements (a 500-750 kcal/day deficit to loose weight), personal and cultural food preferences, and nutrition therapy for other medical conditions (including diabetes).  Achieve this pattern by following plans such as the [place] Mediterranean, DASH dietary pattern, or AHA diet.     Engage in 2 hours and 30 minutes per week of moderate-intensity physical activity, or 1 hour and 15 minutes (75 minutes) per week of vigorous-intensity aerobic physical activity, or an equivalent combination of moderate and vigorous-intensity aerobic physical activity. Aerobic activity should be performed in episodes of at least 10 minutes preferably spread throughout the week.     Adhering to a heart healthy diet, regular exercise habits, avoidance of tobacco products, and maintenance of a healthy weight are crucial components of their heart disease risk reduction.     Any positive review of systems not specifically addressed in the office visit today should be evaluated and treated by the patients primary care physician or in an emergency department if necessary     Patient was notified that results from ordered tests will be called to the patient if it changes current management; it will  "otherwise be discussed at a future appointment and available on  [employer].     Thank you for allowing me to participate in the care of this patient.        This document was generated using the assistance of voice recognition software. If there are any errors of spelling, grammar, syntax, or meaning; please feel free to contact me directly for clarification.    Past Medical History:  He has a past medical history of ADHD (attention deficit hyperactivity disorder), Headache, Hypertension, and Seizures (Multi).    Past Surgical History:  He has a past surgical history that includes No past surgeries.      Social History:  He reports that he has never smoked. He has never been exposed to tobacco smoke. He has never used smokeless tobacco. He reports that he does not currently use alcohol. He reports that he does not use drugs.    Family History:  Family History  Problem Relation Name Age of Onset    No Known Problems Mother      No Known Problems Father      Hypertension Maternal Grandfather Champion         Allergies:  Shellfish containing products and Dilantin [phenytoin sodium extended]    Outpatient Medications:  Current Outpatient Medications   Medication Instructions    lisinopril 20 mg, oral, 2 times daily    MAGNESIUM ORAL Take by mouth. \"Sometimes but not often\"    metFORMIN (GLUCOPHAGE) 1,000 mg, oral, 2 times daily (morning and late afternoon)    NON FORMULARY Minh Inchi    semaglutide 0.25 mg, subcutaneous, Every 7 days, Inject 0.25 mg weekly for 4 weeks then increase to 0.5 mg.    spironolactone (ALDACTONE) 25 mg, oral, 2 times daily        ROS:  A 14 point review of systems was done and is negative other than as stated in HPI    Vitals:      5/6/2024    12:43 PM 6/10/2024     9:21 AM 7/15/2024     9:30 AM 9/24/2024     9:44 AM 11/11/2024    12:39 PM 3/24/2025     9:41 AM 3/25/2025     7:38 AM   Vitals   Systolic 140 138 116 134 124 128 117   Diastolic 92 82 81 76 82 74 80   BP Location   Left arm  Left " "arm  Left arm   Heart Rate 79  95  81  83   Temp   36.3 °C (97.3 °F)    36.6 °C (97.9 °F)   Height 1.803 m (5' 11\")    1.803 m (5' 11\")     Weight (lb) 413 411 409.4 410 414 390 391.6   BMI 57.6 kg/m2 57.32 kg/m2 57.1 kg/m2 57.18 kg/m2 57.74 kg/m2 54.39 kg/m2 54.62 kg/m2   BSA (m2) 3.06 m2 3.05 m2 3.05 m2 3.05 m2 3.07 m2 2.98 m2 2.99 m2        Physical Exam:   Constitutional: Cooperative, in no acute distress, alert, appears stated age.  Skin: Skin color, texture, turgor normal. No rashes or lesions.  Head: Normocephalic. No masses, lesions, tenderness or abnormalities  Eyes: Extraocular movements are grossly intact.  Mouth and throat: Mucous membranes moist  Neck: Neck supple, no carotid bruits, no JVD  Respiratory: Lungs clear to auscultation, no wheezing or rhonchi, no use of accessory muscles  Chest wall: No scars, normal excursion with respiration  Cardiovascular: Regular rhythm without murmur  Gastrointestinal: Abdomen soft, nontender. Bowel sounds normal. Morbidly obese.  Musculoskeletal: Strength equal in upper extremities  Extremities: Trace pitting edema in lower extremities bilaterally  Neurologic: Sensation grossly intact, alert and oriented ×3        Intake/Output:   No intake/output data recorded.    Outpatient Medications  Current Outpatient Medications on File Prior to Visit   Medication Sig Dispense Refill    lisinopril 20 mg tablet Take 1 tablet (20 mg) by mouth 2 times a day. 180 tablet 0    MAGNESIUM ORAL Take by mouth. \"Sometimes but not often\"      metFORMIN (Glucophage) 500 mg tablet Take 2 tablets (1,000 mg) by mouth 2 times daily (morning and late afternoon). 120 tablet 11    NON FORMULARY Minh Inchi      semaglutide 0.25 mg or 0.5 mg (2 mg/3 mL) pen injector Inject 0.25 mg under the skin every 7 days. Inject 0.25 mg weekly for 4 weeks then increase to 0.5 mg. 3 mL 2    spironolactone (Aldactone) 25 mg tablet TAKE 1 TABLET BY MOUTH TWICE A  tablet 0     No current " facility-administered medications on file prior to visit.       Labs: (past 26 weeks)  Recent Results (from the past 26 weeks)   Basic Metabolic Panel    Collection Time: 11/09/24  8:59 AM   Result Value Ref Range    Glucose 97 74 - 99 mg/dL    Sodium 139 136 - 145 mmol/L    Potassium 4.8 3.5 - 5.3 mmol/L    Chloride 105 98 - 107 mmol/L    Bicarbonate 27 21 - 32 mmol/L    Anion Gap 12 10 - 20 mmol/L    Urea Nitrogen 13 6 - 23 mg/dL    Creatinine 0.79 0.50 - 1.30 mg/dL    eGFR >90 >60 mL/min/1.73m*2    Calcium 8.9 8.6 - 10.3 mg/dL   B-Type Natriuretic Peptide    Collection Time: 11/09/24  8:59 AM   Result Value Ref Range    BNP 19 0 - 99 pg/mL   Magnesium    Collection Time: 11/09/24  8:59 AM   Result Value Ref Range    Magnesium 2.04 1.60 - 2.40 mg/dL       ECG  No results found for this or any previous visit (from the past 4464 hours).    Echocardiogram  No results found for this or any previous visit from the past 1095 days.      CV Studies:  EKG: No results found for this or any previous visit (from the past 4464 hours).  Echocardiogram: No results found for this or any previous visit from the past 1825 days.    Stress Testing IMGRESULT(XWS0685:1:1825): No results found for this or any previous visit from the past 1825 days.    Cardiac Catheterization: No results found for this or any previous visit from the past 1825 days.  No results found for this or any previous visit from the past 3650 days.     Cardiac Scoring:   CT cardiac scoring wo IV contrast 04/14/2024    Narrative  Interpreted By:  Andres Fitzgerald,  STUDY:  CT CARDIAC SCORING WO IV CONTRAST;  4/14/2024 11:27 am    INDICATION:  Signs/Symptoms:See associated diagnoses.    COMPARISON:  None.    ACCESSION NUMBER(S):  [id number]    ORDERING CLINICIAN:  ANTONIETA SULLIVAN    TECHNIQUE:  Using prospective ECG gating, CT scan of the coronary arteries was  performed without intravenous contrast. Coronary calcium scoring  was  performed according to the method of  "Rosalba.    FINDINGS:  The calcium score in the coronary arteries is as follows:    LM 0  LAD 0  LCx 0  RCA 0    Total 0    The visualized mid/lower ascending thoracic aorta measures 3.1 cm in  diameter. The heart is normal in size. No pericardial effusion is  present.    No gross evidence of mediastinal or hilar lymphadenopathy or masses  is identified. The visualized segments of the lungs are normally  expanded.    The visualized subdiaphragmatic structures appear intact.    Impression  Coronary artery calcium score of 0*.    *Coronary artery calcium scoring may be helpful in predicting the  risk for future coronary heart disease events.  According to the  American College of Cardiology Foundation Clinical Expert Consensus  Task Force, such testing provides important prognostic information in  patients with more than one coronary heart disease risk factor. The  coronary artery calcium score correlates with the annual risk of a  non-fatal myocardial infarction or coronary heart disease death.    Coronary artery score            Annual Risk    0-99                             0.4%  100-399                        1.3%  >400                            2.4%    These three \"breakpoints\" correspond to lower, intermediate and high  risk states for future coronary events.  Such information should be  used, along with appropriate clinical judgment, to make decisions  regarding the intensity of risk factor management strategies to treat  blood lipids and to modify other non-lipid coronary risk factors.    Reference: [place] P et al. Circulation.  2007; 115:402-426    MACRO:  None    Signed by: Andres Fitzgerald 4/15/2024 4:56 PM  Dictation workstation:   [id number]    AAA : No results found for this or any previous visit from the past 1825 days.    OTHER: No results found for this or any previous visit from the past 1825 days.    LAST IMAGING RESULTS  ECG 12 Lead  Sinus rhythm, possible inferior infarct age undetermined, " heart rate 79   bpm.      Problem List Items Addressed This Visit       Benign essential hypertension    Morbid obesity (Multi)    Right ventricular dilation - Primary    LVH (left ventricular hypertrophy)          Kanu Quarles DO, FACC, FACOI

## 2025-04-24 ENCOUNTER — TELEPHONE (OUTPATIENT)
Age: 60
End: 2025-04-24

## 2025-04-24 DIAGNOSIS — M72.2 PLANTAR FASCIAL FIBROMATOSIS OF LEFT FOOT: Primary | ICD-10-CM

## 2025-04-24 DIAGNOSIS — M72.2 PLANTAR FASCIAL FIBROMATOSIS OF RIGHT FOOT: ICD-10-CM

## 2025-04-24 NOTE — TELEPHONE ENCOUNTER
[facility] called stating there are significant findings for the recent US Extremities 4/15

## 2025-04-24 NOTE — TELEPHONE ENCOUNTER
Ordered MRI left foot w wo contrast. Called pt and there was no answer. Please let her know MRI follow up is ordered.    Shasta Gill PA-C  [facility]  4/24/2025 3:31 PM

## 2025-05-04 ENCOUNTER — HOSPITAL ENCOUNTER (OUTPATIENT)
Dept: MRI IMAGING | Facility: HOSPITAL | Age: 60
Discharge: HOME | End: 2025-05-04
Payer: COMMERCIAL

## 2025-05-04 DIAGNOSIS — M72.2 PLANTAR FASCIAL FIBROMATOSIS OF LEFT FOOT: ICD-10-CM

## 2025-05-04 PROCEDURE — 73720 MRI LWR EXTREMITY W/O&W/DYE: CPT

## 2025-05-04 PROCEDURE — A9585 GADOBUTROL INJECTION: HCPCS

## 2025-05-04 RX ORDER — GADOBUTROL 604.72 MG/ML
7 INJECTION INTRAVENOUS
Status: COMPLETED | OUTPATIENT
Start: 2025-05-04 | End: 2025-05-04

## 2025-05-04 RX ADMIN — GADOBUTROL 7 ML: 604.72 INJECTION INTRAVENOUS at 10:55

## 2025-05-07 ENCOUNTER — TELEPHONE (OUTPATIENT)
Dept: OTHER | Facility: OTHER | Age: 60
End: 2025-05-07

## 2025-05-08 NOTE — TELEPHONE ENCOUNTER
Patient is calling regarding cancelling an appointment.    Date/Time: 05/08 @300p    Patient was rescheduled: YES [] NO [x]    Patient requesting call back to reschedule: YES [x] NO []

## 2025-05-13 ENCOUNTER — OFFICE VISIT (OUTPATIENT)
Dept: PODIATRY | Facility: CLINIC | Age: 60
End: 2025-05-13
Payer: COMMERCIAL

## 2025-05-13 VITALS — BODY MASS INDEX: 28.19 KG/M2 | HEIGHT: 65 IN | WEIGHT: 169.2 LBS

## 2025-05-13 DIAGNOSIS — M72.2 PLANTAR FASCIAL FIBROMATOSIS OF LEFT FOOT: Primary | ICD-10-CM

## 2025-05-13 DIAGNOSIS — E11.9 TYPE 2 DIABETES MELLITUS WITHOUT COMPLICATION, UNSPECIFIED WHETHER LONG TERM INSULIN USE (HCC): ICD-10-CM

## 2025-05-13 DIAGNOSIS — B35.3 TINEA PEDIS OF BOTH FEET: ICD-10-CM

## 2025-05-13 PROCEDURE — 99243 OFF/OP CNSLTJ NEW/EST LOW 30: CPT | Performed by: PODIATRIST

## 2025-05-13 RX ORDER — TERBINAFINE HYDROCHLORIDE 250 MG/1
250 TABLET ORAL DAILY
Qty: 14 TABLET | Refills: 0 | Status: SHIPPED | OUTPATIENT
Start: 2025-05-13 | End: 2025-05-27

## 2025-05-13 NOTE — PATIENT INSTRUCTIONS
"Patient Education     Foot care for people with diabetes   The Basics   Written by the doctors and editors at [facility]   Why is foot care important if I have diabetes? -- Diabetes can cause nerve damage if your blood sugar is high for a long time. The medical term for this is \"diabetic neuropathy.\"  If you have problems with the nerves in your feet, you might not be able to feel pain in your foot. Normally, people feel pain when they get a cut or a blister on their foot. The pain tells them that they need to treat their cut so it can heal. But people with nerve damage might not feel any pain when their feet get hurt. They might not even know that they have a cut, so they might not treat it. Problems that aren't treated right away can get much worse. For example, an untreated cut can get infected and turn into an open sore.  High blood sugar can also damage blood vessels and decrease blood flow to your feet. This can weaken your skin and make wounds take longer to heal. You are also more likely to get an infection if you have high blood sugar.  How do I take care of my feet? -- Taking good care of your feet can help prevent foot problems. You should:   Wash your feet every day with soap and warm water. Pat your feet dry, and be sure to dry the skin between your toes.   Keep your feet moisturized. Put lotion on the tops and bottoms of your feet, but not between your toes.   Check your feet every day (figure 1). Look for cuts, blisters, redness, or swelling. Use a mirror, or ask someone to help you check the bottoms of your feet. Check all parts of the foot, especially between the toes. Look for broken skin, ulcers, blisters, or redness.   Trim your toenails straight across when needed (figure 2). Do not cut the corners of your toenails. File rough edges. Do not cut your cuticles. Ask for help if you cannot see well or have problems reaching your feet.   Ask your doctor or nurse to check your feet at each visit. Take " your shoes and socks off for these checks.   See a foot care provider (such as a podiatrist) if you have an ingrown toenail, corn, or callus. Do not try to remove corns and calluses yourself.  How do I protect my feet from injury? -- There are several ways to protect your feet. You can:   Wear shoes and socks at all times, even at home. Do not walk barefoot. Wear swim shoes if you go to the beach or a swimming pool.   Choose shoes that fit well. They should not be not too tight or too loose. Your shoes should have plenty of room for your toes (figure 3). Your doctor might give you a prescription for special shoes. Check to see if they are covered by your insurance.   Check your shoes each time before you put them on to make sure that the lining is smooth. Also check to make sure that there is nothing inside the shoes before putting them on.   Do not wear shoes that expose any part of the foot, like sandals, thongs, or clogs.   Wear cotton socks that fit loosely. Do not wear shoes without socks.   Protect your feet from heat and cold. Test bath water before putting your feet in it to make sure that it is not too hot. Do not walk barefoot on hot ground. Take extra care when going outside in the cold and wear warm socks.  What else should I know? -- You can lower your risk for foot problems by keeping your blood sugar levels as close to your goal as possible. Other things you can do include:   Move your ankles and toes often to help with blood flow. You can wear a support stocking to help with swelling.   Walk often. Regular walking helps blood flow.   If you smoke, try to quit. Your doctor or nurse can help. Smoking causes poor blood flow to your feet and can damage your nerves.  When should I call the doctor? -- Call your doctor or nurse for advice if you have:   A fever of 100.4°F (38°C) or higher, chills, or a wound that will not heal   Swelling, redness, warmth around a wound, a foul smell coming from a wound, or  yellowish, greenish, or bloody discharge   Sores or blisters on your feet that hurt more or less than you would expect   Numbness or tingling in your foot or leg   Corns, calluses, blisters, or new sores on your foot   Very dry, scaly, or cracked skin on your feet   Changes in the way your foot joints or arch look  All topics are updated as new evidence becomes available and our peer review process is complete.  This topic retrieved from [employer] on: Mar 13, 2024.  Topic 920655 Version 2.0  Release: 32.2.4 - C32.71  © 2024 UpToDate, Inc. and/or its affiliates. All rights reserved.  figure 1: Foot check for people with diabetes     People with diabetes should check both of their feet every day. It is important to check your feet all over, including in between your toes. If you can't see the bottom of your foot, use a mirror or ask another person to check for you. Let your doctor or nurse know if you find any:  Redness   Cuts or cracks in the skin   Blisters   Swelling   Graphic 51671 Version 3.0  figure 2: Trim your toenails     Trim your toenails straight across and smooth them with a nail file.  Graphic 18483 Version 2.0  figure 3: Correct shoe shape     Choose shoes that fit the right way and are not too tight or too loose. Your shoes should have plenty of room for your toes.  Graphic 47799 Version 2.0  Consumer Information Use and Disclaimer   Disclaimer: This generalized information is a limited summary of diagnosis, treatment, and/or medication information. It is not meant to be comprehensive and should be used as a tool to help the user understand and/or assess potential diagnostic and treatment options. It does NOT include all information about conditions, treatments, medications, side effects, or risks that may apply to a specific patient. It is not intended to be medical advice or a substitute for the medical advice, diagnosis, or treatment of a health care provider based on the health care provider's  examination and assessment of a patient's specific and unique circumstances. Patients must speak with a health care provider for complete information about their health, medical questions, and treatment options, including any risks or benefits regarding use of medications. This information does not endorse any treatments or medications as safe, effective, or approved for treating a specific patient. UpToDate, Inc. and its affiliates disclaim any warranty or liability relating to this information or the use thereof.The use of this information is governed by the Terms of Use, available at https://www.woltersNoiseFreeuwer.com/en/know/clinical-effectiveness-terms. 2024© UpToDate, Inc. and its affiliates and/or licensors. All rights reserved.  Copyright   © 2024 UpToDate, Inc. and/or its affiliates. All rights reserved.

## 2025-05-13 NOTE — LETTER
May 14, 2025     Sara Elder DO  [address] [address]    Patient: Terri Louis   YOB: 1965   Date of Visit: 5/13/2025       Dear Dr. Sara Elder DO:    Thank you for referring Terri Louis to me for evaluation. Below are my notes for this consultation.    If you have questions, please do not hesitate to call me. I look forward to following your patient along with you.         Sincerely,        Kamlesh Masters DPM        CC: No Recipients    Kamlesh Masters DPM  5/14/2025  1:14 PM  Sign when Signing Visit                 PATIENT:  Terri Louis  1965         ASSESSMENT:     1. Plantar fascial fibromatosis of left foot  Ambulatory Referral to Podiatry      2. Tinea pedis of both feet  terbinafine (LamISIL) 250 mg tablet      3. Type 2 diabetes mellitus without complication, unspecified whether long term insulin use (HCC)                  PLAN:  1. Reviewed medical records.  Reviewed MRI and ultrasound.  Patient was counseled and educated on the condition and the diagnosis.    2. The diagnosis, treatment options and prognosis were discussed with the patient.    3. She has stable plantar fibroma without significant pain.  Instructed supportive care, home exercise, and proper footwear/ arch support.   Discussed possible intralesional injection depending on the progress.    4. She has chronic tinea pedis.  Will add oral Lamisil for 2 weeks.  Pt to use Lotrisone bid for 1 month.  5. Educated disease prevention and risks related to diabetes.  Educated proper daily foot care and exam.  Instructed proper skin care / protection and footwear.   6. The recent blood work was reviewed / discussed and the last HbA1c was 7.4.  Discussed proper blood glucose control with diet and exercise.    7. Patient will return in 6 months for re-evaluation.         Imaging: I have personally reviewed pertinent films in PACS  Labs, pathology, and Other Studies: I have personally  reviewed pertinent reports.        Subjective:       HPI  The patient was referred to my office for evaluation of the lump in left foot.  She noticed a lump on left plantar foot in the last few months.  Denied injury.  No significant pain when she walks.  She also has skin peeling in her feet.  She was put on a cream with some improvement.   The patient has diabetes for a few years.  The blood glucose is under control.  No history of diabetic foot ulcer or related foot infection.  No significant numbness or paresthesia.  Denied weakness or significant functional deficit.        The following portions of the patient's history were reviewed and updated as appropriate: allergies, current medications, past family history, past medical history, past social history, past surgical history and problem list.  All pertinent labs and images were reviewed.      Past Medical History  Past Medical History:   Diagnosis Date   • Adjustment disorder with anxiety    • Anemia 2018/19   • Anxiety 2010   • Atypical glandular cells of undetermined significance (ARIA) on cervical Pap smear 09/18/2019   • BRCA1 negative    • BRCA2 negative    • Chronic kidney disease 2018   • Constipation    • COVID-19 02/07/2024   • Dense breasts    • Diabetes mellitus (HCC) 2001   • GERD (gastroesophageal reflux disease) 2014   • Heart murmur     N/A   • History of atrial paroxysmal tachycardia    • History of cerebral artery occlusion    • History of chest pain    • HPV (human papilloma virus) infection 2012   • Hyperlipidemia    • SHAE (iron deficiency anemia)    • Menopause    • Ovarian cyst    • Pancreatitis    • Plantar fasciitis    • Polyclonal hypergammaglobulinemia    • PTSD (post-traumatic stress disorder) 9/28/23   • Restless legs syndrome (RLS)    • Trigger finger        Past Surgical History  Past Surgical History:   Procedure Laterality Date   • CHOLECYSTECTOMY  1998   • COLPOSCOPY  2013   • IA ESOPHAGOGASTRODUODENOSCOPY TRANSORAL DIAGNOSTIC  N/A 10/19/2017    Procedure: EGD AND COLONOSCOPY;  Surgeon: Mekhi Dickey MD;  Location: [facility];  Service: Gastroenterology   • TRIGGER FINGER RELEASE          Allergies:  Patient has no known allergies.    Medications:  Current Outpatient Medications   Medication Sig Dispense Refill   • ACCU-CHEK FASTCLIX LANCETS MISC by Does not apply route daily     • atorvastatin (LIPITOR) 20 mg tablet TAKE 1 TABLET DAILY 90 tablet 3   • B Complex-C (SUPER B COMPLEX PO) Take 1 tablet by mouth daily     • Blood Glucose Monitoring Suppl (ACCU-CHEK JESSEE CONNECT) w/Device KIT by Does not apply route     • cholecalciferol (VITAMIN D3) 1,000 units tablet Take 1,000 Units by mouth 2 (two) times a week      • clobetasol (TEMOVATE) 0.05 % cream Apply 5 g (1 Application total) topically 2 (two) times a day 30 g 23   • clotrimazole-betamethasone (LOTRISONE) 1-0.05 % cream Apply 1 Application topically 2 (two) times a day 30 g 23   • Empagliflozin (Jardiance) 25 MG TABS Take 1 tablet (25 mg total) by mouth in the morning 90 tablet 2   • gabapentin (NEURONTIN) 300 mg capsule Take 1 capsule (300 mg total) by mouth 3 (three) times a day 270 capsule 0   • glucose blood test strip by In Vitro route 4 (four) times a day     • Hyaluronic Acid-Vitamin C (HYALURONIC ACID PO) Take 100 mg by mouth daily     • lisinopril (ZESTRIL) 5 mg tablet TAKE ONE-HALF (1/2) TABLET DAILY 45 tablet 1   • LORazepam (ATIVAN) 0.5 mg tablet TAKE 1 TABLET EVERY 12 HOURS AS NEEDED FOR ANXIETY 30 tablet 0   • metFORMIN (GLUCOPHAGE) 1000 MG tablet TAKE 1 TABLET TWICE A  tablet 1   • Multiple Vitamin (DAILY VALUE MULTIVITAMIN) TABS Take by mouth     • Omega-3 Fatty Acids (FISH OIL) 1,000 mg Take 1,000 mg by mouth daily     • omeprazole (PriLOSEC) 20 mg delayed release capsule TAKE 1 CAPSULE DAILY BEFORE BREAKFAST 90 capsule 1   • Saw Palmetto 160 MG CAPS Take 250 mg by mouth       • semaglutide (Rybelsus) 14 MG tablet TAKE 1 TABLET DAILY BEFORE BREAKFAST 90  tablet 1   • terbinafine (LamISIL) 250 mg tablet Take 1 tablet (250 mg total) by mouth daily for 14 days 14 tablet 0   • Turmeric 500 MG CAPS Take by mouth     • zinc gluconate 50 mg tablet Take 50 mg by mouth daily     • BIOTIN PO Take 1 capsule by mouth daily (Patient not taking: Reported on 2025)     • cyclobenzaprine (FLEXERIL) 10 mg tablet if needed (Patient not taking: Reported on 2025)       No current facility-administered medications for this visit.       Social History:  Social History     Socioeconomic History   • Marital status: /Civil Union     Spouse name: None   • Number of children: None   • Years of education: None   • Highest education level: None   Occupational History   • None   Tobacco Use   • Smoking status: Former     Current packs/day: 0.00     Average packs/day: 0.5 packs/day for 10.0 years (5.0 ttl pk-yrs)     Types: Cigarettes     Start date: 2008     Quit date: 2018     Years since quittin.9     Passive exposure: Never   • Smokeless tobacco: Never   • Tobacco comments:     ocassional   Vaping Use   • Vaping status: Never Used   Substance and Sexual Activity   • Alcohol use: Yes     Alcohol/week: 1.0 standard drink of alcohol     Types: 1 Glasses of wine per week     Comment: rarely   • Drug use: No   • Sexual activity: Yes     Partners: Male     Birth control/protection: None   Other Topics Concern   • None   Social History Narrative    Drinks coffee     Social Drivers of Health     Financial Resource Strain: Not on file   Food Insecurity: Not on file   Transportation Needs: Not on file   Physical Activity: Inactive (2/10/2022)    Exercise Vital Sign    • Days of Exercise per Week: 0 days    • Minutes of Exercise per Session: 0 min   Stress: Stress Concern Present (2/10/2022)    [employer] [place] [employer] - Occupational Stress Questionnaire    • Feeling of Stress : To some extent   Social Connections: Not on file   Intimate Partner Violence:  "Not on file   Housing Stability: Not on file          Review of Systems   Constitutional:  Negative for chills and fever.   Respiratory:  Negative for cough and shortness of breath.    Cardiovascular:  Negative for chest pain.   Gastrointestinal:  Negative for nausea and vomiting.   Musculoskeletal:  Negative for gait problem.   Neurological:  Negative for weakness and numbness.         Objective:      Ht 5' 5\" (1.651 m)   Wt 76.7 kg (169 lb 3.2 oz)   LMP  (LMP Unknown)   BMI 28.16 kg/m²          Physical Exam  Vitals reviewed.   Constitutional:       General: She is not in acute distress.     Appearance: She is not toxic-appearing or diaphoretic.   HENT:      Head: Normocephalic and atraumatic.     Eyes:      Extraocular Movements: Extraocular movements intact.       Cardiovascular:      Rate and Rhythm: Normal rate and regular rhythm.      Pulses: Normal pulses. no weak pulses.           Dorsalis pedis pulses are 2+ on the right side and 2+ on the left side.        Posterior tibial pulses are 2+ on the right side and 2+ on the left side.   Pulmonary:      Effort: Pulmonary effort is normal. No respiratory distress.     Musculoskeletal:         General: No swelling or signs of injury.      Cervical back: Normal range of motion and neck supple.      Right lower leg: No edema.      Left lower leg: No edema.      Right foot: No Charcot foot or foot drop.      Left foot: No Charcot foot or foot drop.      Comments: Palpable mass in left plantar foot which is consistent with plantar fibroma.   Feet:      Right foot:      Protective Sensation: 10 sites tested.  10 sites sensed.      Skin integrity: Dry skin present. No ulcer.      Left foot:      Protective Sensation: 10 sites tested.  10 sites sensed.      Skin integrity: Dry skin present. No ulcer.     Skin:     General: Skin is warm.      Capillary Refill: Capillary refill takes less than 2 seconds.      Coloration: Skin is not cyanotic or mottled.      Findings: " No abscess.      Nails: There is no clubbing.      Comments: Skin peeling / macules  in plantar feet.       Neurological:      General: No focal deficit present.      Mental Status: She is alert and oriented to person, place, and time.      Cranial Nerves: No cranial nerve deficit.      Sensory: No sensory deficit.      Motor: No weakness.      Coordination: Coordination normal.     Psychiatric:         Mood and Affect: Mood normal.         Behavior: Behavior normal.         Thought Content: Thought content normal.         Judgment: Judgment normal.           Diabetic Foot Exam    Patient's shoes and socks removed.    Right Foot/Ankle   Right Foot Inspection  Skin Exam: skin intact and dry skin. No abnormal color, no pre-ulcer and no ulcer.     Toe Exam: No swelling, no tenderness, erythema and  no right toe deformity    Sensory   Proprioception: intact  Monofilament testing: intact    Vascular  Capillary refills: < 3 seconds  The right DP pulse is 2+. The right PT pulse is 2+.     Left Foot/Ankle  Left Foot Inspection  Skin Exam: skin intact and dry skin. Normal color, no pre-ulcer and no ulcer.     Toe Exam: No swelling, no tenderness, no erythema and no left toe deformity.     Sensory   Proprioception: intact  Monofilament testing: intact    Vascular  Capillary refills: < 3 seconds  The left DP pulse is 2+. The left PT pulse is 2+.     Assign Risk Category  No deformity present  No loss of protective sensation  No weak pulses  Risk: 0

## 2025-05-13 NOTE — PROGRESS NOTES
PATIENT:  Terri Louis  1965         ASSESSMENT:     1. Plantar fascial fibromatosis of left foot  Ambulatory Referral to Podiatry      2. Tinea pedis of both feet  terbinafine (LamISIL) 250 mg tablet      3. Type 2 diabetes mellitus without complication, unspecified whether long term insulin use (HCC)                  PLAN:  1. Reviewed medical records.  Reviewed MRI and ultrasound.  Patient was counseled and educated on the condition and the diagnosis.    2. The diagnosis, treatment options and prognosis were discussed with the patient.    3. She has stable plantar fibroma without significant pain.  Instructed supportive care, home exercise, and proper footwear/ arch support.   Discussed possible intralesional injection depending on the progress.    4. She has chronic tinea pedis.  Will add oral Lamisil for 2 weeks.  Pt to use Lotrisone bid for 1 month.  5. Educated disease prevention and risks related to diabetes.  Educated proper daily foot care and exam.  Instructed proper skin care / protection and footwear.   6. The recent blood work was reviewed / discussed and the last HbA1c was 7.4.  Discussed proper blood glucose control with diet and exercise.    7. Patient will return in 6 months for re-evaluation.         Imaging: I have personally reviewed pertinent films in PACS  Labs, pathology, and Other Studies: I have personally reviewed pertinent reports.        Subjective:       HPI  The patient was referred to my office for evaluation of the lump in left foot.  She noticed a lump on left plantar foot in the last few months.  Denied injury.  No significant pain when she walks.  She also has skin peeling in her feet.  She was put on a cream with some improvement.   The patient has diabetes for a few years.  The blood glucose is under control.  No history of diabetic foot ulcer or related foot infection.  No significant numbness or paresthesia.  Denied weakness or significant functional  deficit.        The following portions of the patient's history were reviewed and updated as appropriate: allergies, current medications, past family history, past medical history, past social history, past surgical history and problem list.  All pertinent labs and images were reviewed.      Past Medical History  Past Medical History:   Diagnosis Date    Adjustment disorder with anxiety     Anemia 2018/19    Anxiety 2010    Atypical glandular cells of undetermined significance (ARIA) on cervical Pap smear 09/18/2019    BRCA1 negative     BRCA2 negative     Chronic kidney disease 2018    Constipation     COVID-19 02/07/2024    Dense breasts     Diabetes mellitus (HCC) 2001    GERD (gastroesophageal reflux disease) 2014    Heart murmur     N/A    History of atrial paroxysmal tachycardia     History of cerebral artery occlusion     History of chest pain     HPV (human papilloma virus) infection 2012    Hyperlipidemia     SHAE (iron deficiency anemia)     Menopause     Ovarian cyst     Pancreatitis     Plantar fasciitis     Polyclonal hypergammaglobulinemia     PTSD (post-traumatic stress disorder) 9/28/23    Restless legs syndrome (RLS)     Trigger finger        Past Surgical History  Past Surgical History:   Procedure Laterality Date    CHOLECYSTECTOMY  1998    COLPOSCOPY  2013    AR ESOPHAGOGASTRODUODENOSCOPY TRANSORAL DIAGNOSTIC N/A 10/19/2017    Procedure: EGD AND COLONOSCOPY;  Surgeon: Mekhi Dickey MD;  Location: [facility];  Service: Gastroenterology    TRIGGER FINGER RELEASE          Allergies:  Patient has no known allergies.    Medications:  Current Outpatient Medications   Medication Sig Dispense Refill    ACCU-CHEK FASTCLIX LANCETS MISC by Does not apply route daily      atorvastatin (LIPITOR) 20 mg tablet TAKE 1 TABLET DAILY 90 tablet 3    B Complex-C (SUPER B COMPLEX PO) Take 1 tablet by mouth daily      Blood Glucose Monitoring Suppl (ACCU-CHEK JESSEE CONNECT) w/Device KIT by Does not apply route       cholecalciferol (VITAMIN D3) 1,000 units tablet Take 1,000 Units by mouth 2 (two) times a week       clobetasol (TEMOVATE) 0.05 % cream Apply 5 g (1 Application total) topically 2 (two) times a day 30 g 23    clotrimazole-betamethasone (LOTRISONE) 1-0.05 % cream Apply 1 Application topically 2 (two) times a day 30 g 23    Empagliflozin (Jardiance) 25 MG TABS Take 1 tablet (25 mg total) by mouth in the morning 90 tablet 2    gabapentin (NEURONTIN) 300 mg capsule Take 1 capsule (300 mg total) by mouth 3 (three) times a day 270 capsule 0    glucose blood test strip by In Vitro route 4 (four) times a day      Hyaluronic Acid-Vitamin C (HYALURONIC ACID PO) Take 100 mg by mouth daily      lisinopril (ZESTRIL) 5 mg tablet TAKE ONE-HALF (1/2) TABLET DAILY 45 tablet 1    LORazepam (ATIVAN) 0.5 mg tablet TAKE 1 TABLET EVERY 12 HOURS AS NEEDED FOR ANXIETY 30 tablet 0    metFORMIN (GLUCOPHAGE) 1000 MG tablet TAKE 1 TABLET TWICE A  tablet 1    Multiple Vitamin (DAILY VALUE MULTIVITAMIN) TABS Take by mouth      Omega-3 Fatty Acids (FISH OIL) 1,000 mg Take 1,000 mg by mouth daily      omeprazole (PriLOSEC) 20 mg delayed release capsule TAKE 1 CAPSULE DAILY BEFORE BREAKFAST 90 capsule 1    Saw Palmetto 160 MG CAPS Take 250 mg by mouth        semaglutide (Rybelsus) 14 MG tablet TAKE 1 TABLET DAILY BEFORE BREAKFAST 90 tablet 1    terbinafine (LamISIL) 250 mg tablet Take 1 tablet (250 mg total) by mouth daily for 14 days 14 tablet 0    Turmeric 500 MG CAPS Take by mouth      zinc gluconate 50 mg tablet Take 50 mg by mouth daily      BIOTIN PO Take 1 capsule by mouth daily (Patient not taking: Reported on 5/12/2025)      cyclobenzaprine (FLEXERIL) 10 mg tablet if needed (Patient not taking: Reported on 5/12/2025)       No current facility-administered medications for this visit.       Social History:  Social History     Socioeconomic History    Marital status: /Civil Union     Spouse name: None    Number of children: None  "   Years of education: None    Highest education level: None   Occupational History    None   Tobacco Use    Smoking status: Former     Current packs/day: 0.00     Average packs/day: 0.5 packs/day for 10.0 years (5.0 ttl pk-yrs)     Types: Cigarettes     Start date: 2008     Quit date: 2018     Years since quittin.9     Passive exposure: Never    Smokeless tobacco: Never    Tobacco comments:     ocassional   Vaping Use    Vaping status: Never Used   Substance and Sexual Activity    Alcohol use: Yes     Alcohol/week: 1.0 standard drink of alcohol     Types: 1 Glasses of wine per week     Comment: rarely    Drug use: No    Sexual activity: Yes     Partners: Male     Birth control/protection: None   Other Topics Concern    None   Social History Narrative    Drinks coffee     Social Drivers of Health     Financial Resource Strain: Not on file   Food Insecurity: Not on file   Transportation Needs: Not on file   Physical Activity: Inactive (2/10/2022)    Exercise Vital Sign     Days of Exercise per Week: 0 days     Minutes of Exercise per Session: 0 min   Stress: Stress Concern Present (2/10/2022)    [employer] - Occupational Stress Questionnaire     Feeling of Stress : To some extent   Social Connections: Not on file   Intimate Partner Violence: Not on file   Housing Stability: Not on file          Review of Systems   Constitutional:  Negative for chills and fever.   Respiratory:  Negative for cough and shortness of breath.    Cardiovascular:  Negative for chest pain.   Gastrointestinal:  Negative for nausea and vomiting.   Musculoskeletal:  Negative for gait problem.   Neurological:  Negative for weakness and numbness.         Objective:      Ht 5' 5\" (1.651 m)   Wt 76.7 kg (169 lb 3.2 oz)   LMP  (LMP Unknown)   BMI 28.16 kg/m²          Physical Exam  Vitals reviewed.   Constitutional:       General: She is not in acute distress.     Appearance: She is not toxic-appearing " or diaphoretic.   HENT:      Head: Normocephalic and atraumatic.     Eyes:      Extraocular Movements: Extraocular movements intact.       Cardiovascular:      Rate and Rhythm: Normal rate and regular rhythm.      Pulses: Normal pulses. no weak pulses.           Dorsalis pedis pulses are 2+ on the right side and 2+ on the left side.        Posterior tibial pulses are 2+ on the right side and 2+ on the left side.   Pulmonary:      Effort: Pulmonary effort is normal. No respiratory distress.     Musculoskeletal:         General: No swelling or signs of injury.      Cervical back: Normal range of motion and neck supple.      Right lower leg: No edema.      Left lower leg: No edema.      Right foot: No Charcot foot or foot drop.      Left foot: No Charcot foot or foot drop.      Comments: Palpable mass in left plantar foot which is consistent with plantar fibroma.   Feet:      Right foot:      Protective Sensation: 10 sites tested.  10 sites sensed.      Skin integrity: Dry skin present. No ulcer.      Left foot:      Protective Sensation: 10 sites tested.  10 sites sensed.      Skin integrity: Dry skin present. No ulcer.     Skin:     General: Skin is warm.      Capillary Refill: Capillary refill takes less than 2 seconds.      Coloration: Skin is not cyanotic or mottled.      Findings: No abscess.      Nails: There is no clubbing.      Comments: Skin peeling / macules  in plantar feet.       Neurological:      General: No focal deficit present.      Mental Status: She is alert and oriented to person, place, and time.      Cranial Nerves: No cranial nerve deficit.      Sensory: No sensory deficit.      Motor: No weakness.      Coordination: Coordination normal.     Psychiatric:         Mood and Affect: Mood normal.         Behavior: Behavior normal.         Thought Content: Thought content normal.         Judgment: Judgment normal.           Diabetic Foot Exam    Patient's shoes and socks removed.    Right Foot/Ankle    Right Foot Inspection  Skin Exam: skin intact and dry skin. No abnormal color, no pre-ulcer and no ulcer.     Toe Exam: No swelling, no tenderness, erythema and  no right toe deformity    Sensory   Proprioception: intact  Monofilament testing: intact    Vascular  Capillary refills: < 3 seconds  The right DP pulse is 2+. The right PT pulse is 2+.     Left Foot/Ankle  Left Foot Inspection  Skin Exam: skin intact and dry skin. Normal color, no pre-ulcer and no ulcer.     Toe Exam: No swelling, no tenderness, no erythema and no left toe deformity.     Sensory   Proprioception: intact  Monofilament testing: intact    Vascular  Capillary refills: < 3 seconds  The left DP pulse is 2+. The left PT pulse is 2+.     Assign Risk Category  No deformity present  No loss of protective sensation  No weak pulses  Risk: 0

## 2025-06-09 ENCOUNTER — HOSPITAL ENCOUNTER (OUTPATIENT)
Age: 60
Discharge: HOME | End: 2025-06-09
Payer: COMMERCIAL

## 2025-06-09 DIAGNOSIS — Z12.31 ENCOUNTER FOR SCREENING MAMMOGRAM FOR BREAST CANCER: ICD-10-CM

## 2025-06-09 PROCEDURE — 77063 BREAST TOMOSYNTHESIS BI: CPT

## 2025-06-09 PROCEDURE — 77067 SCR MAMMO BI INCL CAD: CPT

## 2025-06-12 ENCOUNTER — RESULTS FOLLOW-UP (OUTPATIENT)
Dept: FAMILY MEDICINE CLINIC | Facility: CLINIC | Age: 60
End: 2025-06-12

## 2025-06-15 DIAGNOSIS — B35.9 TINEA: ICD-10-CM

## 2025-06-15 DIAGNOSIS — F41.9 ANXIETY: ICD-10-CM

## 2025-06-15 RX ORDER — CLOTRIMAZOLE AND BETAMETHASONE DIPROPIONATE 10; .64 MG/G; MG/G
1 CREAM TOPICAL 2 TIMES DAILY
Qty: 30 G | Refills: 0 | Status: CANCELLED | OUTPATIENT
Start: 2025-06-15

## 2025-06-16 ENCOUNTER — OFFICE VISIT (OUTPATIENT)
Dept: FAMILY MEDICINE CLINIC | Facility: CLINIC | Age: 60
End: 2025-06-16
Payer: COMMERCIAL

## 2025-06-16 VITALS
DIASTOLIC BLOOD PRESSURE: 62 MMHG | HEIGHT: 65 IN | RESPIRATION RATE: 18 BRPM | WEIGHT: 169 LBS | HEART RATE: 70 BPM | BODY MASS INDEX: 28.16 KG/M2 | OXYGEN SATURATION: 97 % | SYSTOLIC BLOOD PRESSURE: 110 MMHG

## 2025-06-16 DIAGNOSIS — E11.9 TYPE 2 DIABETES MELLITUS WITHOUT COMPLICATION, WITHOUT LONG-TERM CURRENT USE OF INSULIN (HCC): Primary | ICD-10-CM

## 2025-06-16 DIAGNOSIS — B35.9 TINEA: ICD-10-CM

## 2025-06-16 LAB — SL AMB POCT HEMOGLOBIN AIC: 7.2 (ref ?–6.5)

## 2025-06-16 PROCEDURE — 83036 HEMOGLOBIN GLYCOSYLATED A1C: CPT | Performed by: FAMILY MEDICINE

## 2025-06-16 PROCEDURE — 99214 OFFICE O/P EST MOD 30 MIN: CPT | Performed by: FAMILY MEDICINE

## 2025-06-16 RX ORDER — CLOTRIMAZOLE AND BETAMETHASONE DIPROPIONATE 10; .64 MG/G; MG/G
1 CREAM TOPICAL 2 TIMES DAILY
Qty: 180 G | Refills: 0 | Status: SHIPPED | OUTPATIENT
Start: 2025-06-16

## 2025-06-16 NOTE — PROGRESS NOTES
Name: Terri Louis      : 1965      MRN: [id number]  Encounter Provider: Sara Elder DO  Encounter Date: 2025   Encounter department: [facility] [address]  :  Assessment & Plan  Type 2 diabetes mellitus without complication, without long-term current use of insulin ([facility])    Lab Results   Component Value Date    HGBA1C 7.2 (A) 2025       Orders:  •  POCT hemoglobin A1c    Tinea    Orders:  •  clotrimazole-betamethasone (LOTRISONE) 1-0.05 % cream; Apply 1 Application topically 2 (two) times a day         History of Present Illness   Diabetes  She has type 2 diabetes mellitus. No MedicAlert identification noted. The initial diagnosis of diabetes was made 25 years ago. Pertinent negatives for hypoglycemia include no confusion, dizziness, headaches, hunger, mood changes, nervousness/anxiousness, pallor, seizures, sleepiness, speech difficulty, sweats or tremors. Pertinent negatives for diabetes include no blurred vision, no chest pain, no fatigue, no foot paresthesias, no foot ulcerations, no polydipsia, no polyphagia, no polyuria, no visual change, no weakness and no weight loss. Pertinent negatives for hypoglycemia complications include no blackouts, no nocturnal hypoglycemia, no required assistance and no required glucagon injection. Symptoms are stable. Pertinent negatives for diabetic complications include no CVA, heart disease, nephropathy, peripheral neuropathy, PVD or retinopathy. Current diabetic treatment includes diet and oral agent (triple therapy). She is compliant with treatment most of the time. Her weight is stable. She is following a generally healthy diet. Meal planning includes avoidance of concentrated sweets, calorie counting and carbohydrate counting. She has not had a previous visit with a dietitian. She participates in exercise weekly. Blood glucose monitoring compliance is inadequate. Her overall blood glucose range is 110-130  "mg/dl. She sees a podiatrist.Eye exam is current.     Patient presents to the office for follow up.     She is doing Metformin 1000 mg BID, Jardiance 25 mg daily and Rybelsus 14 mg tablet.     Review of Systems   Constitutional:  Negative for fatigue and weight loss.   Eyes:  Negative for blurred vision.   Cardiovascular:  Negative for chest pain.   Endocrine: Negative for polydipsia, polyphagia and polyuria.   Skin:  Negative for pallor.   Neurological:  Negative for dizziness, tremors, seizures, speech difficulty, weakness and headaches.   Psychiatric/Behavioral:  Negative for confusion. The patient is not nervous/anxious.      Objective   /62 (BP Location: Left arm, Patient Position: Sitting, Cuff Size: Large)   Pulse 70   Resp 18   Ht 5' 5\" (1.651 m)   Wt 76.7 kg (169 lb)   LMP  (LMP Unknown)   SpO2 97%   BMI 28.12 kg/m²      Physical Exam  Vitals reviewed.   Constitutional:       General: She is not in acute distress.     Appearance: Normal appearance.   HENT:      Head: Normocephalic and atraumatic.      Right Ear: External ear normal.      Left Ear: External ear normal.      Nose: Nose normal.      Mouth/Throat:      Mouth: Mucous membranes are moist.     Eyes:      Extraocular Movements: Extraocular movements intact.      Conjunctiva/sclera: Conjunctivae normal.       Cardiovascular:      Rate and Rhythm: Normal rate and regular rhythm.      Heart sounds: Normal heart sounds.   Pulmonary:      Effort: Pulmonary effort is normal.      Breath sounds: Normal breath sounds.   Abdominal:      General: Bowel sounds are normal. There is no distension.      Palpations: Abdomen is soft.      Tenderness: There is no abdominal tenderness.     Musculoskeletal:      Cervical back: Neck supple.      Right lower leg: No edema.      Left lower leg: No edema.     Skin:     General: Skin is warm.      Capillary Refill: Capillary refill takes less than 2 seconds.      Findings: No rash.     Neurological:      " Mental Status: She is alert. Mental status is at baseline.           DO Jay Frost [facility]  6/16/2025 2:13 PM

## 2025-06-17 ENCOUNTER — TELEMEDICINE (OUTPATIENT)
Dept: BEHAVIORAL/MENTAL HEALTH CLINIC | Facility: CLINIC | Age: 60
End: 2025-06-17
Payer: COMMERCIAL

## 2025-06-17 DIAGNOSIS — F41.1 GENERALIZED ANXIETY DISORDER: Primary | ICD-10-CM

## 2025-06-17 PROCEDURE — 90834 PSYTX W PT 45 MINUTES: CPT | Performed by: SOCIAL WORKER

## 2025-06-17 NOTE — PSYCH
Virtual Regular VisitName: Terri Louis      : 1965      MRN: [id number]  Encounter Provider: Willow Perdomo  Encounter Date: 2025   Encounter department: [facility] [address]  :  Assessment & Plan  Generalized anxiety disorder             DATA: Therapist met w/pt for individual session.  Symptoms include: racing thoughts, stress, anxiety, sadness.  She shared that this past weekend was the anniversary of her daughter's death which was difficult.  She stated her son called her the day before and how grateful she is to have such good family support.  Therapist and pt discussed pt having summer break and the different tasks/goals she has for the summer.  Therapist and pt discussed ways she is working on managing her stressors.  Therapist informed pt that she will be transitioning to a different department and discussed different options for pt.  Pt stated that she wants to take a break from therapy and when she is ready will initiate it on her own.  Therapist and pt will meet one more time before discharge.      During this session, this clinician used the following therapeutic modalities: Client-centered Therapy and Cognitive Behavioral Therapy    Substance Abuse was not addressed during this session. If the client is diagnosed with a co-occurring substance use disorder, please indicate any changes in the frequency or amount of use: unknown. Stage of change for addressing substance use diagnoses: No substance use/Not applicable    ASSESSMENT:  Terri presents with a Euthymic/ normal mood. Rufinas affect is Normal range and intensity, which is congruent, with their mood and the content of the session. The client has made progress on their goals as evidenced by pts improved symptoms and ability to implement self care.    Terri presents with a none risk of suicide, none risk of self-harm, and none risk of harm to others.    For any risk assessment  "that surpasses a \"low\" rating, a safety plan must be developed.    A safety plan was indicated: no  If yes, describe in detail n/a    PLAN: Between sessions, Terri will continue to engage in self care and ensure she is using calming strategies. At the next session, the therapist will use Client-centered Therapy and Cognitive Behavioral Therapy to address symptoms of anxiety/depression.    Behavioral Health Treatment Plan [facility]: Diagnosis and Treatment Plan explained to Terri, Terri relates understanding diagnosis and is agreeable to Treatment Plan. Yes     Depression Follow-up Plan Completed: Not applicable     Reason for visit is No chief complaint on file.     Recent Visits  Date Type Provider Dept   06/16/25 Office Visit Sara Elder DO [facility] [address] [place]   Showing recent visits within past 7 days and meeting all other requirements  Today's Visits  Date Type Provider Dept   06/17/25 Telemedicine Willow Perdomo [facility] [address]   Showing today's visits and meeting all other requirements  Future Appointments  No visits were found meeting these conditions.  Showing future appointments within next 150 days and meeting all other requirements     History of Present Illness     HPI    Past Medical History   Past Medical History[1]  Past Surgical History[2]  Current Outpatient Medications   Medication Instructions    ACCU-CHEK FASTCLIX LANCETS MISC Daily    atorvastatin (LIPITOR) 20 mg, Oral, Daily    B Complex-C (SUPER B COMPLEX PO) 1 tablet, Daily    BIOTIN PO 1 capsule, Daily    Blood Glucose Monitoring Suppl (ACCU-CHEK JESSEE CONNECT) w/Device KIT Use    cholecalciferol (VITAMIN D3) 1,000 Units, 2 times weekly    clobetasol (TEMOVATE) 0.05 % cream 1 Application, Topical, 2 times daily    clotrimazole-betamethasone (LOTRISONE) 1-0.05 % cream 1 Application, Topical, 2 times daily    cyclobenzaprine (FLEXERIL) 10 mg tablet As needed    Empagliflozin 25 mg, Oral, " Daily    fish oil 1,000 mg, Daily    gabapentin (NEURONTIN) 300 mg, Oral, 3 times daily    glucose blood test strip 4 times daily    Hyaluronic Acid-Vitamin C (HYALURONIC ACID PO) 100 mg, Daily    lisinopril (ZESTRIL) 2.5 mg, Oral    LORazepam (ATIVAN) 0.5 mg, Oral, Every 12 hours PRN    metFORMIN (GLUCOPHAGE) 1,000 mg, 2 times daily    Multiple Vitamin (DAILY VALUE MULTIVITAMIN) TABS Take by mouth    omeprazole (PRILOSEC) 20 mg, Oral, Daily before breakfast    Rybelsus 14 mg, Daily before breakfast    Saw Palmetto 250 mg    Turmeric 500 MG CAPS Take by mouth    zinc gluconate 50 mg, Daily     Allergies[3]    Objective   LMP  (LMP Unknown)     Video Exam  Physical Exam     Administrative Statements   Encounter provider Willow Perdomo    The Patient is located at Home and in the following state in which I hold an active license [place].    The patient was identified by name and date of birth. Terri Mercerragoza was informed that this is a telemedicine visit and that the visit is being conducted through the Epic Embedded platform. She agrees to proceed..  My office door was closed. No one else was in the room.  She acknowledged consent and understanding of privacy and security of the video platform. The patient has agreed to participate and understands they can discontinue the visit at any time.      Visit Time  Start Time: 1300  Stop Time: 1348  Total Visit Time: 48 minutes         [1]   Past Medical History:  Diagnosis Date    Adjustment disorder with anxiety     Anemia 2018/19    Anxiety 2010    Atypical glandular cells of undetermined significance (ARIA) on cervical Pap smear 09/18/2019    BRCA1 negative     BRCA2 negative     Chronic kidney disease 2018    Constipation     COVID-19 02/07/2024    Dense breasts     Diabetes mellitus (HCC) 2001    GERD (gastroesophageal reflux disease) 2014    Heart murmur     N/A    History of atrial paroxysmal tachycardia     History of cerebral artery occlusion     History of chest pain      HPV (human papilloma virus) infection 2012    Hyperlipidemia     SHAE (iron deficiency anemia)     Menopause     Ovarian cyst     Pancreatitis     Plantar fasciitis     Polyclonal hypergammaglobulinemia     PTSD (post-traumatic stress disorder) 9/28/23    Restless legs syndrome (RLS)     Trigger finger    [2]   Past Surgical History:  Procedure Laterality Date    CHOLECYSTECTOMY  1998    COLPOSCOPY  2013    AK ESOPHAGOGASTRODUODENOSCOPY TRANSORAL DIAGNOSTIC N/A 10/19/2017    Procedure: EGD AND COLONOSCOPY;  Surgeon: Mekhi Dickey MD;  Location: [facility];  Service: Gastroenterology    TRIGGER FINGER RELEASE     [3] No Known Allergies

## 2025-06-18 RX ORDER — LORAZEPAM 0.5 MG/1
0.5 TABLET ORAL EVERY 12 HOURS PRN
Qty: 30 TABLET | Refills: 0 | Status: SHIPPED | OUTPATIENT
Start: 2025-06-18

## 2025-07-01 ENCOUNTER — TELEMEDICINE (OUTPATIENT)
Dept: BEHAVIORAL/MENTAL HEALTH CLINIC | Facility: CLINIC | Age: 60
End: 2025-07-01
Payer: COMMERCIAL

## 2025-07-01 DIAGNOSIS — F41.1 GENERALIZED ANXIETY DISORDER: Primary | ICD-10-CM

## 2025-07-01 PROCEDURE — 90834 PSYTX W PT 45 MINUTES: CPT | Performed by: SOCIAL WORKER

## 2025-07-07 NOTE — PSYCH
"Virtual Regular VisitName: Terri Louis      : 1965      MRN: [id number]  Encounter Provider: Willow Perdomo  Encounter Date: 2025   Encounter department: [facility] [address]  :  Assessment & Plan  Generalized anxiety disorder             DATA: Therapist met w/pt one more time before pt discharges.  Therapist and pt reviewed pt's progress and ongoing strategies to manage stressors/anxieties in her life.      During this session, this clinician used the following therapeutic modalities: Client-centered Therapy and Cognitive Behavioral Therapy    Substance Abuse was not addressed during this session. If the client is diagnosed with a co-occurring substance use disorder, please indicate any changes in the frequency or amount of use: unknown. Stage of change for addressing substance use diagnoses: No substance use/Not applicable    ASSESSMENT:  Terri presents with a Euthymic/ normal and Anxious mood. Rufinas affect is Normal range and intensity, which is congruent, with their mood and the content of the session. The client has made progress on their goals as evidenced by pts improved symptoms..    Terri presents with a none risk of suicide, none risk of self-harm, and none risk of harm to others.    For any risk assessment that surpasses a \"low\" rating, a safety plan must be developed.    A safety plan was indicated: no  If yes, describe in detail n/a    PLAN: Therapist will d/c pt.     Behavioral Health Treatment Plan [facility]: Diagnosis and Treatment Plan explained to Terri Murray relates understanding diagnosis and is agreeable to Treatment Plan. Yes     Depression Follow-up Plan Completed: Not applicable     Reason for visit is No chief complaint on file.     Recent Visits  Date Type Provider Dept   25 Telemedicine Willow Perdomo [facility] [address]   Showing recent visits within past 7 days and meeting all other " requirements  Future Appointments  No visits were found meeting these conditions.  Showing future appointments within next 150 days and meeting all other requirements     History of Present Illness     HPI    Past Medical History   Past Medical History[1]  Past Surgical History[2]  Current Outpatient Medications   Medication Instructions    ACCU-CHEK FASTCLIX LANCETS MISC Daily    atorvastatin (LIPITOR) 20 mg, Oral, Daily    B Complex-C (SUPER B COMPLEX PO) 1 tablet, Daily    BIOTIN PO 1 capsule, Daily    Blood Glucose Monitoring Suppl (ACCU-CHEK JESSEE CONNECT) w/Device KIT Use    cholecalciferol (VITAMIN D3) 1,000 Units, 2 times weekly    clobetasol (TEMOVATE) 0.05 % cream 1 Application, Topical, 2 times daily    clotrimazole-betamethasone (LOTRISONE) 1-0.05 % cream 1 Application, Topical, 2 times daily    cyclobenzaprine (FLEXERIL) 10 mg tablet As needed    Empagliflozin 25 mg, Oral, Daily    fish oil 1,000 mg, Daily    gabapentin (NEURONTIN) 300 mg, Oral, 3 times daily    glucose blood test strip 4 times daily    Hyaluronic Acid-Vitamin C (HYALURONIC ACID PO) 100 mg, Daily    lisinopril (ZESTRIL) 2.5 mg, Oral    LORazepam (ATIVAN) 0.5 mg, Oral, Every 12 hours PRN    metFORMIN (GLUCOPHAGE) 1,000 mg, 2 times daily    Multiple Vitamin (DAILY VALUE MULTIVITAMIN) TABS Take by mouth    omeprazole (PRILOSEC) 20 mg, Oral, Daily before breakfast    Rybelsus 14 mg, Daily before breakfast    Saw Palmetto 250 mg    Turmeric 500 MG CAPS Take by mouth    zinc gluconate 50 mg, Daily     Allergies[3]    Objective   LMP  (LMP Unknown)     Video Exam  Physical Exam     Administrative Statements   Encounter provider Willow Perdomo    The Patient is located at Home and in the following state in which I hold an active license [place].    The patient was identified by name and date of birth. Terri Louis was informed that this is a telemedicine visit and that the visit is being conducted through the Epic Embedded platform. She agrees  to proceed..  My office door was closed. No one else was in the room.  She acknowledged consent and understanding of privacy and security of the video platform. The patient has agreed to participate and understands they can discontinue the visit at any time.      Visit Time  Start Time: 1400  Stop Time: 1445  Total Visit Time: 45 minutes         [1]   Past Medical History:  Diagnosis Date    Adjustment disorder with anxiety     Anemia 2018/19    Anxiety 2010    Atypical glandular cells of undetermined significance (ARIA) on cervical Pap smear 09/18/2019    BRCA1 negative     BRCA2 negative     Chronic kidney disease 2018    Constipation     COVID-19 02/07/2024    Dense breasts     Diabetes mellitus (HCC) 2001    GERD (gastroesophageal reflux disease) 2014    Heart murmur     N/A    History of atrial paroxysmal tachycardia     History of cerebral artery occlusion     History of chest pain     HPV (human papilloma virus) infection 2012    Hyperlipidemia     SHAE (iron deficiency anemia)     Menopause     Ovarian cyst     Pancreatitis     Plantar fasciitis     Polyclonal hypergammaglobulinemia     PTSD (post-traumatic stress disorder) 9/28/23    Restless legs syndrome (RLS)     Trigger finger    [2]   Past Surgical History:  Procedure Laterality Date    CHOLECYSTECTOMY  1998    COLPOSCOPY  2013    FL ESOPHAGOGASTRODUODENOSCOPY TRANSORAL DIAGNOSTIC N/A 10/19/2017    Procedure: EGD AND COLONOSCOPY;  Surgeon: Mekhi Dickey MD;  Location: [facility];  Service: Gastroenterology    TRIGGER FINGER RELEASE     [3] No Known Allergies

## 2025-07-07 NOTE — BH CRISIS PLAN
Psychotherapy Discharge Summary    Preferred Name: Tamera Louis  YOB: 1965    Admission date to psychotherapy: 11/20/2023    Referred by: PCP    Presenting Problem: Stress and anxiety related to work    Course of treatment included : individual therapy     Progress/Outcome of Treatment Goals (brief summary of course of treatment) Pt presented to treatment after an incident that occurred at work and the stress/anxiety she was experiencing.  Pt was able to learn healthy coping strategies to manage her stress/anxiety    Treatment Complications (if any): None    Treatment Progress: good    Current [facility] Provider: n/a    Discharge Medications include: lorazepam    Discharge Date: 7/1/2025    Discharge Diagnosis:   1. Generalized anxiety disorder            Criteria for Discharge: completed treatment goals and objectives and is no longer in need of services      Aftercare recommendations include (include specific referral names and phone numbers, if appropriate): none    Prognosis: good

## 2025-07-17 ENCOUNTER — TELEPHONE (OUTPATIENT)
Age: 60
End: 2025-07-17

## 2025-07-17 NOTE — TELEPHONE ENCOUNTER
Pt called and has a form to be filled out for her employer. Pt also needs a TB test. Please place order and call pt back to schedule.    none

## 2025-07-18 ENCOUNTER — TELEPHONE (OUTPATIENT)
Dept: NEPHROLOGY | Facility: CLINIC | Age: 60
End: 2025-07-18

## 2025-07-18 NOTE — TELEPHONE ENCOUNTER
I called and spoke to the patient and schedule her 12 month nephrology follow up appointment with Dr. RODOLFO Roldan from our recall list. The patient understood and was okay with the day and time of her next appointment.

## 2025-07-21 ENCOUNTER — CLINICAL SUPPORT (OUTPATIENT)
Dept: FAMILY MEDICINE CLINIC | Facility: CLINIC | Age: 60
End: 2025-07-21
Payer: COMMERCIAL

## 2025-07-21 DIAGNOSIS — Z11.1 VISIT FOR TB SKIN TEST: Primary | ICD-10-CM

## 2025-07-21 PROCEDURE — 86580 TB INTRADERMAL TEST: CPT

## 2025-07-21 NOTE — PROGRESS NOTES
PPD Placement note  Terri Louis, 60 y.o. female is here today for placement of PPD test  Reason for PPD test: work   Pt taken PPD test before: yes   Verified in allergy area and with patient that they are not allergic to the products PPD is made of (Phenol or Tween). Yes  Is patient taking any oral or IV steroid medication now or have they taken it in the last month? no  Has the patient ever received the BCG vaccine?: no  Has the patient been in recent contact with anyone known or suspected of having active TB disease?: no    P:  PPD placed on 7/21/2025.  Patient advised to return for reading within 48-72 hours.     Lower right   Paperwork in Dr. Elder's bin

## 2025-07-23 ENCOUNTER — CLINICAL SUPPORT (OUTPATIENT)
Dept: FAMILY MEDICINE CLINIC | Facility: CLINIC | Age: 60
End: 2025-07-23

## 2025-07-23 DIAGNOSIS — E11.9 TYPE 2 DIABETES MELLITUS WITHOUT COMPLICATION, WITHOUT LONG-TERM CURRENT USE OF INSULIN (HCC): ICD-10-CM

## 2025-07-23 DIAGNOSIS — F41.9 ANXIETY: ICD-10-CM

## 2025-07-23 DIAGNOSIS — E11.65 UNCONTROLLED TYPE 2 DIABETES MELLITUS WITH HYPERGLYCEMIA (HCC): ICD-10-CM

## 2025-07-23 DIAGNOSIS — R12 HEARTBURN: ICD-10-CM

## 2025-07-23 DIAGNOSIS — E11.22 TYPE 2 DIABETES MELLITUS WITH STAGE 2 CHRONIC KIDNEY DISEASE, WITHOUT LONG-TERM CURRENT USE OF INSULIN  (HCC): ICD-10-CM

## 2025-07-23 DIAGNOSIS — I10 ESSENTIAL HYPERTENSION: ICD-10-CM

## 2025-07-23 DIAGNOSIS — Z11.1 VISIT FOR TB SKIN TEST: Primary | ICD-10-CM

## 2025-07-23 DIAGNOSIS — N18.2 TYPE 2 DIABETES MELLITUS WITH STAGE 2 CHRONIC KIDNEY DISEASE, WITHOUT LONG-TERM CURRENT USE OF INSULIN  (HCC): ICD-10-CM

## 2025-07-23 DIAGNOSIS — E78.2 MIXED HYPERLIPIDEMIA: ICD-10-CM

## 2025-07-23 LAB
INDURATION: 0 MM
TB SKIN TEST: NEGATIVE

## 2025-07-23 PROCEDURE — NURSE

## 2025-07-23 NOTE — Clinical Note
Physical completed and scanned into encounter. PPD read and eye exam performed. Patient requesting refill of pended medications.

## 2025-07-23 NOTE — PROGRESS NOTES
PPD Reading Note  PPD read and results entered in [employer].  Result: 0 mm induration.  Interpretation: Negative  If test not read within 48-72 hours of initial placement, patient advised to repeat in other arm 1-3 weeks after this test.  Allergic reaction: no

## 2025-07-24 RX ORDER — LISINOPRIL 5 MG/1
2.5 TABLET ORAL DAILY
Qty: 45 TABLET | Refills: 1 | Status: SHIPPED | OUTPATIENT
Start: 2025-07-24

## 2025-07-24 RX ORDER — ATORVASTATIN CALCIUM 20 MG/1
20 TABLET, FILM COATED ORAL DAILY
Qty: 90 TABLET | Refills: 3 | Status: SHIPPED | OUTPATIENT
Start: 2025-07-24

## 2025-07-24 RX ORDER — OMEPRAZOLE 20 MG/1
20 CAPSULE, DELAYED RELEASE ORAL
Qty: 90 CAPSULE | Refills: 1 | Status: SHIPPED | OUTPATIENT
Start: 2025-07-24

## 2025-07-24 RX ORDER — LORAZEPAM 0.5 MG/1
0.5 TABLET ORAL EVERY 12 HOURS PRN
Qty: 30 TABLET | Refills: 0 | Status: SHIPPED | OUTPATIENT
Start: 2025-07-24